# Patient Record
Sex: FEMALE | Race: WHITE | NOT HISPANIC OR LATINO | Employment: OTHER | URBAN - METROPOLITAN AREA
[De-identification: names, ages, dates, MRNs, and addresses within clinical notes are randomized per-mention and may not be internally consistent; named-entity substitution may affect disease eponyms.]

---

## 2017-01-21 ENCOUNTER — HOSPITAL ENCOUNTER (EMERGENCY)
Facility: MEDICAL CENTER | Age: 67
End: 2017-01-21
Attending: EMERGENCY MEDICINE
Payer: COMMERCIAL

## 2017-01-21 VITALS
DIASTOLIC BLOOD PRESSURE: 71 MMHG | RESPIRATION RATE: 16 BRPM | BODY MASS INDEX: 43.67 KG/M2 | HEIGHT: 65 IN | HEART RATE: 79 BPM | OXYGEN SATURATION: 93 % | WEIGHT: 262.13 LBS | TEMPERATURE: 97.1 F | SYSTOLIC BLOOD PRESSURE: 129 MMHG

## 2017-01-21 DIAGNOSIS — R10.10 PAIN OF UPPER ABDOMEN: ICD-10-CM

## 2017-01-21 DIAGNOSIS — R19.7 VOMITING AND DIARRHEA: ICD-10-CM

## 2017-01-21 DIAGNOSIS — R11.10 VOMITING AND DIARRHEA: ICD-10-CM

## 2017-01-21 DIAGNOSIS — E87.6 HYPOKALEMIA: ICD-10-CM

## 2017-01-21 LAB
ALBUMIN SERPL BCP-MCNC: 3.6 G/DL (ref 3.2–4.9)
ALBUMIN/GLOB SERPL: 1.1 G/DL
ALP SERPL-CCNC: 85 U/L (ref 30–99)
ALT SERPL-CCNC: 47 U/L (ref 2–50)
ANION GAP SERPL CALC-SCNC: 11 MMOL/L (ref 0–11.9)
AST SERPL-CCNC: 40 U/L (ref 12–45)
BASOPHILS # BLD AUTO: 0 % (ref 0–1.8)
BASOPHILS # BLD: 0 K/UL (ref 0–0.12)
BILIRUB SERPL-MCNC: 0.8 MG/DL (ref 0.1–1.5)
BUN SERPL-MCNC: 10 MG/DL (ref 8–22)
CALCIUM SERPL-MCNC: 8.5 MG/DL (ref 8.4–10.2)
CHLORIDE SERPL-SCNC: 89 MMOL/L (ref 96–112)
CO2 SERPL-SCNC: 29 MMOL/L (ref 20–33)
CREAT SERPL-MCNC: 0.81 MG/DL (ref 0.5–1.4)
EOSINOPHIL # BLD AUTO: 0.34 K/UL (ref 0–0.51)
EOSINOPHIL NFR BLD: 4 % (ref 0–6.9)
ERYTHROCYTE [DISTWIDTH] IN BLOOD BY AUTOMATED COUNT: 35.8 FL (ref 35.9–50)
GFR SERPL CREATININE-BSD FRML MDRD: >60 ML/MIN/1.73 M 2
GLOBULIN SER CALC-MCNC: 3.2 G/DL (ref 1.9–3.5)
GLUCOSE SERPL-MCNC: 111 MG/DL (ref 65–99)
HCT VFR BLD AUTO: 39.1 % (ref 37–47)
HGB BLD-MCNC: 14.1 G/DL (ref 12–16)
LYMPHOCYTES # BLD AUTO: 1.51 K/UL (ref 1–4.8)
LYMPHOCYTES NFR BLD: 18 % (ref 22–41)
MANUAL DIFF BLD: NORMAL
MCH RBC QN AUTO: 29.4 PG (ref 27–33)
MCHC RBC AUTO-ENTMCNC: 36.1 G/DL (ref 33.6–35)
MCV RBC AUTO: 81.5 FL (ref 81.4–97.8)
MONOCYTES # BLD AUTO: 0.34 K/UL (ref 0–0.85)
MONOCYTES NFR BLD AUTO: 4 % (ref 0–13.4)
NEUTROPHILS # BLD AUTO: 6.22 K/UL (ref 2–7.15)
NEUTROPHILS NFR BLD: 71 % (ref 44–72)
NEUTS BAND NFR BLD MANUAL: 3 % (ref 0–10)
NRBC # BLD AUTO: 0 K/UL
NRBC BLD AUTO-RTO: 0 /100 WBC
PLATELET # BLD AUTO: 271 K/UL (ref 164–446)
PMV BLD AUTO: 9.1 FL (ref 9–12.9)
POTASSIUM SERPL-SCNC: 2.6 MMOL/L (ref 3.6–5.5)
PROT SERPL-MCNC: 6.8 G/DL (ref 6–8.2)
RBC # BLD AUTO: 4.8 M/UL (ref 4.2–5.4)
SODIUM SERPL-SCNC: 129 MMOL/L (ref 135–145)
WBC # BLD AUTO: 8.4 K/UL (ref 4.8–10.8)

## 2017-01-21 PROCEDURE — 85007 BL SMEAR W/DIFF WBC COUNT: CPT

## 2017-01-21 PROCEDURE — 99285 EMERGENCY DEPT VISIT HI MDM: CPT

## 2017-01-21 PROCEDURE — 85027 COMPLETE CBC AUTOMATED: CPT

## 2017-01-21 PROCEDURE — 96365 THER/PROPH/DIAG IV INF INIT: CPT

## 2017-01-21 PROCEDURE — 700111 HCHG RX REV CODE 636 W/ 250 OVERRIDE (IP): Performed by: EMERGENCY MEDICINE

## 2017-01-21 PROCEDURE — 700111 HCHG RX REV CODE 636 W/ 250 OVERRIDE (IP): Performed by: FAMILY MEDICINE

## 2017-01-21 PROCEDURE — 36415 COLL VENOUS BLD VENIPUNCTURE: CPT

## 2017-01-21 PROCEDURE — 700102 HCHG RX REV CODE 250 W/ 637 OVERRIDE(OP): Performed by: EMERGENCY MEDICINE

## 2017-01-21 PROCEDURE — A9270 NON-COVERED ITEM OR SERVICE: HCPCS | Performed by: EMERGENCY MEDICINE

## 2017-01-21 PROCEDURE — 80053 COMPREHEN METABOLIC PANEL: CPT

## 2017-01-21 RX ORDER — POTASSIUM CHLORIDE 750 MG/1
40 TABLET, FILM COATED, EXTENDED RELEASE ORAL ONCE
Status: COMPLETED | OUTPATIENT
Start: 2017-01-21 | End: 2017-01-21

## 2017-01-21 RX ORDER — POTASSIUM CHLORIDE 7.45 MG/ML
10 INJECTION INTRAVENOUS
Status: DISCONTINUED | OUTPATIENT
Start: 2017-01-21 | End: 2017-01-21 | Stop reason: HOSPADM

## 2017-01-21 RX ORDER — POTASSIUM CHLORIDE 750 MG/1
10 TABLET, FILM COATED, EXTENDED RELEASE ORAL 2 TIMES DAILY
Qty: 20 TAB | Refills: 3 | Status: SHIPPED | OUTPATIENT
Start: 2017-01-21 | End: 2018-09-08

## 2017-01-21 RX ORDER — IBUPROFEN 200 MG
400 TABLET ORAL EVERY 6 HOURS PRN
Status: ON HOLD | COMMUNITY
End: 2021-05-18

## 2017-01-21 RX ORDER — DEXTROMETHORPHAN HBR. AND GUAIFENESIN 10; 100 MG/5ML; MG/5ML
10 SOLUTION ORAL EVERY 4 HOURS PRN
Status: SHIPPED | COMMUNITY
End: 2018-09-08

## 2017-01-21 RX ORDER — SODIUM CHLORIDE 9 MG/ML
1000 INJECTION, SOLUTION INTRAVENOUS ONCE
Status: COMPLETED | OUTPATIENT
Start: 2017-01-21 | End: 2017-01-21

## 2017-01-21 RX ORDER — AZITHROMYCIN 250 MG/1
250-500 TABLET, FILM COATED ORAL DAILY
Status: SHIPPED | COMMUNITY
Start: 2017-01-16 | End: 2018-09-08

## 2017-01-21 RX ADMIN — POTASSIUM CHLORIDE 40 MEQ: 750 TABLET, FILM COATED, EXTENDED RELEASE ORAL at 17:48

## 2017-01-21 RX ADMIN — SODIUM CHLORIDE 1000 ML: 9 INJECTION, SOLUTION INTRAVENOUS at 17:48

## 2017-01-21 RX ADMIN — POTASSIUM CHLORIDE 10 MEQ: 7.46 INJECTION, SOLUTION INTRAVENOUS at 17:47

## 2017-01-21 ASSESSMENT — PAIN SCALES - GENERAL
PAINLEVEL_OUTOF10: 0
PAINLEVEL_OUTOF10: ASSUMED PAIN PRESENT

## 2017-01-21 NOTE — ED AVS SNAPSHOT
After Visit Summary                                                                                                                Guerda Lunsford   MRN: [id number]    Department:  [facility], Emergency Dept   Date of Visit:  1/21/2017            [facility], Emergency Dept    [address]    [place] [address]    Phone:  [phone]      You were seen by     Wing Zapata M.D.      Your Diagnosis Was     Pain of upper abdomen     R10.10       These are the medications you received during your hospitalization from 01/21/2017 1315 to 01/21/2017 1856     Date/Time Order Dose Route Action    01/21/2017 1747 potassium chloride in water (KCL) ivpb 10 mEq 10 mEq Intravenous New Bag    01/21/2017 1748 potassium chloride ER (KLOR-CON) tablet 40 mEq 40 mEq Oral Given    01/21/2017 1748 NS (BOLUS) infusion 1,000 mL 1,000 mL Intravenous Given      Follow-up Information     1. Follow up with Hermelinda Mccabe D.O.. Schedule an appointment as soon as possible for a visit in 1 week.    Specialty:  Family Medicine    Why:  As needed    Contact information    [address]  [address]  [place] [id number] [phone]        Medication Information     Review all of your home medications and newly ordered medications with your primary doctor and/or pharmacist as soon as possible. Follow medication instructions as directed by your doctor and/or pharmacist.     Please keep your complete medication list with you and share with your physician. Update the information when medications are discontinued, doses are changed, or new medications (including over-the-counter products) are added; and carry medication information at all times in the event of emergency situations.               Medication List      START taking these medications        Instructions    potassium chloride ER 10 MEQ tablet   Commonly known as:  KLOR-CON    Take 1 Tab by mouth 2 times a day.   Dose:  10 mEq         ASK your doctor about these medications        Instructions    albuterol 108 (90 BASE) MCG/ACT Aers inhalation aerosol    Inhale 2 Puffs by mouth every 6 hours as needed for Shortness of Breath.   Dose:  2 Puff       azithromycin 250 MG Tabs   Commonly known as:  ZITHROMAX    Take 250-500 mg by mouth every day. 5 day course, started 01/16. Patient discontinued after third dose.   Dose:  250-500 mg       docusate sodium 50 MG Caps   Commonly known as:  COLACE    Take 50 mg by mouth 1 time daily as needed for Constipation.   Dose:  50 mg       guaifenesin DM sugar free  MG/5ML Liqd soln   Commonly known as:  DIABETIC TUSSIN DM    Take 10 mL by mouth every four hours as needed for Cough.   Dose:  10 mL       hydrochlorothiazide 25 MG Tabs   Commonly known as:  HYDRODIURIL    Take 25 mg by mouth every day.   Dose:  25 mg       ibuprofen 200 MG Tabs   Commonly known as:  MOTRIN    Take 400 mg by mouth every 6 hours as needed for Mild Pain.   Dose:  400 mg       ipratropium-albuterol 0.5-2.5 (3) MG/3ML nebulizer solution   Commonly known as:  DUONEB    3 mL by Nebulization route 4 times a day.   Dose:  3 mL       sertraline 100 MG Tabs   Commonly known as:  ZOLOFT    Take 150 mg by mouth every day.   Dose:  150 mg               Procedures and tests performed during your visit     CBC WITH DIFFERENTIAL    COMP METABOLIC PANEL    DIFFERENTIAL MANUAL    ESTIMATED GFR        Discharge Instructions         Abdominal Pain, Extended Version   Belly Pain, Stomach Pain    Take a clear fluid diet 12 hours and use Tylenol for p  Return  for worsening pain (especially in the lower right abdomen), pain that is worse with movement, uncontrolled vomiting, new fever, bleeding or ill appearance. Take potassium as prescribed.        Your exam may not have shown the exact reason you have abdominal pain.  Since there are many different causes of abdominal pain, another checkup and more tests may be needed. One of the many possible  causes of abdominal pain in any person who has not had their appendix removed is Acute Appendicitis.  Appendicitis is often a very difficult to diagnosis. Normal blood tests, urine tests, and even ultrasound and CT can not ensure there is not an early appendicitis. Sometimes only the changes which occur over time will allow appendicitis and other causes of abdominal pain to be determined.  Because of this, it is important you follow all of the instructions below.                                                                                                Home care instructions  Ø Rest.  Ø Do not eat solid food until your pain is gone.  Ø While You Have Pain:  Stay on a clear liquid diet.  A clear liquid is one you can see through (water, weak tea, broth or bouillon, ginger ale, Jell-o, Clint-Aid, Gatorade, apple juice, popsicles or ice chips).   Ø When Your Pain is Gone:  Start a light diet (dry toast, crackers, applesauce, white rice, bananas, broth or bouillon) and increase the diet slowly, as long as it does not bother you.  No dairy products (including cheese and eggs) and no spicy, fatty, fried or high fiber foods.  Ø No alcohol, caffeine or cigarettes.  Ø Take your regular medicines unless the caregiver told you not to.  Ø Take any prescribed medicine as directed.  Ø Do not take medicine containing aspirin, ibuprofen (Advil® / Motrin® ), naprosyn/naproxen (Aleve®) or ketoprofen (Orudis® ) unless told to by your own caregiver.    seek immediate medical attention if :  Ø Your pain is not gone in 8-12 hours.  Ø Your pain becomes worse, changes location or feels different.  Ø You have a fever or shaking chills.  Ø You keep throwing up or cannot drink liquids.   Ø You see blood when you throw up or see blood in your bowel movements.    Ø Your bowel movements become dark or black.  Ø You move your bowels frequently.  Ø Your bowel movements stop (become blocked) or you cannot pass gas.  Ø You have bloody, frequent  or painful urination (“passing water”).  Ø Your skin or the whites of your eyes look yellow.  Ø Your stomach becomes bloated or bigger.  Ø You notice bleeding or discharge from your vagina.  Ø You have dizziness or fainting.  Ø You have chest or back pain.   Ø Anything else worries you.  Ø You become short of breath.    If you have questions or concerns, please call your caregiver.     Adapted from ©2001  [employer] Aftercare Instruction Sheets  Last reviewed July 12, 2005, [employer], creator of [employer] Patient Information System.    ExitCare® Patient Information ©2007 [employer].              Patient Information     Patient Information    Following emergency treatment: all patient requiring follow-up care must return either to a private physician or a clinic if your condition worsens before you are able to obtain further medical attention, please return to the emergency room.     Billing Information    At [facility], we work to make the billing process streamlined for our patients.  Our Representatives are here to answer any questions you may have regarding your hospital bill.  If you have insurance coverage and have supplied your insurance information to us, we will submit a claim to your insurer on your behalf.  Should you have any questions regarding your bill, we can be reached online or by phone as follows:  Online: You are able pay your bills online or live chat with our representatives about any billing questions you may have. We are here to help Monday - Friday from 8:00am to 7:30pm and 9:00am - 12:00pm on Saturdays.  Please visit [email or web address]  for more information.   Phone:  [phone] or [phone]    Please note that your emergency physician, surgeon, pathologist, radiologist, anesthesiologist, and other specialists are not employed by [facility] and will therefore bill separately for their services.  Please  contact them directly for any questions concerning their bills at the numbers below:     Emergency Physician Services:  [phone]  [facility]:  [phone]  [facility]:  [phone]  [facility]:  [phone]    1. Your final bill may vary from the amount quoted upon discharge if all procedures are not complete at that time, or if your doctor has additional procedures of which we are not aware. You will receive an additional bill if you return to the Emergency Department at [facility] for suture removal regardless of the facility of which the sutures were placed.     2. Please arrange for settlement of this account at the emergency registration.    3. All self-pay accounts are due in full at the time of treatment.  If you are unable to meet this obligation then payment is expected within 4-5 days.     4. If you have had radiology studies (CT, X-ray, Ultrasound, MRI), you have received a preliminary result during your emergency department visit. Please contact the radiology department [phone] to receive a copy of your final result. Please discuss the Final result with your primary physician or with the follow up physician provided.     Crisis Hotline:  [place] Crisis Hotline:  9-404-BONLBQM or 1-648.584.4931  [place] Crisis Hotline:    1-191.907.7744 or 816-046-6906         ED Discharge Follow Up Questions    1. In order to provide you with very good care, we would like to follow up with a phone call in the next few days.  May we have your permission to contact you?     YES /  NO    2. What is the best phone number to call you? (       )_____-__________    3. What is the best time to call you?      Morning  /  Afternoon  /  Evening                   Patient Signature:  ____________________________________________________________    Date:  ____________________________________________________________

## 2017-01-21 NOTE — ED AVS SNAPSHOT
[employer] Access Code: [email or web address]  Expires: 2/20/2017  6:56 PM    [employer]  A secure, online tool to manage your health information     [employer]’s [employer]® is a secure, online tool that connects you to your personalized health information from the privacy of your home -- day or night - making it very easy for you to manage your healthcare. Once the activation process is completed, you can even access your medical information using the [employer] jeramie, which is available for free in the Apple Jeramie store or Google Play store.     [employer] provides the following levels of access (as shown below):   My Chart Features   [facility] Primary Care Doctor [facility]  Specialists [facility]  Urgent  Care Non-[facility]  Primary Care  Doctor   Email your healthcare team securely and privately 24/7 X X X X   Manage appointments: schedule your next appointment; view details of past/upcoming appointments X      Request prescription refills. X      View recent personal medical records, including lab and immunizations X X X X   View health record, including health history, allergies, medications X X X X   Read reports about your outpatient visits, procedures, consult and ER notes X X X X   See your discharge summary, which is a recap of your hospital and/or ER visit that includes your diagnosis, lab results, and care plan. X X       How to register for [employer]:  1. Go to  [email or web address].  2. Click on the Sign Up Now box, which takes you to the New Member Sign Up page. You will need to provide the following information:  a. Enter your [employer] Access Code exactly as it appears at the top of this page. (You will not need to use this code after you’ve completed the sign-up process. If you do not sign up before the expiration date, you must request a new code.)   b. Enter your date of birth.   c. Enter your home email address.   d. Click Submit, and follow the next screen’s instructions.  3. Create a [employer] ID. This will be your [employer]  login ID and cannot be changed, so think of one that is secure and easy to remember.  4. Create a [employer] password. You can change your password at any time.  5. Enter your Password Reset Question and Answer. This can be used at a later time if you forget your password.   6. Enter your e-mail address. This allows you to receive e-mail notifications when new information is available in [employer].  7. Click Sign Up. You can now view your health information.    For assistance activating your [employer] account, call [phone]

## 2017-01-21 NOTE — ED PROVIDER NOTES
"CHIEF COMPLAINT  Chief Complaint   Patient presents with   • Abdominal Pain   • Nausea/Vomiting/Diarrhea       HPI (1,4)  Guerda Lunsford is a 66 y.o. female who presents with complaint of abdominal pain. N/V, and diarrhea after starting azithromycin 5 days ago for suspected bronchiolitis diagnosed at [facility] Urgent care. Patient reports symptoms started  With N/V initially on Monday night that progressed to 10-episodes non-bloody diarrhea and subjective fever/chills,and upper abdominal 3/10 dull nonradiating pain with borborgamy the following day. Patient stopped taking the azithromycin on Wednesday with improved symptoms. Patient restarted the azithromycin  on Thursday with return of diarrhea and subjective fever/chills. Patient reports last dose of Azithromycin on Thursday with no further diarrhea or N/V, however patient reports subjective fever described like \"nuclear medicine through my vein\" throughout her body and  Dull mild upper abdominal pain this morning that resolved prior to arrving to the ED.      REVIEW OF SYSTEMS(2/10)  Pertinent positives include: currently asymptmatic  Pertinent negatives include: denies headache, dizziness, N/V, diarreha, f/c, abdominal pain, hematochezia, melena, dysuria, polyuria, increased urinary urgency.   All other systems are negative.     PAST MEDICAL HISTORY(PFS1,2)  Past Medical History   Diagnosis Date   • Cholesterol serum elevated      diet controlled   • Hypertension      non-medicated   • Arthritis    • Allergy    • Anemia 1997     Bleeding hemorrhoids   • Hemorrhoids 1997     had surgery   • Anxiety    • Heart murmur      Comes and goes   • Headache(784.0)      Occasionally, can become severe if she does not take Advil.   • Depression      anxiety   • Infectious disease      uti   • Urinary tract infection, site not specified    • Pneumonia    • Indigestion    • Bronchitis    • Diverticulitis    hospitalized for diverticulitis for 4 days in March 2016    FAMILY " "HISTORY  Family History   Problem Relation Age of Onset   • Diabetes Mother    • Heart Disease Mother 60     MI then CABG   • Heart Disease Father      CHF   • Alcohol/Drug Maternal Aunt    • Heart Disease Maternal Grandmother 65     MI,  with it   • Alcohol/Drug Maternal Grandmother    • Alcohol/Drug Maternal Grandfather    • Alcohol/Drug Paternal Grandmother        SOCIAL HISTORY  Social History   Substance Use Topics   • Smoking status: Never Smoker    • Smokeless tobacco: Never Used   • Alcohol Use: No     History   Drug Use No   denies illicit drug use    SURGICAL HISTORY  Past Surgical History   Procedure Laterality Date   • Laminotomy       2 Lumbar Discs   • Breast biopsy     • Other abdominal surgery       appy   • Appendectomy     • Gyn surgery       hysterectomy - partial   • Abdominal hysterectomy total       Cervical precancer and bleeding.  No oophorectomy.   • Other orthopedic surgery       lami   • Other       hemorrhoidectomy       CURRENT MEDICATIONS  Home Medications     Reviewed by Yasmeen Shaffer (Pharmacy Tech) on 17 at 1514  Med List Status: Complete    Medication Last Dose Status    albuterol (VENTOLIN OR PROVENTIL) 108 (90 BASE) MCG/ACT AERS 2017 Active    azithromycin (ZITHROMAX) 250 MG Tab discontinued Active    docusate sodium (COLACE) 50 MG Cap > 3 days Active    guaifenesin DM sugar free (DIABETIC TUSSIN DM)  MG/5ML Liquid soln > 5 days Active    hydrochlorothiazide (HYDRODIURIL) 25 MG Tab 2017 Active    ibuprofen (MOTRIN) 200 MG Tab > 3 days Active    ipratropium-albuterol (DUONEB) 0.5-2.5 (3) MG/3ML nebulizer solution 2017 Active    sertraline (ZOLOFT) 100 MG TABS 2017 Active                ALLERGIES  None.    PHYSICAL EXAM (2,8)  VITAL SIGNS: /71 mmHg  Pulse 79  Temp(Src) 36.2 °C (97.1 °F)  Resp 16  Ht 1.651 m (5' 5\")  Wt 118.9 kg (262 lb 2 oz)  BMI 43.62 kg/m2  SpO2 92% Reviewed and  borderline elevated blood " pressure  Constitutional: pleasant, Well developed, Well nourished, obese  HENT: Normocephalic, atraumatic  Eyes: PERRLA, conjunctiva pink, no scleral icterus.    Cardiovascular: RRR, no murmurs or gallops appreciated  Respiratory: CTAB, no WOB, good-air flow throughout.  Gastrointestinal: soft, NTTP, no guarding or rebound to deep palpation, no CVA tenderness.  Skin: No erythema, no rash.    Genitourinary:  No suprapubic tenderness    Neurologic: Alert & oriented x 3, cranial nerves 2-12 intact by passive exam.  No focal deficit noted.  Psychiatric: Affect normal, Judgment normal, Mood normal.   DIFFERENTIAL DIAGNOSIS:  drug reaction, c. Difficile infection, gastritis, diverticulitis (unlikley given no fever)    LABORATORY: Reviewed as below.  Results for orders placed or performed during the hospital encounter of 01/21/17   CBC WITH DIFFERENTIAL   Result Value Ref Range    WBC 8.4 4.8 - 10.8 K/uL    RBC 4.80 4.20 - 5.40 M/uL    Hemoglobin 14.1 12.0 - 16.0 g/dL    Hematocrit 39.1 37.0 - 47.0 %    MCV 81.5 81.4 - 97.8 fL    MCH 29.4 27.0 - 33.0 pg    MCHC 36.1 (H) 33.6 - 35.0 g/dL    RDW 35.8 (L) 35.9 - 50.0 fL    Platelet Count 271 164 - 446 K/uL    MPV 9.1 9.0 - 12.9 fL    Nucleated RBC 0.00 /100 WBC    NRBC (Absolute) 0.00 K/uL    Neutrophils-Polys 71.00 44.00 - 72.00 %    Lymphocytes 18.00 (L) 22.00 - 41.00 %    Monocytes 4.00 0.00 - 13.40 %    Eosinophils 4.00 0.00 - 6.90 %    Basophils 0.00 0.00 - 1.80 %    Neutrophils (Absolute) 6.22 2.00 - 7.15 K/uL    Lymphs (Absolute) 1.51 1.00 - 4.80 K/uL    Monos (Absolute) 0.34 0.00 - 0.85 K/uL    Eos (Absolute) 0.34 0.00 - 0.51 K/uL    Baso (Absolute) 0.00 0.00 - 0.12 K/uL   COMP METABOLIC PANEL   Result Value Ref Range    Sodium 129 (L) 135 - 145 mmol/L    Potassium 2.6 (LL) 3.6 - 5.5 mmol/L    Chloride 89 (L) 96 - 112 mmol/L    Co2 29 20 - 33 mmol/L    Anion Gap 11.0 0.0 - 11.9    Glucose 111 (H) 65 - 99 mg/dL    Bun 10 8 - 22 mg/dL    Creatinine 0.81 0.50 - 1.40  mg/dL    Calcium 8.5 8.4 - 10.2 mg/dL    AST(SGOT) 40 12 - 45 U/L    ALT(SGPT) 47 2 - 50 U/L    Alkaline Phosphatase 85 30 - 99 U/L    Total Bilirubin 0.8 0.1 - 1.5 mg/dL    Albumin 3.6 3.2 - 4.9 g/dL    Total Protein 6.8 6.0 - 8.2 g/dL    Globulin 3.2 1.9 - 3.5 g/dL    A-G Ratio 1.1 g/dL       INTERVENTIONS:  Medications   potassium chloride in water (KCL) ivpb 10 mEq (0 mEq Intravenous Stopped 1/21/17 1854)   potassium chloride ER (KLOR-CON) tablet 40 mEq (40 mEq Oral Given 1/21/17 1748)   NS (BOLUS) infusion 1,000 mL (1,000 mL Intravenous Given 1/21/17 1748)         COURSE & MEDICAL DECISION MAKING  Azithromycin allergy  Hypokalemia K+2.6 - repleted IV and PO potassium and gave Il NS bolus fluids    PLAN:  -Follow up with primary care, Dr. Hermelinda Mccabe  -will provide oral potassium for 10 days  -discussed stopping azithromycin use  -provided return to ED precautions    CONDITION: stable    FINAL IMPRESSION  1. Pain of upper abdomen    2. Vomiting and diarrhea    3. Hypokalemia          Electronically signed by: Cody Messer, 1/21/2017 3:37 PM      I independently evaluated the patient and repeated the important components of the history and physical. I discussed the management with the resident. I have reviewed and agree with the pertinent clinical information above including history, exam, study findings, and recommendations.     This patient presents with vomiting diarrhea and abdominal pain this week how her symptoms are pretty much resolved at this time and she is nontender. Patient was primarily worried that she had recurrent diverticulitis but this is unlikely given her exam and laboratory findings as demonstrated above. She likely had a viral syndrome or an adverse effect to azithromycin. C. difficile enteritis is much less likely.    Electronically signed by: Wing Zapata, 1/21/2017 8:59 PM

## 2017-01-21 NOTE — ED AVS SNAPSHOT
1/21/2017          Guerda Lunsford  [address] [place] [address]    Dear Guerda:    [facility] wants to ensure your discharge home is safe and you or your loved ones have had all your questions answered regarding your care after you leave the hospital.    You may receive a telephone call within two days of your discharge.  This call is to make certain you understand your discharge instructions as well as ensure we provided you with the best care possible during your stay with us.     The call will only last approximately 3-5 minutes and will be done by a nurse.    Once again, we want to ensure your discharge home is safe and that you have a clear understanding of any next steps in your care.  If you have any questions or concerns, please do not hesitate to contact us, we are here for you.  Thank you for choosing [facility] for your healthcare needs.    Sincerely,    Reuben Mcclelland    [facility]

## 2017-01-21 NOTE — ED NOTES
"Med rec complete per patient.  Patient started a Zpak on 01/16, took three doses total and discontinued. States she had a reaction to it.  Allergies reviewed with patient and updated per patient request after speaking with [facility].  Patient states she took ciprofloxacin and metronidazole in March 2016 and had a similar reaction to them as she did to azithromycin, unsure if allergic.  Patient states she used to get \"lots\" of sinus infections and was treated with antibiotics for them \"many times\" until her surgery. States she tolerates amoxicillin for sure, but is unsure of other antibiotics that she's taken.  "

## 2017-01-22 NOTE — DISCHARGE INSTRUCTIONS
Abdominal Pain, Extended Version   Belly Pain, Stomach Pain    Take a clear fluid diet 12 hours and use Tylenol for p  Return  for worsening pain (especially in the lower right abdomen), pain that is worse with movement, uncontrolled vomiting, new fever, bleeding or ill appearance. Take potassium as prescribed.        Your exam may not have shown the exact reason you have abdominal pain.  Since there are many different causes of abdominal pain, another checkup and more tests may be needed. One of the many possible causes of abdominal pain in any person who has not had their appendix removed is Acute Appendicitis.  Appendicitis is often a very difficult to diagnosis. Normal blood tests, urine tests, and even ultrasound and CT can not ensure there is not an early appendicitis. Sometimes only the changes which occur over time will allow appendicitis and other causes of abdominal pain to be determined.  Because of this, it is important you follow all of the instructions below.                                                                                                Home care instructions  Ø Rest.  Ø Do not eat solid food until your pain is gone.  Ø While You Have Pain:  Stay on a clear liquid diet.  A clear liquid is one you can see through (water, weak tea, broth or bouillon, ginger ale, Jell-o, Clint-Aid, Gatorade, apple juice, popsicles or ice chips).   Ø When Your Pain is Gone:  Start a light diet (dry toast, crackers, applesauce, white rice, bananas, broth or bouillon) and increase the diet slowly, as long as it does not bother you.  No dairy products (including cheese and eggs) and no spicy, fatty, fried or high fiber foods.  Ø No alcohol, caffeine or cigarettes.  Ø Take your regular medicines unless the caregiver told you not to.  Ø Take any prescribed medicine as directed.  Ø Do not take medicine containing aspirin, ibuprofen (Advil® / Motrin® ), naprosyn/naproxen (Aleve®) or ketoprofen (Orudis® ) unless  told to by your own caregiver.    seek immediate medical attention if :  Ø Your pain is not gone in 8-12 hours.  Ø Your pain becomes worse, changes location or feels different.  Ø You have a fever or shaking chills.  Ø You keep throwing up or cannot drink liquids.   Ø You see blood when you throw up or see blood in your bowel movements.    Ø Your bowel movements become dark or black.  Ø You move your bowels frequently.  Ø Your bowel movements stop (become blocked) or you cannot pass gas.  Ø You have bloody, frequent or painful urination (“passing water”).  Ø Your skin or the whites of your eyes look yellow.  Ø Your stomach becomes bloated or bigger.  Ø You notice bleeding or discharge from your vagina.  Ø You have dizziness or fainting.  Ø You have chest or back pain.   Ø Anything else worries you.  Ø You become short of breath.    If you have questions or concerns, please call your caregiver.     Adapted from ©2001  [employer] Aftercare Instruction Sheets  Last reviewed July 12, 2005, Layout [place]ut, creator of [employer] Patient Information System.    ExitCare® Patient Information ©2007 [employer].

## 2017-01-22 NOTE — ED NOTES
IV placed for intravenous medications. Patient given PO medications. Patient stated  was bringing dinner. RN clarified with ERP this was allowed. Patient educated 2 bags of IV potassium ordered.

## 2017-01-22 NOTE — ED NOTES
Jorge from Lab called with critical result of potassium 2.6 at 1710. Critical lab result read back to Jorge.   Dr. Zapata notified of critical lab result at 1710.  Critical lab result read back by Dr. Zapata.

## 2017-01-22 NOTE — ED NOTES
Patient ok for discharge after 1 bag of IV potassium.   Discharge instructions provided.  Pt verbalized the understanding of discharge instructions to follow up with PCP and to return to ER if condition worsens.  Pt ambulated out of ER without difficulty.   Patient educated on 1 new prescription.

## 2018-09-08 ENCOUNTER — HOSPITAL ENCOUNTER (EMERGENCY)
Facility: MEDICAL CENTER | Age: 68
End: 2018-09-08
Attending: EMERGENCY MEDICINE
Payer: COMMERCIAL

## 2018-09-08 VITALS
HEIGHT: 65 IN | RESPIRATION RATE: 19 BRPM | HEART RATE: 84 BPM | SYSTOLIC BLOOD PRESSURE: 144 MMHG | BODY MASS INDEX: 45.55 KG/M2 | TEMPERATURE: 97.5 F | DIASTOLIC BLOOD PRESSURE: 73 MMHG | WEIGHT: 273.37 LBS | OXYGEN SATURATION: 92 %

## 2018-09-08 DIAGNOSIS — R55 VASOVAGAL EPISODE: ICD-10-CM

## 2018-09-08 LAB
ALBUMIN SERPL BCP-MCNC: 4.3 G/DL (ref 3.2–4.9)
ALBUMIN/GLOB SERPL: 1.3 G/DL
ALP SERPL-CCNC: 79 U/L (ref 30–99)
ALT SERPL-CCNC: 18 U/L (ref 2–50)
ANION GAP SERPL CALC-SCNC: 10 MMOL/L (ref 0–11.9)
AST SERPL-CCNC: 20 U/L (ref 12–45)
BASOPHILS # BLD AUTO: 0.5 % (ref 0–1.8)
BASOPHILS # BLD: 0.05 K/UL (ref 0–0.12)
BILIRUB SERPL-MCNC: 0.4 MG/DL (ref 0.1–1.5)
BNP SERPL-MCNC: 40 PG/ML (ref 0–100)
BUN SERPL-MCNC: 17 MG/DL (ref 8–22)
CALCIUM SERPL-MCNC: 8.5 MG/DL (ref 8.4–10.2)
CHLORIDE SERPL-SCNC: 97 MMOL/L (ref 96–112)
CO2 SERPL-SCNC: 27 MMOL/L (ref 20–33)
CREAT SERPL-MCNC: 0.9 MG/DL (ref 0.5–1.4)
DEPRECATED D DIMER PPP IA-ACNC: 281 NG/ML(D-DU)
EKG IMPRESSION: NORMAL
EOSINOPHIL # BLD AUTO: 0.34 K/UL (ref 0–0.51)
EOSINOPHIL NFR BLD: 3.5 % (ref 0–6.9)
ERYTHROCYTE [DISTWIDTH] IN BLOOD BY AUTOMATED COUNT: 37.6 FL (ref 35.9–50)
GLOBULIN SER CALC-MCNC: 3.2 G/DL (ref 1.9–3.5)
GLUCOSE SERPL-MCNC: 136 MG/DL (ref 65–99)
HCT VFR BLD AUTO: 40.5 % (ref 37–47)
HGB BLD-MCNC: 14 G/DL (ref 12–16)
IMM GRANULOCYTES # BLD AUTO: 0.05 K/UL (ref 0–0.11)
IMM GRANULOCYTES NFR BLD AUTO: 0.5 % (ref 0–0.9)
LYMPHOCYTES # BLD AUTO: 2.34 K/UL (ref 1–4.8)
LYMPHOCYTES NFR BLD: 24.1 % (ref 22–41)
MCH RBC QN AUTO: 29 PG (ref 27–33)
MCHC RBC AUTO-ENTMCNC: 34.6 G/DL (ref 33.6–35)
MCV RBC AUTO: 84 FL (ref 81.4–97.8)
MONOCYTES # BLD AUTO: 0.81 K/UL (ref 0–0.85)
MONOCYTES NFR BLD AUTO: 8.4 % (ref 0–13.4)
NEUTROPHILS # BLD AUTO: 6.11 K/UL (ref 2–7.15)
NEUTROPHILS NFR BLD: 63 % (ref 44–72)
NRBC # BLD AUTO: 0 K/UL
NRBC BLD-RTO: 0 /100 WBC
PLATELET # BLD AUTO: 304 K/UL (ref 164–446)
PMV BLD AUTO: 9.2 FL (ref 9–12.9)
POTASSIUM SERPL-SCNC: 3.2 MMOL/L (ref 3.6–5.5)
PROT SERPL-MCNC: 7.5 G/DL (ref 6–8.2)
RBC # BLD AUTO: 4.82 M/UL (ref 4.2–5.4)
SODIUM SERPL-SCNC: 134 MMOL/L (ref 135–145)
TROPONIN I SERPL-MCNC: <0.02 NG/ML (ref 0–0.04)
WBC # BLD AUTO: 9.7 K/UL (ref 4.8–10.8)

## 2018-09-08 PROCEDURE — 99284 EMERGENCY DEPT VISIT MOD MDM: CPT

## 2018-09-08 PROCEDURE — 80053 COMPREHEN METABOLIC PANEL: CPT

## 2018-09-08 PROCEDURE — 85379 FIBRIN DEGRADATION QUANT: CPT

## 2018-09-08 PROCEDURE — 36415 COLL VENOUS BLD VENIPUNCTURE: CPT

## 2018-09-08 PROCEDURE — 83880 ASSAY OF NATRIURETIC PEPTIDE: CPT

## 2018-09-08 PROCEDURE — 85025 COMPLETE CBC W/AUTO DIFF WBC: CPT

## 2018-09-08 PROCEDURE — 93005 ELECTROCARDIOGRAM TRACING: CPT

## 2018-09-08 PROCEDURE — 84484 ASSAY OF TROPONIN QUANT: CPT

## 2018-09-08 PROCEDURE — 93005 ELECTROCARDIOGRAM TRACING: CPT | Performed by: EMERGENCY MEDICINE

## 2018-09-08 RX ORDER — AZELASTINE HYDROCHLORIDE, FLUTICASONE PROPIONATE 137; 50 UG/1; UG/1
1 SPRAY, METERED NASAL DAILY
Status: SHIPPED | COMMUNITY
End: 2023-04-10

## 2018-09-08 NOTE — ED NOTES
Pt BIB [facility] following a near-syncopal event. Pt states she was shopping at [employer] when she developed sudden onset dizziness, weakness, and feeling flush.

## 2018-09-08 NOTE — ED PROVIDER NOTES
ED Provider Note    CHIEF COMPLAINT  Chief Complaint   Patient presents with   • Dizziness   • Weakness       HPI  Guerda Lunsford is a 67 y.o. female who presents with near syncope.  She was at [employer] shopping near the end of her trip.  She suddenly felt a feeling of dizziness is the best she could explain she felt hot week.  She was helped to the ground.  It lasted a few minutes she has never had it before she is brought in for evaluation.  She did not have any pain no back pain neck pain headache or chest pain no abdominal pain no shortness of breath no vomiting.  She is felt okay prior to this.  She has no visual changes of diplopia or blurred vision.  No hearing loss no tinnitus no recent upper respiratory symptoms no leg pain or swelling no dysuria or diarrhea.  All other systems are negative    REVIEW OF SYSTEMS  See HPI for further details    PAST MEDICAL HISTORY  Past Medical History:   Diagnosis Date   • Allergy    • Anemia     Bleeding hemorrhoids   • Anxiety    • Arthritis    • Bronchitis    • Cholesterol serum elevated     diet controlled   • Depression     anxiety   • Diverticulitis    • Headache(784.0)     Occasionally, can become severe if she does not take Advil.   • Heart murmur     Comes and goes   • Hemorrhoids     had surgery   • Hypertension     non-medicated   • Indigestion    • Infectious disease     uti   • Pneumonia    • Urinary tract infection, site not specified        FAMILY HISTORY  Family History   Problem Relation Age of Onset   • Diabetes Mother    • Heart Disease Mother 60        MI then CABG   • Heart Disease Father         CHF   • Alcohol/Drug Maternal Aunt    • Heart Disease Maternal Grandmother 65        MI,  with it   • Alcohol/Drug Maternal Grandmother    • Alcohol/Drug Maternal Grandfather    • Alcohol/Drug Paternal Grandmother        SOCIAL HISTORY  Social History     Social History   • Marital status:      Spouse name: N/A   • Number of children: N/A   •  "Years of education: N/A     Social History Main Topics   • Smoking status: Never Smoker   • Smokeless tobacco: Never Used   • Alcohol use No   • Drug use: No   • Sexual activity: Not Currently     Partners: Male     Other Topics Concern   • Not on file     Social History Narrative   • No narrative on file       SURGICAL HISTORY  Past Surgical History:   Procedure Laterality Date   • BREAST BIOPSY  2009   • LAMINOTOMY  1982    2 Lumbar Discs   • ABDOMINAL HYSTERECTOMY TOTAL  1982    Cervical precancer and bleeding.  No oophorectomy.   • APPENDECTOMY     • GYN SURGERY      hysterectomy - partial   • OTHER      hemorrhoidectomy   • OTHER ABDOMINAL SURGERY      appy   • OTHER ORTHOPEDIC SURGERY      lami       CURRENT MEDICATIONS  Home Medications    **Home medications have not yet been reviewed for this encounter**         ALLERGIES  Allergies   Allergen Reactions   • Azithromycin Unspecified     Pt states she feels a burning and hot sensation \"I can feel it in my veins, all the way through my body down to the bottom of my feet.\"   • Cymbalta [Duloxetine Hcl]      Make blood pressure go up   • Demerol Rash     Rash at injection site, N/V.       PHYSICAL EXAM  VITAL SIGNS: /73   Pulse 82   Temp 36.4 °C (97.5 °F)   Resp 18   Ht 1.651 m (5' 5\")   Wt 124 kg (273 lb 5.9 oz)   SpO2 92%   BMI 45.49 kg/m²     Constitutional: Patient is alert and oriented x3 in mild distress   HENT: Moist mucous membranes  Eyes:   No conjunctivitis or icterus  Neck: Trachea is midline no palpable thyroid  Lymphatic: Negative anterior cervical lymphadenopathy  Cardiovascular: Normal heart rate    Thorax & Lungs: Clear to auscultation  Back: No CVA tenderness  Abdomen: Obese nontender palpation bowel sounds present  Neurologic: Normal motor sensation cranial nerves are intact  Extremities: Trace symmetric pretibial edema  Psychiatric: Affect normal, Judgment normal, Mood normal.     EKG: Normal sinus rhythm 78 with a normal corrected " QT interval normal QRS leftward axis T-wave inversions V1 through V4 unchanged from prior EKG    Results for orders placed or performed during the hospital encounter of 09/08/18   CBC WITH DIFFERENTIAL   Result Value Ref Range    WBC 9.7 4.8 - 10.8 K/uL    RBC 4.82 4.20 - 5.40 M/uL    Hemoglobin 14.0 12.0 - 16.0 g/dL    Hematocrit 40.5 37.0 - 47.0 %    MCV 84.0 81.4 - 97.8 fL    MCH 29.0 27.0 - 33.0 pg    MCHC 34.6 33.6 - 35.0 g/dL    RDW 37.6 35.9 - 50.0 fL    Platelet Count 304 164 - 446 K/uL    MPV 9.2 9.0 - 12.9 fL    Neutrophils-Polys 63.00 44.00 - 72.00 %    Lymphocytes 24.10 22.00 - 41.00 %    Monocytes 8.40 0.00 - 13.40 %    Eosinophils 3.50 0.00 - 6.90 %    Basophils 0.50 0.00 - 1.80 %    Immature Granulocytes 0.50 0.00 - 0.90 %    Nucleated RBC 0.00 /100 WBC    Neutrophils (Absolute) 6.11 2.00 - 7.15 K/uL    Lymphs (Absolute) 2.34 1.00 - 4.80 K/uL    Monos (Absolute) 0.81 0.00 - 0.85 K/uL    Eos (Absolute) 0.34 0.00 - 0.51 K/uL    Baso (Absolute) 0.05 0.00 - 0.12 K/uL    Immature Granulocytes (abs) 0.05 0.00 - 0.11 K/uL    NRBC (Absolute) 0.00 K/uL   COMP METABOLIC PANEL   Result Value Ref Range    Sodium 134 (L) 135 - 145 mmol/L    Potassium 3.2 (L) 3.6 - 5.5 mmol/L    Chloride 97 96 - 112 mmol/L    Co2 27 20 - 33 mmol/L    Anion Gap 10.0 0.0 - 11.9    Glucose 136 (H) 65 - 99 mg/dL    Bun 17 8 - 22 mg/dL    Creatinine 0.90 0.50 - 1.40 mg/dL    Calcium 8.5 8.4 - 10.2 mg/dL    AST(SGOT) 20 12 - 45 U/L    ALT(SGPT) 18 2 - 50 U/L    Alkaline Phosphatase 79 30 - 99 U/L    Total Bilirubin 0.4 0.1 - 1.5 mg/dL    Albumin 4.3 3.2 - 4.9 g/dL    Total Protein 7.5 6.0 - 8.2 g/dL    Globulin 3.2 1.9 - 3.5 g/dL    A-G Ratio 1.3 g/dL   TROPONIN   Result Value Ref Range    Troponin I <0.02 0.00 - 0.04 ng/mL   BTYPE NATRIURETIC PEPTIDE   Result Value Ref Range    B Natriuretic Peptide 40 0 - 100 pg/mL   D-DIMER   Result Value Ref Range    D-Dimer Screen 281 (H) <250 ng/mL(D-DU)   ESTIMATED GFR   Result Value Ref Range     GFR If African American >60 >60 mL/min/1.73 m 2    GFR If Non African American >60 >60 mL/min/1.73 m 2   EKG (ER)   Result Value Ref Range    Report       [facility] Emergency Dept.    Test Date:  2018  Pt Name:    KARYNA BARBER             Department: [facility]  MRN:        [id number]                      Room:       [place] [address]  Gender:     Female                       Technician: HRS  :        [id number]                   Requested By:ER TRIAGE PROTOCOL  Order #:    [id number]                    Reading MD:    Measurements  Intervals                                Axis  Rate:       78                           P:          25  TX:         181                          QRS:        -42  QRSD:       107                          T:          37  QT:         418  QTc:        477    Interpretive Statements  Sinus rhythm  Atrial premature complex  Left axis deviation  Low voltage, precordial leads  RSR' in V1 or V2, right VCD or RVH  Compared to ECG 2016 13:10:02  Atrial premature complex(es) now present          COURSE & MEDICAL DECISION MAKING  Pertinent Labs & Imaging studies reviewed. (See chart for details)  Patient's EKG is unchanged her lab work is unremarkable.  She had low normal oxygen intermittently.  She is low risk for PE so d-dimer was obtained for rule out.  It is slightly elevated have ever adjusting for age she does not need further evaluation for the d-dimer.    Patient's been ambulated at 3 PM feels fine.  I do not think she needs further evaluation.  I am going to discharge her with near-syncope information precautions and follow-up with her physician    FINAL IMPRESSION  1.   1. Vasovagal episode        2.   3.         Electronically signed by: Aaron Garcia, 2018 2:05 PM

## 2018-09-08 NOTE — ED NOTES
Pt ambulated around ED with no assistance. Pt denies SOB, difficulty breathing, lightheadedness, or dizziness. Pt tolerated ambulation well. ERP notified.

## 2018-09-08 NOTE — ED NOTES
Med Rec Updated and Complete per Pt at bedside with permission to do so in front of visitor/family  Allergies Reviewed and Updated  No PO ABX last 30 days    Pt states she is not taking any Potassium Supplements at this time

## 2020-02-19 ENCOUNTER — HOSPITAL ENCOUNTER (OUTPATIENT)
Dept: LAB | Facility: MEDICAL CENTER | Age: 70
End: 2020-02-19
Attending: FAMILY MEDICINE
Payer: COMMERCIAL

## 2020-02-19 LAB
ALBUMIN SERPL BCP-MCNC: 4.3 G/DL (ref 3.2–4.9)
ALBUMIN/GLOB SERPL: 1.7 G/DL
ALP SERPL-CCNC: 86 U/L (ref 30–99)
ALT SERPL-CCNC: 15 U/L (ref 2–50)
ANION GAP SERPL CALC-SCNC: 10 MMOL/L (ref 0–11.9)
AST SERPL-CCNC: 15 U/L (ref 12–45)
BILIRUB SERPL-MCNC: 0.6 MG/DL (ref 0.1–1.5)
BUN SERPL-MCNC: 23 MG/DL (ref 8–22)
CALCIUM SERPL-MCNC: 9.2 MG/DL (ref 8.5–10.5)
CHLORIDE SERPL-SCNC: 99 MMOL/L (ref 96–112)
CHOLEST SERPL-MCNC: 194 MG/DL (ref 100–199)
CO2 SERPL-SCNC: 30 MMOL/L (ref 20–33)
CREAT SERPL-MCNC: 0.99 MG/DL (ref 0.5–1.4)
FASTING STATUS PATIENT QL REPORTED: NORMAL
GLOBULIN SER CALC-MCNC: 2.6 G/DL (ref 1.9–3.5)
GLUCOSE SERPL-MCNC: 110 MG/DL (ref 65–99)
HDLC SERPL-MCNC: 41 MG/DL
LDLC SERPL CALC-MCNC: 121 MG/DL
POTASSIUM SERPL-SCNC: 3.7 MMOL/L (ref 3.6–5.5)
PROT SERPL-MCNC: 6.9 G/DL (ref 6–8.2)
SODIUM SERPL-SCNC: 139 MMOL/L (ref 135–145)
TRIGL SERPL-MCNC: 159 MG/DL (ref 0–149)

## 2020-02-19 PROCEDURE — 80053 COMPREHEN METABOLIC PANEL: CPT

## 2020-02-19 PROCEDURE — 80061 LIPID PANEL: CPT

## 2020-02-19 PROCEDURE — 83036 HEMOGLOBIN GLYCOSYLATED A1C: CPT | Mod: GA

## 2020-02-19 PROCEDURE — 84439 ASSAY OF FREE THYROXINE: CPT

## 2020-02-19 PROCEDURE — 84443 ASSAY THYROID STIM HORMONE: CPT

## 2020-02-19 PROCEDURE — 36415 COLL VENOUS BLD VENIPUNCTURE: CPT | Mod: GA

## 2020-02-20 LAB
EST. AVERAGE GLUCOSE BLD GHB EST-MCNC: 146 MG/DL
HBA1C MFR BLD: 6.7 % (ref 0–5.6)
T4 FREE SERPL-MCNC: 0.94 NG/DL (ref 0.53–1.43)
TSH SERPL DL<=0.005 MIU/L-ACNC: 1.61 UIU/ML (ref 0.38–5.33)

## 2020-09-02 ENCOUNTER — HOSPITAL ENCOUNTER (OUTPATIENT)
Dept: LAB | Facility: MEDICAL CENTER | Age: 70
End: 2020-09-02
Attending: FAMILY MEDICINE
Payer: MEDICARE

## 2020-09-02 PROCEDURE — 82570 ASSAY OF URINE CREATININE: CPT

## 2020-09-02 PROCEDURE — 82306 VITAMIN D 25 HYDROXY: CPT

## 2020-09-02 PROCEDURE — 82043 UR ALBUMIN QUANTITATIVE: CPT

## 2020-09-02 PROCEDURE — 36415 COLL VENOUS BLD VENIPUNCTURE: CPT

## 2020-09-02 PROCEDURE — 80061 LIPID PANEL: CPT

## 2020-09-02 PROCEDURE — 83036 HEMOGLOBIN GLYCOSYLATED A1C: CPT

## 2020-09-02 PROCEDURE — 80053 COMPREHEN METABOLIC PANEL: CPT

## 2020-09-03 LAB
25(OH)D3 SERPL-MCNC: 16 NG/ML (ref 30–100)
ALBUMIN SERPL BCP-MCNC: 4.2 G/DL (ref 3.2–4.9)
ALBUMIN/GLOB SERPL: 1.5 G/DL
ALP SERPL-CCNC: 115 U/L (ref 30–99)
ALT SERPL-CCNC: 14 U/L (ref 2–50)
ANION GAP SERPL CALC-SCNC: 14 MMOL/L (ref 7–16)
AST SERPL-CCNC: 11 U/L (ref 12–45)
BILIRUB SERPL-MCNC: 0.4 MG/DL (ref 0.1–1.5)
BUN SERPL-MCNC: 25 MG/DL (ref 8–22)
CALCIUM SERPL-MCNC: 9 MG/DL (ref 8.5–10.5)
CHLORIDE SERPL-SCNC: 98 MMOL/L (ref 96–112)
CHOLEST SERPL-MCNC: 128 MG/DL (ref 100–199)
CO2 SERPL-SCNC: 28 MMOL/L (ref 20–33)
CREAT SERPL-MCNC: 0.86 MG/DL (ref 0.5–1.4)
CREAT UR-MCNC: 136.55 MG/DL
EST. AVERAGE GLUCOSE BLD GHB EST-MCNC: 128 MG/DL
FASTING STATUS PATIENT QL REPORTED: NORMAL
GLOBULIN SER CALC-MCNC: 2.8 G/DL (ref 1.9–3.5)
GLUCOSE SERPL-MCNC: 116 MG/DL (ref 65–99)
HBA1C MFR BLD: 6.1 % (ref 0–5.6)
HDLC SERPL-MCNC: 40 MG/DL
LDLC SERPL CALC-MCNC: 63 MG/DL
MICROALBUMIN UR-MCNC: <1.2 MG/DL
MICROALBUMIN/CREAT UR: NORMAL MG/G (ref 0–30)
POTASSIUM SERPL-SCNC: 3.4 MMOL/L (ref 3.6–5.5)
PROT SERPL-MCNC: 7 G/DL (ref 6–8.2)
SODIUM SERPL-SCNC: 140 MMOL/L (ref 135–145)
TRIGL SERPL-MCNC: 127 MG/DL (ref 0–149)

## 2021-01-15 DIAGNOSIS — Z23 NEED FOR VACCINATION: ICD-10-CM

## 2021-04-07 ENCOUNTER — HOSPITAL ENCOUNTER (OUTPATIENT)
Dept: LAB | Facility: MEDICAL CENTER | Age: 71
End: 2021-04-07
Attending: PHYSICIAN ASSISTANT
Payer: MEDICARE

## 2021-04-07 LAB
ALBUMIN SERPL BCP-MCNC: 4.2 G/DL (ref 3.2–4.9)
ALBUMIN/GLOB SERPL: 1.4 G/DL
ALP SERPL-CCNC: 129 U/L (ref 30–99)
ALT SERPL-CCNC: 16 U/L (ref 2–50)
ANION GAP SERPL CALC-SCNC: 7 MMOL/L (ref 7–16)
AST SERPL-CCNC: 15 U/L (ref 12–45)
BASOPHILS # BLD AUTO: 0.5 % (ref 0–1.8)
BASOPHILS # BLD: 0.06 K/UL (ref 0–0.12)
BILIRUB SERPL-MCNC: 0.3 MG/DL (ref 0.1–1.5)
BUN SERPL-MCNC: 20 MG/DL (ref 8–22)
CALCIUM SERPL-MCNC: 9.4 MG/DL (ref 8.5–10.5)
CHLORIDE SERPL-SCNC: 97 MMOL/L (ref 96–112)
CHOLEST SERPL-MCNC: 132 MG/DL (ref 100–199)
CO2 SERPL-SCNC: 32 MMOL/L (ref 20–33)
CREAT SERPL-MCNC: 0.96 MG/DL (ref 0.5–1.4)
EOSINOPHIL # BLD AUTO: 0.44 K/UL (ref 0–0.51)
EOSINOPHIL NFR BLD: 3.8 % (ref 0–6.9)
ERYTHROCYTE [DISTWIDTH] IN BLOOD BY AUTOMATED COUNT: 40.5 FL (ref 35.9–50)
EST. AVERAGE GLUCOSE BLD GHB EST-MCNC: 143 MG/DL
GLOBULIN SER CALC-MCNC: 3 G/DL (ref 1.9–3.5)
GLUCOSE SERPL-MCNC: 117 MG/DL (ref 65–99)
HBA1C MFR BLD: 6.6 % (ref 4–5.6)
HCT VFR BLD AUTO: 42.4 % (ref 37–47)
HDLC SERPL-MCNC: 38 MG/DL
HGB BLD-MCNC: 14.1 G/DL (ref 12–16)
IMM GRANULOCYTES # BLD AUTO: 0.08 K/UL (ref 0–0.11)
IMM GRANULOCYTES NFR BLD AUTO: 0.7 % (ref 0–0.9)
LDLC SERPL CALC-MCNC: 60 MG/DL
LYMPHOCYTES # BLD AUTO: 1.92 K/UL (ref 1–4.8)
LYMPHOCYTES NFR BLD: 16.5 % (ref 22–41)
MCH RBC QN AUTO: 28.8 PG (ref 27–33)
MCHC RBC AUTO-ENTMCNC: 33.3 G/DL (ref 33.6–35)
MCV RBC AUTO: 86.7 FL (ref 81.4–97.8)
MONOCYTES # BLD AUTO: 0.73 K/UL (ref 0–0.85)
MONOCYTES NFR BLD AUTO: 6.3 % (ref 0–13.4)
NEUTROPHILS # BLD AUTO: 8.41 K/UL (ref 2–7.15)
NEUTROPHILS NFR BLD: 72.2 % (ref 44–72)
NRBC # BLD AUTO: 0 K/UL
NRBC BLD-RTO: 0 /100 WBC
PLATELET # BLD AUTO: 318 K/UL (ref 164–446)
PMV BLD AUTO: 9.5 FL (ref 9–12.9)
POTASSIUM SERPL-SCNC: 3.9 MMOL/L (ref 3.6–5.5)
PROT SERPL-MCNC: 7.2 G/DL (ref 6–8.2)
RBC # BLD AUTO: 4.89 M/UL (ref 4.2–5.4)
SODIUM SERPL-SCNC: 136 MMOL/L (ref 135–145)
TRIGL SERPL-MCNC: 169 MG/DL (ref 0–149)
WBC # BLD AUTO: 11.6 K/UL (ref 4.8–10.8)

## 2021-04-07 PROCEDURE — 36415 COLL VENOUS BLD VENIPUNCTURE: CPT | Mod: GA

## 2021-04-07 PROCEDURE — 80053 COMPREHEN METABOLIC PANEL: CPT

## 2021-04-07 PROCEDURE — 82306 VITAMIN D 25 HYDROXY: CPT | Mod: GA

## 2021-04-07 PROCEDURE — 83036 HEMOGLOBIN GLYCOSYLATED A1C: CPT | Mod: GA

## 2021-04-07 PROCEDURE — 85025 COMPLETE CBC W/AUTO DIFF WBC: CPT

## 2021-04-07 PROCEDURE — 80061 LIPID PANEL: CPT

## 2021-04-09 LAB — 25(OH)D3 SERPL-MCNC: 33 NG/ML (ref 30–80)

## 2021-05-13 ENCOUNTER — PRE-ADMISSION TESTING (OUTPATIENT)
Dept: ADMISSIONS | Facility: MEDICAL CENTER | Age: 71
End: 2021-05-13
Attending: ORTHOPAEDIC SURGERY
Payer: MEDICARE

## 2021-05-13 DIAGNOSIS — Z01.812 PRE-OPERATIVE LABORATORY EXAMINATION: ICD-10-CM

## 2021-05-13 DIAGNOSIS — Z01.810 PRE-OPERATIVE CARDIOVASCULAR EXAMINATION: ICD-10-CM

## 2021-05-13 LAB
ANION GAP SERPL CALC-SCNC: 15 MMOL/L (ref 7–16)
APTT PPP: 34.4 SEC (ref 24.7–36)
BUN SERPL-MCNC: 26 MG/DL (ref 8–22)
CALCIUM SERPL-MCNC: 9.3 MG/DL (ref 8.4–10.2)
CHLORIDE SERPL-SCNC: 96 MMOL/L (ref 96–112)
CO2 SERPL-SCNC: 29 MMOL/L (ref 20–33)
CREAT SERPL-MCNC: 1.01 MG/DL (ref 0.5–1.4)
EKG IMPRESSION: NORMAL
ERYTHROCYTE [DISTWIDTH] IN BLOOD BY AUTOMATED COUNT: 39.6 FL (ref 35.9–50)
GLUCOSE SERPL-MCNC: 104 MG/DL (ref 65–99)
HCT VFR BLD AUTO: 42.6 % (ref 37–47)
HGB BLD-MCNC: 14.4 G/DL (ref 12–16)
INR PPP: 1.13 (ref 0.87–1.13)
MCH RBC QN AUTO: 29.1 PG (ref 27–33)
MCHC RBC AUTO-ENTMCNC: 33.8 G/DL (ref 33.6–35)
MCV RBC AUTO: 86.2 FL (ref 81.4–97.8)
PLATELET # BLD AUTO: 302 K/UL (ref 164–446)
PMV BLD AUTO: 9.2 FL (ref 9–12.9)
POTASSIUM SERPL-SCNC: 3.4 MMOL/L (ref 3.6–5.5)
PROTHROMBIN TIME: 14.2 SEC (ref 12–14.6)
RBC # BLD AUTO: 4.94 M/UL (ref 4.2–5.4)
SCCMEC + MECA PNL NOSE NAA+PROBE: NEGATIVE
SCCMEC + MECA PNL NOSE NAA+PROBE: NEGATIVE
SODIUM SERPL-SCNC: 140 MMOL/L (ref 135–145)
WBC # BLD AUTO: 10.7 K/UL (ref 4.8–10.8)

## 2021-05-13 PROCEDURE — 93010 ELECTROCARDIOGRAM REPORT: CPT | Performed by: INTERNAL MEDICINE

## 2021-05-13 PROCEDURE — 85610 PROTHROMBIN TIME: CPT

## 2021-05-13 PROCEDURE — 85027 COMPLETE CBC AUTOMATED: CPT

## 2021-05-13 PROCEDURE — 80048 BASIC METABOLIC PNL TOTAL CA: CPT

## 2021-05-13 PROCEDURE — 87640 STAPH A DNA AMP PROBE: CPT | Mod: XU

## 2021-05-13 PROCEDURE — 87641 MR-STAPH DNA AMP PROBE: CPT

## 2021-05-13 PROCEDURE — 85730 THROMBOPLASTIN TIME PARTIAL: CPT

## 2021-05-13 PROCEDURE — 36415 COLL VENOUS BLD VENIPUNCTURE: CPT

## 2021-05-13 PROCEDURE — 93005 ELECTROCARDIOGRAM TRACING: CPT

## 2021-05-13 RX ORDER — ATORVASTATIN CALCIUM 20 MG/1
20 TABLET, FILM COATED ORAL EVERY EVENING
COMMUNITY
Start: 2021-03-01

## 2021-05-13 RX ORDER — METOPROLOL SUCCINATE 25 MG/1
25 TABLET, EXTENDED RELEASE ORAL EVERY EVENING
COMMUNITY

## 2021-05-13 ASSESSMENT — FIBROSIS 4 INDEX: FIB4 SCORE: 0.83

## 2021-05-13 NOTE — DISCHARGE PLANNING
DISCHARGE PLANNING NOTE - TOTAL JOINT    Procedure: Procedure(s):  ARTHROPLASTY, KNEE, TOTAL  Procedure Date: 5/18/2021  Insurance: Payor: MEDICARE / Plan: MEDICARE PART A & B / Product Type: *No Product type* /    Equipment currently available at home?  front-wheel walker and ice.  Steps into the home? 0  Steps within the home? 0  Toilet height? Standard  Type of shower? walk-in shower  Who will be with you during your recovery? Spouse.  Is Outpatient Physical Therapy set up after surgery? Yes  Did you take the Total Joint Class and where? Yes received NAON book today.  Planning same day discharge?unsure.    This writer met with pt during her preadmission appointment. Pt presents with a cane. Pt states her spouse is getting a shower chair and a toilet seat riser. Pt states she has all other needed equipment.Home safety checklist reviewed and copy given to pt. Pt educated to dc criteria. All questions answered and verbalizes understanding of all instructions. No dc needs identified at this time. Anticipate dc to home without barriers.

## 2021-05-13 NOTE — OR NURSING
"Preadmit appointment: \" Preparing for your Procedure information\" sheet given to patient with verbal and written instructions. Patient instructed to continue prescribed medications through the day before surgery, instructed to take the following medications the day of surgery per anesthesia protocol: AZELASTINE-FLUTICASONE. Pt states she take her meds at night; instructed to bring in med sheet with dates and times meds were taken.           Verbal and written instructions on self isolation prior to surgery and covid symptoms to watch for given to patient, pt advised to notify MD if any symptoms develop      Covid test 05/15/21    Pt states she has \"My Chart\"; informed of pre admit video and encouraged to watch.    Pt states she had echo and stress test and cariac clearance with Dr. Alyson New and these were FAX'd to Dr. Amado; requested these to be FAX fo [phone] from Dr. Amado.  "

## 2021-05-14 NOTE — OR NURSING
Hi again. This patient needs to be referred to the health improvement program for review of attempted weight loss and other measures as she meets the Red flag criteria for primary total Joint replacement. This needs to be done prior to having her be scheduled for this elective procedure. Thank you.    Also, I would appreciate if you can include the anesthesia summary as I do not have access to epic.  Joi Javier M.D.  [facility]      On May 13, 2021, at 18:23, Shu Alejandre [email or web address] wrote:  ?   Fredy Brito Guerda Beal MRN: [id number], BMI is 47.87 with cardiomeglia. She has seen Dr. Alyson Peace had an echo and stress test and got cardiac clearance that was sent to Dr. Amado. I have Requested Dr. Amado to FAX us these. Guerda is also on metformin and we did get a Hem A1C today as well as CBC, BMP,PT, PTT, and an ECG. When we receive the echo and stress test results and the cardiac clearance would you like me to FAX them to you?     Shu Alejandre RN  Pre-Admit

## 2021-05-14 NOTE — OR NURSING
Hi again. This patient needs to be referred to the health improvement program for review of attempted weight loss and other measures as she meets the Red flag criteria for primary total Joint replacement. This needs to be done prior to having her be scheduled for this elective procedure. Thank you.    Also, I would appreciate if you can include the anesthesia summary as I do not have access to epic.  Joi Javier M.D.  [facility]      On May 13, 2021, at 18:23, Shu Alejandre [email or web address] wrote:  ?   Fredy Brito Guerda Beal MRN: [id number], BMI is 47.87 with cardiomeglia. She has seen Dr. Alyson Peace had an echo and stress test and got cardiac clearance that was sent to Dr. Amado. I have Requested Dr. Amado to FAX us these. Guerda is also on metformin and we did get a Hem A1C today as well as CBC, BMP,PT, PTT, and an ECG. When we receive the echo and stress test results and the cardiac clearance would you like me to FAX them to you?     Shu Alejandre RN  Pre-Admit

## 2021-05-15 ENCOUNTER — PRE-ADMISSION TESTING (OUTPATIENT)
Dept: ADMISSIONS | Facility: MEDICAL CENTER | Age: 71
End: 2021-05-15
Attending: ORTHOPAEDIC SURGERY
Payer: MEDICARE

## 2021-05-15 DIAGNOSIS — Z01.812 PRE-OPERATIVE LABORATORY EXAMINATION: ICD-10-CM

## 2021-05-15 LAB — COVID ORDER STATUS COVID19: NORMAL

## 2021-05-15 PROCEDURE — C9803 HOPD COVID-19 SPEC COLLECT: HCPCS

## 2021-05-15 PROCEDURE — U0005 INFEC AGEN DETEC AMPLI PROBE: HCPCS

## 2021-05-15 PROCEDURE — U0003 INFECTIOUS AGENT DETECTION BY NUCLEIC ACID (DNA OR RNA); SEVERE ACUTE RESPIRATORY SYNDROME CORONAVIRUS 2 (SARS-COV-2) (CORONAVIRUS DISEASE [COVID-19]), AMPLIFIED PROBE TECHNIQUE, MAKING USE OF HIGH THROUGHPUT TECHNOLOGIES AS DESCRIBED BY CMS-2020-01-R: HCPCS

## 2021-05-16 LAB
SARS-COV-2 RNA RESP QL NAA+PROBE: NOTDETECTED
SPECIMEN SOURCE: NORMAL

## 2021-05-18 ENCOUNTER — HOSPITAL ENCOUNTER (OUTPATIENT)
Facility: MEDICAL CENTER | Age: 71
End: 2021-05-18
Attending: ORTHOPAEDIC SURGERY | Admitting: ORTHOPAEDIC SURGERY
Payer: MEDICARE

## 2021-05-18 ENCOUNTER — ANESTHESIA (OUTPATIENT)
Dept: SURGERY | Facility: MEDICAL CENTER | Age: 71
End: 2021-05-18
Payer: MEDICARE

## 2021-05-18 ENCOUNTER — APPOINTMENT (OUTPATIENT)
Dept: RADIOLOGY | Facility: MEDICAL CENTER | Age: 71
End: 2021-05-18
Attending: ORTHOPAEDIC SURGERY
Payer: MEDICARE

## 2021-05-18 ENCOUNTER — ANESTHESIA EVENT (OUTPATIENT)
Dept: SURGERY | Facility: MEDICAL CENTER | Age: 71
End: 2021-05-18
Payer: MEDICARE

## 2021-05-18 VITALS
HEIGHT: 64 IN | SYSTOLIC BLOOD PRESSURE: 113 MMHG | TEMPERATURE: 97.4 F | HEART RATE: 69 BPM | RESPIRATION RATE: 17 BRPM | DIASTOLIC BLOOD PRESSURE: 73 MMHG | BODY MASS INDEX: 47.72 KG/M2 | WEIGHT: 279.54 LBS | OXYGEN SATURATION: 98 %

## 2021-05-18 DIAGNOSIS — R65.10 SIRS (SYSTEMIC INFLAMMATORY RESPONSE SYNDROME) (HCC): ICD-10-CM

## 2021-05-18 DIAGNOSIS — G89.18 POSTOPERATIVE PAIN: ICD-10-CM

## 2021-05-18 DIAGNOSIS — M17.12 PRIMARY OSTEOARTHRITIS OF LEFT KNEE: ICD-10-CM

## 2021-05-18 PROCEDURE — 64447 NJX AA&/STRD FEMORAL NRV IMG: CPT | Performed by: ORTHOPAEDIC SURGERY

## 2021-05-18 PROCEDURE — 160029 HCHG SURGERY MINUTES - 1ST 30 MINS LEVEL 4: Performed by: ORTHOPAEDIC SURGERY

## 2021-05-18 PROCEDURE — 700105 HCHG RX REV CODE 258: Performed by: ORTHOPAEDIC SURGERY

## 2021-05-18 PROCEDURE — 97165 OT EVAL LOW COMPLEX 30 MIN: CPT

## 2021-05-18 PROCEDURE — 502579 HCHG PACK, TOTAL KNEE: Performed by: ORTHOPAEDIC SURGERY

## 2021-05-18 PROCEDURE — 700111 HCHG RX REV CODE 636 W/ 250 OVERRIDE (IP): Performed by: ORTHOPAEDIC SURGERY

## 2021-05-18 PROCEDURE — 700102 HCHG RX REV CODE 250 W/ 637 OVERRIDE(OP): Performed by: ORTHOPAEDIC SURGERY

## 2021-05-18 PROCEDURE — A9270 NON-COVERED ITEM OR SERVICE: HCPCS | Performed by: ORTHOPAEDIC SURGERY

## 2021-05-18 PROCEDURE — 700101 HCHG RX REV CODE 250: Performed by: ORTHOPAEDIC SURGERY

## 2021-05-18 PROCEDURE — 160048 HCHG OR STATISTICAL LEVEL 1-5: Performed by: ORTHOPAEDIC SURGERY

## 2021-05-18 PROCEDURE — 700111 HCHG RX REV CODE 636 W/ 250 OVERRIDE (IP): Performed by: ANESTHESIOLOGY

## 2021-05-18 PROCEDURE — 700105 HCHG RX REV CODE 258: Performed by: STUDENT IN AN ORGANIZED HEALTH CARE EDUCATION/TRAINING PROGRAM

## 2021-05-18 PROCEDURE — 160041 HCHG SURGERY MINUTES - EA ADDL 1 MIN LEVEL 4: Performed by: ORTHOPAEDIC SURGERY

## 2021-05-18 PROCEDURE — 97161 PT EVAL LOW COMPLEX 20 MIN: CPT

## 2021-05-18 PROCEDURE — 160009 HCHG ANES TIME/MIN: Performed by: ORTHOPAEDIC SURGERY

## 2021-05-18 PROCEDURE — 73560 X-RAY EXAM OF KNEE 1 OR 2: CPT | Mod: LT

## 2021-05-18 PROCEDURE — G0378 HOSPITAL OBSERVATION PER HR: HCPCS

## 2021-05-18 PROCEDURE — 160035 HCHG PACU - 1ST 60 MINS PHASE I: Performed by: ORTHOPAEDIC SURGERY

## 2021-05-18 PROCEDURE — 160002 HCHG RECOVERY MINUTES (STAT): Performed by: ORTHOPAEDIC SURGERY

## 2021-05-18 PROCEDURE — 502000 HCHG MISC OR IMPLANTS RC 0278: Performed by: ORTHOPAEDIC SURGERY

## 2021-05-18 PROCEDURE — 700111 HCHG RX REV CODE 636 W/ 250 OVERRIDE (IP)

## 2021-05-18 PROCEDURE — C1776 JOINT DEVICE (IMPLANTABLE): HCPCS | Performed by: ORTHOPAEDIC SURGERY

## 2021-05-18 PROCEDURE — 96365 THER/PROPH/DIAG IV INF INIT: CPT | Mod: XU

## 2021-05-18 PROCEDURE — 700101 HCHG RX REV CODE 250: Performed by: STUDENT IN AN ORGANIZED HEALTH CARE EDUCATION/TRAINING PROGRAM

## 2021-05-18 PROCEDURE — 700101 HCHG RX REV CODE 250: Performed by: ANESTHESIOLOGY

## 2021-05-18 PROCEDURE — 94669 MECHANICAL CHEST WALL OSCILL: CPT

## 2021-05-18 DEVICE — IMPLANTABLE DEVICE: Type: IMPLANTABLE DEVICE | Site: KNEE | Status: FUNCTIONAL

## 2021-05-18 RX ORDER — SODIUM CHLORIDE, SODIUM LACTATE, POTASSIUM CHLORIDE, CALCIUM CHLORIDE 600; 310; 30; 20 MG/100ML; MG/100ML; MG/100ML; MG/100ML
INJECTION, SOLUTION INTRAVENOUS CONTINUOUS
Status: ACTIVE | OUTPATIENT
Start: 2021-05-18 | End: 2021-05-18

## 2021-05-18 RX ORDER — ONDANSETRON 2 MG/ML
4 INJECTION INTRAMUSCULAR; INTRAVENOUS EVERY 4 HOURS PRN
Status: DISCONTINUED | OUTPATIENT
Start: 2021-05-18 | End: 2021-05-18 | Stop reason: HOSPADM

## 2021-05-18 RX ORDER — DIPHENHYDRAMINE HYDROCHLORIDE 50 MG/ML
12.5 INJECTION INTRAMUSCULAR; INTRAVENOUS
Status: DISCONTINUED | OUTPATIENT
Start: 2021-05-18 | End: 2021-05-18 | Stop reason: HOSPADM

## 2021-05-18 RX ORDER — MELOXICAM 7.5 MG/1
7.5 TABLET ORAL DAILY
Qty: 30 TABLET | Refills: 1 | Status: SHIPPED | OUTPATIENT
Start: 2021-05-18 | End: 2021-07-17

## 2021-05-18 RX ORDER — KETOROLAC TROMETHAMINE 30 MG/ML
INJECTION, SOLUTION INTRAMUSCULAR; INTRAVENOUS
Status: DISCONTINUED | OUTPATIENT
Start: 2021-05-18 | End: 2021-05-18 | Stop reason: HOSPADM

## 2021-05-18 RX ORDER — DEXMEDETOMIDINE HYDROCHLORIDE 100 UG/ML
INJECTION, SOLUTION INTRAVENOUS PRN
Status: DISCONTINUED | OUTPATIENT
Start: 2021-05-18 | End: 2021-05-18 | Stop reason: SURG

## 2021-05-18 RX ORDER — AMOXICILLIN 250 MG
1 CAPSULE ORAL NIGHTLY
Status: DISCONTINUED | OUTPATIENT
Start: 2021-05-18 | End: 2021-05-18 | Stop reason: HOSPADM

## 2021-05-18 RX ORDER — EPINEPHRINE 1 MG/ML(1)
AMPUL (ML) INJECTION PRN
Status: DISCONTINUED | OUTPATIENT
Start: 2021-05-18 | End: 2021-05-18 | Stop reason: SURG

## 2021-05-18 RX ORDER — GLYCOPYRROLATE 0.2 MG/ML
INJECTION INTRAMUSCULAR; INTRAVENOUS PRN
Status: DISCONTINUED | OUTPATIENT
Start: 2021-05-18 | End: 2021-05-18 | Stop reason: SURG

## 2021-05-18 RX ORDER — ONDANSETRON 2 MG/ML
4 INJECTION INTRAMUSCULAR; INTRAVENOUS
Status: DISCONTINUED | OUTPATIENT
Start: 2021-05-18 | End: 2021-05-18 | Stop reason: HOSPADM

## 2021-05-18 RX ORDER — LIDOCAINE HYDROCHLORIDE 20 MG/ML
INJECTION, SOLUTION EPIDURAL; INFILTRATION; INTRACAUDAL; PERINEURAL PRN
Status: DISCONTINUED | OUTPATIENT
Start: 2021-05-18 | End: 2021-05-18 | Stop reason: SURG

## 2021-05-18 RX ORDER — OXYCODONE HYDROCHLORIDE AND ACETAMINOPHEN 5; 325 MG/1; MG/1
2 TABLET ORAL
Status: DISCONTINUED | OUTPATIENT
Start: 2021-05-18 | End: 2021-05-18 | Stop reason: HOSPADM

## 2021-05-18 RX ORDER — ASPIRIN 81 MG/1
81 TABLET, CHEWABLE ORAL 2 TIMES DAILY
Qty: 56 TABLET | Refills: 0 | Status: SHIPPED | OUTPATIENT
Start: 2021-05-18 | End: 2021-06-15

## 2021-05-18 RX ORDER — CEFAZOLIN SODIUM IN 0.9 % NACL 2 G/100 ML
2 PLASTIC BAG, INJECTION (ML) INTRAVENOUS ONCE
Status: COMPLETED | OUTPATIENT
Start: 2021-05-18 | End: 2021-05-18

## 2021-05-18 RX ORDER — LIDOCAINE HYDROCHLORIDE 40 MG/ML
SOLUTION TOPICAL PRN
Status: DISCONTINUED | OUTPATIENT
Start: 2021-05-18 | End: 2021-05-18 | Stop reason: SURG

## 2021-05-18 RX ORDER — DOCUSATE SODIUM 100 MG/1
100 CAPSULE, LIQUID FILLED ORAL 2 TIMES DAILY
Qty: 60 CAPSULE | Refills: 0 | Status: SHIPPED | OUTPATIENT
Start: 2021-05-18 | End: 2021-06-17

## 2021-05-18 RX ORDER — VANCOMYCIN HYDROCHLORIDE 1 G/20ML
INJECTION, POWDER, LYOPHILIZED, FOR SOLUTION INTRAVENOUS
Status: COMPLETED | OUTPATIENT
Start: 2021-05-18 | End: 2021-05-18

## 2021-05-18 RX ORDER — ACETAMINOPHEN 500 MG
1000 TABLET ORAL EVERY 6 HOURS
Status: DISCONTINUED | OUTPATIENT
Start: 2021-05-18 | End: 2021-05-18 | Stop reason: HOSPADM

## 2021-05-18 RX ORDER — POLYETHYLENE GLYCOL 3350 17 G/17G
1 POWDER, FOR SOLUTION ORAL 2 TIMES DAILY PRN
Status: DISCONTINUED | OUTPATIENT
Start: 2021-05-18 | End: 2021-05-18 | Stop reason: HOSPADM

## 2021-05-18 RX ORDER — BUPIVACAINE HYDROCHLORIDE 5 MG/ML
INJECTION, SOLUTION EPIDURAL; INTRACAUDAL PRN
Status: DISCONTINUED | OUTPATIENT
Start: 2021-05-18 | End: 2021-05-18

## 2021-05-18 RX ORDER — ONDANSETRON 2 MG/ML
INJECTION INTRAMUSCULAR; INTRAVENOUS PRN
Status: DISCONTINUED | OUTPATIENT
Start: 2021-05-18 | End: 2021-05-18 | Stop reason: SURG

## 2021-05-18 RX ORDER — HYDROMORPHONE HYDROCHLORIDE 1 MG/ML
0.1 INJECTION, SOLUTION INTRAMUSCULAR; INTRAVENOUS; SUBCUTANEOUS
Status: DISCONTINUED | OUTPATIENT
Start: 2021-05-18 | End: 2021-05-18 | Stop reason: HOSPADM

## 2021-05-18 RX ORDER — EPINEPHRINE 1 MG/ML(1)
AMPUL (ML) INJECTION
Status: DISCONTINUED | OUTPATIENT
Start: 2021-05-18 | End: 2021-05-18 | Stop reason: HOSPADM

## 2021-05-18 RX ORDER — CEFAZOLIN SODIUM 1 G/3ML
INJECTION, POWDER, FOR SOLUTION INTRAMUSCULAR; INTRAVENOUS PRN
Status: DISCONTINUED | OUTPATIENT
Start: 2021-05-18 | End: 2021-05-18 | Stop reason: SURG

## 2021-05-18 RX ORDER — SODIUM CHLORIDE 9 MG/ML
INJECTION, SOLUTION INTRAMUSCULAR; INTRAVENOUS; SUBCUTANEOUS
Status: DISCONTINUED | OUTPATIENT
Start: 2021-05-18 | End: 2021-05-18 | Stop reason: HOSPADM

## 2021-05-18 RX ORDER — SCOLOPAMINE TRANSDERMAL SYSTEM 1 MG/1
1 PATCH, EXTENDED RELEASE TRANSDERMAL
Status: DISCONTINUED | OUTPATIENT
Start: 2021-05-18 | End: 2021-05-18 | Stop reason: HOSPADM

## 2021-05-18 RX ORDER — ROCURONIUM BROMIDE 10 MG/ML
INJECTION, SOLUTION INTRAVENOUS PRN
Status: DISCONTINUED | OUTPATIENT
Start: 2021-05-18 | End: 2021-05-18 | Stop reason: SURG

## 2021-05-18 RX ORDER — BUPIVACAINE HYDROCHLORIDE 5 MG/ML
INJECTION, SOLUTION EPIDURAL; INTRACAUDAL PRN
Status: DISCONTINUED | OUTPATIENT
Start: 2021-05-18 | End: 2021-05-18 | Stop reason: SURG

## 2021-05-18 RX ORDER — HYDROMORPHONE HYDROCHLORIDE 1 MG/ML
0.2 INJECTION, SOLUTION INTRAMUSCULAR; INTRAVENOUS; SUBCUTANEOUS
Status: DISCONTINUED | OUTPATIENT
Start: 2021-05-18 | End: 2021-05-18 | Stop reason: HOSPADM

## 2021-05-18 RX ORDER — CELECOXIB 200 MG/1
200 CAPSULE ORAL 2 TIMES DAILY PRN
Status: DISCONTINUED | OUTPATIENT
Start: 2021-05-23 | End: 2021-05-18 | Stop reason: HOSPADM

## 2021-05-18 RX ORDER — OXYCODONE HYDROCHLORIDE AND ACETAMINOPHEN 5; 325 MG/1; MG/1
1 TABLET ORAL
Status: DISCONTINUED | OUTPATIENT
Start: 2021-05-18 | End: 2021-05-18 | Stop reason: HOSPADM

## 2021-05-18 RX ORDER — OXYCODONE HYDROCHLORIDE 5 MG/1
5 TABLET ORAL
Status: DISCONTINUED | OUTPATIENT
Start: 2021-05-18 | End: 2021-05-18 | Stop reason: HOSPADM

## 2021-05-18 RX ORDER — METOPROLOL SUCCINATE 25 MG/1
25 TABLET, EXTENDED RELEASE ORAL DAILY
Status: DISCONTINUED | OUTPATIENT
Start: 2021-05-18 | End: 2021-05-18 | Stop reason: HOSPADM

## 2021-05-18 RX ORDER — HYDROMORPHONE HYDROCHLORIDE 1 MG/ML
0.5 INJECTION, SOLUTION INTRAMUSCULAR; INTRAVENOUS; SUBCUTANEOUS
Status: DISCONTINUED | OUTPATIENT
Start: 2021-05-18 | End: 2021-05-18 | Stop reason: HOSPADM

## 2021-05-18 RX ORDER — TRAMADOL HYDROCHLORIDE 50 MG/1
50 TABLET ORAL EVERY 4 HOURS PRN
Status: DISCONTINUED | OUTPATIENT
Start: 2021-05-18 | End: 2021-05-18 | Stop reason: HOSPADM

## 2021-05-18 RX ORDER — BISACODYL 10 MG
10 SUPPOSITORY, RECTAL RECTAL
Status: DISCONTINUED | OUTPATIENT
Start: 2021-05-18 | End: 2021-05-18 | Stop reason: HOSPADM

## 2021-05-18 RX ORDER — TRANEXAMIC ACID 100 MG/ML
INJECTION, SOLUTION INTRAVENOUS PRN
Status: DISCONTINUED | OUTPATIENT
Start: 2021-05-18 | End: 2021-05-18 | Stop reason: SURG

## 2021-05-18 RX ORDER — DEXAMETHASONE SODIUM PHOSPHATE 4 MG/ML
4 INJECTION, SOLUTION INTRA-ARTICULAR; INTRALESIONAL; INTRAMUSCULAR; INTRAVENOUS; SOFT TISSUE
Status: DISCONTINUED | OUTPATIENT
Start: 2021-05-18 | End: 2021-05-18 | Stop reason: HOSPADM

## 2021-05-18 RX ORDER — OXYCODONE HYDROCHLORIDE 5 MG/1
5-10 TABLET ORAL EVERY 4 HOURS PRN
Qty: 42 TABLET | Refills: 0 | Status: SHIPPED | OUTPATIENT
Start: 2021-05-18 | End: 2021-05-25

## 2021-05-18 RX ORDER — DIPHENHYDRAMINE HYDROCHLORIDE 50 MG/ML
25 INJECTION INTRAMUSCULAR; INTRAVENOUS EVERY 6 HOURS PRN
Status: DISCONTINUED | OUTPATIENT
Start: 2021-05-18 | End: 2021-05-18 | Stop reason: HOSPADM

## 2021-05-18 RX ORDER — HYDROMORPHONE HYDROCHLORIDE 2 MG/ML
INJECTION, SOLUTION INTRAMUSCULAR; INTRAVENOUS; SUBCUTANEOUS PRN
Status: DISCONTINUED | OUTPATIENT
Start: 2021-05-18 | End: 2021-05-18 | Stop reason: SURG

## 2021-05-18 RX ORDER — ACETAMINOPHEN 500 MG
1000 TABLET ORAL 3 TIMES DAILY
Qty: 180 TABLET | Refills: 0 | Status: SHIPPED | OUTPATIENT
Start: 2021-05-18 | End: 2021-06-17

## 2021-05-18 RX ORDER — TRAMADOL HYDROCHLORIDE 50 MG/1
50-100 TABLET ORAL EVERY 4 HOURS PRN
Qty: 80 TABLET | Refills: 0 | Status: SHIPPED | OUTPATIENT
Start: 2021-05-18 | End: 2021-05-28

## 2021-05-18 RX ORDER — ROPIVACAINE HYDROCHLORIDE 5 MG/ML
INJECTION, SOLUTION EPIDURAL; INFILTRATION; PERINEURAL
Status: DISCONTINUED | OUTPATIENT
Start: 2021-05-18 | End: 2021-05-18 | Stop reason: HOSPADM

## 2021-05-18 RX ORDER — ENEMA 19; 7 G/133ML; G/133ML
1 ENEMA RECTAL
Status: DISCONTINUED | OUTPATIENT
Start: 2021-05-18 | End: 2021-05-18 | Stop reason: HOSPADM

## 2021-05-18 RX ORDER — HALOPERIDOL 5 MG/ML
1 INJECTION INTRAMUSCULAR EVERY 6 HOURS PRN
Status: DISCONTINUED | OUTPATIENT
Start: 2021-05-18 | End: 2021-05-18 | Stop reason: HOSPADM

## 2021-05-18 RX ORDER — HYDROMORPHONE HYDROCHLORIDE 1 MG/ML
0.4 INJECTION, SOLUTION INTRAMUSCULAR; INTRAVENOUS; SUBCUTANEOUS
Status: DISCONTINUED | OUTPATIENT
Start: 2021-05-18 | End: 2021-05-18 | Stop reason: HOSPADM

## 2021-05-18 RX ORDER — DOCUSATE SODIUM 100 MG/1
100 CAPSULE, LIQUID FILLED ORAL 2 TIMES DAILY
Status: DISCONTINUED | OUTPATIENT
Start: 2021-05-18 | End: 2021-05-18 | Stop reason: HOSPADM

## 2021-05-18 RX ORDER — CELECOXIB 200 MG/1
200 CAPSULE ORAL 2 TIMES DAILY
Status: DISCONTINUED | OUTPATIENT
Start: 2021-05-18 | End: 2021-05-18 | Stop reason: HOSPADM

## 2021-05-18 RX ORDER — ACETAMINOPHEN 500 MG
1000 TABLET ORAL EVERY 6 HOURS PRN
Status: DISCONTINUED | OUTPATIENT
Start: 2021-05-23 | End: 2021-05-18 | Stop reason: HOSPADM

## 2021-05-18 RX ORDER — HALOPERIDOL 5 MG/ML
1 INJECTION INTRAMUSCULAR
Status: DISCONTINUED | OUTPATIENT
Start: 2021-05-18 | End: 2021-05-18 | Stop reason: HOSPADM

## 2021-05-18 RX ORDER — OXYCODONE HYDROCHLORIDE 10 MG/1
10 TABLET ORAL
Status: DISCONTINUED | OUTPATIENT
Start: 2021-05-18 | End: 2021-05-18 | Stop reason: HOSPADM

## 2021-05-18 RX ORDER — AMOXICILLIN 250 MG
1 CAPSULE ORAL
Status: DISCONTINUED | OUTPATIENT
Start: 2021-05-18 | End: 2021-05-18 | Stop reason: HOSPADM

## 2021-05-18 RX ADMIN — ONDANSETRON 4 MG: 2 INJECTION INTRAMUSCULAR; INTRAVENOUS at 09:00

## 2021-05-18 RX ADMIN — TRANEXAMIC ACID 1000 MG: 100 INJECTION, SOLUTION INTRAVENOUS at 08:51

## 2021-05-18 RX ADMIN — FENTANYL CITRATE 100 MCG: 50 INJECTION, SOLUTION INTRAMUSCULAR; INTRAVENOUS at 08:03

## 2021-05-18 RX ADMIN — DOCUSATE SODIUM 100 MG: 100 CAPSULE, LIQUID FILLED ORAL at 11:05

## 2021-05-18 RX ADMIN — EPHEDRINE SULFATE 10 MG: 50 INJECTION INTRAMUSCULAR; INTRAVENOUS; SUBCUTANEOUS at 08:28

## 2021-05-18 RX ADMIN — POVIDONE IODINE 15 ML: 100 SOLUTION TOPICAL at 06:47

## 2021-05-18 RX ADMIN — LIDOCAINE HYDROCHLORIDE 4 ML: 40 SOLUTION TOPICAL at 08:06

## 2021-05-18 RX ADMIN — HYDROMORPHONE HYDROCHLORIDE 1 MG: 2 INJECTION, SOLUTION INTRAMUSCULAR; INTRAVENOUS; SUBCUTANEOUS at 08:35

## 2021-05-18 RX ADMIN — CEFAZOLIN 2 G: 1 INJECTION, POWDER, FOR SOLUTION INTRAVENOUS at 08:05

## 2021-05-18 RX ADMIN — CELECOXIB 200 MG: 200 CAPSULE ORAL at 11:06

## 2021-05-18 RX ADMIN — SUGAMMADEX 200 MG: 100 INJECTION, SOLUTION INTRAVENOUS at 09:03

## 2021-05-18 RX ADMIN — LIDOCAINE HYDROCHLORIDE 80 MG: 20 INJECTION, SOLUTION EPIDURAL; INFILTRATION; INTRACAUDAL; PERINEURAL at 08:04

## 2021-05-18 RX ADMIN — SODIUM CHLORIDE, POTASSIUM CHLORIDE, SODIUM LACTATE AND CALCIUM CHLORIDE: 600; 310; 30; 20 INJECTION, SOLUTION INTRAVENOUS at 06:42

## 2021-05-18 RX ADMIN — SODIUM CHLORIDE, POTASSIUM CHLORIDE, SODIUM LACTATE AND CALCIUM CHLORIDE: 600; 310; 30; 20 INJECTION, SOLUTION INTRAVENOUS at 11:03

## 2021-05-18 RX ADMIN — BUPIVACAINE HYDROCHLORIDE 20 ML: 5 INJECTION, SOLUTION EPIDURAL; INTRACAUDAL; PERINEURAL at 08:13

## 2021-05-18 RX ADMIN — TRANEXAMIC ACID 1000 MG: 100 INJECTION, SOLUTION INTRAVENOUS at 10:05

## 2021-05-18 RX ADMIN — PROPOFOL 150 MG: 10 INJECTION, EMULSION INTRAVENOUS at 08:04

## 2021-05-18 RX ADMIN — ROCURONIUM BROMIDE 50 MG: 10 INJECTION, SOLUTION INTRAVENOUS at 08:04

## 2021-05-18 RX ADMIN — SODIUM CHLORIDE, POTASSIUM CHLORIDE, SODIUM LACTATE AND CALCIUM CHLORIDE: 600; 310; 30; 20 INJECTION, SOLUTION INTRAVENOUS at 09:08

## 2021-05-18 RX ADMIN — ACETAMINOPHEN 1000 MG: 500 TABLET ORAL at 11:05

## 2021-05-18 RX ADMIN — CEFAZOLIN 2 G: 1 INJECTION, POWDER, FOR SOLUTION INTRAVENOUS at 11:03

## 2021-05-18 RX ADMIN — GLYCOPYRROLATE 0.2 MG: 0.2 INJECTION INTRAMUSCULAR; INTRAVENOUS at 08:28

## 2021-05-18 RX ADMIN — EPINEPHRINE 100 MCG: 1 INJECTION, SOLUTION, CONCENTRATE INTRAVENOUS at 08:13

## 2021-05-18 RX ADMIN — DEXMEDETOMIDINE 40 MCG: 200 INJECTION, SOLUTION INTRAVENOUS at 08:13

## 2021-05-18 ASSESSMENT — COGNITIVE AND FUNCTIONAL STATUS - GENERAL
CLIMB 3 TO 5 STEPS WITH RAILING: A LITTLE
TOILETING: A LITTLE
DRESSING REGULAR LOWER BODY CLOTHING: A LOT
SUGGESTED CMS G CODE MODIFIER DAILY ACTIVITY: CJ
WALKING IN HOSPITAL ROOM: A LITTLE
MOBILITY SCORE: 22
HELP NEEDED FOR BATHING: A LITTLE
DAILY ACTIVITIY SCORE: 20
SUGGESTED CMS G CODE MODIFIER MOBILITY: CJ

## 2021-05-18 ASSESSMENT — LIFESTYLE VARIABLES
HOW MANY TIMES IN THE PAST YEAR HAVE YOU HAD 5 OR MORE DRINKS IN A DAY: 0
AVERAGE NUMBER OF DAYS PER WEEK YOU HAVE A DRINK CONTAINING ALCOHOL: 0
ALCOHOL_USE: NO
EVER FELT BAD OR GUILTY ABOUT YOUR DRINKING: NO
TOTAL SCORE: 0
HAVE PEOPLE ANNOYED YOU BY CRITICIZING YOUR DRINKING: NO
CONSUMPTION TOTAL: NEGATIVE
EVER HAD A DRINK FIRST THING IN THE MORNING TO STEADY YOUR NERVES TO GET RID OF A HANGOVER: NO
TOTAL SCORE: 0
HAVE YOU EVER FELT YOU SHOULD CUT DOWN ON YOUR DRINKING: NO
ON A TYPICAL DAY WHEN YOU DRINK ALCOHOL HOW MANY DRINKS DO YOU HAVE: 0
TOTAL SCORE: 0

## 2021-05-18 ASSESSMENT — PAIN SCALES - GENERAL: PAIN_LEVEL: 4

## 2021-05-18 ASSESSMENT — FIBROSIS 4 INDEX: FIB4 SCORE: 0.87

## 2021-05-18 ASSESSMENT — GAIT ASSESSMENTS
GAIT LEVEL OF ASSIST: SUPERVISED
ASSISTIVE DEVICE: FRONT WHEEL WALKER
DEVIATION: ANTALGIC;STEP TO
DISTANCE (FEET): 150

## 2021-05-18 ASSESSMENT — PATIENT HEALTH QUESTIONNAIRE - PHQ9
1. LITTLE INTEREST OR PLEASURE IN DOING THINGS: NOT AT ALL
SUM OF ALL RESPONSES TO PHQ9 QUESTIONS 1 AND 2: 0
2. FEELING DOWN, DEPRESSED, IRRITABLE, OR HOPELESS: NOT AT ALL

## 2021-05-18 ASSESSMENT — ACTIVITIES OF DAILY LIVING (ADL): TOILETING: INDEPENDENT

## 2021-05-18 ASSESSMENT — PAIN DESCRIPTION - PAIN TYPE: TYPE: SURGICAL PAIN

## 2021-05-18 NOTE — THERAPY
Physical Therapy   Initial Evaluation     Patient Name: Guerda Lunsford  Age:  70 y.o., Sex:  female  Medical Record #: [id number]  Today's Date: 5/18/2021     Precautions: (P) No Weight Bearing Restrictions Left Lower Extremity    Assessment  Patient is 70 y.o. female who was seen s/p L TKA. Pt was agreeable to therapy evaluation and was able to demonstrate safe functional upright mobility with FWW use. Pt educated on supine therapeutic exercises, elevation, icing, and positioning. Pt was primarily limited by pain and poor confidence with upright mobility. Pt was able to improve in mobility with continued ambulation and demonstrated with improved confidence as session continued. Pt was able to go up/down 2 steps with railing use w/spv and able to demo good mechanics. Pt is in no acute skilled PT needs at this time, anticipate pt to d/c home once medically clear with recs for OP therapy services.     Plan    Recommend Physical Therapy for Evaluation only     DC Equipment Recommendations: (P) Front-Wheel Walker  Discharge Recommendations: (P) Recommend outpatient physical therapy services to address higher level deficits    Objective       05/18/21 1616   Initial Contact Note    Initial Contact Note Order Received and Verified, Evaluation Only - Patient Does Not Require Further Acute Physical Therapy at this Time.  However, May Benefit from Post Acute Therapy for Higher Level Functional Deficits.   Precautions   Precautions No Weight Bearing Restrictions Left Lower Extremity   Pain 0 - 10 Group   Location Knee   Location Orientation Left   Description Aching   Therapist Pain Assessment During Activity;Prior to Activity;Post Activity;Nurse Notified;3   Prior Living Situation   Prior Services None   Housing / Facility 1 Story House   Steps Into Home 2   Steps In Home 0   Equipment Owned Front-Wheel Walker;4-Wheel Walker;Raised Toilet Seat With Arms   Lives with - Patient's Self Care Capacity Spouse   Comments pt  reports spouse will be able to assist upon d/c to home    Prior Level of Functional Mobility   Bed Mobility Independent   Transfer Status Independent   Ambulation Independent   Distance Ambulation (Feet)   (community )   Assistive Devices Used None   Stairs Independent   History of Falls   History of Falls No   Cognition    Cognition / Consciousness WDL   Level of Consciousness Alert   Comments pleasant/cooperative   Passive ROM Lower Body   Passive ROM Lower Body X   Comments limited due to pain; able to demo functional PROM for L LE 0 to 90 deg    Active ROM Lower Body    Active ROM Lower Body  X   Comments same as above   Strength Lower Body   Lower Body Strength  X   Comments limited due to pain; able to demo functional strength for BLE    Sensation Lower Body   Lower Extremity Sensation   WDL   Lower Body Muscle Tone   Lower Body Muscle Tone  WDL   Strength Upper Body   Upper Body Strength  WDL   Upper Body Muscle Tone   Upper Body Muscle Tone  WDL   Neurological Concerns   Neurological Concerns No   Coordination Lower Body    Coordination Lower Body  WDL   Balance Assessment   Sitting Balance (Static) Good   Sitting Balance (Dynamic) Fair +   Standing Balance (Static) Fair +   Standing Balance (Dynamic) Fair +   Weight Shift Sitting Good   Weight Shift Standing Fair   Comments no jose LOB noted with FWW use; heavy reliance on FWW due to anterior lean; cues to stand upright    Gait Analysis   Gait Level Of Assist Supervised   Assistive Device Front Wheel Walker   Distance (Feet) 150   # of Times Distance was Traveled 1   Deviation Antalgic;Step To   # of Stairs Climbed 2   Level of Assist with Stairs Supervised   Weight Bearing Status none noted   Comments cues and demo to go up/down stairs safely with correct mechanics; pt provided with cues and demo to demonstrate appropriate heel to toe mechanics and knee flexion of L LE during ambulation    Bed Mobility    Supine to Sit Supervised   Sit to Supine  Supervised   Scooting Supervised   Rolling Supervised   Comments HOB elevated and rails up    Functional Mobility   Sit to Stand Supervised   Bed, Chair, Wheelchair Transfer Supervised   Transfer Method Stand Step   Mobility EOB, sit<>stand, ambulation, stairs, back to bed    Comments cues for handplacement    How much difficulty does the patient currently have...   Turning over in bed (including adjusting bedclothes, sheets and blankets)? 4   Sitting down on and standing up from a chair with arms (e.g., wheelchair, bedside commode, etc.) 4   Moving from lying on back to sitting on the side of the bed? 4   How much help from another person does the patient currently need...   Moving to and from a bed to a chair (including a wheelchair)? 4   Need to walk in a hospital room? 3   Climbing 3-5 steps with a railing? 3   6 clicks Mobility Score 22   Activity Tolerance   Sitting in Chair 5 mins    Sitting Edge of Bed 5 mins   Standing 10 mins    Comments no adverse events noted    Edema / Skin Assessment   Edema / Skin  Not Assessed   Education Group   Education Provided Role of Physical Therapist   Role of Physical Therapist Patient Response Patient;Acceptance;Explanation;Demonstration;Verbal Demonstration;Action Demonstration   Anticipated Discharge Equipment and Recommendations   DC Equipment Recommendations Front-Wheel Walker   Discharge Recommendations Recommend outpatient physical therapy services to address higher level deficits   Interdisciplinary Plan of Care Collaboration   IDT Collaboration with  Nursing;Family / Caregiver   Patient Position at End of Therapy Seated;Edge of Bed;Call Light within Reach;Tray Table within Reach;Phone within Reach;Family / Friend in Room   Collaboration Comments aware of visit and recs    Session Information   Date / Session Number  5/18 eval only

## 2021-05-18 NOTE — ANESTHESIA POSTPROCEDURE EVALUATION
Patient: Guerda Lunsford    Procedure Summary     Date: 05/18/21 Room / Location:  OR 03 / SURGERY [facility]    Anesthesia Start: 0800 Anesthesia Stop: 0923    Procedure: ARTHROPLASTY, KNEE, TOTAL. (Left Knee) Diagnosis: (PRIMARY LOCALIZED OSTEOARTHRITIS, PAIN LEFT KNEE)    Surgeons: Sonido Amado M.D. Responsible Provider: Dio Gonzalez M.D.    Anesthesia Type: general, peripheral nerve block ASA Status: 3          Final Anesthesia Type: general, peripheral nerve block  Last vitals  BP   Blood Pressure : 123/60    Temp   36.9 °C (98.4 °F)    Pulse   98   Resp   16    SpO2   92 %      Anesthesia Post Evaluation    Patient location during evaluation: PACU  Patient participation: complete - patient participated  Level of consciousness: awake and alert  Pain score: 4    Airway patency: patent  Anesthetic complications: no  Cardiovascular status: hemodynamically stable  Respiratory status: acceptable  Hydration status: euvolemic    PONV: none          No complications documented.     Nurse Pain Score: 0 (NPRS)

## 2021-05-18 NOTE — ANESTHESIA PROCEDURE NOTES
Airway    Date/Time: 5/18/2021 8:06 AM  Performed by: Dio Gonzalez M.D.  Authorized by: Dio Gonzalez M.D.     Location:  OR  Urgency:  Elective  Difficult Airway: No    Indications for Airway Management:  Anesthesia      Spontaneous Ventilation: absent    Sedation Level:  Deep  Preoxygenated: Yes    Patient Position:  Sniffing  Mask Difficulty Assessment:  2 - vent by mask + OA or adjuvant +/- NMBA  Final Airway Type:  Endotracheal airway  Final Endotracheal Airway:  ETT  Cuffed: Yes    Technique Used for Successful ETT Placement:  Direct laryngoscopy  Devices/Methods Used in Placement:  Intubating stylet and anterior pressure/BURP    Insertion Site:  Oral  Blade Type:  Jose  Laryngoscope Blade/Videolaryngoscope Blade Size:  3  ETT Size (mm):  7.0  Measured from:  Teeth  ETT to Teeth (cm):  22  Placement Verified by: auscultation and capnometry    Cormack-Lehane Classification:  Grade IIb - view of arytenoids or posterior of glottis only  Number of Attempts at Approach:  1   By JUS Bergeron

## 2021-05-18 NOTE — OR NURSING
0607 Procedure, patient allergies and NPO status verified. Home med rec completed and belongings secured. Patient verbalizes understanding of pain scale, expected course of stay and plan of care. IV access established. SCD placed on RIGHT calve. Triple aim completed by Birgit RUIZ.    0706 Pt ready for the OR.     0747 Pre-admit called regarding the referral to health improvement program regarding high BMI 47.96. Pre-admit stated referral made, but appointment not completed. Anesthesia notified.    0750 Anesthesia stated patient ok to proceed with surgery.

## 2021-05-18 NOTE — DISCHARGE PLANNING
Anticipated Discharge Disposition:   Home when medically cleared     Action:   Chart review complete.     This patient was admitted for a left TKA. Per case management note from 5/13, this patient has a FWW and ice at home. No anticipated equipment needs.     PT/OT pending. RN CM will continue to follow and assist as needed.     Barriers to Discharge:   PT/OT   Medical Clearance    Plan:   Hospital care management will continue to assist with discharge planning needs.

## 2021-05-18 NOTE — DIETARY
NUTRITION SERVICES: BMI - Pt with BMI >40 (=Body mass index is 47.96 kg/m².), Class III obesity. Weight loss counseling not appropriate in acute care setting. RECOMMEND - If appropriate at DC please refer to outpatient nutrition services for weight management.

## 2021-05-18 NOTE — OR NURSING
0919: Patient arrived from OR via Bed.  Left knee dresggin CDI, pedal pulse +2.  Ice pack placed. Knee gatch of bed locked straight. STOP BANG per protocol.     Sedation/Resp Status: Oral airway in place. Sleeping, respirations spontaneous and non-labored.    HR 91 SR; VSS on 6L 02 via mask.    0921: Spontaneous eye opening, eye opening to verbal, oral airway removed. 02 turned up to 10L via mask per ERAS.     0930: Continues stable, no changes.     0941: Paged Dr Amado for admit orders and 2nd TXA    0945: Continues stable, no changes.    1000: Continues stable, no changes. Tolerating sips of clear liquids when awake.     1001: Xray at bedside.     1015: Continues stable, no changes. Meets criteria to transfer to floor.     1035: Transport arrived to take patient to floor room.

## 2021-05-18 NOTE — THERAPY
Occupational Therapy   Initial Evaluation     Patient Name: Guerda Lunsford  Age:  70 y.o., Sex:  female  Medical Record #: [id number]  Today's Date: 5/18/2021     Precautions  Precautions: No Weight Bearing Restrictions Left Lower Extremity    Assessment  Patient is 70 y.o. female s/p L TKA. A&Ox4. Performs ADL transfers with Will, FWW. Encouragement needed for standing on L knee. Toileting with Sup. LB dressing with Will/ModA; states spouse helps at times with socks/shoes. Reviewed home safety during ADL's. Spouse present. D/C ready from OT.       Plan  Recommend Occupational Therapy for Evaluation only for the following treatments:  .  DC Equipment Recommendations: (P) Tub / Shower Seat - spouse to obtain  Discharge Recommendations: (P) Anticipate that the patient will have no further occupational therapy needs after discharge from the hospital      05/18/21 1605   Prior Living Situation   Prior Services Intermittent Physical Support for ADL Per Family   Housing / Facility 1 Story House   Steps Into Home 2   Steps In Home 0   Bathroom Set up Walk In Shower;Bathtub / Shower Combination;Grab Bars   Equipment Owned Front-Wheel Walker;4-Wheel Walker;Grab Bar(s) In Tub / Shower;Raised Toilet Seat Without Arms   Lives with - Patient's Self Care Capacity Spouse   Comments Used SPC prior. States standing tolerance was limited.    Prior Level of ADL Function   Self Feeding Independent   Grooming / Hygiene Independent   Bathing Independent   Dressing Requires Assist   Toileting Independent   Prior Level of IADL Function   Medication Management Independent   Laundry Independent   Kitchen Mobility Independent   Finances Unable To Determine At This Time   Home Management Requires Assist   Shopping Requires Assist   Prior Level Of Mobility Independent With Device in Home   Driving / Transportation Unable To Determine At This Time   Occupation (Pre-Hospital Vocational) Retired Due To Age   Leisure Interests Crafts   Balance  Assessment   Sitting Balance (Static) Good   Sitting Balance (Dynamic) Fair +   Standing Balance (Static) Fair   Standing Balance (Dynamic) Fair   Weight Shift Sitting Good   Weight Shift Standing Fair   Comments fww   ADL Assessment   Eating Independent   Grooming Independent;Seated   Upper Body Dressing Independent   Lower Body Dressing Moderate Assist   Toileting Supervision   Functional Mobility   Sit to Stand Minimal Assist   Bed, Chair, Wheelchair Transfer Minimal Assist   Toilet Transfers Minimal Assist   Transfer Method Stand Step

## 2021-05-18 NOTE — ANESTHESIA PROCEDURE NOTES
Peripheral Block    Date/Time: 5/18/2021 8:13 AM  Performed by: Dio Gonzalez M.D.  Authorized by: Dio Gonzalez M.D.     Patient Location:  OR  Start Time:  5/18/2021 8:13 AM  End Time:  5/18/2021 8:16 AM  Reason for Block: at surgeon's request and post-op pain management ONLY    patient identified, IV checked, site marked, risks and benefits discussed, surgical consent, monitors and equipment checked, pre-op evaluation and timeout performed    Patient Position:  Supine  Prep: ChloraPrep    Monitoring:  Heart rate, continuous pulse ox and cardiac monitor  Block Region:  Lower Extremity  Lower Extremity - Block Type:  Selective FEMORAL nerve block at the Adductor Canal    Laterality:  Left  Procedures: ultrasound guided  Image captured, interpreted and electronically stored.  Strength:  1 %  Dose:  3 ml  Block Type:  Single-shot  Needle Length:  100mm  Needle Gauge:  21 G  Needle Localization:  Ultrasound guidance  Injection Assessment:  Negative aspiration for heme, no paresthesia on injection, incremental injection and local visualized surrounding nerve on ultrasound  Evidence of intravascular injection: No     US Guided Selective Femoral Nerve Block at Adductor Canal:   US probe placed at mid-thigh level on externally rotated leg and femur identified.  Probe directed medially until Sartorius Muscle (SM), Femoral Artery (FA) and Saphenous Nerve (SN) identified in Adductor Canal (AC).  Needle inserted anterolateral to probe in an in plane approach into a subsartorial perivascular perineural position.  After negative aspiration LA injected with ease and visualized spreading within the AC.

## 2021-05-18 NOTE — ANESTHESIA TIME REPORT
Anesthesia Start and Stop Event Times     Date Time Event    5/18/2021 0800 Anesthesia Start     0923 Anesthesia Stop        Responsible Staff  05/18/21    Name Role Begin End    Dio Gonzalez M.D. Anesth 0800 0923        Preop Diagnosis (Free Text):  Pre-op Diagnosis     PRIMARY LOCALIZED OSTEOARTHRITIS, PAIN LEFT KNEE        Preop Diagnosis (Codes):    Post op Diagnosis  Osteoarthritis      Premium Reason  Non-Premium    Comments:

## 2021-05-18 NOTE — ANESTHESIA PREPROCEDURE EVALUATION
Relevant Problems   PULMONARY   (positive) Shortness of breath      NEURO   (positive) Headache      CARDIAC   (positive) Heart murmur   (positive) Hemorrhoids       Physical Exam    Airway   Mallampati: II  TM distance: >3 FB  Neck ROM: full       Cardiovascular - normal exam  Rhythm: regular  Rate: normal  (-) murmur     Dental - normal exam           Pulmonary - normal exam  Breath sounds clear to auscultation     Abdominal   (+) obese     Neurological - normal exam                 Anesthesia Plan    ASA 3   ASA physical status 3 criteria: morbid obesity - BMI greater than or equal to 40    Plan - general and peripheral nerve block     Peripheral nerve block will be post-op pain control  Airway plan will be ETT          Induction: intravenous    Postoperative Plan: Postoperative administration of opioids is intended.    Pertinent diagnostic labs and testing reviewed    Informed Consent:    Anesthetic plan and risks discussed with patient.    Use of blood products discussed with: patient whom consented to blood products.

## 2021-05-18 NOTE — PROGRESS NOTES
Received report from Sandra CUETO at 1019. Pt to floor at 1036. Pt sleeping but awakes easily to verbal stimuli. Pt is AOx4. No signs of distress. Pt denies any nausea. Pt denies any numbness or tingling. Pt reports pain 1/10, okay with scheduled pain medication. Pt is able to planter flex and dorsi flex ble, + pulses. Dressing to l knee is cdi. Polar ice in place to l knee. Fall precautions in place. Bed in locked and lowest position. Call light within reach.

## 2021-05-18 NOTE — OP REPORT
Preop Diagnosis: Advanced left knee arthritis  Postop Diagnosis:  Same  Procedure: Left total knee arthroplasty  Surgeon: Dr. Sonido Amado MD  Assistant: Narciso Walls PA-C  Anesthesia: Dr. Gonzalez. General + Adductor canal block  Estimated Blood Loss: 50cc  Drains: None  Complications: None    Implants: [place] [employer] CR Cementless, size 4 CR femur, size 5 tibia, with a 9 mm dished insert and 35 oval patella.    Indications: Guerda Lunsford is a 70 y.o. female who has had severe progressive knee pain that failed conservative management.  There were severe degenerative changes on radiographs.  We discussed the options and she was ultimately indicated for knee replacement.  We discussed the risk of bleeding, transfusion, pain, neurovascular injury, stiffness, fracture, infection, wound complication, loosening of the parts over time, blood clots, and medical complication.  No guarantees were made and she elected to proceed.    Pertinent Findings and Releases: There were severe degenerative changes.  At the end of the case, the knee easily came to full extension and flexed up to calf/thigh impingement with the arthrotomy closed.  The patella tracked centrally.  Her bone quality was very good, so we used a cementless design, which has shown extremely good results in heavier patients.    Informed consent and site marking were confirmed in preop.  An adductor canal block had been performed by the anesthesiologist, and then she was brought back to the OR where anesthesia was performed. Supine positioning was perfmored with all bony prominences well padded with a padded tourniquet on the thigh.  The extremity was prepped and draped in the usual sterile fashion. I performed a pre-procedure timeout to confirm we had the correct patient, side, site, procedure, and presence of necessary personal and equipment.  I confirmed that Ancef and tranexamic acid were given.  The tourniquet was then inflated.    A midline  incision was made medial to the tibial tubercle centered over patella taken down to the deep capsule of the knee. A medial parapatellar arthrotomy was performed.  The fat pad was released. The patella was subluxated and the knee was flexed. The remnants of the anterior horn of the medial and lateral meniscus were removed as well as the ACL from the intercondylar notch. All distal protruding osteophytes were removed. I then drilled and accessed the intramedullary canal of the femur, lavaged it and placed the intramedullary cutting guide. The distal femur was cut in 5 degrees of valgus. I then sized the femur and based rotation off of bony landmarks.  All distal femoral cuts were made.  The tibia was then subluxated forward and cut perpendicular to its long axis.  A spacer block was placed in the knee extension to confirm I had resected enough bone and then sequential releases were performed until there was a rectangular gap.  Collaterals were intact and overall limb alignment was checked and appropriate.  The knee was then tensed at 90 degrees and the meniscal remnants and posterior osteophytes were removed.  The posterior capsule was injected with a local anesthetic cocktail.  The tibia was then subluxed forward, sized, and punched in the appropriate amount of external rotation and mechanical alignment was verified using a drop eddie. Trials were placed, the knee was reduced with a trial insert, it was taken through a range of motion, and found to be stable in the varus/valgus plane 0-30 degrees of flexion and the AP plane at 90 degrees of flexion. Attention was then turned to the patella. A symmetric resection was performed to recreate native patella height and appropriately sized. All extraneous osteophyte and synovium were removed.  With a trial in place, the patella tracked centrally using the no thumbs technique. All trial components were removed. The real components were impacted and the trial liner was place  and found to be appropriate.  The tourniquet was let down and hemostasis ensured. The real insert was placed. Stability and patellar tracking were excellent. The knee was then copiously irrigated. One gram of Vancomycin was left in the wound.  The arthrotomy was closed with number 2 running barbed suture, and the wound was closed in layers.  An occlusive silver dressing was applied and the patient was turned over to anesthesia in stable condition without any apparent intraoperative complications.     Plan:  WBAT, PT/OT evaluation, Aspirin 81mg BID for 4 weeks for DVT prophylaxis, Leave dressing in place until followup.

## 2021-05-19 NOTE — CARE PLAN
The patient is Stable - Low risk of patient condition declining or worsening    Shift Goals  Clinical Goals: ambulate 50 ft.    Progress made toward(s) clinical / shift goals:  pt ambulated 50 ft during shift. And prior to discharge    Patient is not progressing towards the following goals:

## 2021-05-19 NOTE — DISCHARGE SUMMARY
Patient was admitted for a total knee arthroplasty.  There were no complications during the surgery. Did well post-operatively.               There are no active hospital problems to display for this patient.      Uneventful hospital course.     Medication List      START taking these medications      Instructions   acetaminophen 500 MG Tabs  Commonly known as: TYLENOL   Take 2 Tablets by mouth 3 times a day for 30 days.  Dose: 1,000 mg     aspirin 81 MG Chew chewable tablet  Commonly known as: ASA   Chew 1 tablet 2 times a day for 28 days.  Dose: 81 mg     docusate sodium 100 MG Caps  Commonly known as: COLACE   Take 1 capsule by mouth 2 times a day for 30 days.  Dose: 100 mg     meloxicam 7.5 MG Tabs  Commonly known as: MOBIC   Take 1 tablet by mouth every day for 60 days.  Dose: 7.5 mg     oxyCODONE immediate-release 5 MG Tabs  Commonly known as: ROXICODONE   Take 1-2 Tablets by mouth every four hours as needed for up to 7 days.  Dose: 5-10 mg     traMADol 50 MG Tabs  Commonly known as: ULTRAM   Take 1-2 Tablets by mouth every four hours as needed for up to 10 days.  Dose:  mg        CONTINUE taking these medications      Instructions   atorvastatin 20 MG Tabs  Commonly known as: LIPITOR   Take  by mouth. Take 1 tablet by mouth every night at bedtime     Azelastine-Fluticasone 137-50 MCG/ACT Susp   Spray 1 Spray in nose every day. 1 spray each nostril  Dose: 1 Spray     CYANOCOBALAMIN PO   Take 1 Dose by mouth every day.  Dose: 1 Dose     hydroCHLOROthiazide 25 MG Tabs  Commonly known as: HYDRODIURIL   Take 25 mg by mouth every day.  Dose: 25 mg     metoprolol SR 25 MG Tb24  Commonly known as: TOPROL XL   Take 25 mg by mouth every day. Takes 1/2 tab daily  Dose: 25 mg     Pepto-Bismol 262 MG Tabs  Generic drug: Bismuth Subsalicylate   Take 1 Tab by mouth Once.  Dose: 1 tablet     sertraline 100 MG Tabs  Commonly known as: Zoloft   Take 150 mg by mouth every day. Pt takes a 100mg tablet and a 50mg  tablet  Dose: 150 mg        STOP taking these medications    ibuprofen 200 MG Tabs  Commonly known as: MOTRIN              Patient will be discharged home and follow up with Dr. Amado in 2 weeks, for which the patient already has scheduled. [facility] office phone number is [phone].  Patient to begin Physical Therapy as currently scheduled.  Encourage ROM of the knee.     Instructions:  -Leave the bandage in place until your followup appointment in 2 weeks.  -Call if there is drainage beneath the bandage  -Use ice and elevation frequently to help with pain and swelling control  -Put as much weight as comfortable on the operative leg.  Use a walker to assist with balance.  -Take your blood clot prevention medication for 4 weeks after surgery.

## 2021-05-19 NOTE — PROGRESS NOTES
Discharge paperwork reviewed with patient and spouse at bedside. Copy given. Questions encouraged and answered. IV removed.  Patient escorted to ED entrance via wheelchair. Patient discharged to home.

## 2021-05-19 NOTE — PROGRESS NOTES
2 RN skin assessment completed. Skin is not wdl. Sx inciscion to left knee. Dressing to l knee is cdi. All other skin wdl.   Pt has pillows to support positioning.

## 2021-05-19 NOTE — DISCHARGE INSTRUCTIONS
Patient will be discharged home and follow up with Dr. Amado in 2 weeks, for which the patient already has scheduled. [facility] office phone number is [phone].  Patient to begin Physical Therapy as currently scheduled.  Encourage ROM of the knee.      Instructions:  -Leave the bandage in place until your followup appointment in 2 weeks.  -Call if there is drainage beneath the bandage  -Use ice and elevation frequently to help with pain and swelling control  -Put as much weight as comfortable on the operative leg.  Use a walker to assist with balance.  -Take your blood clot prevention medication for 4 weeks after surgery.    Discharge Instructions    Discharged to home by car with relative. Discharged via wheelchair, hospital escort: Yes.  Special equipment needed: Not Applicable    Be sure to schedule a follow-up appointment with your primary care doctor or any specialists as instructed.     Discharge Plan:        I understand that a diet low in cholesterol, fat, and sodium is recommended for good health. Unless I have been given specific instructions below for another diet, I accept this instruction as my diet prescription.   Other diet: Regular    Special Instructions: Discharge instructions for the Orthopedic Patient    Follow up with Primary Care Physician within 2 weeks of discharge to home, regarding:  Review of medications and diagnostic testing.  Surveillance for medical complications.  Workup and treatment of osteoporosis, if appropriate.     -Is this a Hip/Knee/Shoulder Joint Replacement patient? Yes   TOTAL KNEE REPLACEMENT, AFTER-CARE GUIDELINES     These instructions provide you with information on caring for yourself and your knee after surgery. Your health care provider may also give you instructions that are more specific. Your treatment was planned and performed according to current medical practices but problems sometimes occur. Call your health care provider if you have any problems or  questions.     WHAT TO EXPECT AFTER THE PROCEDURE   After your procedure, your knee will typically be stiff, sore, and bruised. This will improve over time.     Pain   · Follow your home pain management plan as discussed with your nurse and as directed by your provider.   · It is important to follow any scheduled pain medications for maximal pain relief.   · If prescribed opioid medication, the goal is to use opioids only as needed and to wean off prescription pain medicine as soon as possible.   · Ice can be used for pain control.   · Put ice in a plastic bag.   · Place a towel between your skin and the bag.   · Leave the ice on for 20 minutes, 2-3 times a day at a minimum.   · Most patients are off the pain pills by 3 weeks. If your pain continues to be severe, follow up with your provider.     Infection   Knee joint infections occur in fewer than 2% of patients. The most common causes of infection following total knee replacement surgery are from bacteria that enter the bloodstream during dental procedures, urinary tract infections, or skin infections. These bacteria can lodge around your knee replacement and cause an infection.   · Keep the incision as clean and dry as possible.   · Always wash your hands before touching your incision.   · Avoid dental care for 3 months after surgery. Your provider may recommend taking a dose of antibiotics an hour prior to any dental procedure. After 2 years, most providers recommend antibiotics only before an extensive procedure. Ask your provider what they recommend.   · Signs and symptoms of infection include low-grade fever, redness, pain, swelling and drainage from your incision. Notify your provider IMMEDIATELY if you develop ANY of these symptoms.     Post op Disturbances   · Bowel Habits - Constipation is extremely common and caused by a combination of anesthesia, lack of mobility, dehydration and pain medicine. Use stool softeners or laxatives if necessary. It is  important not to ignore this problem as bowel obstructions can be a serious complication after joint replacement surgery.   · Mood/Energy Level - Many patients experience a lack of energy and endurance for up to 2-3 months after surgery. Some people feel down and can even become depressed. This is likely due to postoperative anemia, change in activity level, lack of sleep, pain medicine and just the emotional reaction to the surgery itself that is a big disruption in a person’s life. This usually passes. If symptoms persist, follow up with your primary care provider.  · Returning to Work - Your provider will give you specific instructions based on your profession. Generally, if you work a sedentary job requiring little standing or walking, most patients may return within 2-6 weeks. Manual labor jobs involving walking, lifting and standing may take 3-4 months. Your provider’s office can provide a release to part-time or light duty work early on in your recovery and progress you to full duty as able.   · Driving - You can begin driving once cleared by your provider, provided you are no longer taking narcotic pain medication or any other medications that impair driving. Discuss the length of time expected with your provider as returning to driving depends on things such as your vehicle, which knee was replaced (right or left), and knee motion, strength and reflexes returning appropriately.   · Avoiding falls - A fall during the first few weeks after surgery can damage your new knee and may result in a need for further surgery.  throw rugs and tack down loose carpeting. Be aware of floor hazards such as pets, small objects or uneven surfaces. Notify your provider of any falls.   · Airport Metal Detectors - The sensitivity of metal detectors varies and it is likely that your prosthesis will cause an alarm. Inform the  of your artificial joint.     Diet   · Resume your normal diet as tolerated.    · It is important to achieve a healthy nutritional status by eating a well-balanced diet on a regular basis.   · Your provider may recommend that you take iron and vitamin supplements.   · Continue to drink plenty of fluids.     Shower/Bathing   · You may shower as soon as you get home from the hospital unless otherwise instructed.   · Keep your incision out of water to prevent infection. To keep the incision dry when showering, cover it with a plastic bag or plastic wrap. If your bandage is waterproof, this may not be necessary. o Pat incision dry if it gets wet. Do not rub. Notify your provider.   · Do not submerge in a bath until cleared by your provider. Your staples must be out and the incision completely healed.     Dressing Change: Only change your dressing if directed by your provider.   · Wash hands.   · Open all dressing change materials.   · Remove old dressing and discard.   · Inspect incision for signs of irritation or infection including redness, increase in clear drainage, yellow/green drainage, odor and surrounding skin hot to touch. Notify your provider if present.   ·  the new dressing by one corner and lay over the incision. Be careful not to touch the inside of the dressing that will lay over the incision.   · Secure in place as instructed. Swelling/Bruising   · Swelling is normal after knee replacement and can involve the thigh, knee, calf and foot.   · Swelling can last from 3-6 months.   · To reduce swelling, elevate your leg higher than your heart while reclining. The first week you are home you should elevate your leg an equal amount of time as you are active.   · The swelling is usually worse after you go home since you are upright for longer periods of time.   · Bruising often does not appear until after you arrive home and can be quite dramatic- appearing purple, black, or green. Bruising is typically not concerning and will subside without any treatment.     Blood Clot  Prevention   Your treatment plan includes multiple preventative measures to decrease the risk of blood clots in the legs (DVTs) and the less common, but serious, clots that travel to the lungs (pulmonary emboli). Most patients are at standard risk for them, but people who are at higher risk include those who have had previous clots, a family history of clotting, smoking, diabetes, obesity, advanced age, use estrogen and/or live a sedentary lifestyle.     · Signs of blood clots in legs include - Swelling in thigh, calf or ankle that does not go down with elevation. Pain, heat and tenderness in calf, back of calf or groin area. NOTE: blood clots can occur in either leg.   · Signs of blood clots in lungs include - Sudden increased shortness of breath, sudden onset of chest pain, and localized chest pain with coughing.   · If you experience any of the above symptoms, notify your provider and seek medical attention immediately.   · You received anticoagulant therapy (blood thinners) in the hospital. Continue the prescribed blood-thinning medication at home, as directed by your provider.   · Your risk for developing a clot continues for up to 2-3 months after surgery. Avoid prolonged sitting and dehydration (long air trips and car trips). If you do take a trip during this time, please get up, move around every 1-1.5 hours, and discuss all travel plans with your provider.     Activity   Once home, stay active. The key is not to overdo it. While you can expect some good days and some bad days, you should notice a gradual improvement and a gradual increase in your endurance over the next 6 to 12 months. Exercise is a critical component of recovery, particularly during the first few weeks after surgery.     · Normal activities of daily living - Expect to resume most within 3 to 6 weeks following surgery. Some pain with activity and at night is common for several weeks after surgery. Walk as much as you like once your doctor  gives permission to proceed, but remember that walking is no substitute for the exercises your doctor and physical therapist prescribe. Use a walker, crutches or cane to assist with walking until you can walk smoothly (minimal or no limp) without assistance.   · Physical Therapy Exercises - Follow your home exercise program as instructed by your physical therapist during your hospital stay. Call and set up outpatient physical therapy appointments per your provider’s recommendations. Physical therapy after the hospital stay focuses on increasing your range of motion, strengthening your muscles and improving your gait/walking pattern. Contact your provider for the referral to outpatient physical therapy if you have not yet received this. -   · Riding a stationary bicycle can help maintain muscle tone and keep your knee flexible. Begin stationary bicycling as directed by your physical therapist or provider.   · Sexual Activity - Your provider can tell you when it safe to resume sexual activity.   · Sleeping Positions - You can safely sleep on your back, on either side, or on your stomach.   · Other Activities - Lower impact activities are preferred. Consult your provider if you have specific questions.     When to Call the Doctor   Call the provider if you experience:   · Fever over 100.5° F   · Increased pain, drainage, redness, odor or heat around the incision area   · Shaking chills   · Increased knee pain with activity and rest   · Increased pain in calf, tenderness or redness above or below the knee   · Increased swelling of calf, ankle, foot   · Sudden increased shortness of breath, sudden onset of chest pain, localized chest pain with coughing   · Incision opening   Or, if there are any questions or concerns about medications or care.     Infection statistic resource:   https://[email or web address].[email or web address]-joint-[email or web address]-epidemiology-microbiology-clinical-manifestations-and-diagnosis     -Is this  patient being discharged with medication to prevent blood clots?  Yes, Aspirin Aspirin, ASA oral tablets  What is this medicine?  ASPIRIN (AS pir in) is a pain reliever. It is used to treat mild pain and fever. This medicine is also used as directed by a doctor to prevent and to treat heart attacks, to prevent strokes and blood clots, and to treat arthritis or inflammation.  This medicine may be used for other purposes; ask your health care provider or pharmacist if you have questions.  COMMON BRAND NAME(S): Aspir-Low, Aspir-Martina, Aspirtab, Pranav Advanced Aspirin, Pranav Aspirin, Pranav Aspirin Extra Strength, Pranav Aspirin Plus, Pranav Extra Strength, Pranav Extra Strength Plus, Pranav Genuine Aspirin, Pranav Womens Aspirin, Bufferin, Bufferin Extra Strength, Bufferin Low Dose  What should I tell my health care provider before I take this medicine?  They need to know if you have any of these conditions:  · anemia  · asthma  · bleeding problems  · child with chickenpox, the flu, or other viral infection  · diabetes  · gout  · if you frequently drink alcohol containing drinks  · kidney disease  · liver disease  · low level of vitamin K  · lupus  · smoke tobacco  · stomach ulcers or other problems  · an unusual or allergic reaction to aspirin, tartrazine dye, other medicines, dyes, or preservatives  · pregnant or trying to get pregnant  · breast-feeding  How should I use this medicine?  Take this medicine by mouth with a glass of water. Follow the directions on the package or prescription label. You can take this medicine with or without food. If it upsets your stomach, take it with food. Do not take your medicine more often than directed.  Talk to your pediatrician regarding the use of this medicine in children. While this drug may be prescribed for children as young as 12 years of age for selected conditions, precautions do apply. Children and teenagers should not use this medicine to treat chicken pox or flu symptoms  unless directed by a doctor.  Patients over 65 years old may have a stronger reaction and need a smaller dose.  Overdosage: If you think you have taken too much of this medicine contact a poison control center or emergency room at once.  NOTE: This medicine is only for you. Do not share this medicine with others.  What if I miss a dose?  If you are taking this medicine on a regular schedule and miss a dose, take it as soon as you can. If it is almost time for your next dose, take only that dose. Do not take double or extra doses.  What may interact with this medicine?  Do not take this medicine with any of the following medications:  · cidofovir  · ketorolac  · probenecid  This medicine may also interact with the following medications:  · alcohol  · alendronate  · bismuth subsalicylate  · flavocoxid  · herbal supplements like feverfew, garlic, michael, ginkgo biloba, horse chestnut  · medicines for diabetes or glaucoma like acetazolamide, methazolamide  · medicines for gout  · medicines that treat or prevent blood clots like enoxaparin, heparin, ticlopidine, warfarin  · other aspirin and aspirin-like medicines  · NSAIDs, medicines for pain and inflammation, like ibuprofen or naproxen  · pemetrexed  · sulfinpyrazone  · varicella live vaccine  This list may not describe all possible interactions. Give your health care provider a list of all the medicines, herbs, non-prescription drugs, or dietary supplements you use. Also tell them if you smoke, drink alcohol, or use illegal drugs. Some items may interact with your medicine.  What should I watch for while using this medicine?  If you are treating yourself for pain, tell your doctor or health care professional if the pain lasts more than 10 days, if it gets worse, or if there is a new or different kind of pain. Tell your doctor if you see redness or swelling. Also, check with your doctor if you have a fever that lasts for more than 3 days. Only take this medicine to  prevent heart attacks or blood clotting if prescribed by your doctor or health care professional.  Do not take aspirin or aspirin-like medicines with this medicine. Too much aspirin can be dangerous. Always read the labels carefully.  This medicine can irritate your stomach or cause bleeding problems. Do not smoke cigarettes or drink alcohol while taking this medicine. Do not lie down for 30 minutes after taking this medicine to prevent irritation to your throat.  If you are scheduled for any medical or dental procedure, tell your healthcare provider that you are taking this medicine. You may need to stop taking this medicine before the procedure.  This medicine may be used to treat migraines. If you take migraine medicines for 10 or more days a month, your migraines may get worse. Keep a diary of headache days and medicine use. Contact your healthcare professional if your migraine attacks occur more frequently.  What side effects may I notice from receiving this medicine?  Side effects that you should report to your doctor or health care professional as soon as possible:  · allergic reactions like skin rash, itching or hives, swelling of the face, lips, or tongue  · breathing problems  · changes in hearing, ringing in the ears  · confusion  · general ill feeling or flu-like symptoms  · pain on swallowing  · redness, blistering, peeling or loosening of the skin, including inside the mouth or nose  · signs and symptoms of bleeding such as bloody or black, tarry stools; red or dark-brown urine; spitting up blood or brown material that looks like coffee grounds; red spots on the skin; unusual bruising or bleeding from the eye, gums, or nose  · trouble passing urine or change in the amount of urine  · unusually weak or tired  · yellowing of the eyes or skin  Side effects that usually do not require medical attention (report to your doctor or health care professional if they continue or are bothersome):  · diarrhea or  constipation  · headache  · nausea, vomiting  · stomach gas, heartburn  This list may not describe all possible side effects. Call your doctor for medical advice about side effects. You may report side effects to FDA at 5-531-JWF-7031.  Where should I keep my medicine?  Keep out of the reach of children.  Store at room temperature between 15 and 30 degrees C (59 and 86 degrees F). Protect from heat and moisture. Do not use this medicine if it has a strong vinegar smell. Throw away any unused medicine after the expiration date.  NOTE: This sheet is a summary. It may not cover all possible information. If you have questions about this medicine, talk to your doctor, pharmacist, or health care provider.  © 2020 [employer]/Gold Standard (2018-01-30 10:42:13)      · Is patient discharged on Warfarin / Coumadin?   No     Depression / Suicide Risk    As you are discharged from this [facility] facility, it is important to learn how to keep safe from harming yourself.    Recognize the warning signs:  · Abrupt changes in personality, positive or negative- including increase in energy   · Giving away possessions  · Change in eating patterns- significant weight changes-  positive or negative  · Change in sleeping patterns- unable to sleep or sleeping all the time   · Unwillingness or inability to communicate  · Depression  · Unusual sadness, discouragement and loneliness  · Talk of wanting to die  · Neglect of personal appearance   · Rebelliousness- reckless behavior  · Withdrawal from people/activities they love  · Confusion- inability to concentrate     If you or a loved one observes any of these behaviors or has concerns about self-harm, here's what you can do:  · Talk about it- your feelings and reasons for harming yourself  · Remove any means that you might use to hurt yourself (examples: pills, rope, extension cords, firearm)  · Get professional help from the community (Mental Health, Substance Abuse, psychological  counseling)  · Do not be alone:Call your Safe Contact- someone whom you trust who will be there for you.  · Call your local CRISIS HOTLINE 541-7180 or 406-919-7093  · Call your local Children's Mobile Crisis Response Team [place] (011) 157-4257 or www.[employer].com  · Call the toll free National Suicide Prevention Hotlines   · National Suicide Prevention Lifeline 907-386-IGIB (9972)  · National Hope Line Network 800-SUICIDE (397-2322)

## 2021-09-10 PROBLEM — M17.11 OSTEOARTHRITIS OF RIGHT KNEE: Status: ACTIVE | Noted: 2021-09-10

## 2021-11-09 ENCOUNTER — PRE-ADMISSION TESTING (OUTPATIENT)
Dept: ADMISSIONS | Facility: MEDICAL CENTER | Age: 71
End: 2021-11-09
Attending: ORTHOPAEDIC SURGERY
Payer: MEDICARE

## 2021-11-09 DIAGNOSIS — Z01.818 PREOPERATIVE EVALUATION TO RULE OUT SURGICAL CONTRAINDICATION: ICD-10-CM

## 2021-11-09 DIAGNOSIS — M17.11 PRIMARY OSTEOARTHRITIS OF RIGHT KNEE: ICD-10-CM

## 2021-11-09 DIAGNOSIS — Z01.810 PRE-OPERATIVE CARDIOVASCULAR EXAMINATION: ICD-10-CM

## 2021-11-09 DIAGNOSIS — Z01.812 PRE-OPERATIVE LABORATORY EXAMINATION: ICD-10-CM

## 2021-11-09 LAB
ANION GAP SERPL CALC-SCNC: 14 MMOL/L (ref 7–16)
APTT PPP: 37.3 SEC (ref 24.7–36)
BASOPHILS # BLD AUTO: 0.6 % (ref 0–1.8)
BASOPHILS # BLD: 0.07 K/UL (ref 0–0.12)
BUN SERPL-MCNC: 21 MG/DL (ref 8–22)
CALCIUM SERPL-MCNC: 9.1 MG/DL (ref 8.4–10.2)
CHLORIDE SERPL-SCNC: 99 MMOL/L (ref 96–112)
CO2 SERPL-SCNC: 28 MMOL/L (ref 20–33)
CREAT SERPL-MCNC: 1.14 MG/DL (ref 0.5–1.4)
EKG IMPRESSION: NORMAL
EOSINOPHIL # BLD AUTO: 0.38 K/UL (ref 0–0.51)
EOSINOPHIL NFR BLD: 3.2 % (ref 0–6.9)
ERYTHROCYTE [DISTWIDTH] IN BLOOD BY AUTOMATED COUNT: 40.5 FL (ref 35.9–50)
GLUCOSE SERPL-MCNC: 95 MG/DL (ref 65–99)
HCT VFR BLD AUTO: 42.3 % (ref 37–47)
HGB BLD-MCNC: 14.1 G/DL (ref 12–16)
IMM GRANULOCYTES # BLD AUTO: 0.06 K/UL (ref 0–0.11)
IMM GRANULOCYTES NFR BLD AUTO: 0.5 % (ref 0–0.9)
INR PPP: 1.16 (ref 0.87–1.13)
LYMPHOCYTES # BLD AUTO: 2.67 K/UL (ref 1–4.8)
LYMPHOCYTES NFR BLD: 22.3 % (ref 22–41)
MCH RBC QN AUTO: 28.9 PG (ref 27–33)
MCHC RBC AUTO-ENTMCNC: 33.3 G/DL (ref 33.6–35)
MCV RBC AUTO: 86.7 FL (ref 81.4–97.8)
MONOCYTES # BLD AUTO: 0.91 K/UL (ref 0–0.85)
MONOCYTES NFR BLD AUTO: 7.6 % (ref 0–13.4)
NEUTROPHILS # BLD AUTO: 7.88 K/UL (ref 2–7.15)
NEUTROPHILS NFR BLD: 65.8 % (ref 44–72)
NRBC # BLD AUTO: 0 K/UL
NRBC BLD-RTO: 0 /100 WBC
PLATELET # BLD AUTO: 329 K/UL (ref 164–446)
PMV BLD AUTO: 9.1 FL (ref 9–12.9)
POTASSIUM SERPL-SCNC: 3.7 MMOL/L (ref 3.6–5.5)
PROTHROMBIN TIME: 13.9 SEC (ref 12–14.6)
RBC # BLD AUTO: 4.88 M/UL (ref 4.2–5.4)
SCCMEC + MECA PNL NOSE NAA+PROBE: NEGATIVE
SCCMEC + MECA PNL NOSE NAA+PROBE: NEGATIVE
SODIUM SERPL-SCNC: 141 MMOL/L (ref 135–145)
WBC # BLD AUTO: 12 K/UL (ref 4.8–10.8)

## 2021-11-09 PROCEDURE — 93010 ELECTROCARDIOGRAM REPORT: CPT | Performed by: INTERNAL MEDICINE

## 2021-11-09 PROCEDURE — U0003 INFECTIOUS AGENT DETECTION BY NUCLEIC ACID (DNA OR RNA); SEVERE ACUTE RESPIRATORY SYNDROME CORONAVIRUS 2 (SARS-COV-2) (CORONAVIRUS DISEASE [COVID-19]), AMPLIFIED PROBE TECHNIQUE, MAKING USE OF HIGH THROUGHPUT TECHNOLOGIES AS DESCRIBED BY CMS-2020-01-R: HCPCS

## 2021-11-09 PROCEDURE — 93005 ELECTROCARDIOGRAM TRACING: CPT

## 2021-11-09 PROCEDURE — 85610 PROTHROMBIN TIME: CPT

## 2021-11-09 PROCEDURE — 85730 THROMBOPLASTIN TIME PARTIAL: CPT

## 2021-11-09 PROCEDURE — 85025 COMPLETE CBC W/AUTO DIFF WBC: CPT

## 2021-11-09 PROCEDURE — 87640 STAPH A DNA AMP PROBE: CPT | Mod: XU

## 2021-11-09 PROCEDURE — U0005 INFEC AGEN DETEC AMPLI PROBE: HCPCS

## 2021-11-09 PROCEDURE — 87641 MR-STAPH DNA AMP PROBE: CPT

## 2021-11-09 PROCEDURE — 80048 BASIC METABOLIC PNL TOTAL CA: CPT

## 2021-11-09 PROCEDURE — 36415 COLL VENOUS BLD VENIPUNCTURE: CPT

## 2021-11-09 RX ORDER — CEFAZOLIN SODIUM IN 0.9 % NACL 2 G/100 ML
2 PLASTIC BAG, INJECTION (ML) INTRAVENOUS ONCE
Status: CANCELLED | OUTPATIENT
Start: 2021-11-09 | End: 2021-11-09

## 2021-11-09 RX ORDER — SERTRALINE HYDROCHLORIDE 100 MG/1
100 TABLET, FILM COATED ORAL DAILY
COMMUNITY
Start: 2021-10-09 | End: 2021-11-09

## 2021-11-09 ASSESSMENT — FIBROSIS 4 INDEX: FIB4 SCORE: 0.87

## 2021-11-09 NOTE — OR NURSING
Good afternoon Dr. Concepcion Croft’s BMI of 42.76 is the main reason for drawing your attention to her. She does have HTN and elevated cholesterol which she is medicated for. We got a CBC, BMP, ECG PT, PTT today. She had a TKA 05/21 w/Dr. Amado. Is there anything you would like done for Guerda?      Anesthesia Summary Report           Patient Name: Guerda Lunsford MRN: [id number] Admission Date: Patient not admitted

## 2021-11-09 NOTE — OR NURSING
"Preadmit appointment: \" Preparing for your Procedure information\" sheet given to patient with verbal and written instructions. Patient instructed to continue prescribed medications through the day before surgery, instructed to take the following medications the day of surgery per anesthesia protocol: AZELASTINE-FLUTICASONE           Verbal and written, and pre-admit video website instructions provided on covid symptoms to watch for given to patient, pt advised to notify MD if any symptoms develop. Verbal instructions given to follow the [place] mask mandate and encouraged to avoid crowds. Also verbally instructed to wear a mask when with person known not to have had the Covid vaccine.    METs 3-5    FALL RISK ORDERED AND PROTOCOL INITIATED    STOP/BANG PROTOCOL INITIATED  "

## 2021-11-09 NOTE — DISCHARGE PLANNING
DISCHARGE PLANNING NOTE - TOTAL JOINT    Procedure: Procedure(s):  ARTHROPLASTY, KNEE, TOTAL [place] cementless  Procedure Date: 11/16/2021  Insurance: Payor: MEDICARE / Plan: MEDICARE PART A & B / Product Type: *No Product type* /    Equipment currently available at home?  cane, front-wheel walker and shower chair  Steps into the home? 2  Steps within the home? 0  Toilet height? Standard  Type of shower? walk-in shower  Who will be with you during your recovery? Spouse  Is Outpatient Physical Therapy set up after surgery? Yes  Did you take the Total Joint Class and where? Yes received [facility] [employer] book this past May.  Planning same day discharge?Yes     This writer met with pt during her preadmission appointment. Pt states she has all needed equipment.  Home safety checklist reviewed and copy given to pt. Pt educated to dc criteria. All questions answered and verbalizes understanding of all instructions. No dc needs identified at this time. Anticipate dc to home without barriers.

## 2021-11-10 LAB
SARS-COV-2 RNA RESP QL NAA+PROBE: NOTDETECTED
SPECIMEN SOURCE: NORMAL

## 2021-11-10 NOTE — OR NURSING
11/10 Left message with Landon regarding Dr. Javier's request for medical clearance, addressing pt's BMI greater than 40, which meets red flag criteria for total joint.  Medical clearance to address wt loss strategies, and if Dr. Amado' would mention in his H&P weight loss strategies.

## 2021-11-10 NOTE — OR NURSING
We will need a primary care medical clearance particularly addressing the BMI of greater than 40 which meets red flag criteria for primary total joint replacement and attempts made add weight loss strategies. If Dr. Amado’ H&P also make mention of these, then it would be appropriate to proceed forward. Thank you.  Joi Javier M.D.  [facility]      On Nov 9, 2021, at 15:09, SMPreadmit [email or web address] wrote:  ?   Good afternoon Dr. Concepcion Croft’s BMI of 42.76 is the main reason for drawing your attention to her. She does have HTN and elevated cholesterol which she is medicated for. We got a CBC, BMP, ECG PT, PTT today. She had a TKA 05/21 w/Dr. Amado. Is there anything you would like done for Guerda?        Anesthesia Summary Report           Patient Name: Guerda Lunsford MRN: [id number] Admission Date: Patient not admitted   Allergies: Azithromycin, Cymbalta [Duloxetine Hcl], Montelukast, Demerol

## 2021-11-15 NOTE — OR NURSING
LM with FINN Porter for Dr. Aguilar's office regarding Dr. Javier's request for medical clearance addressing BMI, requested a return call of status (surgery tomorrow). Also re-faxed paperwork to Dr. Aguilar's office.      Phone call to surgery scheduler, spoke with Helen, she states she will be looking into case.

## 2021-11-16 ENCOUNTER — ANESTHESIA EVENT (OUTPATIENT)
Dept: SURGERY | Facility: MEDICAL CENTER | Age: 71
End: 2021-11-16
Payer: MEDICARE

## 2021-11-16 ENCOUNTER — ANESTHESIA (OUTPATIENT)
Dept: SURGERY | Facility: MEDICAL CENTER | Age: 71
End: 2021-11-16
Payer: MEDICARE

## 2021-11-16 ENCOUNTER — HOSPITAL ENCOUNTER (OUTPATIENT)
Facility: MEDICAL CENTER | Age: 71
End: 2021-11-16
Attending: ORTHOPAEDIC SURGERY | Admitting: ORTHOPAEDIC SURGERY
Payer: MEDICARE

## 2021-11-16 VITALS
HEART RATE: 82 BPM | WEIGHT: 256.95 LBS | OXYGEN SATURATION: 94 % | TEMPERATURE: 97.3 F | SYSTOLIC BLOOD PRESSURE: 120 MMHG | DIASTOLIC BLOOD PRESSURE: 66 MMHG | BODY MASS INDEX: 42.81 KG/M2 | HEIGHT: 65 IN | RESPIRATION RATE: 18 BRPM

## 2021-11-16 DIAGNOSIS — M17.11 PRIMARY OSTEOARTHRITIS OF RIGHT KNEE: ICD-10-CM

## 2021-11-16 DIAGNOSIS — G89.18 POSTOPERATIVE PAIN: ICD-10-CM

## 2021-11-16 PROCEDURE — 90662 IIV NO PRSV INCREASED AG IM: CPT

## 2021-11-16 PROCEDURE — 160035 HCHG PACU - 1ST 60 MINS PHASE I: Performed by: ORTHOPAEDIC SURGERY

## 2021-11-16 PROCEDURE — 700111 HCHG RX REV CODE 636 W/ 250 OVERRIDE (IP): Performed by: ORTHOPAEDIC SURGERY

## 2021-11-16 PROCEDURE — G0378 HOSPITAL OBSERVATION PER HR: HCPCS

## 2021-11-16 PROCEDURE — 97165 OT EVAL LOW COMPLEX 30 MIN: CPT

## 2021-11-16 PROCEDURE — 500002 HCHG ADHESIVE, DERMABOND: Performed by: ORTHOPAEDIC SURGERY

## 2021-11-16 PROCEDURE — 96365 THER/PROPH/DIAG IV INF INIT: CPT | Mod: XU

## 2021-11-16 PROCEDURE — A9270 NON-COVERED ITEM OR SERVICE: HCPCS | Performed by: INTERNAL MEDICINE

## 2021-11-16 PROCEDURE — 700101 HCHG RX REV CODE 250: Performed by: INTERNAL MEDICINE

## 2021-11-16 PROCEDURE — 700111 HCHG RX REV CODE 636 W/ 250 OVERRIDE (IP)

## 2021-11-16 PROCEDURE — 160036 HCHG PACU - EA ADDL 30 MINS PHASE I: Performed by: ORTHOPAEDIC SURGERY

## 2021-11-16 PROCEDURE — 96375 TX/PRO/DX INJ NEW DRUG ADDON: CPT | Mod: XU

## 2021-11-16 PROCEDURE — 700111 HCHG RX REV CODE 636 W/ 250 OVERRIDE (IP): Performed by: INTERNAL MEDICINE

## 2021-11-16 PROCEDURE — 160048 HCHG OR STATISTICAL LEVEL 1-5: Performed by: ORTHOPAEDIC SURGERY

## 2021-11-16 PROCEDURE — 97162 PT EVAL MOD COMPLEX 30 MIN: CPT

## 2021-11-16 PROCEDURE — A9270 NON-COVERED ITEM OR SERVICE: HCPCS | Performed by: ORTHOPAEDIC SURGERY

## 2021-11-16 PROCEDURE — 94669 MECHANICAL CHEST WALL OSCILL: CPT

## 2021-11-16 PROCEDURE — C1776 JOINT DEVICE (IMPLANTABLE): HCPCS | Performed by: ORTHOPAEDIC SURGERY

## 2021-11-16 PROCEDURE — 64447 NJX AA&/STRD FEMORAL NRV IMG: CPT | Performed by: ORTHOPAEDIC SURGERY

## 2021-11-16 PROCEDURE — 160002 HCHG RECOVERY MINUTES (STAT): Performed by: ORTHOPAEDIC SURGERY

## 2021-11-16 PROCEDURE — 700105 HCHG RX REV CODE 258: Performed by: ORTHOPAEDIC SURGERY

## 2021-11-16 PROCEDURE — 700102 HCHG RX REV CODE 250 W/ 637 OVERRIDE(OP): Performed by: INTERNAL MEDICINE

## 2021-11-16 PROCEDURE — 160041 HCHG SURGERY MINUTES - EA ADDL 1 MIN LEVEL 4: Performed by: ORTHOPAEDIC SURGERY

## 2021-11-16 PROCEDURE — 27447 TOTAL KNEE ARTHROPLASTY: CPT | Mod: RT | Performed by: ORTHOPAEDIC SURGERY

## 2021-11-16 PROCEDURE — C1713 ANCHOR/SCREW BN/BN,TIS/BN: HCPCS | Performed by: ORTHOPAEDIC SURGERY

## 2021-11-16 PROCEDURE — 90471 IMMUNIZATION ADMIN: CPT

## 2021-11-16 PROCEDURE — 27447 TOTAL KNEE ARTHROPLASTY: CPT | Mod: ASROC,RT | Performed by: STUDENT IN AN ORGANIZED HEALTH CARE EDUCATION/TRAINING PROGRAM

## 2021-11-16 PROCEDURE — 502579 HCHG PACK, TOTAL KNEE: Performed by: ORTHOPAEDIC SURGERY

## 2021-11-16 PROCEDURE — 700101 HCHG RX REV CODE 250: Performed by: ORTHOPAEDIC SURGERY

## 2021-11-16 PROCEDURE — 97116 GAIT TRAINING THERAPY: CPT

## 2021-11-16 PROCEDURE — 502000 HCHG MISC OR IMPLANTS RC 0278: Performed by: ORTHOPAEDIC SURGERY

## 2021-11-16 PROCEDURE — 700102 HCHG RX REV CODE 250 W/ 637 OVERRIDE(OP): Performed by: ORTHOPAEDIC SURGERY

## 2021-11-16 PROCEDURE — 94760 N-INVAS EAR/PLS OXIMETRY 1: CPT

## 2021-11-16 PROCEDURE — 160009 HCHG ANES TIME/MIN: Performed by: ORTHOPAEDIC SURGERY

## 2021-11-16 PROCEDURE — 160029 HCHG SURGERY MINUTES - 1ST 30 MINS LEVEL 4: Performed by: ORTHOPAEDIC SURGERY

## 2021-11-16 DEVICE — IMPLANTABLE DEVICE: Type: IMPLANTABLE DEVICE | Status: FUNCTIONAL

## 2021-11-16 RX ORDER — ONDANSETRON 2 MG/ML
INJECTION INTRAMUSCULAR; INTRAVENOUS PRN
Status: DISCONTINUED | OUTPATIENT
Start: 2021-11-16 | End: 2021-11-16 | Stop reason: SURG

## 2021-11-16 RX ORDER — HYDROMORPHONE HYDROCHLORIDE 2 MG/ML
INJECTION, SOLUTION INTRAMUSCULAR; INTRAVENOUS; SUBCUTANEOUS PRN
Status: DISCONTINUED | OUTPATIENT
Start: 2021-11-16 | End: 2021-11-16 | Stop reason: SURG

## 2021-11-16 RX ORDER — EPINEPHRINE 1 MG/ML(1)
AMPUL (ML) INJECTION
Status: DISCONTINUED | OUTPATIENT
Start: 2021-11-16 | End: 2021-11-16 | Stop reason: HOSPADM

## 2021-11-16 RX ORDER — OXYCODONE HYDROCHLORIDE 5 MG/1
5-10 TABLET ORAL EVERY 4 HOURS PRN
Qty: 42 TABLET | Refills: 0 | Status: SHIPPED | OUTPATIENT
Start: 2021-11-16 | End: 2021-11-23

## 2021-11-16 RX ORDER — SODIUM CHLORIDE 9 MG/ML
INJECTION, SOLUTION INTRAMUSCULAR; INTRAVENOUS; SUBCUTANEOUS
Status: DISCONTINUED | OUTPATIENT
Start: 2021-11-16 | End: 2021-11-16 | Stop reason: HOSPADM

## 2021-11-16 RX ORDER — DIPHENHYDRAMINE HYDROCHLORIDE 50 MG/ML
25 INJECTION INTRAMUSCULAR; INTRAVENOUS EVERY 6 HOURS PRN
Status: DISCONTINUED | OUTPATIENT
Start: 2021-11-16 | End: 2021-11-16 | Stop reason: HOSPADM

## 2021-11-16 RX ORDER — DEXAMETHASONE SODIUM PHOSPHATE 4 MG/ML
4 INJECTION, SOLUTION INTRA-ARTICULAR; INTRALESIONAL; INTRAMUSCULAR; INTRAVENOUS; SOFT TISSUE
Status: DISCONTINUED | OUTPATIENT
Start: 2021-11-16 | End: 2021-11-16 | Stop reason: HOSPADM

## 2021-11-16 RX ORDER — ONDANSETRON 2 MG/ML
4 INJECTION INTRAMUSCULAR; INTRAVENOUS EVERY 4 HOURS PRN
Status: DISCONTINUED | OUTPATIENT
Start: 2021-11-16 | End: 2021-11-16 | Stop reason: HOSPADM

## 2021-11-16 RX ORDER — ATORVASTATIN CALCIUM 20 MG/1
20 TABLET, FILM COATED ORAL EVERY EVENING
Status: DISCONTINUED | OUTPATIENT
Start: 2021-11-16 | End: 2021-11-16 | Stop reason: HOSPADM

## 2021-11-16 RX ORDER — AMOXICILLIN 250 MG
1 CAPSULE ORAL
Status: DISCONTINUED | OUTPATIENT
Start: 2021-11-16 | End: 2021-11-16 | Stop reason: HOSPADM

## 2021-11-16 RX ORDER — LIDOCAINE HYDROCHLORIDE 20 MG/ML
INJECTION, SOLUTION EPIDURAL; INFILTRATION; INTRACAUDAL; PERINEURAL PRN
Status: DISCONTINUED | OUTPATIENT
Start: 2021-11-16 | End: 2021-11-16 | Stop reason: SURG

## 2021-11-16 RX ORDER — KETOROLAC TROMETHAMINE 30 MG/ML
INJECTION, SOLUTION INTRAMUSCULAR; INTRAVENOUS
Status: DISCONTINUED | OUTPATIENT
Start: 2021-11-16 | End: 2021-11-16 | Stop reason: HOSPADM

## 2021-11-16 RX ORDER — HYDROMORPHONE HYDROCHLORIDE 1 MG/ML
0.1 INJECTION, SOLUTION INTRAMUSCULAR; INTRAVENOUS; SUBCUTANEOUS
Status: DISCONTINUED | OUTPATIENT
Start: 2021-11-16 | End: 2021-11-16 | Stop reason: HOSPADM

## 2021-11-16 RX ORDER — CHLORPROMAZINE HYDROCHLORIDE 25 MG/ML
25 INJECTION INTRAMUSCULAR EVERY 6 HOURS PRN
Status: DISCONTINUED | OUTPATIENT
Start: 2021-11-16 | End: 2021-11-16 | Stop reason: HOSPADM

## 2021-11-16 RX ORDER — DOCUSATE SODIUM 100 MG/1
100 CAPSULE, LIQUID FILLED ORAL 2 TIMES DAILY
Status: DISCONTINUED | OUTPATIENT
Start: 2021-11-16 | End: 2021-11-16 | Stop reason: HOSPADM

## 2021-11-16 RX ORDER — SODIUM CHLORIDE, SODIUM LACTATE, POTASSIUM CHLORIDE, CALCIUM CHLORIDE 600; 310; 30; 20 MG/100ML; MG/100ML; MG/100ML; MG/100ML
INJECTION, SOLUTION INTRAVENOUS CONTINUOUS
Status: ACTIVE | OUTPATIENT
Start: 2021-11-16 | End: 2021-11-16

## 2021-11-16 RX ORDER — HALOPERIDOL 5 MG/ML
1 INJECTION INTRAMUSCULAR
Status: DISCONTINUED | OUTPATIENT
Start: 2021-11-16 | End: 2021-11-16 | Stop reason: HOSPADM

## 2021-11-16 RX ORDER — AZELASTINE HYDROCHLORIDE, FLUTICASONE PROPIONATE 137; 50 UG/1; UG/1
1 SPRAY, METERED NASAL DAILY
Status: DISCONTINUED | OUTPATIENT
Start: 2021-11-16 | End: 2021-11-16

## 2021-11-16 RX ORDER — HALOPERIDOL 5 MG/ML
1 INJECTION INTRAMUSCULAR EVERY 6 HOURS PRN
Status: DISCONTINUED | OUTPATIENT
Start: 2021-11-16 | End: 2021-11-16 | Stop reason: HOSPADM

## 2021-11-16 RX ORDER — DEXAMETHASONE SODIUM PHOSPHATE 4 MG/ML
6 INJECTION, SOLUTION INTRA-ARTICULAR; INTRALESIONAL; INTRAMUSCULAR; INTRAVENOUS; SOFT TISSUE ONCE
Status: DISCONTINUED | OUTPATIENT
Start: 2021-11-17 | End: 2021-11-16 | Stop reason: HOSPADM

## 2021-11-16 RX ORDER — TRAMADOL HYDROCHLORIDE 50 MG/1
100 TABLET ORAL EVERY 6 HOURS
Status: DISCONTINUED | OUTPATIENT
Start: 2021-11-16 | End: 2021-11-16 | Stop reason: HOSPADM

## 2021-11-16 RX ORDER — METOPROLOL SUCCINATE 25 MG/1
25 TABLET, EXTENDED RELEASE ORAL DAILY
Status: DISCONTINUED | OUTPATIENT
Start: 2021-11-16 | End: 2021-11-16 | Stop reason: HOSPADM

## 2021-11-16 RX ORDER — CEFAZOLIN SODIUM 1 G/3ML
INJECTION, POWDER, FOR SOLUTION INTRAMUSCULAR; INTRAVENOUS PRN
Status: DISCONTINUED | OUTPATIENT
Start: 2021-11-16 | End: 2021-11-16 | Stop reason: SURG

## 2021-11-16 RX ORDER — MEPERIDINE HYDROCHLORIDE 25 MG/ML
12.5 INJECTION INTRAMUSCULAR; INTRAVENOUS; SUBCUTANEOUS
Status: DISCONTINUED | OUTPATIENT
Start: 2021-11-16 | End: 2021-11-16 | Stop reason: HOSPADM

## 2021-11-16 RX ORDER — IBUPROFEN 400 MG/1
800 TABLET ORAL 3 TIMES DAILY PRN
Status: DISCONTINUED | OUTPATIENT
Start: 2021-11-19 | End: 2021-11-16 | Stop reason: HOSPADM

## 2021-11-16 RX ORDER — DEXAMETHASONE SODIUM PHOSPHATE 4 MG/ML
INJECTION, SOLUTION INTRA-ARTICULAR; INTRALESIONAL; INTRAMUSCULAR; INTRAVENOUS; SOFT TISSUE PRN
Status: DISCONTINUED | OUTPATIENT
Start: 2021-11-16 | End: 2021-11-16 | Stop reason: SURG

## 2021-11-16 RX ORDER — ONDANSETRON 2 MG/ML
4 INJECTION INTRAMUSCULAR; INTRAVENOUS
Status: DISCONTINUED | OUTPATIENT
Start: 2021-11-16 | End: 2021-11-16 | Stop reason: HOSPADM

## 2021-11-16 RX ORDER — TRAMADOL HYDROCHLORIDE 50 MG/1
50-100 TABLET ORAL EVERY 6 HOURS PRN
Qty: 80 TABLET | Refills: 0 | Status: SHIPPED | OUTPATIENT
Start: 2021-11-16 | End: 2021-11-30

## 2021-11-16 RX ORDER — ROPIVACAINE HYDROCHLORIDE 5 MG/ML
INJECTION, SOLUTION EPIDURAL; INFILTRATION; PERINEURAL
Status: DISCONTINUED | OUTPATIENT
Start: 2021-11-16 | End: 2021-11-16 | Stop reason: HOSPADM

## 2021-11-16 RX ORDER — BISACODYL 10 MG
10 SUPPOSITORY, RECTAL RECTAL
Status: DISCONTINUED | OUTPATIENT
Start: 2021-11-16 | End: 2021-11-16 | Stop reason: HOSPADM

## 2021-11-16 RX ORDER — POLYETHYLENE GLYCOL 3350 17 G/17G
1 POWDER, FOR SOLUTION ORAL 2 TIMES DAILY PRN
Status: DISCONTINUED | OUTPATIENT
Start: 2021-11-16 | End: 2021-11-16 | Stop reason: HOSPADM

## 2021-11-16 RX ORDER — TRANEXAMIC ACID 100 MG/ML
INJECTION, SOLUTION INTRAVENOUS PRN
Status: DISCONTINUED | OUTPATIENT
Start: 2021-11-16 | End: 2021-11-16 | Stop reason: SURG

## 2021-11-16 RX ORDER — ENEMA 19; 7 G/133ML; G/133ML
1 ENEMA RECTAL
Status: DISCONTINUED | OUTPATIENT
Start: 2021-11-16 | End: 2021-11-16 | Stop reason: HOSPADM

## 2021-11-16 RX ORDER — KETOROLAC TROMETHAMINE 30 MG/ML
15 INJECTION, SOLUTION INTRAMUSCULAR; INTRAVENOUS EVERY 6 HOURS
Status: DISCONTINUED | OUTPATIENT
Start: 2021-11-16 | End: 2021-11-16 | Stop reason: HOSPADM

## 2021-11-16 RX ORDER — AMOXICILLIN 250 MG
1 CAPSULE ORAL NIGHTLY
Status: DISCONTINUED | OUTPATIENT
Start: 2021-11-16 | End: 2021-11-16 | Stop reason: HOSPADM

## 2021-11-16 RX ORDER — OXYCODONE HYDROCHLORIDE 10 MG/1
10 TABLET ORAL
Status: DISCONTINUED | OUTPATIENT
Start: 2021-11-16 | End: 2021-11-16 | Stop reason: HOSPADM

## 2021-11-16 RX ORDER — LABETALOL HYDROCHLORIDE 5 MG/ML
5 INJECTION, SOLUTION INTRAVENOUS
Status: DISCONTINUED | OUTPATIENT
Start: 2021-11-16 | End: 2021-11-16 | Stop reason: HOSPADM

## 2021-11-16 RX ORDER — ACETAMINOPHEN 500 MG
1000 TABLET ORAL EVERY 6 HOURS
Status: DISCONTINUED | OUTPATIENT
Start: 2021-11-16 | End: 2021-11-16 | Stop reason: HOSPADM

## 2021-11-16 RX ORDER — HYDROCHLOROTHIAZIDE 25 MG/1
25 TABLET ORAL DAILY
Status: DISCONTINUED | OUTPATIENT
Start: 2021-11-16 | End: 2021-11-16 | Stop reason: HOSPADM

## 2021-11-16 RX ORDER — CELECOXIB 200 MG/1
200 CAPSULE ORAL ONCE
Status: COMPLETED | OUTPATIENT
Start: 2021-11-16 | End: 2021-11-16

## 2021-11-16 RX ORDER — OXYCODONE HCL 5 MG/5 ML
10 SOLUTION, ORAL ORAL
Status: DISCONTINUED | OUTPATIENT
Start: 2021-11-16 | End: 2021-11-16 | Stop reason: HOSPADM

## 2021-11-16 RX ORDER — SCOLOPAMINE TRANSDERMAL SYSTEM 1 MG/1
1 PATCH, EXTENDED RELEASE TRANSDERMAL
Status: DISCONTINUED | OUTPATIENT
Start: 2021-11-16 | End: 2021-11-16 | Stop reason: HOSPADM

## 2021-11-16 RX ORDER — MELOXICAM 7.5 MG/1
7.5 TABLET ORAL DAILY
Qty: 30 TABLET | Refills: 1 | Status: SHIPPED | OUTPATIENT
Start: 2021-11-16 | End: 2022-01-17

## 2021-11-16 RX ORDER — VANCOMYCIN HYDROCHLORIDE 1 G/20ML
INJECTION, POWDER, LYOPHILIZED, FOR SOLUTION INTRAVENOUS
Status: COMPLETED | OUTPATIENT
Start: 2021-11-16 | End: 2021-11-16

## 2021-11-16 RX ORDER — DIPHENHYDRAMINE HCL 25 MG
25 TABLET ORAL EVERY 6 HOURS PRN
Status: DISCONTINUED | OUTPATIENT
Start: 2021-11-16 | End: 2021-11-16 | Stop reason: HOSPADM

## 2021-11-16 RX ORDER — ACETAMINOPHEN 500 MG
1000 TABLET ORAL ONCE
Status: COMPLETED | OUTPATIENT
Start: 2021-11-16 | End: 2021-11-16

## 2021-11-16 RX ORDER — HYDROMORPHONE HYDROCHLORIDE 1 MG/ML
0.5 INJECTION, SOLUTION INTRAMUSCULAR; INTRAVENOUS; SUBCUTANEOUS
Status: DISCONTINUED | OUTPATIENT
Start: 2021-11-16 | End: 2021-11-16 | Stop reason: HOSPADM

## 2021-11-16 RX ORDER — KETAMINE HYDROCHLORIDE 50 MG/ML
INJECTION, SOLUTION INTRAMUSCULAR; INTRAVENOUS PRN
Status: DISCONTINUED | OUTPATIENT
Start: 2021-11-16 | End: 2021-11-16 | Stop reason: SURG

## 2021-11-16 RX ORDER — HYDROMORPHONE HYDROCHLORIDE 1 MG/ML
0.2 INJECTION, SOLUTION INTRAMUSCULAR; INTRAVENOUS; SUBCUTANEOUS
Status: DISCONTINUED | OUTPATIENT
Start: 2021-11-16 | End: 2021-11-16 | Stop reason: HOSPADM

## 2021-11-16 RX ORDER — HYDROMORPHONE HYDROCHLORIDE 1 MG/ML
0.4 INJECTION, SOLUTION INTRAMUSCULAR; INTRAVENOUS; SUBCUTANEOUS
Status: DISCONTINUED | OUTPATIENT
Start: 2021-11-16 | End: 2021-11-16 | Stop reason: HOSPADM

## 2021-11-16 RX ORDER — HYDRALAZINE HYDROCHLORIDE 20 MG/ML
5 INJECTION INTRAMUSCULAR; INTRAVENOUS
Status: DISCONTINUED | OUTPATIENT
Start: 2021-11-16 | End: 2021-11-16 | Stop reason: HOSPADM

## 2021-11-16 RX ORDER — ACETAMINOPHEN 500 MG
1000 TABLET ORAL EVERY 6 HOURS PRN
Status: DISCONTINUED | OUTPATIENT
Start: 2021-11-21 | End: 2021-11-16 | Stop reason: HOSPADM

## 2021-11-16 RX ORDER — OXYCODONE HCL 10 MG/1
10 TABLET, FILM COATED, EXTENDED RELEASE ORAL ONCE
Status: COMPLETED | OUTPATIENT
Start: 2021-11-16 | End: 2021-11-16

## 2021-11-16 RX ORDER — CHLORPROMAZINE HYDROCHLORIDE 25 MG/1
25 TABLET, FILM COATED ORAL EVERY 6 HOURS PRN
Status: DISCONTINUED | OUTPATIENT
Start: 2021-11-16 | End: 2021-11-16 | Stop reason: HOSPADM

## 2021-11-16 RX ORDER — ROPIVACAINE HYDROCHLORIDE 5 MG/ML
INJECTION, SOLUTION EPIDURAL; INFILTRATION; PERINEURAL
Status: COMPLETED | OUTPATIENT
Start: 2021-11-16 | End: 2021-11-16

## 2021-11-16 RX ORDER — OXYCODONE HCL 5 MG/5 ML
5 SOLUTION, ORAL ORAL
Status: DISCONTINUED | OUTPATIENT
Start: 2021-11-16 | End: 2021-11-16 | Stop reason: HOSPADM

## 2021-11-16 RX ORDER — DIPHENHYDRAMINE HYDROCHLORIDE 50 MG/ML
12.5 INJECTION INTRAMUSCULAR; INTRAVENOUS
Status: DISCONTINUED | OUTPATIENT
Start: 2021-11-16 | End: 2021-11-16 | Stop reason: HOSPADM

## 2021-11-16 RX ORDER — KETOROLAC TROMETHAMINE 30 MG/ML
INJECTION, SOLUTION INTRAMUSCULAR; INTRAVENOUS PRN
Status: DISCONTINUED | OUTPATIENT
Start: 2021-11-16 | End: 2021-11-16 | Stop reason: SURG

## 2021-11-16 RX ORDER — CEFAZOLIN SODIUM IN 0.9 % NACL 2 G/100 ML
2 PLASTIC BAG, INJECTION (ML) INTRAVENOUS ONCE
Status: COMPLETED | OUTPATIENT
Start: 2021-11-16 | End: 2021-11-16

## 2021-11-16 RX ORDER — OXYCODONE HYDROCHLORIDE 5 MG/1
5 TABLET ORAL
Status: DISCONTINUED | OUTPATIENT
Start: 2021-11-16 | End: 2021-11-16 | Stop reason: HOSPADM

## 2021-11-16 RX ORDER — CEFAZOLIN SODIUM 1 G/3ML
3 INJECTION, POWDER, FOR SOLUTION INTRAMUSCULAR; INTRAVENOUS ONCE
Status: DISCONTINUED | OUTPATIENT
Start: 2021-11-16 | End: 2021-11-16 | Stop reason: HOSPADM

## 2021-11-16 RX ORDER — LIDOCAINE HYDROCHLORIDE 40 MG/ML
SOLUTION TOPICAL PRN
Status: DISCONTINUED | OUTPATIENT
Start: 2021-11-16 | End: 2021-11-16 | Stop reason: SURG

## 2021-11-16 RX ADMIN — CEFAZOLIN 2 G: 330 INJECTION, POWDER, FOR SOLUTION INTRAMUSCULAR; INTRAVENOUS at 07:50

## 2021-11-16 RX ADMIN — TRANEXAMIC ACID 1000 MG: 100 INJECTION, SOLUTION INTRAVENOUS at 08:17

## 2021-11-16 RX ADMIN — FENTANYL CITRATE 50 MCG: 50 INJECTION, SOLUTION INTRAMUSCULAR; INTRAVENOUS at 07:44

## 2021-11-16 RX ADMIN — CELECOXIB 200 MG: 200 CAPSULE ORAL at 06:54

## 2021-11-16 RX ADMIN — TRAMADOL HYDROCHLORIDE 100 MG: 50 TABLET ORAL at 11:25

## 2021-11-16 RX ADMIN — PROPOFOL 150 MG: 10 INJECTION, EMULSION INTRAVENOUS at 07:44

## 2021-11-16 RX ADMIN — KETOROLAC TROMETHAMINE 15 MG: 30 INJECTION, SOLUTION INTRAMUSCULAR at 08:29

## 2021-11-16 RX ADMIN — DOCUSATE SODIUM 100 MG: 100 CAPSULE ORAL at 11:25

## 2021-11-16 RX ADMIN — KETAMINE HYDROCHLORIDE 30 MG: 50 INJECTION INTRAMUSCULAR; INTRAVENOUS at 07:55

## 2021-11-16 RX ADMIN — ROPIVACAINE HYDROCHLORIDE 15 ML: 5 INJECTION, SOLUTION EPIDURAL; INFILTRATION; PERINEURAL at 07:31

## 2021-11-16 RX ADMIN — FENTANYL CITRATE 50 MCG: 50 INJECTION, SOLUTION INTRAMUSCULAR; INTRAVENOUS at 07:31

## 2021-11-16 RX ADMIN — DEXAMETHASONE SODIUM PHOSPHATE 8 MG: 4 INJECTION, SOLUTION INTRAMUSCULAR; INTRAVENOUS at 07:44

## 2021-11-16 RX ADMIN — KETOROLAC TROMETHAMINE 15 MG: 30 INJECTION, SOLUTION INTRAMUSCULAR at 11:26

## 2021-11-16 RX ADMIN — HYDROMORPHONE HYDROCHLORIDE 0.5 MG: 2 INJECTION, SOLUTION INTRAMUSCULAR; INTRAVENOUS; SUBCUTANEOUS at 07:55

## 2021-11-16 RX ADMIN — ROCURONIUM BROMIDE 50 MG: 10 INJECTION INTRAVENOUS at 07:44

## 2021-11-16 RX ADMIN — ACETAMINOPHEN 1000 MG: 500 TABLET, FILM COATED ORAL at 11:25

## 2021-11-16 RX ADMIN — EPHEDRINE SULFATE 5 MG: 50 INJECTION INTRAMUSCULAR; INTRAVENOUS; SUBCUTANEOUS at 08:23

## 2021-11-16 RX ADMIN — LIDOCAINE HYDROCHLORIDE 3 ML: 40 SOLUTION TOPICAL at 07:46

## 2021-11-16 RX ADMIN — EPHEDRINE SULFATE 5 MG: 50 INJECTION INTRAMUSCULAR; INTRAVENOUS; SUBCUTANEOUS at 08:17

## 2021-11-16 RX ADMIN — CEFAZOLIN 2 G: 1 INJECTION, POWDER, FOR SOLUTION INTRAVENOUS at 11:26

## 2021-11-16 RX ADMIN — LIDOCAINE HYDROCHLORIDE 80 MG: 20 INJECTION, SOLUTION EPIDURAL; INFILTRATION; INTRACAUDAL; PERINEURAL at 07:44

## 2021-11-16 RX ADMIN — INFLUENZA A VIRUS A/VICTORIA/2570/2019 IVR-215 (H1N1) ANTIGEN (FORMALDEHYDE INACTIVATED), INFLUENZA A VIRUS A/TASMANIA/503/2020 IVR-221 (H3N2) ANTIGEN (FORMALDEHYDE INACTIVATED), INFLUENZA B VIRUS B/PHUKET/3073/2013 ANTIGEN (FORMALDEHYDE INACTIVATED), AND INFLUENZA B VIRUS B/WASHINGTON/02/2019 ANTIGEN (FORMALDEHYDE INACTIVATED) 0.7 ML: 60; 60; 60; 60 INJECTION, SUSPENSION INTRAMUSCULAR at 12:02

## 2021-11-16 RX ADMIN — OXYCODONE HYDROCHLORIDE 10 MG: 10 TABLET, FILM COATED, EXTENDED RELEASE ORAL at 06:54

## 2021-11-16 RX ADMIN — ONDANSETRON 4 MG: 2 INJECTION INTRAMUSCULAR; INTRAVENOUS at 13:32

## 2021-11-16 RX ADMIN — TRANEXAMIC ACID 1000 MG: 100 INJECTION, SOLUTION INTRAVENOUS at 07:48

## 2021-11-16 RX ADMIN — SUGAMMADEX 400 MG: 100 INJECTION, SOLUTION INTRAVENOUS at 08:27

## 2021-11-16 RX ADMIN — ONDANSETRON 4 MG: 2 INJECTION INTRAMUSCULAR; INTRAVENOUS at 08:24

## 2021-11-16 RX ADMIN — ACETAMINOPHEN 1000 MG: 500 TABLET, FILM COATED ORAL at 06:54

## 2021-11-16 RX ADMIN — SODIUM CHLORIDE, POTASSIUM CHLORIDE, SODIUM LACTATE AND CALCIUM CHLORIDE: 600; 310; 30; 20 INJECTION, SOLUTION INTRAVENOUS at 06:55

## 2021-11-16 ASSESSMENT — COGNITIVE AND FUNCTIONAL STATUS - GENERAL
SUGGESTED CMS G CODE MODIFIER MOBILITY: CI
MOBILITY SCORE: 22
SUGGESTED CMS G CODE MODIFIER DAILY ACTIVITY: CK
SUGGESTED CMS G CODE MODIFIER MOBILITY: CJ
TOILETING: A LITTLE
DRESSING REGULAR UPPER BODY CLOTHING: A LITTLE
SUGGESTED CMS G CODE MODIFIER DAILY ACTIVITY: CJ
TOILETING: A LITTLE
MOBILITY SCORE: 23
DRESSING REGULAR LOWER BODY CLOTHING: A LITTLE
DAILY ACTIVITIY SCORE: 22
HELP NEEDED FOR BATHING: A LITTLE
DAILY ACTIVITIY SCORE: 18
PERSONAL GROOMING: A LITTLE
DRESSING REGULAR LOWER BODY CLOTHING: A LITTLE
EATING MEALS: A LITTLE
CLIMB 3 TO 5 STEPS WITH RAILING: A LITTLE
CLIMB 3 TO 5 STEPS WITH RAILING: A LOT

## 2021-11-16 ASSESSMENT — LIFESTYLE VARIABLES
HAVE YOU EVER FELT YOU SHOULD CUT DOWN ON YOUR DRINKING: NO
HAVE PEOPLE ANNOYED YOU BY CRITICIZING YOUR DRINKING: NO
TOTAL SCORE: 0
HOW MANY TIMES IN THE PAST YEAR HAVE YOU HAD 5 OR MORE DRINKS IN A DAY: 0
TOTAL SCORE: 0
CONSUMPTION TOTAL: NEGATIVE
EVER HAD A DRINK FIRST THING IN THE MORNING TO STEADY YOUR NERVES TO GET RID OF A HANGOVER: NO
AVERAGE NUMBER OF DAYS PER WEEK YOU HAVE A DRINK CONTAINING ALCOHOL: 0
ON A TYPICAL DAY WHEN YOU DRINK ALCOHOL HOW MANY DRINKS DO YOU HAVE: 0
ALCOHOL_USE: NO
TOTAL SCORE: 0
EVER FELT BAD OR GUILTY ABOUT YOUR DRINKING: NO

## 2021-11-16 ASSESSMENT — PAIN DESCRIPTION - PAIN TYPE
TYPE: ACUTE PAIN;SURGICAL PAIN
TYPE: SURGICAL PAIN
TYPE: CHRONIC PAIN

## 2021-11-16 ASSESSMENT — GAIT ASSESSMENTS
DISTANCE (FEET): 2
GAIT LEVEL OF ASSIST: SUPERVISED
ASSISTIVE DEVICE: FRONT WHEEL WALKER
DISTANCE (FEET): 80
DEVIATION: INCREASED BASE OF SUPPORT;ANTALGIC

## 2021-11-16 ASSESSMENT — ACTIVITIES OF DAILY LIVING (ADL): TOILETING: INDEPENDENT

## 2021-11-16 NOTE — ANESTHESIA PREPROCEDURE EVALUATION
Case: [id number] Anesthesia Start Date/Time: 11/16/21 0739    Procedure: ARTHROPLASTY, KNEE, TOTAL [place] cementless (Right Knee)    Anesthesia type: General    Diagnosis: Osteoarthritis of right knee [M17.11]    Pre-op diagnosis: Osteoarthritis of right knee [M17.11]    Location: [place] [address] / SURGERY [facility]    Surgeons: Sonido Amado M.D.        Brief Past Medical History    Anesthesia: No Problems with Anesthesia  Cards: No History of CHF, CAD, or Stents. > 4 METS. Negative stress test in the past. Has improved METS since last total joint in May and tolerated that procedure well losing 30+ pounds since her last procedure. Ernandez risk calculator states 1.3% chance of MI or cardiac event post-op and is low risk.   EKG: Stable NSR  ECHO: No recent on file.   Resp: No COPD, Hx of mild intermittent asthma, no hospitalizations. (patient advised for sleep study given stop bang score)   GI: No Daily Symptomatic GERD. NPO per guidelines.  Neuro: No History of CVA , TIA, or Seizures.   Endo: No History of Diabetes  Renal: GFR<60 noted, likely some underlying CKD.   MSK: No active problems    Relevant Problems   PULMONARY   (positive) Shortness of breath      NEURO   (positive) Headache      CARDIAC   (positive) Heart murmur   (positive) Hemorrhoids       Physical Exam    Airway   Mallampati: III  TM distance: <3 FB  Neck ROM: full       Cardiovascular - normal exam  Rhythm: regular  Rate: normal  (-) murmur     Dental - normal exam           Pulmonary - normal exam  Breath sounds clear to auscultation     Abdominal    Neurological - normal exam                 Anesthesia Plan    ASA 3   ASA physical status 3 criteria: morbid obesity - BMI greater than or equal to 40    Plan - general and peripheral nerve block     Peripheral nerve block will be post-op pain control  Airway plan will be ETT    (ACB for post-op pain)      Induction: intravenous    Postoperative Plan: Postoperative administration of opioids is  intended.    Pertinent diagnostic labs and testing reviewed    Informed Consent:    Anesthetic plan and risks discussed with patient.    Use of blood products discussed with: patient whom consented to blood products.

## 2021-11-16 NOTE — DISCHARGE PLANNING
Anticipated Discharge Disposition: Home     Action: Pt on surgery list for today. Pt not on [facility] at this time.   Note in Epic from Shahnaz DOUGLASS stating that during pt's preadmission appointment it was identified that pt has all of the required equipment available upon d/c. Pt stated to have a cane, front-wheel walker and shower chair.    Anticipate for pt to be evaluated by the therapy team.     Barriers to Discharge: None     Plan: Hospital Care Management to continue to follow for d/c needs

## 2021-11-16 NOTE — THERAPY
"Physical Therapy   Initial Evaluation     Patient Name: Guerda Lunsford  Age:  70 y.o., Sex:  female  Medical Record #: [id number]  Today's Date: 11/16/2021     Precautions  Precautions: Fall Risk;Weight Bearing As Tolerated Right Lower Extremity  Comments: s/p R TKA     Assessment  Patient is 70 y.o. female s/p R TKA with Dr. Amado.  She presents with pain, impaired balance and gait and session was limited by nausea/emesis. She was able to demo bed mobility and 80' gaitx2 w/FWW and demos safe use of AD and unable to practice stairs 2/2 emesis. However, we reviewed safe sequencing with stairs and patient verbalizes understanding and sufficient strength to navigate stairs if she decides to DC home today. If she stays we will see her tomorrow for further PT.      Recommend Physical Therapy daily until therapy goals are met for the following treatments:  Bed Mobility, Gait Training, Neuro Re-Education / Balance, Stair Training, Therapeutic Activities and Therapeutic Exercises    DC Equipment Recommendations: None  Discharge Recommendations: Recommend outpatient physical therapy services to address higher level deficits       Subjective    \"I'm just tired\"      Objective       11/16/21 1300   Precautions   Precautions Fall Risk;Weight Bearing As Tolerated Right Lower Extremity   Comments s/p R TKA    Pain 0 - 10 Group   Therapist Pain Assessment 0;Post Activity Pain Same as Prior to Activity   Prior Living Situation   Prior Services Home-Independent   Housing / Facility 1 Story House   Steps Into Home 2   Steps In Home 0   Rail Both Rail (Steps into Home)   Bathroom Set up Walk In Shower;Shower Chair   Equipment Owned Front-Wheel Walker;4-Wheel Walker;Tub / Shower Seat;Raised Toilet Seat With Arms;Single Point Cane   Lives with - Patient's Self Care Capacity Spouse   Comments spouse can assist as needed    Prior Level of Functional Mobility   Bed Mobility Independent   Transfer Status Independent   Ambulation " Independent   Distance Ambulation (Feet)   (community distances )   Assistive Devices Used Single Point Cane   Stairs Independent   Comments patient had her L knee replaced last year and reports progressing to mabulating w/SPC    History of Falls   History of Falls No   Cognition    Cognition / Consciousness WDL   Level of Consciousness Alert   Comments pleasant and cooperative, somewhat lethargic    Passive ROM Lower Body   Passive ROM Lower Body X   Comments R knee flexion limited by pain    Active ROM Lower Body    Active ROM Lower Body  X   Comments see above   Strength Lower Body   Lower Body Strength  WDL   Sensation Lower Body   Lower Extremity Sensation   WDL   Strength Upper Body   Upper Body Strength  WDL   Balance Assessment   Sitting Balance (Static) Good   Sitting Balance (Dynamic) Good   Standing Balance (Static) Fair +   Standing Balance (Dynamic) Fair +   Weight Shift Sitting Good   Weight Shift Standing Fair   Gait Analysis   Gait Level Of Assist Supervised   Assistive Device Front Wheel Walker   Distance (Feet) 80   # of Times Distance was Traveled 2   Deviation Increased Base Of Support;Antalgic   # of Stairs Climbed 0   Comments stairs deferred 2/2 emesis    Bed Mobility    Supine to Sit Supervised   Scooting Supervised   Rolling Supervised   Functional Mobility   Sit to Stand Supervised   Bed, Chair, Wheelchair Transfer Supervised   Toilet Transfers Supervised   How much difficulty does the patient currently have...   Turning over in bed (including adjusting bedclothes, sheets and blankets)? 4   Sitting down on and standing up from a chair with arms (e.g., wheelchair, bedside commode, etc.) 4   Moving from lying on back to sitting on the side of the bed? 4   How much help from another person does the patient currently need...   Moving to and from a bed to a chair (including a wheelchair)? 4   Need to walk in a hospital room? 4   Climbing 3-5 steps with a railing? 3   6 clicks Mobility Score 23    Activity Tolerance   Sitting in Chair post session    Sitting Edge of Bed 5 min    Standing 5 min    Comments limtied by nausea    Edema / Skin Assessment   Comments R knee in ACE wrap    Short Term Goals    Short Term Goal # 1 in 6 visits patient will demo all functional transfers Neal for safe DC   Short Term Goal # 2 in 6 visits patient will ambulate 200' Neal for safe DC w/FWW   Short Term Goal # 3 in 6 visits patient will navigate 2 stairs w/B rails and Sup for safe DC    Education Group   Education Provided Role of Physical Therapist;Exercises - Supine;Exercises - Seated;Gait Training   Role of Physical Therapist Patient Response Patient;Acceptance;Explanation;Demonstration;Verbal Demonstration   Gait Training Patient Response Patient;Acceptance;Explanation;Demonstration;Verbal Demonstration;Action Demonstration   Exercises - Supine Patient Response Patient;Acceptance;Explanation;Demonstration;Handout;Verbal Demonstration;Action Demonstration   Exercises - Seated Patient Response Patient;Eager;Explanation;Demonstration;Handout;Verbal Demonstration;Action Demonstration   Problem List    Problems Pain;Impaired Ambulation;Impaired Transfers;Impaired Balance;Decreased Activity Tolerance   Anticipated Discharge Equipment and Recommendations   DC Equipment Recommendations None   Discharge Recommendations Recommend outpatient physical therapy services to address higher level deficits       Kellie Brice, PT, DPT, GCS

## 2021-11-16 NOTE — PROGRESS NOTES
COVID-19 surge in effect  Received report from Maddy CUETO at 0950. Pt brought to floor via bed at 1002. Pt is awake and alert. No signs of distress. Pt denies any nausea at this time. Pt denies any numbness or tingling. Pt denies any pain at this time. Pt is able to planter flex and dorsi flex ble, + pulses. Dressing to r knee is cdi. Polar ice in place to r knee. fall precautions in place. Bed in lowest and locked position. Call light within reach.

## 2021-11-16 NOTE — OR NURSING
0843 PT RECEIVED IN PACU, REPORT RECEIVED.  VSS, RESP SPONT, EVEN, NON LABORED.      0901 VSS. PT COUGHING, ABLE TO CLEAR AIRWAY, TOLERATING PO FLUIDS.     0916 VSS.     0931 VSS. PT CHANGED TO GOWN, UP TO FOWLERS. PT REMAINS DROWSY.      0946 VSS. PT MEETS CRITERIA FOR TRANSFER TO ROOM CALL TO [facility] FOR REPORT.

## 2021-11-16 NOTE — ANESTHESIA TIME REPORT
Anesthesia Start and Stop Event Times     Date Time Event    11/16/2021 0732 Ready for Procedure     0739 Anesthesia Start     0848 Anesthesia Stop        Responsible Staff  11/16/21    Name Role Begin End    Peter Santamaria M.D. Anesth 0739 0848        Preop Diagnosis (Free Text):  Pre-op Diagnosis     Osteoarthritis of right knee [M17.11]        Preop Diagnosis (Codes):  Diagnosis Information     Diagnosis Code(s): Osteoarthritis of right knee [M17.11]        Premium Reason  Non-Premium    Comments: Block

## 2021-11-16 NOTE — ANESTHESIA PROCEDURE NOTES
Peripheral Block    Date/Time: 11/16/2021 7:31 AM  Performed by: Peter Santamaria M.D.  Authorized by: Peter Santamaria M.D.     Patient Location:  Pre-op  Start Time:  11/16/2021 7:31 AM  End Time:  11/16/2021 7:33 AM  Reason for Block: at surgeon's request and post-op pain management ONLY    patient identified, IV checked, site marked, risks and benefits discussed, surgical consent, monitors and equipment checked, pre-op evaluation and timeout performed    Patient Position:  Supine  Prep: ChloraPrep    Monitoring:  Heart rate, continuous pulse ox and cardiac monitor  Block Region:  Lower Extremity  Lower Extremity - Block Type:  Selective FEMORAL nerve block at the Adductor Canal    Laterality:  Right  Procedures: ultrasound guided  Image captured, interpreted and electronically stored.  Local Infiltration:  Lidocaine  Strength:  1 %  Dose:  3 ml  Block Type:  Single-shot  Needle Length:  100mm  Needle Gauge:  21 G  Needle Localization:  Ultrasound guidance  Injection Assessment:  Negative aspiration for heme, no paresthesia on injection, incremental injection and local visualized surrounding nerve on ultrasound  Evidence of intravascular injection: No     US Guided Selective Femoral Nerve Block at Adductor Canal:   US probe placed at mid-thigh level on externally rotated leg and femur identified.  Probe directed medially until Sartorius Muscle (SM), Femoral Artery (FA) and Saphenous Nerve (SN) identified in Adductor Canal (AC).  Needle inserted anterolateral to probe in an in plane approach into a subsartorial perivascular perineural position.  After negative aspiration LA injected with ease and visualized spreading within the AC.

## 2021-11-16 NOTE — LETTER
October 1, 2021    [facility]  [address] [place] [address]  [phone]    Patient Name: Guerda Lunsford  Surgeon Name: Surgeon(s):  Sonido Masters M.D.  Narciso Walls P.A.-C.  Surgery Facility: [facility] ([address] [facility])  Surgery Date: 11/16/2021    The time of your surgery is not final and may change up to and until the day of your surgery. You will be contacted 24-48 hours prior to your surgery date with your check-in and surgery time.    If you will not be at one of the below numbers please call/text the surgery scheduler at [facility] ([phone])  Cell Phone: [phone]    BEFORE YOUR SURGERY  Pre Registration and/or Lab Work must be done within and no earlier than 28 days prior to your surgery date. Please call [phone] for an appointment as soon as possible.    Please refrain from smoking any substance after midnight prior to surgery. Smoking may interfere with the anesthetic and frequently produces nausea during the recovery period.    Continue taking all lifesaving medications. Including the morning of your surgery with small sip of water.    Please read the MEDICATION INSTRUCTIONS below completely.    DAY OF YOUR SURGERY  Nothing to eat or drink after midnight     Please arrive at the hospital/surgery center at the check-in time provided.     An adult will need to bring you and take you home after your surgery.     AFTER YOUR SURGERY  Post op Appointment:   Date: 11/29/2021   Time: 09:30AM   With: SONIDO MASTERS MD   Location: [address] [place] [phone]    - Therapy- Your first appointment should be 1  day(s) after your surgery. For your convenience we have 4 Physical Therapy locations: [facility], [place], and [facility]. Call our office ASAP to schedule an appointment at [phone] or take the enclosed Therapy Prescription to a facility of your choice.  - No dental work for 3-6 months after your  surgery.    TIME OFF WORK  FMLA or Disability forms can be faxed directly to: [phone] or you may drop them off at [address] [place] [phone]. Our office charges a $35.00 fee per form. Forms will be completed within 10 business days of drop off and payment received. For the status of your forms you may contact our disability office directly [phone].    MEDICATION INSTRUCTIONS  The following medications should be stopped a minimum of 10 days prior to surgery:  All over the counter, Supplements & Herbal medications    Anorectics: Phentermine (Adipex-P, Lomaira and Suprenza), Phentermine-topiramate (Qsymia), Bupropion-naltrexone (Contrave)    Opiod Partial Agonists/Opioid Antagonists: Buprenorphine (Subocone, Belbuca, Butrans, Probuphine Implant, Sublocade), Naltrexone (ReVia, Vivitrol), Naloxone    Amphetamines: Dextroamphetamine/Amphetamine (Adderall, Mydayis), Methylphenidate Hydrochloride (Concerta, Metadate, Methylin, Ritalin)    The following medications should be stopped 5 days prior to surgery:  Blood Thinners: Any Aspirin, Aspirin products, anti-inflammatories such as ibuprofen and any blood thinners such as Coumadin and Plavix. Please consult your prescribing physician if you are on life saving blood thinners, in regards to when to stop medications prior to surgery.     The following medications should be stopped a minimum of 3 days prior to surgery:  PDE-5 inhibitors: Sildenafil (Viagra), Tadalafil (Cialis), Vardenafil (Levitra), Avanafil (Stendra)    MAO Inhibitors: Rasagiline (Azilect), Selegiline (Eldepryl, Emsam, Selapar), Isocarboxazid (Marplan), Phenelzine (Nardil)

## 2021-11-16 NOTE — PROGRESS NOTES
Discharge paperwork reviewed with patient and relative at bedside. Copy given. Questions encouraged and answered. IV removed. biointellisence education provided. Patient escorted to ED entrance via wheelchair. Patient discharged to home.

## 2021-11-16 NOTE — ANESTHESIA POSTPROCEDURE EVALUATION
Patient: Guerda Lunsford    Procedure Summary     Date: 11/16/21 Room / Location:  OR 06 / SURGERY [facility]    Anesthesia Start: 0739 Anesthesia Stop: 0848    Procedure: ARTHROPLASTY, KNEE, TOTAL [place] cementless (Right Knee) Diagnosis:       Osteoarthritis of right knee      (Osteoarthritis of right knee [M17.11])    Surgeons: Sonido Amado M.D. Responsible Provider: Peter Santamaria M.D.    Anesthesia Type: general, peripheral nerve block ASA Status: 3          Final Anesthesia Type: general, peripheral nerve block  Last vitals  BP   Blood Pressure : 135/66, NIBP: 139/90    Temp   36.4 °C (97.5 °F)    Pulse   81   Resp   14    SpO2   97 %      Anesthesia Post Evaluation    Patient location during evaluation: PACU  Patient participation: complete - patient participated  Level of consciousness: awake and alert    Airway patency: patent  Anesthetic complications: no  Cardiovascular status: hemodynamically stable  Respiratory status: acceptable  Hydration status: euvolemic    PONV: none          No complications documented.     Nurse Pain Score: 0 (NPRS)

## 2021-11-16 NOTE — DISCHARGE INSTRUCTIONS
Aspirin 81mg BID for 4 weeks for DVT prophylaxis, Leave dressing in place until followup.    Discharge Instructions    Discharged to home by car with relative. Discharged via wheelchair, hospital escort: Yes.  Special equipment needed: Not Applicable    Be sure to schedule a follow-up appointment with your primary care doctor or any specialists as instructed.     Discharge Plan:   Diet Plan: Discussed  Activity Level: Discussed  Confirmed Follow up Appointment: Appointment Scheduled  Confirmed Symptoms Management: Discussed  Influenza Vaccine Indication: Indicated: 65 years and older  Influenza Vaccine Given - only chart on this line when given: Influenza Vaccine Given (See MAR)    I understand that a diet low in cholesterol, fat, and sodium is recommended for good health. Unless I have been given specific instructions below for another diet, I accept this instruction as my diet prescription.   Other diet: resume home diet    Special Instructions: Discharge instructions for the Orthopedic Patient    Follow up with Primary Care Physician within 2 weeks of discharge to home, regarding:  Review of medications and diagnostic testing.  Surveillance for medical complications.  Workup and treatment of osteoporosis, if appropriate.     -Is this a Hip/Knee/Shoulder Joint Replacement patient? Yes   TOTAL KNEE REPLACEMENT, AFTER-CARE GUIDELINES     These instructions provide you with information on caring for yourself and your knee after surgery. Your health care provider may also give you instructions that are more specific. Your treatment was planned and performed according to current medical practices but problems sometimes occur. Call your health care provider if you have any problems or questions.     WHAT TO EXPECT AFTER THE PROCEDURE   After your procedure, your knee will typically be stiff, sore, and bruised. This will improve over time.     Pain   · Follow your home pain management plan as discussed with your nurse  and as directed by your provider.   · It is important to follow any scheduled pain medications for maximal pain relief.   · If prescribed opioid medication, the goal is to use opioids only as needed and to wean off prescription pain medicine as soon as possible.   · Ice can be used for pain control.   · Put ice in a plastic bag.   · Place a towel between your skin and the bag.   · Leave the ice on for 20 minutes, 2-3 times a day at a minimum.   · Most patients are off the pain pills by 3 weeks. If your pain continues to be severe, follow up with your provider.     Infection   Knee joint infections occur in fewer than 2% of patients. The most common causes of infection following total knee replacement surgery are from bacteria that enter the bloodstream during dental procedures, urinary tract infections, or skin infections. These bacteria can lodge around your knee replacement and cause an infection.   · Keep the incision as clean and dry as possible.   · Always wash your hands before touching your incision.   · Avoid dental care for 3 months after surgery. Your provider may recommend taking a dose of antibiotics an hour prior to any dental procedure. After 2 years, most providers recommend antibiotics only before an extensive procedure. Ask your provider what they recommend.   · Signs and symptoms of infection include low-grade fever, redness, pain, swelling and drainage from your incision. Notify your provider IMMEDIATELY if you develop ANY of these symptoms.     Post op Disturbances   · Bowel Habits - Constipation is extremely common and caused by a combination of anesthesia, lack of mobility, dehydration and pain medicine. Use stool softeners or laxatives if necessary. It is important not to ignore this problem as bowel obstructions can be a serious complication after joint replacement surgery.   · Mood/Energy Level - Many patients experience a lack of energy and endurance for up to 2-3 months after surgery. Some  people feel down and can even become depressed. This is likely due to postoperative anemia, change in activity level, lack of sleep, pain medicine and just the emotional reaction to the surgery itself that is a big disruption in a person’s life. This usually passes. If symptoms persist, follow up with your primary care provider.  · Returning to Work - Your provider will give you specific instructions based on your profession. Generally, if you work a sedentary job requiring little standing or walking, most patients may return within 2-6 weeks. Manual labor jobs involving walking, lifting and standing may take 3-4 months. Your provider’s office can provide a release to part-time or light duty work early on in your recovery and progress you to full duty as able.   · Driving - You can begin driving once cleared by your provider, provided you are no longer taking narcotic pain medication or any other medications that impair driving. Discuss the length of time expected with your provider as returning to driving depends on things such as your vehicle, which knee was replaced (right or left), and knee motion, strength and reflexes returning appropriately.   · Avoiding falls - A fall during the first few weeks after surgery can damage your new knee and may result in a need for further surgery.  throw rugs and tack down loose carpeting. Be aware of floor hazards such as pets, small objects or uneven surfaces. Notify your provider of any falls.   · [employer] Metal Detectors - The sensitivity of metal detectors varies and it is likely that your prosthesis will cause an alarm. Inform the  of your artificial joint.     Diet   · Resume your normal diet as tolerated.   · It is important to achieve a healthy nutritional status by eating a well-balanced diet on a regular basis.   · Your provider may recommend that you take iron and vitamin supplements.   · Continue to drink plenty of fluids.     Shower/Bathing    · You may shower as soon as you get home from the hospital unless otherwise instructed.   · Keep your incision out of water to prevent infection. To keep the incision dry when showering, cover it with a plastic bag or plastic wrap. If your bandage is waterproof, this may not be necessary. o Pat incision dry if it gets wet. Do not rub. Notify your provider.   · Do not submerge in a bath until cleared by your provider. Your staples must be out and the incision completely healed.     Dressing Change: Only change your dressing if directed by your provider.   · Wash hands.   · Open all dressing change materials.   · Remove old dressing and discard.   · Inspect incision for signs of irritation or infection including redness, increase in clear drainage, yellow/green drainage, odor and surrounding skin hot to touch. Notify your provider if present.   ·  the new dressing by one corner and lay over the incision. Be careful not to touch the inside of the dressing that will lay over the incision.   · Secure in place as instructed. Swelling/Bruising   · Swelling is normal after knee replacement and can involve the thigh, knee, calf and foot.   · Swelling can last from 3-6 months.   · To reduce swelling, elevate your leg higher than your heart while reclining. The first week you are home you should elevate your leg an equal amount of time as you are active.   · The swelling is usually worse after you go home since you are upright for longer periods of time.   · Bruising often does not appear until after you arrive home and can be quite dramatic- appearing purple, black, or green. Bruising is typically not concerning and will subside without any treatment.     Blood Clot Prevention   Your treatment plan includes multiple preventative measures to decrease the risk of blood clots in the legs (DVTs) and the less common, but serious, clots that travel to the lungs (pulmonary emboli). Most patients are at standard risk for  them, but people who are at higher risk include those who have had previous clots, a family history of clotting, smoking, diabetes, obesity, advanced age, use estrogen and/or live a sedentary lifestyle.     · Signs of blood clots in legs include - Swelling in thigh, calf or ankle that does not go down with elevation. Pain, heat and tenderness in calf, back of calf or groin area. NOTE: blood clots can occur in either leg.   · Signs of blood clots in lungs include - Sudden increased shortness of breath, sudden onset of chest pain, and localized chest pain with coughing.   · If you experience any of the above symptoms, notify your provider and seek medical attention immediately.   · You received anticoagulant therapy (blood thinners) in the hospital. Continue the prescribed blood-thinning medication at home, as directed by your provider.   · Your risk for developing a clot continues for up to 2-3 months after surgery. Avoid prolonged sitting and dehydration (long air trips and car trips). If you do take a trip during this time, please get up, move around every 1-1.5 hours, and discuss all travel plans with your provider.     Activity   Once home, stay active. The key is not to overdo it. While you can expect some good days and some bad days, you should notice a gradual improvement and a gradual increase in your endurance over the next 6 to 12 months. Exercise is a critical component of recovery, particularly during the first few weeks after surgery.     · Normal activities of daily living - Expect to resume most within 3 to 6 weeks following surgery. Some pain with activity and at night is common for several weeks after surgery. Walk as much as you like once your doctor gives permission to proceed, but remember that walking is no substitute for the exercises your doctor and physical therapist prescribe. Use a walker, crutches or cane to assist with walking until you can walk smoothly (minimal or no limp) without  assistance.   · Physical Therapy Exercises - Follow your home exercise program as instructed by your physical therapist during your hospital stay. Call and set up outpatient physical therapy appointments per your provider’s recommendations. Physical therapy after the hospital stay focuses on increasing your range of motion, strengthening your muscles and improving your gait/walking pattern. Contact your provider for the referral to outpatient physical therapy if you have not yet received this. -   · Riding a stationary bicycle can help maintain muscle tone and keep your knee flexible. Begin stationary bicycling as directed by your physical therapist or provider.   · Sexual Activity - Your provider can tell you when it safe to resume sexual activity.   · Sleeping Positions - You can safely sleep on your back, on either side, or on your stomach.   · Other Activities - Lower impact activities are preferred. Consult your provider if you have specific questions.     When to Call the Doctor   Call the provider if you experience:   · Fever over 100.5° F   · Increased pain, drainage, redness, odor or heat around the incision area   · Shaking chills   · Increased knee pain with activity and rest   · Increased pain in calf, tenderness or redness above or below the knee   · Increased swelling of calf, ankle, foot   · Sudden increased shortness of breath, sudden onset of chest pain, localized chest pain with coughing   · Incision opening   Or, if there are any questions or concerns about medications or care.     Infection statistic resource:   https://[email or web address]/prosthetic-joint-infection-epidemiology-microbiology-clinical-manifestations-and-diagnosis     -Is this patient being discharged with medication to prevent blood clots?  Yes, Aspirin Aspirin, ASA oral tablets  What is this medicine?  ASPIRIN (AS pir in) is a pain reliever. It is used to treat mild pain and fever. This medicine is also used as directed by  a doctor to prevent and to treat heart attacks, to prevent strokes and blood clots, and to treat arthritis or inflammation.  This medicine may be used for other purposes; ask your health care provider or pharmacist if you have questions.  COMMON BRAND NAME(S): Aspir-Low, Aspir-Martina, Aspirtab, Pranav Advanced Aspirin, Pranav Aspirin, Pranav Aspirin Extra Strength, Pranav Aspirin Plus, Pranav Extra Strength, Pranav Extra Strength Plus, Pranav Genuine Aspirin, Pranav Womens Aspirin, Bufferin, Bufferin Extra Strength, Bufferin Low Dose  What should I tell my health care provider before I take this medicine?  They need to know if you have any of these conditions:  · anemia  · asthma  · bleeding problems  · child with chickenpox, the flu, or other viral infection  · diabetes  · gout  · if you frequently drink alcohol containing drinks  · kidney disease  · liver disease  · low level of vitamin K  · lupus  · smoke tobacco  · stomach ulcers or other problems  · an unusual or allergic reaction to aspirin, tartrazine dye, other medicines, dyes, or preservatives  · pregnant or trying to get pregnant  · breast-feeding  How should I use this medicine?  Take this medicine by mouth with a glass of water. Follow the directions on the package or prescription label. You can take this medicine with or without food. If it upsets your stomach, take it with food. Do not take your medicine more often than directed.  Talk to your pediatrician regarding the use of this medicine in children. While this drug may be prescribed for children as young as 12 years of age for selected conditions, precautions do apply. Children and teenagers should not use this medicine to treat chicken pox or flu symptoms unless directed by a doctor.  Patients over 65 years old may have a stronger reaction and need a smaller dose.  Overdosage: If you think you have taken too much of this medicine contact a poison control center or emergency room at once.  NOTE: This  medicine is only for you. Do not share this medicine with others.  What if I miss a dose?  If you are taking this medicine on a regular schedule and miss a dose, take it as soon as you can. If it is almost time for your next dose, take only that dose. Do not take double or extra doses.  What may interact with this medicine?  Do not take this medicine with any of the following medications:  · cidofovir  · ketorolac  · probenecid  This medicine may also interact with the following medications:  · alcohol  · alendronate  · bismuth subsalicylate  · flavocoxid  · herbal supplements like feverfew, garlic, michael, ginkgo biloba, horse chestnut  · medicines for diabetes or glaucoma like acetazolamide, methazolamide  · medicines for gout  · medicines that treat or prevent blood clots like enoxaparin, heparin, ticlopidine, warfarin  · other aspirin and aspirin-like medicines  · NSAIDs, medicines for pain and inflammation, like ibuprofen or naproxen  · pemetrexed  · sulfinpyrazone  · varicella live vaccine  This list may not describe all possible interactions. Give your health care provider a list of all the medicines, herbs, non-prescription drugs, or dietary supplements you use. Also tell them if you smoke, drink alcohol, or use illegal drugs. Some items may interact with your medicine.  What should I watch for while using this medicine?  If you are treating yourself for pain, tell your doctor or health care professional if the pain lasts more than 10 days, if it gets worse, or if there is a new or different kind of pain. Tell your doctor if you see redness or swelling. Also, check with your doctor if you have a fever that lasts for more than 3 days. Only take this medicine to prevent heart attacks or blood clotting if prescribed by your doctor or health care professional.  Do not take aspirin or aspirin-like medicines with this medicine. Too much aspirin can be dangerous. Always read the labels carefully.  This medicine can  irritate your stomach or cause bleeding problems. Do not smoke cigarettes or drink alcohol while taking this medicine. Do not lie down for 30 minutes after taking this medicine to prevent irritation to your throat.  If you are scheduled for any medical or dental procedure, tell your healthcare provider that you are taking this medicine. You may need to stop taking this medicine before the procedure.  This medicine may be used to treat migraines. If you take migraine medicines for 10 or more days a month, your migraines may get worse. Keep a diary of headache days and medicine use. Contact your healthcare professional if your migraine attacks occur more frequently.  What side effects may I notice from receiving this medicine?  Side effects that you should report to your doctor or health care professional as soon as possible:  · allergic reactions like skin rash, itching or hives, swelling of the face, lips, or tongue  · breathing problems  · changes in hearing, ringing in the ears  · confusion  · general ill feeling or flu-like symptoms  · pain on swallowing  · redness, blistering, peeling or loosening of the skin, including inside the mouth or nose  · signs and symptoms of bleeding such as bloody or black, tarry stools; red or dark-brown urine; spitting up blood or brown material that looks like coffee grounds; red spots on the skin; unusual bruising or bleeding from the eye, gums, or nose  · trouble passing urine or change in the amount of urine  · unusually weak or tired  · yellowing of the eyes or skin  Side effects that usually do not require medical attention (report to your doctor or health care professional if they continue or are bothersome):  · diarrhea or constipation  · headache  · nausea, vomiting  · stomach gas, heartburn  This list may not describe all possible side effects. Call your doctor for medical advice about side effects. You may report side effects to FDA at 3-908-FDA-3542.  Where should I  keep my medicine?  Keep out of the reach of children.  Store at room temperature between 15 and 30 degrees C (59 and 86 degrees F). Protect from heat and moisture. Do not use this medicine if it has a strong vinegar smell. Throw away any unused medicine after the expiration date.  NOTE: This sheet is a summary. It may not cover all possible information. If you have questions about this medicine, talk to your doctor, pharmacist, or health care provider.  © 2020 [employer] (2018-01-30 10:42:13)      · Is patient discharged on Warfarin / Coumadin?   No     Depression / Suicide Risk    As you are discharged from this [facility] facility, it is important to learn how to keep safe from harming yourself.    Recognize the warning signs:  · Abrupt changes in personality, positive or negative- including increase in energy   · Giving away possessions  · Change in eating patterns- significant weight changes-  positive or negative  · Change in sleeping patterns- unable to sleep or sleeping all the time   · Unwillingness or inability to communicate  · Depression  · Unusual sadness, discouragement and loneliness  · Talk of wanting to die  · Neglect of personal appearance   · Rebelliousness- reckless behavior  · Withdrawal from people/activities they love  · Confusion- inability to concentrate     If you or a loved one observes any of these behaviors or has concerns about self-harm, here's what you can do:  · Talk about it- your feelings and reasons for harming yourself  · Remove any means that you might use to hurt yourself (examples: pills, rope, extension cords, firearm)  · Get professional help from the community (Mental Health, Substance Abuse, psychological counseling)  · Do not be alone:Call your Safe Contact- someone whom you trust who will be there for you.  · Call your local CRISIS HOTLINE 929-2096 or 944-264-7855  · Call your local Children's Mobile Crisis Response Team [place] (698) 882-4502 or  www.[facility]  · Call the toll free National Suicide Prevention Hotlines   · National Suicide Prevention Lifeline 458-539-PJVC (6333)  · National Hope Line Network 800-SUICIDE (872-1484)

## 2021-11-16 NOTE — H&P
Surgery Orthopedic History & Physical Note    Date  11/16/2021    Primary Care Physician  Veronica Danielson M.D.    CC  Pre-Op Diagnosis Codes:     * Osteoarthritis of right knee [M17.11]    HPI  This is a 70 y.o. female who presents for a TKA.  No new concerns.    Past Medical History:   Diagnosis Date   • Allergy    • Anemia 1997    Bleeding hemorrhoids   • Anxiety    • Arthritis    • Bronchitis    • Cataract     bilateral   • Cholesterol serum elevated     diet controlled   • Dental disorder 10/2020    Filled chipped front upper tooth   • Depression     anxiety   • Detached retina, left    • Diverticulitis    • Gynecological disorder 1982    bleeding, cervical pecancer   • Headache(784.0)     Occasionally, can become severe if she does not take Advil.   • Heart murmur     Comes and goes   • Hemorrhoids 1997    had surgery   • High cholesterol    • Hypertension     non-medicated   • Indigestion    • Infectious disease     uti   • Macular hole of right eye    • Pain    • Pneumonia    • Urinary tract infection, site not specified        Past Surgical History:   Procedure Laterality Date   • PB TOTAL KNEE ARTHROPLASTY Left 5/18/2021    Procedure: ARTHROPLASTY, KNEE, TOTAL.;  Surgeon: Sonido Amado M.D.;  Location: SURGERY [facility];  Service: Orthopedics   • BREAST BIOPSY  2009   • LAMINOTOMY  1982    2 Lumbar Discs   • ABDOMINAL HYSTERECTOMY TOTAL  1982    Cervical precancer and bleeding.  No oophorectomy.   • APPENDECTOMY     • GYN SURGERY      hysterectomy - partial   • OTHER      hemorrhoidectomy   • OTHER Right     cataract extraction   • OTHER Right     Macular Hole Surgery   • OTHER ABDOMINAL SURGERY      appy   • OTHER ORTHOPEDIC SURGERY      lami       Current Facility-Administered Medications   Medication Dose Route Frequency Provider Last Rate Last Admin   • acetaminophen (TYLENOL) tablet 1,000 mg  1,000 mg Oral Once Peter Santamaria M.D.       • celecoxib (CELEBREX) capsule 200 mg  200 mg Oral Once  Peter Santamaria M.D.       • oxyCODONE CR (OXYCONTIN) tablet 10 mg  10 mg Oral Once Peter Santamaria M.D.       • ceFAZolin (ANCEF) injection 3 g  3 g Intravenous Once Sonido Amado M.D.       • lidocaine (XYLOCAINE) 1 % injection 0.5 mL  0.5 mL Intradermal Once PRN Sonido Amado M.D.       • lactated ringers infusion   Intravenous Continuous Sonido Amado M.D.           Social History     Socioeconomic History   • Marital status:      Spouse name: Not on file   • Number of children: Not on file   • Years of education: Not on file   • Highest education level: Not on file   Occupational History   • Not on file   Tobacco Use   • Smoking status: Never Smoker   • Smokeless tobacco: Never Used   Vaping Use   • Vaping Use: Never used   Substance and Sexual Activity   • Alcohol use: No     Alcohol/week: 0.0 oz   • Drug use: No   • Sexual activity: Not Currently     Partners: Male   Other Topics Concern   • Not on file   Social History Narrative   • Not on file     Social Determinants of Health     Financial Resource Strain:    • Difficulty of Paying Living Expenses: Not on file   Food Insecurity:    • Worried About Running Out of Food in the Last Year: Not on file   • Ran Out of Food in the Last Year: Not on file   Transportation Needs:    • Lack of Transportation (Medical): Not on file   • Lack of Transportation (Non-Medical): Not on file   Physical Activity:    • Days of Exercise per Week: Not on file   • Minutes of Exercise per Session: Not on file   Stress:    • Feeling of Stress : Not on file   Social Connections:    • Frequency of Communication with Friends and Family: Not on file   • Frequency of Social Gatherings with Friends and Family: Not on file   • Attends Yazidism Services: Not on file   • Active Member of Clubs or Organizations: Not on file   • Attends Club or Organization Meetings: Not on file   • Marital Status: Not on file   Intimate Partner Violence:    • Fear of Current or Ex-Partner: Not on  file   • Emotionally Abused: Not on file   • Physically Abused: Not on file   • Sexually Abused: Not on file   Housing Stability:    • Unable to Pay for Housing in the Last Year: Not on file   • Number of Places Lived in the Last Year: Not on file   • Unstable Housing in the Last Year: Not on file       Family History   Problem Relation Age of Onset   • Diabetes Mother    • Heart Disease Mother 60        MI then CABG   • Heart Disease Father         CHF   • Alcohol/Drug Maternal Aunt    • Heart Disease Maternal Grandmother 65        MI,  with it   • Alcohol/Drug Maternal Grandmother    • Alcohol/Drug Maternal Grandfather    • Alcohol/Drug Paternal Grandmother        Allergies  Azithromycin, Cymbalta [duloxetine hcl], Montelukast, and Demerol    Review of Systems  Negative    Physical Exam  Regular rate and rhythm  Nonlabored breathing  Abdomen soft and nontender   Neurovascular intact distally  Skin is in good condition    Vital Signs      Temperature: 36.7 °C (98.1 °F)   Pulse: (!) 102   Respiration: 16           Labs:                    Radiology:  No orders to display         Assessment/Plan:  Pre-Op Diagnosis Codes:     * Osteoarthritis of right knee [M17.11]  Procedure(s):  ARTHROPLASTY, KNEE, TOTAL Marvin cementless

## 2021-11-16 NOTE — PROGRESS NOTES
Guerda Lunsford has been enrolled in the [facility] remote patient monitoring proof of concept program in which biometric data will be collected concurrently during the hospital stay and upon discharge and transfer for approximately 30 days.  Consent has been obtained for this project.  The monitoring device must be REMOVED for MRIs, but may be worn during all other times of patient care.  If there are questions regarding this program please contact the [facility] [place] (RTOC) leadership who will be happy to answer your questions.  Information about the program has been provided to the patient and is available from the [facility] nursing coordinators.  During the initial phase of this  project, we will be determining how to best use the data to optimize each patient's care and we will NOT be monitoring the data in real-time, so there will not be notifications of abnormalities, and this will not replace or change your standard care for the patient.   A referral for this monitoring program has been placed under the supervision of Dr. Alec Arreola, Medical Director of the [facility] [place] to enable to collection of data in the patient's electronic medical record.

## 2021-11-16 NOTE — THERAPY
"Occupational Therapy   Initial Evaluation     Patient Name: Guerda Lunsford  Age:  70 y.o., Sex:  female  Medical Record #: [id number]  Today's Date: 11/16/2021     Precautions  Precautions: No Weight Bearing Restrictions Right Lower Extremity  Comments: s/p R TKA    Assessment  Patient is 70 y.o. female with a diagnosis of advanced R knee OA, now POD #0 R TKA. Additional factors influencing patient status / progress: previous L TKA in May 2021. Pt lives with supportive spouse, Keo, and was I with all ADLs/IADLs, ambulates with use of SPC for both inside and outside the home, is retired, but still drives at [employer]. Pt and spouse -- who was present during entire OT eval -- were provided detailed education re: post op care, dressing techniques, shower strategies with mgt of Aquacel dressing, use of appropriate DMEs/AD, and home safety. Pt presents today with post op pain, though was able to tolerate most ADLs and functional txfs with FWW use at spv level. Pt's home is adequately equipped with DMEs to aid her during recovery, and spouse reports he is able to provide good support upon dc home. No acute OT needs at this time.    Plan    Recommend Occupational Therapy for Evaluation only     DC Equipment Recommendations: None  Discharge Recommendations: Anticipate that the patient will have no further occupational therapy needs after discharge from the hospital     Subjective    \"I wanna take a nap.\"     Objective       11/16/21 1446   Prior Living Situation   Prior Services None   Housing / Facility 1 Story House   Steps Into Home 2   Steps In Home 0   Rail Both Rail (Steps into Home)   Bathroom Set up Walk In Shower;Shower Chair;Grab Bars   Equipment Owned Front-Wheel Walker;Single Point Cane;Tub / Shower Seat;Grab Bar(s) In Tub / Shower;Raised Toilet Seat Without Arms   Lives with - Patient's Self Care Capacity Spouse   Comments Pt lives with supportive spouse, Keo   Prior Level of ADL Function   Self Feeding " Independent   Grooming / Hygiene Independent   Bathing Independent   Dressing Independent   Toileting Independent   Prior Level of IADL Function   Medication Management Independent   Laundry Independent   Kitchen Mobility Independent   Finances Independent   Home Management Independent   Shopping Independent   Prior Level Of Mobility Independent With Device in Home;Independent With Device in Community   Driving / Transportation Driving Independent   Occupation (Pre-Hospital Vocational) Retired Due To Age   Comments pt was using SPC at baseline   History of Falls   History of Falls No   Precautions   Precautions No Weight Bearing Restrictions Right Lower Extremity   Comments s/p R TKA   Pain   Pain Scales 0 to 10 Scale    Intervention Declines;Cold Pack   Pain 0 - 10 Group   Pain Rating Scale (NPRS) 0   Therapist Pain Assessment Post Activity Pain Same as Prior to Activity   Non Verbal Descriptors   Non Verbal Scale  Calm;Unlabored Breathing   Cognition    Cognition / Consciousness WDL   Level of Consciousness Alert   Comments pleasant and cooperative, still somewhat lethargic   Active ROM Upper Body   Active ROM Upper Body  WDL   Dominant Hand Right   Strength Upper Body   Upper Body Strength  WDL   Balance Assessment   Sitting Balance (Static) Good   Sitting Balance (Dynamic) Good   Standing Balance (Static) Fair +   Standing Balance (Dynamic) Fair +   Weight Shift Sitting Good   Weight Shift Standing Fair   Comments with FWW   Bed Mobility    Comments up in chair pre and post OT   ADL Assessment   Eating Supervision   Grooming Seated;Supervision   Upper Body Dressing Supervision   Lower Body Dressing Supervision   How much help from another person does the patient currently need...   Putting on and taking off regular lower body clothing? 3   Bathing (including washing, rinsing, and drying)? 3   Toileting, which includes using a toilet, bedpan, or urinal? 3   Putting on and taking off regular upper body clothing? 3    Taking care of personal grooming such as brushing teeth? 3   Eating meals? 3   6 Clicks Daily Activity Score 18   Functional Mobility   Sit to Stand Supervised   Bed, Chair, Wheelchair Transfer Supervised   Transfer Method Stand Step   Mobility in room with FWW   Distance (Feet) 2   # of Times Distance was Traveled 1   Activity Tolerance   Sitting in Chair 1+ hour   Sitting Edge of Bed NT   Standing 5 mins   Education Group   Education Provided Role of Occupational Therapist;Home Safety;Activities of Daily Living   Role of Occupational Therapist Patient Response Patient;Significant Other;Acceptance;Explanation   Home Safety Patient Response Patient;Significant Other;Acceptance;Explanation;Verbal Demonstration   ADL Patient Response Patient;Significant Other;Acceptance;Explanation;Verbal Demonstration;Action Demonstration   Anticipated Discharge Equipment and Recommendations   DC Equipment Recommendations None   Discharge Recommendations Anticipate that the patient will have no further occupational therapy needs after discharge from the hospital   Interdisciplinary Plan of Care Collaboration   IDT Collaboration with  Nursing;Physical Therapist;Family / Caregiver   Patient Position at End of Therapy Seated;Call Light within Reach;Tray Table within Reach;Phone within Reach;Family / Friend in Room   Collaboration Comments RN aware of OT session   Session Information   Date / Session Number  11/16 #1 (one time only)   Priority 0

## 2021-11-16 NOTE — ANESTHESIA PROCEDURE NOTES
Airway    Date/Time: 11/16/2021 7:46 AM  Performed by: Peter Santamaria M.D.  Authorized by: Peter Santamaria M.D.     Location:  OR  Urgency:  Elective  Indications for Airway Management:  Anesthesia      Spontaneous Ventilation: absent    Sedation Level:  Deep  Preoxygenated: Yes    Patient Position:  Sniffing  Mask Difficulty Assessment:  2 - vent by mask + OA or adjuvant +/- NMBA  Final Airway Type:  Endotracheal airway  Final Endotracheal Airway:  ETT  Cuffed: Yes    Technique Used for Successful ETT Placement:  Direct laryngoscopy  Devices/Methods Used in Placement:  Intubating stylet    Insertion Site:  Oral  Blade Type:  Jose  Laryngoscope Blade/Videolaryngoscope Blade Size:  3  ETT Size (mm):  7.0  Measured from:  Teeth  ETT to Teeth (cm):  22  Placement Verified by: auscultation and capnometry    Cormack-Lehane Classification:  Grade IIb - view of arytenoids or posterior of glottis only  Number of Attempts at Approach:  1

## 2021-11-16 NOTE — CARE PLAN
The patient is Stable - Low risk of patient condition declining or worsening    Shift Goals  Clinical Goals: Pain control, ambulation  Patient Goals: Comfort, Rest    Progress made toward(s) clinical / shift goals: Patient educated on Pain rating scale. Patient educated on safety precautions. Patient worked with Physical Therapy.    Patient is not progressing towards the following goals:N/A      Problem: Fall Risk  Goal: Patient will remain free from falls  Outcome: Progressing     Problem: Knowledge Deficit - Standard  Goal: Patient and family/care givers will demonstrate understanding of plan of care, disease process/condition, diagnostic tests and medications  Outcome: Progressing

## 2021-11-16 NOTE — OP REPORT
Preop Diagnosis: Advanced right knee arthritis  Postop Diagnosis:  Same  Procedure: Right total knee arthroplasty  Surgeon: Dr. Sonido Amado MD  Assistant: Narciso Walls PA-C  Anesthesia: General + Adductor canal block  Estimated Blood Loss: 50cc  Drains: None  Complications: None    Implants: [place] Triathlon CR Cementless, size 5 femur, size 5 tibia, with a 9 mm CS insert and 35 oval patella.    Indications: Guerda Lunsford is a 70 y.o. female who has had severe progressive knee pain that failed conservative management.  There were severe degenerative changes on radiographs.  We discussed the options and she was ultimately indicated for knee replacement.  We discussed the risk of bleeding, transfusion, pain, neurovascular injury, stiffness, fracture, infection, wound complication, loosening of the parts over time, blood clots, and medical complication.  No guarantees were made and she elected to proceed.    Pertinent Findings and Releases: There were severe degenerative changes.  Her valgus and flexion contracture corrected without the need for any significant soft tissue releases.  At the end of the case, the knee easily came to full extension and flexed up to calf/thigh impingement with the arthrotomy closed.  The patella tracked centrally.    Informed consent and site marking were confirmed in preop.  An adductor canal block had been performed by the anesthesiologist, and then she was brought back to the OR where anesthesia was performed. Supine positioning was perfmored with all bony prominences well padded with a padded tourniquet on the thigh.  The extremity was prepped and draped in the usual sterile fashion. I performed a pre-procedure timeout to confirm we had the correct patient, side, site, procedure, and presence of necessary personal and equipment.  I confirmed that Ancef and tranexamic acid were given.  The tourniquet was then inflated.    A midline incision was made medial to the tibial  tubercle centered over patella taken down to the deep capsule of the knee. A medial parapatellar arthrotomy was performed.  The fat pad was released. The patella was subluxated and the knee was flexed. The remnants of the anterior horn of the medial and lateral meniscus were removed as well as the ACL from the intercondylar notch. All distal protruding osteophytes were removed. I then drilled and accessed the intramedullary canal of the femur, lavaged it and placed the intramedullary cutting guide. The distal femur was cut in 6 degrees of valgus. I then sized the femur and based rotation off of bony landmarks.  All distal femoral cuts were made.  The tibia was then subluxated forward and cut perpendicular to its long axis.  A spacer block was placed in the knee extension to confirm I had resected enough bone and then sequential releases were performed until there was a rectangular gap.  Collaterals were intact and overall limb alignment was checked and appropriate.  The knee was then tensed at 90 degrees and the meniscal remnants and posterior osteophytes were removed.  The posterior capsule was injected with a local anesthetic cocktail.  The tibia was then subluxed forward, sized, and punched in the appropriate amount of external rotation and mechanical alignment was verified using a drop eddie. Trials were placed, the knee was reduced with a trial insert, it was taken through a range of motion, and found to be stable in the varus/valgus plane 0-30 degrees of flexion and the AP plane at 90 degrees of flexion. Attention was then turned to the patella. A symmetric resection was performed to recreate native patella height and appropriately sized. All extraneous osteophyte and synovium were removed.  With a trial in place, the patella tracked centrally using the no thumbs technique. All trial components were removed. The real components were impacted with a great press-fit.  The tourniquet was let down and hemostasis  ensured. The real insert was placed. Stability and patellar tracking were excellent. The knee was then copiously irrigated. One gram of Vancomycin was left in the wound.  The arthrotomy was closed with number 2 running barbed suture, and the wound was closed in layers. An occlusive silver dressing was applied and the patient was turned over to anesthesia in stable condition without any apparent intraoperative complications.    Plan:  WBAT, PT/OT evaluation, Aspirin 81mg BID for 4 weeks for DVT prophylaxis, Leave dressing in place until followup.

## 2023-02-14 ENCOUNTER — OFFICE VISIT (OUTPATIENT)
Dept: URGENT CARE | Facility: CLINIC | Age: 73
End: 2023-02-14
Payer: MEDICARE

## 2023-02-14 VITALS
WEIGHT: 270 LBS | HEART RATE: 72 BPM | DIASTOLIC BLOOD PRESSURE: 72 MMHG | TEMPERATURE: 98.5 F | HEIGHT: 65 IN | OXYGEN SATURATION: 95 % | BODY MASS INDEX: 44.98 KG/M2 | SYSTOLIC BLOOD PRESSURE: 116 MMHG | RESPIRATION RATE: 16 BRPM

## 2023-02-14 DIAGNOSIS — J01.40 ACUTE NON-RECURRENT PANSINUSITIS: ICD-10-CM

## 2023-02-14 DIAGNOSIS — J45.21 MILD INTERMITTENT ASTHMA WITH ACUTE EXACERBATION: ICD-10-CM

## 2023-02-14 PROCEDURE — 99204 OFFICE O/P NEW MOD 45 MIN: CPT | Performed by: NURSE PRACTITIONER

## 2023-02-14 RX ORDER — PREDNISONE 20 MG/1
40 TABLET ORAL DAILY
Qty: 10 TABLET | Refills: 0 | Status: SHIPPED | OUTPATIENT
Start: 2023-02-14 | End: 2023-02-19

## 2023-02-14 RX ORDER — ALBUTEROL SULFATE 90 UG/1
2 AEROSOL, METERED RESPIRATORY (INHALATION) EVERY 6 HOURS PRN
Qty: 8.5 G | Refills: 0 | Status: SHIPPED | OUTPATIENT
Start: 2023-02-14

## 2023-02-14 RX ORDER — AMOXICILLIN AND CLAVULANATE POTASSIUM 875; 125 MG/1; MG/1
1 TABLET, FILM COATED ORAL 2 TIMES DAILY
Qty: 20 TABLET | Refills: 0 | Status: SHIPPED | OUTPATIENT
Start: 2023-02-14 | End: 2023-02-24

## 2023-02-14 RX ORDER — BUPROPION HYDROCHLORIDE 150 MG/1
TABLET ORAL
COMMUNITY
Start: 2023-01-17 | End: 2023-03-08

## 2023-02-14 ASSESSMENT — FIBROSIS 4 INDEX: FIB4 SCORE: 0.82

## 2023-02-14 NOTE — PROGRESS NOTES
"Patient has consented to treatment and for use of patient information for treatment and billing purposes.    Date: 02/14/23     Arrival Mode: Private Vehicle / Ambulatory    Chief Complaint:    Chief Complaint   Patient presents with    Nasal Congestion     X1 week, \" Chest congestion, cough. I have had to use my inhaler more frequently this week.\"          History of Present Illness: 72 y.o. female  presents to clinic on day 8 of sinus congestion pain pressure with postnasal drip and a cough that is causing wheezing.  Patient has been using her albuterol inhaler which she states at this point she feels it is not helping.  The cough is awakening her at night.  She denies any severe shortness of breath chest pain or leg swelling.  No nausea vomiting diarrhea.  No recent fever and or body aches.      ROS:  As stated in HPI     Pertinent Medical History:    Past Medical History:   Diagnosis Date    Allergy     Anemia 1997    Bleeding hemorrhoids    Anxiety     Arthritis     Bronchitis     Cataract     bilateral    Chest pain 2/1/2016    Cholesterol serum elevated     diet controlled    Dental disorder 10/2020    Filled chipped front upper tooth    Depression     anxiety    Detached retina, left     Diverticulitis     Diverticulitis 3/28/2016    Gynecological disorder 1982    bleeding, cervical pecancer    Headache     Occasionally, can become severe if she does not take Advil. ICD-10 transition    Headache(784.0)     Occasionally, can become severe if she does not take Advil.    Heart murmur     Comes and goes    Hemorrhoids 1997    had surgery    High cholesterol     Hypertension     non-medicated    Hyponatremia 3/29/2016    Indigestion     Infectious disease     uti    Macular hole of right eye     Pain     Pneumonia     Shortness of breath 2/28/2014    SIRS (systemic inflammatory response syndrome) (HCC) 3/29/2016    Urinary tract infection, site not specified         Pertinent Surgical History:    Past Surgical " History:   Procedure Laterality Date    PB TOTAL KNEE ARTHROPLASTY Right 11/16/2021    Procedure: ARTHROPLASTY, KNEE, TOTAL Lupillo cementless;  Surgeon: Sonido Amado M.D.;  Location: SURGERY [facility];  Service: Orthopedics    PB TOTAL KNEE ARTHROPLASTY Left 5/18/2021    Procedure: ARTHROPLASTY, KNEE, TOTAL.;  Surgeon: Sonido Amado M.D.;  Location: SURGERY [facility];  Service: Orthopedics    BREAST BIOPSY  2009    LAMINOTOMY  1982    2 Lumbar Discs    ABDOMINAL HYSTERECTOMY TOTAL  1982    Cervical precancer and bleeding.  No oophorectomy.    APPENDECTOMY      GYN SURGERY      hysterectomy - partial    OTHER      hemorrhoidectomy    OTHER Right     cataract extraction    OTHER Right     Macular Hole Surgery    OTHER ABDOMINAL SURGERY      appy    OTHER ORTHOPEDIC SURGERY      lami        Current  Medications:  Current Outpatient Medications on File Prior to Visit   Medication Sig Dispense Refill    buPROPion (WELLBUTRIN XL) 150 MG XL tablet       atorvastatin (LIPITOR) 20 MG Tab Take  by mouth. Take 1 tablet by mouth every night at bedtime      metoprolol SR (TOPROL XL) 25 MG TABLET SR 24 HR Take 25 mg by mouth every day. Takes 1/2 tab daily      Bismuth Subsalicylate 262 MG Tab Take 1 Tab by mouth Once.      Azelastine-Fluticasone 137-50 MCG/ACT Suspension Spray 1 Spray in nose every day. 1 spray each nostril      hydrochlorothiazide (HYDRODIURIL) 25 MG Tab Take 25 mg by mouth every day.      sertraline (ZOLOFT) 100 MG TABS Take 150 mg by mouth every day. Pt takes a 100mg tablet and a 50mg tablet       No current facility-administered medications on file prior to visit.        Allergies:     Azithromycin, Cymbalta [duloxetine hcl], Montelukast, and Demerol     Social History:   Social History     Socioeconomic History    Marital status:      Spouse name: Not on file    Number of children: Not on file    Years of education: Not on file    Highest education level: Not on file   Occupational History     Not on file   Tobacco Use    Smoking status: Never    Smokeless tobacco: Never   Vaping Use    Vaping Use: Never used   Substance and Sexual Activity    Alcohol use: No     Alcohol/week: 0.0 oz    Drug use: No    Sexual activity: Not Currently     Partners: Male   Other Topics Concern    Not on file   Social History Narrative    Not on file     Social Determinants of Health     Financial Resource Strain: Not on file   Food Insecurity: Not on file   Transportation Needs: Not on file   Physical Activity: Not on file   Stress: Not on file   Social Connections: Not on file   Intimate Partner Violence: Not on file   Housing Stability: Not on file        No LMP recorded. Patient has had a hysterectomy.       Physical Exam:  Vitals:    02/14/23 1222   BP: 116/72   Pulse: 72   Resp: 16   Temp: 36.9 °C (98.5 °F)   SpO2: 95%        Physical Exam  Vitals reviewed.   Constitutional:       General: She is not in acute distress.     Appearance: Normal appearance. She is well-developed. She is not toxic-appearing.   HENT:      Head: Normocephalic and atraumatic.      Right Ear: Tympanic membrane, ear canal and external ear normal.      Left Ear: Tympanic membrane, ear canal and external ear normal.      Nose: Congestion and rhinorrhea present.      Right Sinus: Maxillary sinus tenderness and frontal sinus tenderness present.      Left Sinus: Maxillary sinus tenderness and frontal sinus tenderness present.      Mouth/Throat:      Lips: Pink.      Mouth: Mucous membranes are moist.      Pharynx: Posterior oropharyngeal erythema present.   Eyes:      General: Lids are normal. Gaze aligned appropriately. No allergic shiner or scleral icterus.     Extraocular Movements: Extraocular movements intact.      Conjunctiva/sclera: Conjunctivae normal.      Pupils: Pupils are equal, round, and reactive to light.   Cardiovascular:      Rate and Rhythm: Normal rate and regular rhythm.      Pulses:           Radial pulses are 2+ on the right  side and 2+ on the left side.      Heart sounds: Normal heart sounds.   Pulmonary:      Effort: Pulmonary effort is normal.      Breath sounds: Normal breath sounds. No wheezing.   Musculoskeletal:      Right lower leg: No edema.      Left lower leg: No edema.   Lymphadenopathy:      Cervical: No cervical adenopathy.   Skin:     General: Skin is warm.      Capillary Refill: Capillary refill takes less than 2 seconds.      Coloration: Skin is not cyanotic or pale.      Findings: No rash.   Neurological:      Mental Status: She is alert.      Gait: Gait is intact.   Psychiatric:         Behavior: Behavior normal. Behavior is cooperative.        Diagnostics:    None     Medical Decision Making:  I personally reviewed prior external notes and test results pertinent to today's visit.   Shared decision-making was utilized with patient did develop treatment plan and clinic course. Pt is clinically stable at today's acute urgent care visit.  No acute distress noted. Appropriate for outpatient care at this time. Luke, 72 y.o. female presenting clinic on day 8 of viral URI-like symptoms.  Although due to the longevity of symptoms and failed outpatient conservative measures for sinus congestion pain and pressure we will treat for acute bacterial sinusitis at this time.  We send for Augmentin.  We will also treat for acute on chronic asthma exacerbation with 5-day burst of prednisone, will also refill patient's albuterol inhaler    Did advise patient on conservative measures for management of symptoms.  Patient is agreeable to pursue adequate rest, adequate hydration, saltwater gargle and Neti pot for any symptoms of upper respiratory congestion.  Over-the-counter analgesia and antipyretics on a p.r.n. basis as needed for pain and fever.  Did discuss over-the-counter cough medications.      Patient will monitor symptoms closely for worsening and is advised to seek further evaluation the emergency room if alarm signs or  symptoms arise.  Patient states understanding and verbalizes agreement with this plan of care.    Plan:    1. Acute non-recurrent pansinusitis    - amoxicillin-clavulanate (AUGMENTIN) 875-125 MG Tab; Take 1 Tablet by mouth 2 times a day for 10 days.  Dispense: 20 Tablet; Refill: 0    2. Mild intermittent asthma with acute exacerbation    - albuterol 108 (90 Base) MCG/ACT Aero Soln inhalation aerosol; Inhale 2 Puffs every 6 hours as needed for Shortness of Breath.  Dispense: 8.5 g; Refill: 0  - predniSONE (DELTASONE) 20 MG Tab; Take 2 Tablets by mouth every day for 5 days.  Dispense: 10 Tablet; Refill: 0         Disposition:  Patient was discharged in stable condition.    Voice Recognition Disclaimer: Portions of this document were created using voice recognition software. The software does have a chance of producing errors of grammar and possibly content. I have made every reasonable attempt to correct obvious errors, but there may be errors of grammar and possibly content that I did not discover before finalizing the documentation.    TRAVON [email or web address].

## 2023-03-08 ENCOUNTER — OFFICE VISIT (OUTPATIENT)
Dept: URGENT CARE | Facility: CLINIC | Age: 73
End: 2023-03-08
Payer: MEDICARE

## 2023-03-08 VITALS
TEMPERATURE: 98 F | HEART RATE: 68 BPM | HEIGHT: 65 IN | OXYGEN SATURATION: 98 % | RESPIRATION RATE: 18 BRPM | SYSTOLIC BLOOD PRESSURE: 108 MMHG | BODY MASS INDEX: 42.65 KG/M2 | DIASTOLIC BLOOD PRESSURE: 60 MMHG | WEIGHT: 256 LBS

## 2023-03-08 DIAGNOSIS — J45.21 MILD INTERMITTENT ASTHMA WITH EXACERBATION: ICD-10-CM

## 2023-03-08 PROCEDURE — 99213 OFFICE O/P EST LOW 20 MIN: CPT

## 2023-03-08 RX ORDER — ATORVASTATIN CALCIUM 20 MG/1
20 TABLET, FILM COATED ORAL DAILY
COMMUNITY
Start: 2022-11-04 | End: 2023-03-08

## 2023-03-08 RX ORDER — LISINOPRIL 5 MG/1
TABLET ORAL
COMMUNITY
Start: 2023-02-28 | End: 2023-03-30

## 2023-03-08 RX ORDER — METFORMIN HYDROCHLORIDE 500 MG/1
500 TABLET, EXTENDED RELEASE ORAL EVERY EVENING
COMMUNITY
Start: 2023-02-28 | End: 2023-06-29

## 2023-03-08 RX ORDER — BUPROPION HYDROCHLORIDE 300 MG/1
300 TABLET ORAL EVERY MORNING
COMMUNITY
Start: 2023-02-21

## 2023-03-08 RX ORDER — METHYLPREDNISOLONE 4 MG/1
TABLET ORAL
Qty: 21 TABLET | Refills: 0 | Status: SHIPPED | OUTPATIENT
Start: 2023-03-08 | End: 2023-03-30

## 2023-03-08 ASSESSMENT — FIBROSIS 4 INDEX: FIB4 SCORE: 0.82

## 2023-03-08 NOTE — PROGRESS NOTES
Subjective:   Guerda Lunsford is a 72 y.o. female who presents for Cough (Last visit; 2/14/23, deep cough, had sinus drainage and wheezing, was feeling better, states that after taking Lisinopril given to her by PCP: stated that she developed a dry cough /itchy in throat x 3/1/23, chest irritation, stopped taking Lisinopril, coughing fit, worsens as she goes to bed )      HPI:    Patient presents to urgent care with concern of dry cough.  Reports new use of Lisinopril on 03/01/23. Cough started shortly after medication was started.   Reports typically well controlled mild asthma. She states she has not had an exacerbation in over two years.   Albuterol prn, no controller medications.   Pmh of chronic PND, allergies  D/C lisinopril 2 days ago.   Continues to have wheezing, chest tightness  No fever, chills, SOB  No rash    ROS As above in HPI    Medications:    Current Outpatient Medications on File Prior to Visit   Medication Sig Dispense Refill    metFORMIN ER (GLUCOPHAGE XR) 500 MG TABLET SR 24 HR       buPROPion (WELLBUTRIN XL) 300 MG XL tablet       albuterol 108 (90 Base) MCG/ACT Aero Soln inhalation aerosol Inhale 2 Puffs every 6 hours as needed for Shortness of Breath. 8.5 g 0    atorvastatin (LIPITOR) 20 MG Tab Take  by mouth. Take 1 tablet by mouth every night at bedtime      metoprolol SR (TOPROL XL) 25 MG TABLET SR 24 HR Take 25 mg by mouth every day. Takes 1/2 tab daily      Azelastine-Fluticasone 137-50 MCG/ACT Suspension Spray 1 Spray in nose every day. 1 spray each nostril      hydrochlorothiazide (HYDRODIURIL) 25 MG Tab Take 25 mg by mouth every day.      sertraline (ZOLOFT) 100 MG TABS Take 150 mg by mouth every day. Pt takes a 100mg tablet and a 50mg tablet      lisinopril (PRINIVIL) 5 MG Tab       sertraline (ZOLOFT) 50 MG Tab       Bismuth Subsalicylate 262 MG Tab Take 1 Tab by mouth Once.       No current facility-administered medications on file prior to visit.     "    Allergies:   Azithromycin, Cymbalta [duloxetine hcl], Lisinopril, Montelukast, and Demerol    Problem List:   Patient Active Problem List   Diagnosis    Depression    Routine health maintenance    Colon polyp    Menopausal and postmenopausal disorder    Anemia    Hemorrhoids    Heart murmur    Vitamin d deficiency    Hyperglycemia    Abnormal EKG    Hypokalemia    Osteoarthritis of right knee        Surgical History:  Past Surgical History:   Procedure Laterality Date    PB TOTAL KNEE ARTHROPLASTY Right 11/16/2021    Procedure: ARTHROPLASTY, KNEE, TOTAL Lupillo cementless;  Surgeon: Sonido Amado M.D.;  Location: SURGERY [facility];  Service: Orthopedics    PB TOTAL KNEE ARTHROPLASTY Left 5/18/2021    Procedure: ARTHROPLASTY, KNEE, TOTAL.;  Surgeon: Sonido Amado M.D.;  Location: SURGERY [facility];  Service: Orthopedics    BREAST BIOPSY  2009    LAMINOTOMY  1982    2 Lumbar Discs    ABDOMINAL HYSTERECTOMY TOTAL  1982    Cervical precancer and bleeding.  No oophorectomy.    APPENDECTOMY      GYN SURGERY      hysterectomy - partial    OTHER      hemorrhoidectomy    OTHER Right     cataract extraction    OTHER Right     Macular Hole Surgery    OTHER ABDOMINAL SURGERY      appy    OTHER ORTHOPEDIC SURGERY      lami       Past Social Hx:   Social History     Tobacco Use    Smoking status: Never    Smokeless tobacco: Never   Vaping Use    Vaping Use: Never used   Substance Use Topics    Alcohol use: No     Alcohol/week: 0.0 oz    Drug use: No          Problem list, medications, and allergies reviewed by myself today in Epic.     Objective:     /60 (BP Location: Left arm, Patient Position: Sitting, BP Cuff Size: Adult)   Pulse 68   Temp 36.7 °C (98 °F) (Temporal)   Resp 18   Ht 1.651 m (5' 5\")   Wt 116 kg (256 lb)   SpO2 98%   BMI 42.60 kg/m²     Physical Exam  Vitals and nursing note reviewed.   Constitutional:       General: She is not in acute distress.     Appearance: Normal appearance. She is " not ill-appearing or diaphoretic.   HENT:      Head: Normocephalic and atraumatic.      Right Ear: Tympanic membrane and ear canal normal.      Left Ear: Tympanic membrane and ear canal normal.      Nose: Nose normal.      Mouth/Throat:      Mouth: Mucous membranes are moist.      Pharynx: Oropharynx is clear.   Cardiovascular:      Rate and Rhythm: Normal rate and regular rhythm.      Heart sounds: Normal heart sounds. No murmur heard.    No friction rub. No gallop.   Pulmonary:      Effort: Pulmonary effort is normal. No respiratory distress.      Breath sounds: No stridor. Examination of the right-middle field reveals wheezing. Examination of the left-middle field reveals wheezing. Wheezing (Expiratory wheezing) present. No decreased breath sounds, rhonchi or rales.   Chest:      Chest wall: No tenderness.   Abdominal:      General: Bowel sounds are normal.      Palpations: Abdomen is soft.   Skin:     General: Skin is warm and dry.      Capillary Refill: Capillary refill takes less than 2 seconds.      Findings: No rash.   Neurological:      Mental Status: She is alert and oriented to person, place, and time.       Assessment/Plan:       Diagnosis and associated orders:   1. Mild intermittent asthma with exacerbation  - methylPREDNISolone (MEDROL DOSEPAK) 4 MG Tablet Therapy Pack; Follow schedule on package instructions.  Dispense: 21 Tablet; Refill: 0        Comments/MDM:     Mild exacerbation of asthma vs reaction to lisinopril  Continue use of Nasal antihistamine, albuterol prn  Follow up with PCP for substitution of lisinopril         Please note that this dictation was created using voice recognition software. I have made a reasonable attempt to correct obvious errors, but I expect that there are errors of grammar and possibly content that I did not discover before finalizing the note.    This note was electronically signed by Carmelina Craig DNP

## 2023-03-30 ENCOUNTER — APPOINTMENT (OUTPATIENT)
Dept: RADIOLOGY | Facility: IMAGING CENTER | Age: 73
End: 2023-03-30
Attending: FAMILY MEDICINE
Payer: MEDICARE

## 2023-03-30 ENCOUNTER — OFFICE VISIT (OUTPATIENT)
Dept: URGENT CARE | Facility: CLINIC | Age: 73
End: 2023-03-30
Payer: MEDICARE

## 2023-03-30 VITALS
SYSTOLIC BLOOD PRESSURE: 120 MMHG | HEIGHT: 65 IN | RESPIRATION RATE: 16 BRPM | TEMPERATURE: 97.7 F | WEIGHT: 260 LBS | HEART RATE: 70 BPM | BODY MASS INDEX: 43.32 KG/M2 | DIASTOLIC BLOOD PRESSURE: 82 MMHG | OXYGEN SATURATION: 96 %

## 2023-03-30 DIAGNOSIS — J45.901 EXACERBATION OF ASTHMA, UNSPECIFIED ASTHMA SEVERITY, UNSPECIFIED WHETHER PERSISTENT: ICD-10-CM

## 2023-03-30 PROCEDURE — 71046 X-RAY EXAM CHEST 2 VIEWS: CPT | Mod: TC,FY | Performed by: FAMILY MEDICINE

## 2023-03-30 PROCEDURE — 99214 OFFICE O/P EST MOD 30 MIN: CPT | Performed by: FAMILY MEDICINE

## 2023-03-30 RX ORDER — FLUTICASONE PROPIONATE AND SALMETEROL XINAFOATE 45; 21 UG/1; UG/1
2 AEROSOL, METERED RESPIRATORY (INHALATION) 2 TIMES DAILY
Qty: 12 G | Refills: 0 | Status: SHIPPED | OUTPATIENT
Start: 2023-03-30

## 2023-03-30 RX ORDER — BENZONATATE 200 MG/1
200 CAPSULE ORAL 3 TIMES DAILY PRN
Qty: 30 CAPSULE | Refills: 0 | Status: ON HOLD | OUTPATIENT
Start: 2023-03-30 | End: 2023-04-12

## 2023-03-30 RX ORDER — DEXAMETHASONE 6 MG/1
6 TABLET ORAL DAILY
Qty: 3 TABLET | Refills: 0 | Status: ON HOLD | OUTPATIENT
Start: 2023-03-30 | End: 2023-04-12

## 2023-03-30 RX ORDER — MONTELUKAST SODIUM 10 MG/1
10 TABLET ORAL
Qty: 14 TABLET | Refills: 0 | Status: SHIPPED | OUTPATIENT
Start: 2023-03-30 | End: 2023-04-10

## 2023-03-30 ASSESSMENT — ENCOUNTER SYMPTOMS
WHEEZING: 1
COUGH: 1

## 2023-03-30 ASSESSMENT — FIBROSIS 4 INDEX: FIB4 SCORE: 0.82

## 2023-03-30 NOTE — PROGRESS NOTES
Subjective     Guerda Lunsford is a 72 y.o. female who presents with Cough (X 5 days, dry cough, wheezing, SOB, chest congestion.)      - This is a pleasant and nontoxic appearing 72 y.o. female who has come to the walk-in clinic today for:    #1) ongoing cough wheezing x 4 weeks or so. Seen here twice. Steroids Rx helped but in past 5 days cough wheezing returned and having to use albuterol inhaler more. No associated NVFC/CP      ALLERGIES:  Azithromycin, Cymbalta [duloxetine hcl], Lisinopril, Montelukast, and Demerol     PMH:  Past Medical History:   Diagnosis Date    Allergy     Anemia 1997    Bleeding hemorrhoids    Anxiety     Arthritis     Bronchitis     Cataract     bilateral    Chest pain 2/1/2016    Cholesterol serum elevated     diet controlled    Dental disorder 10/2020    Filled chipped front upper tooth    Depression     anxiety    Detached retina, left     Diverticulitis     Diverticulitis 3/28/2016    Gynecological disorder 1982    bleeding, cervical pecancer    Headache     Occasionally, can become severe if she does not take Advil. ICD-10 transition    Headache(784.0)     Occasionally, can become severe if she does not take Advil.    Heart murmur     Comes and goes    Hemorrhoids 1997    had surgery    High cholesterol     Hypertension     non-medicated    Hyponatremia 3/29/2016    Indigestion     Infectious disease     uti    Macular hole of right eye     Pain     Pneumonia     Shortness of breath 2/28/2014    SIRS (systemic inflammatory response syndrome) (HCC) 3/29/2016    Urinary tract infection, site not specified         PSH:  Past Surgical History:   Procedure Laterality Date    PB TOTAL KNEE ARTHROPLASTY Right 11/16/2021    Procedure: ARTHROPLASTY, KNEE, TOTAL Marilla cementless;  Surgeon: Sonido Amado M.D.;  Location: SURGERY [facility];  Service: Orthopedics    PB TOTAL KNEE ARTHROPLASTY Left 5/18/2021    Procedure: ARTHROPLASTY, KNEE, TOTAL.;  Surgeon: Sonido Amado M.D.;   Location: SURGERY [facility];  Service: Orthopedics    BREAST BIOPSY  2009    LAMINOTOMY  1982    2 Lumbar Discs    ABDOMINAL HYSTERECTOMY TOTAL  1982    Cervical precancer and bleeding.  No oophorectomy.    APPENDECTOMY      GYN SURGERY      hysterectomy - partial    OTHER      hemorrhoidectomy    OTHER Right     cataract extraction    OTHER Right     Macular Hole Surgery    OTHER ABDOMINAL SURGERY      appy    OTHER ORTHOPEDIC SURGERY      lami       MEDS:    Current Outpatient Medications:     montelukast (SINGULAIR) 10 MG Tab, Take 1 Tablet by mouth at bedtime., Disp: 14 Tablet, Rfl: 0    fluticasone-salmeterol (ADVAIR HFA) 45-21 MCG/ACT inhaler, Inhale 2 Puffs 2 times a day., Disp: 12 g, Rfl: 0    dexamethasone (DECADRON) 6 MG Tab, Take 1 Tablet by mouth every day., Disp: 3 Tablet, Rfl: 0    benzonatate (TESSALON) 200 MG capsule, Take 1 Capsule by mouth 3 times a day as needed for Cough., Disp: 30 Capsule, Rfl: 0    metFORMIN ER (GLUCOPHAGE XR) 500 MG TABLET SR 24 HR, , Disp: , Rfl:     sertraline (ZOLOFT) 50 MG Tab, , Disp: , Rfl:     buPROPion (WELLBUTRIN XL) 300 MG XL tablet, , Disp: , Rfl:     albuterol 108 (90 Base) MCG/ACT Aero Soln inhalation aerosol, Inhale 2 Puffs every 6 hours as needed for Shortness of Breath., Disp: 8.5 g, Rfl: 0    atorvastatin (LIPITOR) 20 MG Tab, Take  by mouth. Take 1 tablet by mouth every night at bedtime, Disp: , Rfl:     metoprolol SR (TOPROL XL) 25 MG TABLET SR 24 HR, Take 25 mg by mouth every day. Takes 1/2 tab daily, Disp: , Rfl:     Bismuth Subsalicylate 262 MG Tab, Take 1 Tab by mouth Once., Disp: , Rfl:     Azelastine-Fluticasone 137-50 MCG/ACT Suspension, Spray 1 Spray in nose every day. 1 spray each nostril, Disp: , Rfl:     hydrochlorothiazide (HYDRODIURIL) 25 MG Tab, Take 25 mg by mouth every day., Disp: , Rfl:     ** I have documented what I find to be significant in regards to past medical, social, family and surgical history  in my HPI or under PMH/PSH/  "review section, otherwise it is noncontributory **         HPI    Review of Systems   Respiratory:  Positive for cough and wheezing.    All other systems reviewed and are negative.           Objective     /82   Pulse 70   Temp 36.5 °C (97.7 °F) (Temporal)   Resp 16   Ht 1.651 m (5' 5\")   Wt 118 kg (260 lb)   SpO2 96%   BMI 43.27 kg/m²      Physical Exam  Vitals and nursing note reviewed.   Constitutional:       General: She is not in acute distress.     Appearance: Normal appearance. She is well-developed.   HENT:      Head: Normocephalic.      Mouth/Throat:      Mouth: Mucous membranes are moist.      Pharynx: Oropharynx is clear.   Cardiovascular:      Heart sounds: Normal heart sounds. No murmur heard.  Pulmonary:      Effort: Pulmonary effort is normal. No respiratory distress.      Breath sounds: Wheezing (mild exp) present.   Neurological:      Mental Status: She is alert.      Motor: No abnormal muscle tone.   Psychiatric:         Mood and Affect: Mood normal.         Behavior: Behavior normal.                           Assessment & Plan     1. Exacerbation of asthma, unspecified asthma severity, unspecified whether persistent  DX-CHEST-2 VIEWS    montelukast (SINGULAIR) 10 MG Tab    fluticasone-salmeterol (ADVAIR HFA) 45-21 MCG/ACT inhaler    dexamethasone (DECADRON) 6 MG Tab    Referral to Pulmonary and Sleep Medicine    benzonatate (TESSALON) 200 MG capsule          - Dx, plan & d/c instructions discussed   - Rest, stay hydrated  - E.R. precautions discussed     Follow up with your regular primary care providers office next week for a recheck on today's visit. ER if not improving in 2-3 days or if feeling/getting worse.  Any realistic side effects of medications that may have been given today reviewed.     Patient left in stable condition     Today's radiology imaging personally reviewed by me today on day of visit and Radiology readings reviewed and discussed w/ patient today.       Pertinent " prior office visit notes in [facility] have been reviewed by me today on day of this visit.

## 2023-04-05 ENCOUNTER — TELEPHONE (OUTPATIENT)
Dept: HEALTH INFORMATION MANAGEMENT | Facility: OTHER | Age: 73
End: 2023-04-05

## 2023-04-10 ENCOUNTER — HOSPITAL ENCOUNTER (INPATIENT)
Facility: MEDICAL CENTER | Age: 73
LOS: 2 days | DRG: 872 | End: 2023-04-12
Attending: EMERGENCY MEDICINE | Admitting: HOSPITALIST
Payer: MEDICARE

## 2023-04-10 ENCOUNTER — APPOINTMENT (OUTPATIENT)
Dept: RADIOLOGY | Facility: MEDICAL CENTER | Age: 73
DRG: 872 | End: 2023-04-10
Attending: EMERGENCY MEDICINE
Payer: MEDICARE

## 2023-04-10 DIAGNOSIS — K57.92 DIVERTICULITIS: ICD-10-CM

## 2023-04-10 DIAGNOSIS — K52.9 COLITIS: ICD-10-CM

## 2023-04-10 DIAGNOSIS — R10.84 GENERALIZED ABDOMINAL PAIN: ICD-10-CM

## 2023-04-10 DIAGNOSIS — N39.0 ACUTE UTI: ICD-10-CM

## 2023-04-10 PROBLEM — A41.9 SEPSIS (HCC): Status: ACTIVE | Noted: 2023-04-10

## 2023-04-10 PROBLEM — I10 PRIMARY HYPERTENSION: Status: ACTIVE | Noted: 2023-04-10

## 2023-04-10 LAB
ALBUMIN SERPL BCP-MCNC: 4.1 G/DL (ref 3.2–4.9)
ALBUMIN/GLOB SERPL: 1.2 G/DL
ALP SERPL-CCNC: 128 U/L (ref 30–99)
ALT SERPL-CCNC: 13 U/L (ref 2–50)
ANION GAP SERPL CALC-SCNC: 11 MMOL/L (ref 7–16)
APPEARANCE UR: ABNORMAL
AST SERPL-CCNC: 13 U/L (ref 12–45)
BACTERIA #/AREA URNS HPF: ABNORMAL /HPF
BASOPHILS # BLD AUTO: 0.3 % (ref 0–1.8)
BASOPHILS # BLD: 0.05 K/UL (ref 0–0.12)
BILIRUB SERPL-MCNC: 0.6 MG/DL (ref 0.1–1.5)
BILIRUB UR QL STRIP.AUTO: ABNORMAL
BUN SERPL-MCNC: 19 MG/DL (ref 8–22)
CALCIUM ALBUM COR SERPL-MCNC: 9.2 MG/DL (ref 8.5–10.5)
CALCIUM SERPL-MCNC: 9.3 MG/DL (ref 8.4–10.2)
CHLORIDE SERPL-SCNC: 93 MMOL/L (ref 96–112)
CO2 SERPL-SCNC: 29 MMOL/L (ref 20–33)
COLOR UR: YELLOW
CREAT SERPL-MCNC: 1.06 MG/DL (ref 0.5–1.4)
EOSINOPHIL # BLD AUTO: 0.18 K/UL (ref 0–0.51)
EOSINOPHIL NFR BLD: 1.2 % (ref 0–6.9)
EPI CELLS #/AREA URNS HPF: ABNORMAL /HPF
ERYTHROCYTE [DISTWIDTH] IN BLOOD BY AUTOMATED COUNT: 39.2 FL (ref 35.9–50)
GFR SERPLBLD CREATININE-BSD FMLA CKD-EPI: 56 ML/MIN/1.73 M 2
GLOBULIN SER CALC-MCNC: 3.3 G/DL (ref 1.9–3.5)
GLUCOSE SERPL-MCNC: 138 MG/DL (ref 65–99)
GLUCOSE UR STRIP.AUTO-MCNC: NEGATIVE MG/DL
HCT VFR BLD AUTO: 42.3 % (ref 37–47)
HGB BLD-MCNC: 14.9 G/DL (ref 12–16)
IMM GRANULOCYTES # BLD AUTO: 0.09 K/UL (ref 0–0.11)
IMM GRANULOCYTES NFR BLD AUTO: 0.6 % (ref 0–0.9)
KETONES UR STRIP.AUTO-MCNC: ABNORMAL MG/DL
LACTATE SERPL-SCNC: 0.9 MMOL/L (ref 0.5–2)
LACTATE SERPL-SCNC: 0.9 MMOL/L (ref 0.5–2)
LEUKOCYTE ESTERASE UR QL STRIP.AUTO: ABNORMAL
LIPASE SERPL-CCNC: 15 U/L (ref 7–58)
LYMPHOCYTES # BLD AUTO: 1.48 K/UL (ref 1–4.8)
LYMPHOCYTES NFR BLD: 9.8 % (ref 22–41)
MCH RBC QN AUTO: 29 PG (ref 27–33)
MCHC RBC AUTO-ENTMCNC: 35.2 G/DL (ref 33.6–35)
MCV RBC AUTO: 82.5 FL (ref 81.4–97.8)
MICRO URNS: ABNORMAL
MONOCYTES # BLD AUTO: 1.17 K/UL (ref 0–0.85)
MONOCYTES NFR BLD AUTO: 7.8 % (ref 0–13.4)
NEUTROPHILS # BLD AUTO: 12.06 K/UL (ref 2–7.15)
NEUTROPHILS NFR BLD: 80.3 % (ref 44–72)
NITRITE UR QL STRIP.AUTO: POSITIVE
NRBC # BLD AUTO: 0 K/UL
NRBC BLD-RTO: 0 /100 WBC
PH UR STRIP.AUTO: 5.5 [PH] (ref 5–8)
PLATELET # BLD AUTO: 317 K/UL (ref 164–446)
PMV BLD AUTO: 9.2 FL (ref 9–12.9)
POTASSIUM SERPL-SCNC: 3.5 MMOL/L (ref 3.6–5.5)
PROT SERPL-MCNC: 7.4 G/DL (ref 6–8.2)
PROT UR QL STRIP: NEGATIVE MG/DL
RBC # BLD AUTO: 5.13 M/UL (ref 4.2–5.4)
RBC # URNS HPF: ABNORMAL /HPF
RBC UR QL AUTO: ABNORMAL
SODIUM SERPL-SCNC: 133 MMOL/L (ref 135–145)
SP GR UR STRIP.AUTO: >=1.03
WBC # BLD AUTO: 15 K/UL (ref 4.8–10.8)
WBC #/AREA URNS HPF: ABNORMAL /HPF

## 2023-04-10 PROCEDURE — 81001 URINALYSIS AUTO W/SCOPE: CPT

## 2023-04-10 PROCEDURE — 700101 HCHG RX REV CODE 250: Performed by: HOSPITALIST

## 2023-04-10 PROCEDURE — 36415 COLL VENOUS BLD VENIPUNCTURE: CPT

## 2023-04-10 PROCEDURE — 87077 CULTURE AEROBIC IDENTIFY: CPT

## 2023-04-10 PROCEDURE — 74177 CT ABD & PELVIS W/CONTRAST: CPT

## 2023-04-10 PROCEDURE — 87186 SC STD MICRODIL/AGAR DIL: CPT

## 2023-04-10 PROCEDURE — 770001 HCHG ROOM/CARE - MED/SURG/GYN PRIV*

## 2023-04-10 PROCEDURE — 80053 COMPREHEN METABOLIC PANEL: CPT

## 2023-04-10 PROCEDURE — 96374 THER/PROPH/DIAG INJ IV PUSH: CPT

## 2023-04-10 PROCEDURE — 99291 CRITICAL CARE FIRST HOUR: CPT

## 2023-04-10 PROCEDURE — 87086 URINE CULTURE/COLONY COUNT: CPT

## 2023-04-10 PROCEDURE — 700111 HCHG RX REV CODE 636 W/ 250 OVERRIDE (IP): Performed by: HOSPITALIST

## 2023-04-10 PROCEDURE — 700105 HCHG RX REV CODE 258: Performed by: EMERGENCY MEDICINE

## 2023-04-10 PROCEDURE — 87040 BLOOD CULTURE FOR BACTERIA: CPT | Mod: 91

## 2023-04-10 PROCEDURE — 700102 HCHG RX REV CODE 250 W/ 637 OVERRIDE(OP): Performed by: HOSPITALIST

## 2023-04-10 PROCEDURE — 83690 ASSAY OF LIPASE: CPT

## 2023-04-10 PROCEDURE — 700111 HCHG RX REV CODE 636 W/ 250 OVERRIDE (IP): Performed by: EMERGENCY MEDICINE

## 2023-04-10 PROCEDURE — 700117 HCHG RX CONTRAST REV CODE 255: Performed by: EMERGENCY MEDICINE

## 2023-04-10 PROCEDURE — A9270 NON-COVERED ITEM OR SERVICE: HCPCS | Performed by: HOSPITALIST

## 2023-04-10 PROCEDURE — 85025 COMPLETE CBC W/AUTO DIFF WBC: CPT

## 2023-04-10 PROCEDURE — 96375 TX/PRO/DX INJ NEW DRUG ADDON: CPT

## 2023-04-10 PROCEDURE — 99223 1ST HOSP IP/OBS HIGH 75: CPT | Mod: AI | Performed by: HOSPITALIST

## 2023-04-10 PROCEDURE — 94760 N-INVAS EAR/PLS OXIMETRY 1: CPT

## 2023-04-10 PROCEDURE — 83605 ASSAY OF LACTIC ACID: CPT

## 2023-04-10 RX ORDER — SODIUM CHLORIDE AND POTASSIUM CHLORIDE 150; 900 MG/100ML; MG/100ML
INJECTION, SOLUTION INTRAVENOUS CONTINUOUS
Status: DISCONTINUED | OUTPATIENT
Start: 2023-04-10 | End: 2023-04-11

## 2023-04-10 RX ORDER — SODIUM CHLORIDE, SODIUM LACTATE, POTASSIUM CHLORIDE, AND CALCIUM CHLORIDE .6; .31; .03; .02 G/100ML; G/100ML; G/100ML; G/100ML
500 INJECTION, SOLUTION INTRAVENOUS
Status: DISCONTINUED | OUTPATIENT
Start: 2023-04-10 | End: 2023-04-12 | Stop reason: HOSPADM

## 2023-04-10 RX ORDER — OXYCODONE HYDROCHLORIDE 5 MG/1
2.5 TABLET ORAL
Status: DISCONTINUED | OUTPATIENT
Start: 2023-04-10 | End: 2023-04-12 | Stop reason: HOSPADM

## 2023-04-10 RX ORDER — ALBUTEROL SULFATE 90 UG/1
2 AEROSOL, METERED RESPIRATORY (INHALATION) EVERY 6 HOURS PRN
Status: DISCONTINUED | OUTPATIENT
Start: 2023-04-10 | End: 2023-04-12 | Stop reason: HOSPADM

## 2023-04-10 RX ORDER — BUDESONIDE AND FORMOTEROL FUMARATE DIHYDRATE 80; 4.5 UG/1; UG/1
2 AEROSOL RESPIRATORY (INHALATION) 2 TIMES DAILY
Status: DISCONTINUED | OUTPATIENT
Start: 2023-04-10 | End: 2023-04-11

## 2023-04-10 RX ORDER — POLYETHYLENE GLYCOL 3350 17 G/17G
1 POWDER, FOR SOLUTION ORAL
Status: DISCONTINUED | OUTPATIENT
Start: 2023-04-10 | End: 2023-04-12 | Stop reason: HOSPADM

## 2023-04-10 RX ORDER — CEFTRIAXONE 2 G/1
2000 INJECTION, POWDER, FOR SOLUTION INTRAMUSCULAR; INTRAVENOUS ONCE
Status: COMPLETED | OUTPATIENT
Start: 2023-04-10 | End: 2023-04-10

## 2023-04-10 RX ORDER — METRONIDAZOLE 500 MG/100ML
500 INJECTION, SOLUTION INTRAVENOUS EVERY 8 HOURS
Status: DISCONTINUED | OUTPATIENT
Start: 2023-04-10 | End: 2023-04-12 | Stop reason: HOSPADM

## 2023-04-10 RX ORDER — ACETAMINOPHEN 325 MG/1
650 TABLET ORAL EVERY 6 HOURS PRN
Status: DISCONTINUED | OUTPATIENT
Start: 2023-04-10 | End: 2023-04-12 | Stop reason: HOSPADM

## 2023-04-10 RX ORDER — METRONIDAZOLE 500 MG/100ML
500 INJECTION, SOLUTION INTRAVENOUS ONCE
Status: DISCONTINUED | OUTPATIENT
Start: 2023-04-10 | End: 2023-04-10

## 2023-04-10 RX ORDER — ONDANSETRON 4 MG/1
4 TABLET, ORALLY DISINTEGRATING ORAL EVERY 4 HOURS PRN
Status: DISCONTINUED | OUTPATIENT
Start: 2023-04-10 | End: 2023-04-12 | Stop reason: HOSPADM

## 2023-04-10 RX ORDER — BUPROPION HYDROCHLORIDE 150 MG/1
150 TABLET, EXTENDED RELEASE ORAL 2 TIMES DAILY
Status: DISCONTINUED | OUTPATIENT
Start: 2023-04-11 | End: 2023-04-12 | Stop reason: HOSPADM

## 2023-04-10 RX ORDER — HYDROCHLOROTHIAZIDE 25 MG/1
25 TABLET ORAL DAILY
Status: DISCONTINUED | OUTPATIENT
Start: 2023-04-11 | End: 2023-04-12 | Stop reason: HOSPADM

## 2023-04-10 RX ORDER — ONDANSETRON 2 MG/ML
4 INJECTION INTRAMUSCULAR; INTRAVENOUS EVERY 4 HOURS PRN
Status: DISCONTINUED | OUTPATIENT
Start: 2023-04-10 | End: 2023-04-12 | Stop reason: HOSPADM

## 2023-04-10 RX ORDER — OLMESARTAN MEDOXOMIL 20 MG/1
20 TABLET ORAL DAILY
Status: DISCONTINUED | OUTPATIENT
Start: 2023-04-11 | End: 2023-04-12 | Stop reason: HOSPADM

## 2023-04-10 RX ORDER — ONDANSETRON 2 MG/ML
4 INJECTION INTRAMUSCULAR; INTRAVENOUS ONCE
Status: COMPLETED | OUTPATIENT
Start: 2023-04-10 | End: 2023-04-10

## 2023-04-10 RX ORDER — MORPHINE SULFATE 4 MG/ML
2 INJECTION INTRAVENOUS ONCE
Status: COMPLETED | OUTPATIENT
Start: 2023-04-10 | End: 2023-04-10

## 2023-04-10 RX ORDER — ATORVASTATIN CALCIUM 20 MG/1
20 TABLET, FILM COATED ORAL EVERY EVENING
Status: DISCONTINUED | OUTPATIENT
Start: 2023-04-10 | End: 2023-04-12 | Stop reason: HOSPADM

## 2023-04-10 RX ORDER — ENOXAPARIN SODIUM 100 MG/ML
40 INJECTION SUBCUTANEOUS DAILY
Status: DISCONTINUED | OUTPATIENT
Start: 2023-04-10 | End: 2023-04-12 | Stop reason: HOSPADM

## 2023-04-10 RX ORDER — SODIUM CHLORIDE 9 MG/ML
1000 INJECTION, SOLUTION INTRAVENOUS ONCE
Status: COMPLETED | OUTPATIENT
Start: 2023-04-10 | End: 2023-04-10

## 2023-04-10 RX ORDER — OLMESARTAN MEDOXOMIL 20 MG/1
20 TABLET ORAL DAILY
COMMUNITY
Start: 2023-03-09 | End: 2023-04-27

## 2023-04-10 RX ORDER — METOPROLOL SUCCINATE 25 MG/1
25 TABLET, EXTENDED RELEASE ORAL EVERY EVENING
Status: DISCONTINUED | OUTPATIENT
Start: 2023-04-10 | End: 2023-04-12 | Stop reason: HOSPADM

## 2023-04-10 RX ORDER — HYDROMORPHONE HYDROCHLORIDE 1 MG/ML
0.25 INJECTION, SOLUTION INTRAMUSCULAR; INTRAVENOUS; SUBCUTANEOUS
Status: DISCONTINUED | OUTPATIENT
Start: 2023-04-10 | End: 2023-04-12 | Stop reason: HOSPADM

## 2023-04-10 RX ORDER — SODIUM CHLORIDE, SODIUM LACTATE, POTASSIUM CHLORIDE, CALCIUM CHLORIDE 600; 310; 30; 20 MG/100ML; MG/100ML; MG/100ML; MG/100ML
INJECTION, SOLUTION INTRAVENOUS CONTINUOUS
Status: DISCONTINUED | OUTPATIENT
Start: 2023-04-10 | End: 2023-04-10

## 2023-04-10 RX ORDER — OXYCODONE HYDROCHLORIDE 5 MG/1
5 TABLET ORAL
Status: DISCONTINUED | OUTPATIENT
Start: 2023-04-10 | End: 2023-04-12 | Stop reason: HOSPADM

## 2023-04-10 RX ORDER — BISACODYL 10 MG
10 SUPPOSITORY, RECTAL RECTAL
Status: DISCONTINUED | OUTPATIENT
Start: 2023-04-10 | End: 2023-04-12 | Stop reason: HOSPADM

## 2023-04-10 RX ADMIN — ATORVASTATIN CALCIUM 20 MG: 20 TABLET, FILM COATED ORAL at 22:28

## 2023-04-10 RX ADMIN — ONDANSETRON HYDROCHLORIDE 4 MG: 2 INJECTION, SOLUTION INTRAMUSCULAR; INTRAVENOUS at 19:17

## 2023-04-10 RX ADMIN — IOHEXOL 100 ML: 350 INJECTION, SOLUTION INTRAVENOUS at 20:19

## 2023-04-10 RX ADMIN — MORPHINE SULFATE 2 MG: 4 INJECTION INTRAVENOUS at 19:17

## 2023-04-10 RX ADMIN — CEFTRIAXONE SODIUM 2000 MG: 2 INJECTION, POWDER, FOR SOLUTION INTRAMUSCULAR; INTRAVENOUS at 21:07

## 2023-04-10 RX ADMIN — SODIUM CHLORIDE 1000 ML: 9 INJECTION, SOLUTION INTRAVENOUS at 21:08

## 2023-04-10 RX ADMIN — METOPROLOL SUCCINATE 25 MG: 25 TABLET, EXTENDED RELEASE ORAL at 22:28

## 2023-04-10 RX ADMIN — METRONIDAZOLE 500 MG: 500 INJECTION, SOLUTION INTRAVENOUS at 22:25

## 2023-04-10 RX ADMIN — IOHEXOL 20 ML: 240 INJECTION, SOLUTION INTRATHECAL; INTRAVASCULAR; INTRAVENOUS; ORAL at 20:23

## 2023-04-10 RX ADMIN — POTASSIUM CHLORIDE AND SODIUM CHLORIDE: 900; 150 INJECTION, SOLUTION INTRAVENOUS at 22:06

## 2023-04-10 RX ADMIN — ENOXAPARIN SODIUM 40 MG: 40 INJECTION SUBCUTANEOUS at 22:27

## 2023-04-10 ASSESSMENT — PATIENT HEALTH QUESTIONNAIRE - PHQ9
6. FEELING BAD ABOUT YOURSELF - OR THAT YOU ARE A FAILURE OR HAVE LET YOURSELF OR YOUR FAMILY DOWN: NOT AL ALL
SUM OF ALL RESPONSES TO PHQ9 QUESTIONS 1 AND 2: 2
2. FEELING DOWN, DEPRESSED, IRRITABLE, OR HOPELESS: SEVERAL DAYS
8. MOVING OR SPEAKING SO SLOWLY THAT OTHER PEOPLE COULD HAVE NOTICED. OR THE OPPOSITE, BEING SO FIGETY OR RESTLESS THAT YOU HAVE BEEN MOVING AROUND A LOT MORE THAN USUAL: NOT AT ALL
7. TROUBLE CONCENTRATING ON THINGS, SUCH AS READING THE NEWSPAPER OR WATCHING TELEVISION: SEVERAL DAYS
4. FEELING TIRED OR HAVING LITTLE ENERGY: NOT AT ALL
3. TROUBLE FALLING OR STAYING ASLEEP OR SLEEPING TOO MUCH: NOT AT ALL
5. POOR APPETITE OR OVEREATING: NOT AT ALL
1. LITTLE INTEREST OR PLEASURE IN DOING THINGS: SEVERAL DAYS
9. THOUGHTS THAT YOU WOULD BE BETTER OFF DEAD, OR OF HURTING YOURSELF: NOT AT ALL
SUM OF ALL RESPONSES TO PHQ QUESTIONS 1-9: 3

## 2023-04-10 ASSESSMENT — ENCOUNTER SYMPTOMS
SHORTNESS OF BREATH: 0
NERVOUS/ANXIOUS: 0
NAUSEA: 1
ABDOMINAL PAIN: 1
FOCAL WEAKNESS: 0
VOMITING: 0
COUGH: 0
FEVER: 0
CHILLS: 1
FLANK PAIN: 0
STRIDOR: 0
EYE REDNESS: 0
MYALGIAS: 0
EYE DISCHARGE: 0
BRUISES/BLEEDS EASILY: 0

## 2023-04-10 ASSESSMENT — FIBROSIS 4 INDEX
FIB4 SCORE: 0.82
FIB4 SCORE: 0.82

## 2023-04-10 ASSESSMENT — LIFESTYLE VARIABLES
AVERAGE NUMBER OF DAYS PER WEEK YOU HAVE A DRINK CONTAINING ALCOHOL: 0
ALCOHOL_USE: NO
CONSUMPTION TOTAL: NEGATIVE
HAVE YOU EVER FELT YOU SHOULD CUT DOWN ON YOUR DRINKING: NO
ON A TYPICAL DAY WHEN YOU DRINK ALCOHOL HOW MANY DRINKS DO YOU HAVE: 0
HOW MANY TIMES IN THE PAST YEAR HAVE YOU HAD 5 OR MORE DRINKS IN A DAY: 0
TOTAL SCORE: 0
HAVE PEOPLE ANNOYED YOU BY CRITICIZING YOUR DRINKING: NO
TOTAL SCORE: 0
EVER HAD A DRINK FIRST THING IN THE MORNING TO STEADY YOUR NERVES TO GET RID OF A HANGOVER: NO
TOTAL SCORE: 0
EVER FELT BAD OR GUILTY ABOUT YOUR DRINKING: NO

## 2023-04-10 ASSESSMENT — COGNITIVE AND FUNCTIONAL STATUS - GENERAL
SUGGESTED CMS G CODE MODIFIER MOBILITY: CJ
MOBILITY SCORE: 22
CLIMB 3 TO 5 STEPS WITH RAILING: A LITTLE
MOVING FROM LYING ON BACK TO SITTING ON SIDE OF FLAT BED: A LITTLE
DAILY ACTIVITIY SCORE: 24
SUGGESTED CMS G CODE MODIFIER DAILY ACTIVITY: CH

## 2023-04-10 ASSESSMENT — PAIN DESCRIPTION - PAIN TYPE: TYPE: ACUTE PAIN

## 2023-04-10 NOTE — ED NOTES
Med rec updated and complete, per pt   Allergies reviewed, per pt  Per pt reports that she was was on LISINOPRIL 5MG, but stopped taking about a month or longer because she had the worst cough.  Pt has not started taking her DEXAMETHASONE 6MG or her BENZONATATE 200MG.  Pt reports she is not taken MONTELUKAST 10MG in the last 30 days or longer, she also has an allergie to this medication.

## 2023-04-10 NOTE — ED TRIAGE NOTES
Chief Complaint   Patient presents with    Abdominal Pain     X 3 days, states is getting progressively worse, denies N/V, last BM yesterday     /69   Pulse 94   Temp 36.6 °C (97.8 °F) (Temporal)   Resp 15   Wt 122 kg (270 lb)   SpO2 91%   BMI 44.93 kg/m²     Pt to ED w/ visitor vis WC for c/o ongoing abd pain, states at times feels nauseated, no vomiting.

## 2023-04-11 PROBLEM — E66.01 CLASS 3 SEVERE OBESITY DUE TO EXCESS CALORIES WITHOUT SERIOUS COMORBIDITY WITH BODY MASS INDEX (BMI) OF 40.0 TO 44.9 IN ADULT (HCC): Chronic | Status: ACTIVE | Noted: 2023-04-11

## 2023-04-11 PROBLEM — N30.90 CYSTITIS: Status: ACTIVE | Noted: 2023-04-11

## 2023-04-11 PROBLEM — E66.813 CLASS 3 SEVERE OBESITY DUE TO EXCESS CALORIES WITHOUT SERIOUS COMORBIDITY WITH BODY MASS INDEX (BMI) OF 40.0 TO 44.9 IN ADULT (HCC): Chronic | Status: ACTIVE | Noted: 2023-04-11

## 2023-04-11 LAB
ALBUMIN SERPL BCP-MCNC: 3.6 G/DL (ref 3.2–4.9)
ALBUMIN/GLOB SERPL: 1.2 G/DL
ALP SERPL-CCNC: 117 U/L (ref 30–99)
ALT SERPL-CCNC: 11 U/L (ref 2–50)
ANION GAP SERPL CALC-SCNC: 11 MMOL/L (ref 7–16)
AST SERPL-CCNC: 12 U/L (ref 12–45)
BILIRUB SERPL-MCNC: 0.4 MG/DL (ref 0.1–1.5)
BUN SERPL-MCNC: 18 MG/DL (ref 8–22)
CALCIUM ALBUM COR SERPL-MCNC: 8.8 MG/DL (ref 8.5–10.5)
CALCIUM SERPL-MCNC: 8.5 MG/DL (ref 8.4–10.2)
CHLORIDE SERPL-SCNC: 92 MMOL/L (ref 96–112)
CO2 SERPL-SCNC: 29 MMOL/L (ref 20–33)
CREAT SERPL-MCNC: 1.03 MG/DL (ref 0.5–1.4)
ERYTHROCYTE [DISTWIDTH] IN BLOOD BY AUTOMATED COUNT: 39.1 FL (ref 35.9–50)
GFR SERPLBLD CREATININE-BSD FMLA CKD-EPI: 58 ML/MIN/1.73 M 2
GLOBULIN SER CALC-MCNC: 3 G/DL (ref 1.9–3.5)
GLUCOSE SERPL-MCNC: 131 MG/DL (ref 65–99)
HCT VFR BLD AUTO: 39.3 % (ref 37–47)
HGB BLD-MCNC: 13.5 G/DL (ref 12–16)
LACTATE SERPL-SCNC: 1.3 MMOL/L (ref 0.5–2)
MAGNESIUM SERPL-MCNC: 2.1 MG/DL (ref 1.5–2.5)
MCH RBC QN AUTO: 28.5 PG (ref 27–33)
MCHC RBC AUTO-ENTMCNC: 34.4 G/DL (ref 33.6–35)
MCV RBC AUTO: 82.9 FL (ref 81.4–97.8)
PLATELET # BLD AUTO: 308 K/UL (ref 164–446)
PMV BLD AUTO: 9.3 FL (ref 9–12.9)
POTASSIUM SERPL-SCNC: 3.5 MMOL/L (ref 3.6–5.5)
PROT SERPL-MCNC: 6.6 G/DL (ref 6–8.2)
RBC # BLD AUTO: 4.74 M/UL (ref 4.2–5.4)
SODIUM SERPL-SCNC: 132 MMOL/L (ref 135–145)
WBC # BLD AUTO: 13 K/UL (ref 4.8–10.8)

## 2023-04-11 PROCEDURE — 700102 HCHG RX REV CODE 250 W/ 637 OVERRIDE(OP): Performed by: HOSPITALIST

## 2023-04-11 PROCEDURE — 83605 ASSAY OF LACTIC ACID: CPT

## 2023-04-11 PROCEDURE — 83735 ASSAY OF MAGNESIUM: CPT

## 2023-04-11 PROCEDURE — 700101 HCHG RX REV CODE 250: Performed by: HOSPITALIST

## 2023-04-11 PROCEDURE — 94760 N-INVAS EAR/PLS OXIMETRY 1: CPT

## 2023-04-11 PROCEDURE — 99233 SBSQ HOSP IP/OBS HIGH 50: CPT | Performed by: INTERNAL MEDICINE

## 2023-04-11 PROCEDURE — 80053 COMPREHEN METABOLIC PANEL: CPT

## 2023-04-11 PROCEDURE — 700111 HCHG RX REV CODE 636 W/ 250 OVERRIDE (IP): Performed by: HOSPITALIST

## 2023-04-11 PROCEDURE — A9270 NON-COVERED ITEM OR SERVICE: HCPCS | Performed by: HOSPITALIST

## 2023-04-11 PROCEDURE — 94640 AIRWAY INHALATION TREATMENT: CPT

## 2023-04-11 PROCEDURE — 85027 COMPLETE CBC AUTOMATED: CPT

## 2023-04-11 PROCEDURE — 700105 HCHG RX REV CODE 258: Performed by: HOSPITALIST

## 2023-04-11 PROCEDURE — A9270 NON-COVERED ITEM OR SERVICE: HCPCS | Performed by: INTERNAL MEDICINE

## 2023-04-11 PROCEDURE — 770001 HCHG ROOM/CARE - MED/SURG/GYN PRIV*

## 2023-04-11 PROCEDURE — 700102 HCHG RX REV CODE 250 W/ 637 OVERRIDE(OP): Performed by: INTERNAL MEDICINE

## 2023-04-11 RX ORDER — BUDESONIDE AND FORMOTEROL FUMARATE DIHYDRATE 80; 4.5 UG/1; UG/1
2 AEROSOL RESPIRATORY (INHALATION)
Status: DISCONTINUED | OUTPATIENT
Start: 2023-04-11 | End: 2023-04-12 | Stop reason: HOSPADM

## 2023-04-11 RX ORDER — POTASSIUM CHLORIDE 20 MEQ/1
40 TABLET, EXTENDED RELEASE ORAL ONCE
Status: COMPLETED | OUTPATIENT
Start: 2023-04-11 | End: 2023-04-11

## 2023-04-11 RX ORDER — SIMETHICONE 125 MG
125 TABLET,CHEWABLE ORAL 3 TIMES DAILY PRN
Status: DISCONTINUED | OUTPATIENT
Start: 2023-04-11 | End: 2023-04-12 | Stop reason: HOSPADM

## 2023-04-11 RX ADMIN — CEFTRIAXONE SODIUM 1000 MG: 1 INJECTION, POWDER, FOR SOLUTION INTRAMUSCULAR; INTRAVENOUS at 08:41

## 2023-04-11 RX ADMIN — OXYCODONE 2.5 MG: 5 TABLET ORAL at 10:07

## 2023-04-11 RX ADMIN — BUPROPION HYDROCHLORIDE 150 MG: 150 TABLET, EXTENDED RELEASE ORAL at 05:49

## 2023-04-11 RX ADMIN — ENOXAPARIN SODIUM 40 MG: 40 INJECTION SUBCUTANEOUS at 16:38

## 2023-04-11 RX ADMIN — POTASSIUM CHLORIDE 40 MEQ: 1500 TABLET, EXTENDED RELEASE ORAL at 14:40

## 2023-04-11 RX ADMIN — METRONIDAZOLE 500 MG: 500 INJECTION, SOLUTION INTRAVENOUS at 13:16

## 2023-04-11 RX ADMIN — OXYCODONE 5 MG: 5 TABLET ORAL at 21:15

## 2023-04-11 RX ADMIN — OXYCODONE 2.5 MG: 5 TABLET ORAL at 02:54

## 2023-04-11 RX ADMIN — SIMETHICONE 125 MG: 125 TABLET, CHEWABLE ORAL at 10:52

## 2023-04-11 RX ADMIN — METRONIDAZOLE 500 MG: 500 INJECTION, SOLUTION INTRAVENOUS at 05:54

## 2023-04-11 RX ADMIN — BUDESONIDE AND FORMOTEROL FUMARATE DIHYDRATE 2 PUFF: 80; 4.5 AEROSOL RESPIRATORY (INHALATION) at 19:52

## 2023-04-11 RX ADMIN — POTASSIUM CHLORIDE AND SODIUM CHLORIDE: 900; 150 INJECTION, SOLUTION INTRAVENOUS at 07:53

## 2023-04-11 RX ADMIN — ATORVASTATIN CALCIUM 20 MG: 20 TABLET, FILM COATED ORAL at 16:37

## 2023-04-11 RX ADMIN — OLMESARTAN MEDOXOMIL 20 MG: 20 TABLET, FILM COATED ORAL at 05:50

## 2023-04-11 RX ADMIN — METOPROLOL SUCCINATE 25 MG: 25 TABLET, EXTENDED RELEASE ORAL at 16:37

## 2023-04-11 RX ADMIN — BUDESONIDE AND FORMOTEROL FUMARATE DIHYDRATE 2 PUFF: 80; 4.5 AEROSOL RESPIRATORY (INHALATION) at 07:29

## 2023-04-11 RX ADMIN — METRONIDAZOLE 500 MG: 500 INJECTION, SOLUTION INTRAVENOUS at 21:23

## 2023-04-11 RX ADMIN — SERTRALINE HYDROCHLORIDE 25 MG: 50 TABLET ORAL at 05:50

## 2023-04-11 ASSESSMENT — ENCOUNTER SYMPTOMS
DIARRHEA: 0
DIZZINESS: 0
NAUSEA: 0
CONSTIPATION: 0
VOMITING: 0
FEVER: 0
CHILLS: 0
PALPITATIONS: 0
SHORTNESS OF BREATH: 0
BACK PAIN: 0
ABDOMINAL PAIN: 1
COUGH: 0
HEADACHES: 0

## 2023-04-11 ASSESSMENT — PAIN DESCRIPTION - PAIN TYPE
TYPE: ACUTE PAIN

## 2023-04-11 NOTE — PROGRESS NOTES
Hospital Medicine Daily Progress Note    Date of Service  4/11/2023    Chief Complaint  Guerda Lunsford is a 72 y.o. female admitted 4/10/2023 with abdominal pain for 3 days    Hospital Course  No notes on file    Interval Problem Update  Patient stated she has ongoing lower abdominal pain and mid abdominal pain  WBC 13,000, down from 15,000  Sodium 132 low  Potassium 3.5 low.  Replacing via p.o.  Stopping IV fluids.  Patient continued on ceftriaxone and Flagyl for both UTI and colitis/diverticulitis    I have discussed this patient's plan of care and discharge plan at IDT rounds today with Case Management, Nursing, Nursing leadership, and other members of the IDT team.    Consultants/Specialty  none    Code Status  Full Code    Disposition  Patient is not medically cleared for discharge.   Anticipate discharge to to home with close outpatient follow-up.  I have placed the appropriate orders for post-discharge needs.    Review of Systems  Review of Systems   Constitutional:  Negative for chills, fever and malaise/fatigue.   Respiratory:  Negative for cough and shortness of breath.    Cardiovascular:  Negative for chest pain and palpitations.   Gastrointestinal:  Positive for abdominal pain. Negative for constipation, diarrhea, nausea and vomiting.   Musculoskeletal:  Negative for back pain and joint pain.   Neurological:  Negative for dizziness and headaches.   All other systems reviewed and are negative.     Physical Exam  Temp:  [36.3 °C (97.3 °F)-36.8 °C (98.2 °F)] 36.3 °C (97.3 °F)  Pulse:  [] 75  Resp:  [15-20] 18  BP: (101-146)/() 101/51  SpO2:  [90 %-98 %] 90 %    Physical Exam  Vitals and nursing note reviewed.   Constitutional:       General: She is not in acute distress.     Appearance: Normal appearance. She is obese. She is not ill-appearing.   HENT:      Head: Normocephalic and atraumatic.      Mouth/Throat:      Mouth: Mucous membranes are dry.      Pharynx: No oropharyngeal exudate.    Eyes:      General: No scleral icterus.     Extraocular Movements: Extraocular movements intact.   Cardiovascular:      Rate and Rhythm: Normal rate.      Pulses: Normal pulses.      Heart sounds: Normal heart sounds. No murmur heard.     Comments: Difficult to completely auscultate due to body habitus  Pulmonary:      Effort: Pulmonary effort is normal. No respiratory distress.      Breath sounds: Normal breath sounds.      Comments: Difficult to completely auscultate due to body habitus  Abdominal:      General: Bowel sounds are normal. There is no distension.      Tenderness: There is abdominal tenderness.   Musculoskeletal:         General: No swelling or tenderness. Normal range of motion.      Cervical back: Normal range of motion and neck supple.   Skin:     General: Skin is warm.      Capillary Refill: Capillary refill takes less than 2 seconds.      Coloration: Skin is not jaundiced.      Findings: No erythema.   Neurological:      General: No focal deficit present.      Mental Status: She is alert and oriented to person, place, and time. Mental status is at baseline.      Motor: No weakness.   Psychiatric:         Mood and Affect: Mood normal.         Behavior: Behavior normal.         Thought Content: Thought content normal.         Judgment: Judgment normal.       Fluids    Intake/Output Summary (Last 24 hours) at 4/11/2023 1409  Last data filed at 4/11/2023 1311  Gross per 24 hour   Intake 1460.54 ml   Output 225 ml   Net 1235.54 ml       Laboratory  Recent Labs     04/10/23  1855 04/11/23  0113   WBC 15.0* 13.0*   RBC 5.13 4.74   HEMOGLOBIN 14.9 13.5   HEMATOCRIT 42.3 39.3   MCV 82.5 82.9   MCH 29.0 28.5   MCHC 35.2* 34.4   RDW 39.2 39.1   PLATELETCT 317 308   MPV 9.2 9.3     Recent Labs     04/10/23  1855 04/11/23  0113   SODIUM 133* 132*   POTASSIUM 3.5* 3.5*   CHLORIDE 93* 92*   CO2 29 29   GLUCOSE 138* 131*   BUN 19 18   CREATININE 1.06 1.03   CALCIUM 9.3 8.5                    Imaging  CT-ABDOMEN-PELVIS WITH   Final Result         1.  Segmental colonic wall thickening from the mid transverse through the distal sigmoid, appearance compatible with changes of focal segmental colitis. Consider inflammatory or infectious versus ischemic etiology in the appropriate clinical setting.   2.  Distal sigmoid colon area of increased colonic wall thickening with associated colonic diverticula, could represent superimposed segment of diverticulitis.   3.  Small fat-containing umbilical hernia   4.  Hepatomegaly           Assessment/Plan  * Sepsis (HCC)- (present on admission)  Assessment & Plan  This is Sepsis Present on admission  SIRS criteria identified on my evaluation include: Tachycardia, with heart rate greater than 90 BPM and Leukocytosis, with WBC greater than 12,000  Source is colitis  Sepsis protocol initiated  Fluid resuscitation ordered per protocol  Crystalloid Fluid Administration: Fluid resuscitation ordered per standard protocol - 30 mL/kg per current or ideal body weight  IV antibiotics as appropriate for source of sepsis  Reassessment: I have reassessed the patient's hemodynamic status    WBC 13,000, continued on IV ceftriaxone and Flagyl.  Treating UTI, colitis and diverticulitis    Cystitis- (present on admission)  Assessment & Plan  UA positive with patient having suprapubic symptoms  Continue IV ceftriaxone  Pending urine culture    Class 3 severe obesity due to excess calories without serious comorbidity with body mass index (BMI) of 40.0 to 44.9 in adult (HCC)- (present on admission)  Assessment & Plan  Recommending patient to follow up with their PCP on weight management plan  Recommending polysomnography to PCP given elevated BMI  Counseled patient on weight control, diet management, following up with PCP    Primary hypertension- (present on admission)  Assessment & Plan  Resume home metoprolol, olmesartan and hydrochlorothiazide with hold parameters   BP remains  controlled    Hyponatremia- (present on admission)  Assessment & Plan  133 on admission, down to 132  Patient is on hydrochlorothiazide at home, hold  Increasing diet today    Hypokalemia- (present on admission)  Assessment & Plan  3.5  Replacing via p.o.    Diverticulitis- (present on admission)  Assessment & Plan  Continue ceftriaxone and Flagyl  Likely be able to transition to oral Augmentin on discharge    Depression- (present on admission)  Assessment & Plan  Resume home bupropion and sertraline         VTE prophylaxis: enoxaparin ppx    I have performed a physical exam and reviewed and updated ROS and Plan today (4/11/2023). In review of yesterday's note (4/10/2023), there are no changes except as documented above.      Total time spent 51 minutes.  This included my review of patient's history, yesterday's notes, face to face interview, physical examination, lab analysis.  Patient has been seen multiple times today.  I reviewed CT A/P imaging  In addition, I spoke with entire care team on patient's treatment plan and DC planning on IDT rounds.

## 2023-04-11 NOTE — ASSESSMENT & PLAN NOTE
This is Sepsis Present on admission  SIRS criteria identified on my evaluation include: Tachycardia, with heart rate greater than 90 BPM and Leukocytosis, with WBC greater than 12,000  Source is colitis  Sepsis protocol initiated  Fluid resuscitation ordered per protocol  Crystalloid Fluid Administration: Fluid resuscitation ordered per standard protocol - 30 mL/kg per current or ideal body weight  IV antibiotics as appropriate for source of sepsis  Reassessment: I have reassessed the patient's hemodynamic status    WBC 13,000, continued on IV ceftriaxone and Flagyl.  Treating UTI, colitis and diverticulitis

## 2023-04-11 NOTE — ASSESSMENT & PLAN NOTE
UA positive with patient having suprapubic symptoms  Continue IV ceftriaxone  Pending urine culture

## 2023-04-11 NOTE — ASSESSMENT & PLAN NOTE
Resume home metoprolol, olmesartan and hydrochlorothiazide with hold parameters   BP remains controlled

## 2023-04-11 NOTE — DIETARY
NUTRITION SERVICES: BMI - Pt with BMI >40 (=Body mass index is 44.72 kg/m².), Class III obesity. Weight loss counseling not appropriate in acute care setting. RECOMMEND - If appropriate at DC please refer to outpatient nutrition services for weight management.

## 2023-04-11 NOTE — ED PROVIDER NOTES
ER Provider Note    Scribed for Mannie Nelson M.d. by Sherrell Brand. 4/10/2023  6:40 PM    Primary Care Provider: Hermelinda Mccabe D.O.    CHIEF COMPLAINT  Chief Complaint   Patient presents with    Abdominal Pain     X 3 days, states is getting progressively worse, denies N/V, last BM yesterday     EXTERNAL RECORDS REVIEWED  Outpatient Notes patient has a history of asthma.    HPI/ROS    Guerad Lunsford is a 72 y.o. female who presents to the ED complaining of abdominal pain onset 3 days ago The pain has been non radiating. She states that when she sits down to go to the bathroom she will feel nauseas. She has associated symptoms of fevers or vomiting. She denies any pain with urination or burning with urination, diarrhea. Eating food exacerbates her pain. She has only eaten 4 crackers earlier today. She has a history of diverticulitis. She states that her currently pain feels similar. She is allergic to Azithromycin, Cymbalta, Lisinopril, Montelukast, and Demerol. She is not passing gas. She last had a bowel movement one day ago with minimal fluctuance since. She denies history of small bowel obstruction. She is feeling more bloated than normal.     PAST MEDICAL HISTORY  Past Medical History:   Diagnosis Date    Allergy     Anemia 1997    Bleeding hemorrhoids    Anxiety     Arthritis     Bronchitis     Cataract     bilateral    Chest pain 2/1/2016    Cholesterol serum elevated     diet controlled    Dental disorder 10/2020    Filled chipped front upper tooth    Depression     anxiety    Detached retina, left     Diverticulitis     Diverticulitis 3/28/2016    Gynecological disorder 1982    bleeding, cervical pecancer    Headache     Occasionally, can become severe if she does not take Advil. ICD-10 transition    Headache(784.0)     Occasionally, can become severe if she does not take Advil.    Heart murmur     Comes and goes    Hemorrhoids 1997    had surgery    High cholesterol      Hypertension     non-medicated    Hyponatremia 3/29/2016    Indigestion     Infectious disease     uti    Macular hole of right eye     Pain     Pneumonia     Shortness of breath 2014    SIRS (systemic inflammatory response syndrome) (HCC) 3/29/2016    Urinary tract infection, site not specified        SURGICAL HISTORY  Past Surgical History:   Procedure Laterality Date    PB TOTAL KNEE ARTHROPLASTY Right 2021    Procedure: ARTHROPLASTY, KNEE, TOTAL Lupillo cementless;  Surgeon: Sonido Amado M.D.;  Location: SURGERY [facility];  Service: Orthopedics    PB TOTAL KNEE ARTHROPLASTY Left 2021    Procedure: ARTHROPLASTY, KNEE, TOTAL.;  Surgeon: Sonido Amado M.D.;  Location: SURGERY [facility];  Service: Orthopedics    BREAST BIOPSY  2009    LAMINOTOMY      2 Lumbar Discs    ABDOMINAL HYSTERECTOMY TOTAL      Cervical precancer and bleeding.  No oophorectomy.    APPENDECTOMY      GYN SURGERY      hysterectomy - partial    OTHER      hemorrhoidectomy    OTHER Right     cataract extraction    OTHER Right     Macular Hole Surgery    OTHER ABDOMINAL SURGERY      appy    OTHER ORTHOPEDIC SURGERY      lami       FAMILY HISTORY  Family History   Problem Relation Age of Onset    Diabetes Mother     Heart Disease Mother 60        MI then CABG    Heart Disease Father         CHF    Alcohol/Drug Maternal Aunt     Heart Disease Maternal Grandmother 65        MI,  with it    Alcohol/Drug Maternal Grandmother     Alcohol/Drug Maternal Grandfather     Alcohol/Drug Paternal Grandmother        SOCIAL HISTORY   reports that she has never smoked. She has never used smokeless tobacco. She reports that she does not drink alcohol and does not use drugs.    CURRENT MEDICATIONS  Previous Medications    ALBUTEROL 108 (90 BASE) MCG/ACT AERO SOLN INHALATION AEROSOL    Inhale 2 Puffs every 6 hours as needed for Shortness of Breath.    ATORVASTATIN (LIPITOR) 20 MG TAB    Take 20 mg by mouth every evening.     BENZONATATE (TESSALON) 200 MG CAPSULE    Take 1 Capsule by mouth 3 times a day as needed for Cough.    BISMUTH SUBSALICYLATE (PEPTO-BISMOL PO)    Take 5 mL by mouth 2 times a day as needed (For upset stomach).    BUPROPION (WELLBUTRIN XL) 300 MG XL TABLET    Take 300 mg by mouth every morning.    DEXAMETHASONE (DECADRON) 6 MG TAB    Take 1 Tablet by mouth every day.    FLUTICASONE-SALMETEROL (ADVAIR HFA) 45-21 MCG/ACT INHALER    Inhale 2 Puffs 2 times a day.    HYDROCHLOROTHIAZIDE (HYDRODIURIL) 25 MG TAB    Take 25 mg by mouth every day.    METFORMIN ER (GLUCOPHAGE XR) 500 MG TABLET SR 24 HR    Take 500 mg by mouth every evening.    METOPROLOL SR (TOPROL XL) 25 MG TABLET SR 24 HR    Take 25 mg by mouth every evening.    OLMESARTAN (BENICAR) 20 MG TAB    Take 20 mg by mouth every day.    SERTRALINE (ZOLOFT) 50 MG TAB    Take 25 mg by mouth every day.       ALLERGIES  Azithromycin, Cymbalta [duloxetine hcl], Lisinopril, Montelukast, and Demerol    PHYSICAL EXAM  BP (!) 130/101   Pulse (!) 101   Temp 36.8 °C (98.2 °F)   Resp 18   Wt 122 kg (270 lb)   SpO2 93%   BMI 44.93 kg/m²     Constitutional: Well developed, Well nourished, no acute distress.   HENT: Normocephalic, Atraumatic,    Eyes: Conjunctiva normal, No discharge.   Neck: Supple, No stridor  Cardiovascular: Normal heart rate, Normal rhythm, No murmurs, equal pulses.   Pulmonary: Normal breath sounds, No respiratory distress, No wheezing, No rales, No rhonchi.  Chest: No chest wall tenderness or deformity.   Abdomen: Soft, Diffuse tenderness throughout abdomen but worst in the left upper quadrant, diminished bowel sounds. No masses, no rebound, no guarding.   Back: No CVA tenderness.   Musculoskeletal: No major deformities noted, No tenderness.   Skin: Warm, Dry, No erythema, No rash. No edema in her legs   Neurologic: Alert & oriented x 3, Normal motor function,  No focal deficits noted.   Psychiatric: Affect normal, Judgment normal, Mood normal.      DIAGNOSTIC STUDIES    Labs:   Labs Reviewed   CBC WITH DIFFERENTIAL - Abnormal; Notable for the following components:       Result Value    WBC 15.0 (*)     MCHC 35.2 (*)     Neutrophils-Polys 80.30 (*)     Lymphocytes 9.80 (*)     Neutrophils (Absolute) 12.06 (*)     Monos (Absolute) 1.17 (*)     All other components within normal limits   COMP METABOLIC PANEL - Abnormal; Notable for the following components:    Sodium 133 (*)     Potassium 3.5 (*)     Chloride 93 (*)     Glucose 138 (*)     Alkaline Phosphatase 128 (*)     All other components within normal limits   URINALYSIS - Abnormal; Notable for the following components:    Character Turbid (*)     Ketones Trace (*)     Bilirubin Small (*)     Nitrite Positive (*)     Leukocyte Esterase Small (*)     Occult Blood Trace (*)     All other components within normal limits   ESTIMATED GFR - Abnormal; Notable for the following components:    GFR (CKD-EPI) 56 (*)     All other components within normal limits   URINE MICROSCOPIC (W/UA) - Abnormal; Notable for the following components:    -150 (*)     Bacteria Many (*)     All other components within normal limits   LIPASE   CORRECTED CALCIUM   BLOOD CULTURE   BLOOD CULTURE   LACTIC ACID   LACTIC ACID   LACTIC ACID   URINE CULTURE(NEW)     Radiology:     CT-ABDOMEN-PELVIS WITH   Final Result         1.  Segmental colonic wall thickening from the mid transverse through the distal sigmoid, appearance compatible with changes of focal segmental colitis. Consider inflammatory or infectious versus ischemic etiology in the appropriate clinical setting.   2.  Distal sigmoid colon area of increased colonic wall thickening with associated colonic diverticula, could represent superimposed segment of diverticulitis.   3.  Small fat-containing umbilical hernia   4.  Hepatomegaly         COURSE & MEDICAL DECISION MAKING     ED Observation Status? Yes; I am placing the patient in to an observation status due to a  diagnostic uncertainty as well as therapeutic intensity. Patient placed in observation status at 6:50 PM, 4/10/2023.     Observation plan is as follows: Pending CTs and abdomen    Upon Reevaluation, the patient's condition has: not improved; and will be escalated to hospitalization.    Patient discharged from ED Observation status at 8:45 PM (Time) insert (Date).     INITIAL ASSESSMENT, COURSE AND PLAN  Care Narrative:     6:42 PM - Patient seen and examined at bedside. Ordered for CT-abdomen pelvis, CBC w/ diff, CMP, lipase, and UA to evaluate her symptoms.      6:50 PM - Ordered for morphine 4  mg and Zofran 4 mg.    8:40 PM discussed CT and labs with the patient.  She still is having moderate pain given her earlier tachycardic and white blood cell count being 15,000 amount of pain she is having I think she would benefit from hospitalization with IV antibiotics and careful monitoring.  8:45 PM discussed case with Dr. Wren hospitalist he is agreed to consult on hospitalization     PROBLEM LIST  Problem #1 left upper quadrant abdominal pain at this point time this appears to be a combination of colitis and diverticulitis causing the patient's pain as well as a urinary tract infection.  Given the fact the patient was tachycardic and has a white cell count of 15,000 and she does meet SIRS criteria.  I think she would benefit from hospitalization for IV antibiotics and careful monitoring.  Discussed the case discussed with the patient she likely also will need a colonoscopy once this is healed.    DISPOSITION AND DISCUSSIONS  I have discussed management of the patient with the following physicians and LOUIE's: Holger    Discussion of management with other Q or appropriate source(s): Pharmacy discussed appropriate antibiotic treatment we will do Rocephiron Wilsonyl      DISPOSITION:  Patient will be hospitalized by Dr. Wren in third condition.      FINAL DIANGOSIS  1. Colitis    2. Diverticulitis    3. Acute UTI     4. Generalized abdominal pain       ISherrell (Pao), am scribing for, and in the presence of, [place] Chamorro*.    Electronically signed by: Sherrell Brand (Pao), 4/10/2023    IMannie M.* personally performed the services described in this documentation, as scribed by Sherrell Brand in my presence, and it is both accurate and complete.      The note accurately reflects work and decisions made by me.  Mannie Nelson M.D.  4/10/2023  8:49 PM

## 2023-04-11 NOTE — ASSESSMENT & PLAN NOTE
133 on admission, down to 132  Patient is on hydrochlorothiazide at home, hold  Increasing diet today

## 2023-04-11 NOTE — PROGRESS NOTES
Report was received from Patrice CUETO from the ER. Pt was brought up at 9604 and was received via a hospital bed. Pts clothing and purse were brought up with the pt.

## 2023-04-11 NOTE — PROGRESS NOTES
4 Eyes Skin Assessment Completed by Deborah, NORY and NORY Story.    Head WDL  Ears WDL  Nose WDL  Mouth WDL  Neck WDL  Breast/Chest WDL  Shoulder Blades WDL  Spine WDL  (R) Arm/Elbow/Hand WDL  (L) Arm/Elbow/Hand WDL  Abdomen WDL  Groin WDL  Scrotum/Coccyx/Buttocks WDL  (R) Leg Scar  (L) Leg Scar  (R) Heel/Foot/Toe WDL  (L) Heel/Foot/Toe WDL          Devices In Places Pulse Ox and SCD's      Interventions In Place Low Air Loss Mattress    Possible Skin Injury No    Pictures Uploaded Into Epic N/A  Wound Consult Placed N/A  RN Wound Prevention Protocol Ordered No

## 2023-04-11 NOTE — H&P
[facility] Medicine History & Physical Note    Date of Service  4/10/2023    Primary Care Physician  Hermelinda Mccabe D.O.    Consultants  None     Code Status  Full Code    Chief Complaint  Chief Complaint   Patient presents with    Abdominal Pain     X 3 days, states is getting progressively worse, denies N/V, last BM yesterday     History of Presenting Illness  Guerda Lunsford is a 72 y.o. female with a past medical history of hyperlipidemia, primary hypertension and prediabetes who presented 4/10/2023 with abdominal pain and generalized weakness.  Patient reports that she has not been feeling well over the past 3 days.  She has been having progressively worsening abdominal pain.  Pain is worse after eating food.  She denies having vomiting but does have nausea.      I discussed the plan of care with emergency department physician, and the patient.    Review of Systems  Review of Systems   Constitutional:  Positive for chills and malaise/fatigue. Negative for fever.   Eyes:  Negative for discharge and redness.   Respiratory:  Negative for cough, shortness of breath and stridor.    Cardiovascular:  Negative for chest pain and leg swelling.   Gastrointestinal:  Positive for abdominal pain and nausea. Negative for vomiting.   Genitourinary:  Negative for flank pain.   Musculoskeletal:  Negative for myalgias.   Skin: Negative.    Neurological:  Negative for focal weakness.   Endo/Heme/Allergies:  Does not bruise/bleed easily.   Psychiatric/Behavioral:  The patient is not nervous/anxious.      Past Medical History   has a past medical history of Allergy, Anemia (1997), Anxiety, Arthritis, Bronchitis, Cataract, Chest pain (2/1/2016), Cholesterol serum elevated, Dental disorder (10/2020), Depression, Detached retina, left, Diverticulitis, Diverticulitis (3/28/2016), Gynecological disorder (1982), Headache, Headache(784.0), Heart murmur, Hemorrhoids (1997), High cholesterol, Hypertension, Hyponatremia  "(3/29/2016), Indigestion, Infectious disease, Macular hole of right eye, Pain, Pneumonia, Shortness of breath (2/28/2014), SIRS (systemic inflammatory response syndrome) (HCC) (3/29/2016), and Urinary tract infection, site not specified.    Surgical History   has a past surgical history that includes laminotomy (1982); breast biopsy (2009); other abdominal surgery; appendectomy; gyn surgery; abdominal hysterectomy total (1982); other orthopedic surgery; other; other (Right); other (Right); pr total knee arthroplasty (Left, 5/18/2021); and pr total knee arthroplasty (Right, 11/16/2021).     Family History  family history includes Alcohol/Drug in her maternal aunt, maternal grandfather, maternal grandmother, and paternal grandmother; Diabetes in her mother; Heart Disease in her father; Heart Disease (age of onset: 60) in her mother; Heart Disease (age of onset: 65) in her maternal grandmother.      Social History   reports that she has never smoked. She has never used smokeless tobacco. She reports that she does not drink alcohol and does not use drugs.    Allergies  Allergies   Allergen Reactions    Azithromycin Unspecified     Pt states she feels a burning and hot sensation \"I can feel it in my veins, all the way through my body down to the bottom of my feet.\"    Cymbalta [Duloxetine Hcl]      Make blood pressure go up    Lisinopril      Cough      Montelukast Anxiety     Gets nightmares - will never take it again    Demerol Rash     Rash at injection site, N/V.     Medications  Prior to Admission Medications   Prescriptions Last Dose Informant Patient Reported? Taking?   Bismuth Subsalicylate (PEPTO-BISMOL PO) 4/10/2023 at 1200 Patient Yes Yes   Sig: Take 5 mL by mouth 2 times a day as needed (For upset stomach).   albuterol 108 (90 Base) MCG/ACT Aero Soln inhalation aerosol > 5 days at Unknown Patient No No   Sig: Inhale 2 Puffs every 6 hours as needed for Shortness of Breath.   atorvastatin (LIPITOR) 20 MG Tab " 4/9/2023 at 1800 Patient Yes No   Sig: Take 20 mg by mouth every evening.   benzonatate (TESSALON) 200 MG capsule NEW RX Patient No No   Sig: Take 1 Capsule by mouth 3 times a day as needed for Cough.   buPROPion (WELLBUTRIN XL) 300 MG XL tablet 4/10/2023 at 0730 Patient Yes No   Sig: Take 300 mg by mouth every morning.   dexamethasone (DECADRON) 6 MG Tab NEW RX Patient No No   Sig: Take 1 Tablet by mouth every day.   fluticasone-salmeterol (ADVAIR HFA) 45-21 MCG/ACT inhaler > 2 days at Unknown Patient No No   Sig: Inhale 2 Puffs 2 times a day.   hydrochlorothiazide (HYDRODIURIL) 25 MG Tab 4/10/2023 at 0730 Patient Yes No   Sig: Take 25 mg by mouth every day.   metFORMIN ER (GLUCOPHAGE XR) 500 MG TABLET SR 24 HR 4/9/2023 at 1800 Patient Yes No   Sig: Take 500 mg by mouth every evening.   metoprolol SR (TOPROL XL) 25 MG TABLET SR 24 HR 4/9/2023 at 1800 Patient Yes No   Sig: Take 25 mg by mouth every evening.   olmesartan (BENICAR) 20 MG Tab 4/10/2023 at 0730 Patient Yes No   Sig: Take 20 mg by mouth every day.   sertraline (ZOLOFT) 50 MG Tab 4/10/2023 at 0730 Patient Yes No   Sig: Take 25 mg by mouth every day.      Facility-Administered Medications: None     Physical Exam   Temp:  [36.6 °C (97.8 °F)-36.8 °C (98.2 °F)] 36.8 °C (98.2 °F)  Pulse:  [] 101  Resp:  [15-18] 18  BP: (117-130)/() 130/101  SpO2:  [91 %-93 %] 93 %  Blood Pressure : (!) 130/101   Temperature: 36.8 °C (98.2 °F)   Pulse: (!) 101   Respiration: 18   Pulse Oximetry: 93 %     Physical Exam  Constitutional:       General: She is not in acute distress.  HENT:      Head: Normocephalic and atraumatic.      Right Ear: External ear normal.      Left Ear: External ear normal.      Nose: No congestion or rhinorrhea.      Mouth/Throat:      Mouth: Mucous membranes are dry.      Pharynx: No oropharyngeal exudate or posterior oropharyngeal erythema.   Eyes:      General: No scleral icterus.        Right eye: No discharge.         Left eye: No  discharge.      Conjunctiva/sclera: Conjunctivae normal.      Pupils: Pupils are equal, round, and reactive to light.   Cardiovascular:      Rate and Rhythm: Tachycardia present.      Heart sounds:     No friction rub. No gallop.   Pulmonary:      Effort: Pulmonary effort is normal.   Abdominal:      General: Abdomen is flat. There is no distension.      Tenderness: There is abdominal tenderness. There is no guarding.   Musculoskeletal:         General: No swelling.      Cervical back: Neck supple. No rigidity. No muscular tenderness.      Right lower leg: No edema.      Left lower leg: No edema.   Skin:     General: Skin is dry.      Capillary Refill: Capillary refill takes 2 to 3 seconds.      Coloration: Skin is not jaundiced or pale.      Findings: No bruising or erythema.   Neurological:      Mental Status: She is alert and oriented to person, place, and time.   Psychiatric:         Mood and Affect: Mood normal.         Judgment: Judgment normal.     Laboratory:  Recent Labs     04/10/23  1855   WBC 15.0*   RBC 5.13   HEMOGLOBIN 14.9   HEMATOCRIT 42.3   MCV 82.5   MCH 29.0   MCHC 35.2*   RDW 39.2   PLATELETCT 317   MPV 9.2     Recent Labs     04/10/23  1855   SODIUM 133*   POTASSIUM 3.5*   CHLORIDE 93*   CO2 29   GLUCOSE 138*   BUN 19   CREATININE 1.06   CALCIUM 9.3     Recent Labs     04/10/23  1855   ALTSGPT 13   ASTSGOT 13   ALKPHOSPHAT 128*   TBILIRUBIN 0.6   LIPASE 15   GLUCOSE 138*          No results for input(s): NTPROBNP in the last 72 hours.      No results for input(s): TROPONINT in the last 72 hours.    Imaging:  CT-ABDOMEN-PELVIS WITH   Final Result         1.  Segmental colonic wall thickening from the mid transverse through the distal sigmoid, appearance compatible with changes of focal segmental colitis. Consider inflammatory or infectious versus ischemic etiology in the appropriate clinical setting.   2.  Distal sigmoid colon area of increased colonic wall thickening with associated colonic  diverticula, could represent superimposed segment of diverticulitis.   3.  Small fat-containing umbilical hernia   4.  Hepatomegaly        Assessment/Plan:  Justification for Admission Status  I anticipate this patient will require at least two midnights for appropriate medical management, necessitating inpatient admission because the patient has diverticulitis with sepsis will require intravenous antibiotics, and intravenous fluids.    Sepsis (HCC)- (present on admission)  Assessment & Plan  This is Sepsis Present on admission  SIRS criteria identified on my evaluation include: Tachycardia, with heart rate greater than 90 BPM and Leukocytosis, with WBC greater than 12,000  Source is colitis  Sepsis protocol initiated  Fluid resuscitation ordered per protocol  Crystalloid Fluid Administration: Fluid resuscitation ordered per standard protocol - 30 mL/kg per current or ideal body weight  IV antibiotics as appropriate for source of sepsis  Reassessment: I have reassessed the patient's hemodynamic status    Diverticulitis- (present on admission)  Assessment & Plan  We will start ceftriaxone and metronidazole, follow on cultures and sensitivities    Primary hypertension- (present on admission)  Assessment & Plan  Resume home metoprolol, olmesartan and hydrochlorothiazide with hold parameters     Depression- (present on admission)  Assessment & Plan  Resume home bupropion and sertraline    VTE prophylaxis: SCDs/TEDs and enoxaparin ppx

## 2023-04-11 NOTE — CARE PLAN
The patient is Stable - Low risk of patient condition declining or worsening    Shift Goals  Clinical Goals: monitor labs, nausea, pain management  Patient Goals: rest, eat, nausea  Family Goals: GIOVANNI    Progress made toward(s) clinical / shift goals:    Problem: Pain - Standard  Goal: Alleviation of pain or a reduction in pain to the patient’s comfort goal  Outcome: Progressing     Problem: Knowledge Deficit - Standard  Goal: Patient and family/care givers will demonstrate understanding of plan of care, disease process/condition, diagnostic tests and medications  Outcome: Progressing     Problem: Hemodynamics  Goal: Patient's hemodynamics, fluid balance and neurologic status will be stable or improve  Outcome: Progressing     Problem: Fluid Volume  Goal: Fluid volume balance will be maintained  Outcome: Progressing     Problem: Urinary - Renal Perfusion  Goal: Ability to achieve and maintain adequate renal perfusion and functioning will improve  Outcome: Progressing     Problem: Physical Regulation  Goal: Diagnostic test results will improve  Outcome: Progressing  Goal: Signs and symptoms of infection will decrease  Outcome: Progressing       Patient is not progressing towards the following goals:

## 2023-04-11 NOTE — CARE PLAN
The patient is Stable - Low risk of patient condition declining or worsening    Shift Goals  Clinical Goals: Monitor for abnormal labs  Patient Goals: Rest  Family Goals: GIOVANNI    Progress made toward(s) clinical / shift goals:    Problem: Pain - Standard  Goal: Alleviation of pain or a reduction in pain to the patient’s comfort goal  Description: Target End Date:  Prior to discharge or change in level of care    Document on Vitals flowsheet    1.  Document pain using the appropriate pain scale per order or unit policy  2.  Educate and implement non-pharmacologic comfort measures (i.e. relaxation, distraction, massage, cold/heat therapy, etc.)  3.  Pain management medications as ordered  4.  Reassess pain after pain med administration per policy  5.  If opiods administered assess patient's response to pain medication is appropriate per POSS sedation scale  6.  Follow pain management plan developed in collaboration with patient and interdisciplinary team (including palliative care or pain specialists if applicable)  Outcome: Progressing     Problem: Knowledge Deficit - Standard  Goal: Patient and family/care givers will demonstrate understanding of plan of care, disease process/condition, diagnostic tests and medications  Description: Target End Date:  1-3 days or as soon as patient condition allows    Document in Patient Education    1.  Patient and family/caregiver oriented to unit, equipment, visitation policy and means for communicating concern  2.  Complete/review Learning Assessment  3.  Assess knowledge level of disease process/condition, treatment plan, diagnostic tests and medications  4.  Explain disease process/condition, treatment plan, diagnostic tests and medications  Outcome: Progressing     Problem: Hemodynamics  Goal: Patient's hemodynamics, fluid balance and neurologic status will be stable or improve  Description: Target End Date:  Prior to discharge or change in level of care    Document on  Assessment and I/O flowsheet templates    1.  Monitor vital signs, pulse oximetry and cardiac monitor per provider order and/or policy  2.  Maintain blood pressure per provider order  3.  Hemodynamic monitoring per provider order  4.  Manage IV fluids and IV infusions  5.  Monitor intake and output  6.  Daily weights per unit policy or provider order  7.  Assess peripheral pulses and capillary refill  8.  Assess color and body temperature  9.  Position patient for maximum circulation/cardiac output  10. Monitor for signs/symptoms of excessive bleeding  11. Assess mental status, restlessness and changes in level of consciousness  12. Monitor temperature and report fever or hypothermia to provider immediately. Consideration of targeted temperature management.  Outcome: Progressing     Problem: Fluid Volume  Goal: Fluid volume balance will be maintained  Description: Target End Date:  Prior to discharge or change in level of care    Document on I/O flowsheet    1.  Monitor intake and output as ordered  2.  Promote oral intake as appropriate  3.  Report inadequate intake or output to physician  4.  Administer IV therapy as ordered  5.  Weights per provider order  6.  Assess for signs and symptoms of bleeding  7.  Monitor for signs of fluid overload (respiratory changes, edema, weight gain, increased abdominal girth)  8.  Monitor of signs for inadequate fluid volume (poor skin turgor, dry mucous membranes)  9.  Instruct patient on adherence to fluid restrictions  Outcome: Progressing

## 2023-04-12 ENCOUNTER — PHARMACY VISIT (OUTPATIENT)
Dept: PHARMACY | Facility: MEDICAL CENTER | Age: 73
End: 2023-04-12
Payer: COMMERCIAL

## 2023-04-12 VITALS
WEIGHT: 268.74 LBS | SYSTOLIC BLOOD PRESSURE: 105 MMHG | OXYGEN SATURATION: 91 % | HEIGHT: 65 IN | RESPIRATION RATE: 17 BRPM | HEART RATE: 69 BPM | TEMPERATURE: 98.3 F | BODY MASS INDEX: 44.78 KG/M2 | DIASTOLIC BLOOD PRESSURE: 49 MMHG

## 2023-04-12 PROBLEM — A41.9 SEPSIS (HCC): Status: RESOLVED | Noted: 2023-04-10 | Resolved: 2023-04-12

## 2023-04-12 PROBLEM — N30.90 CYSTITIS: Status: RESOLVED | Noted: 2023-04-11 | Resolved: 2023-04-12

## 2023-04-12 LAB
ALBUMIN SERPL BCP-MCNC: 3.1 G/DL (ref 3.2–4.9)
BUN SERPL-MCNC: 16 MG/DL (ref 8–22)
CALCIUM ALBUM COR SERPL-MCNC: 8.7 MG/DL (ref 8.5–10.5)
CALCIUM SERPL-MCNC: 8 MG/DL (ref 8.4–10.2)
CHLORIDE SERPL-SCNC: 92 MMOL/L (ref 96–112)
CO2 SERPL-SCNC: 26 MMOL/L (ref 20–33)
CREAT SERPL-MCNC: 1.01 MG/DL (ref 0.5–1.4)
ERYTHROCYTE [DISTWIDTH] IN BLOOD BY AUTOMATED COUNT: 39.8 FL (ref 35.9–50)
GFR SERPLBLD CREATININE-BSD FMLA CKD-EPI: 59 ML/MIN/1.73 M 2
GLUCOSE SERPL-MCNC: 110 MG/DL (ref 65–99)
HCT VFR BLD AUTO: 35.3 % (ref 37–47)
HGB BLD-MCNC: 12.2 G/DL (ref 12–16)
MCH RBC QN AUTO: 28.6 PG (ref 27–33)
MCHC RBC AUTO-ENTMCNC: 34.6 G/DL (ref 33.6–35)
MCV RBC AUTO: 82.7 FL (ref 81.4–97.8)
PHOSPHATE SERPL-MCNC: 3.4 MG/DL (ref 2.5–4.5)
PLATELET # BLD AUTO: 266 K/UL (ref 164–446)
PMV BLD AUTO: 9.3 FL (ref 9–12.9)
POTASSIUM SERPL-SCNC: 3.4 MMOL/L (ref 3.6–5.5)
RBC # BLD AUTO: 4.27 M/UL (ref 4.2–5.4)
SODIUM SERPL-SCNC: 129 MMOL/L (ref 135–145)
WBC # BLD AUTO: 9.1 K/UL (ref 4.8–10.8)

## 2023-04-12 PROCEDURE — 99239 HOSP IP/OBS DSCHRG MGMT >30: CPT | Performed by: INTERNAL MEDICINE

## 2023-04-12 PROCEDURE — 80069 RENAL FUNCTION PANEL: CPT

## 2023-04-12 PROCEDURE — 94640 AIRWAY INHALATION TREATMENT: CPT

## 2023-04-12 PROCEDURE — 700102 HCHG RX REV CODE 250 W/ 637 OVERRIDE(OP): Performed by: HOSPITALIST

## 2023-04-12 PROCEDURE — 36415 COLL VENOUS BLD VENIPUNCTURE: CPT

## 2023-04-12 PROCEDURE — 700111 HCHG RX REV CODE 636 W/ 250 OVERRIDE (IP): Performed by: HOSPITALIST

## 2023-04-12 PROCEDURE — 94760 N-INVAS EAR/PLS OXIMETRY 1: CPT

## 2023-04-12 PROCEDURE — RXMED WILLOW AMBULATORY MEDICATION CHARGE: Performed by: INTERNAL MEDICINE

## 2023-04-12 PROCEDURE — 85027 COMPLETE CBC AUTOMATED: CPT

## 2023-04-12 PROCEDURE — 700101 HCHG RX REV CODE 250: Performed by: HOSPITALIST

## 2023-04-12 PROCEDURE — A9270 NON-COVERED ITEM OR SERVICE: HCPCS | Performed by: HOSPITALIST

## 2023-04-12 PROCEDURE — 700105 HCHG RX REV CODE 258: Performed by: HOSPITALIST

## 2023-04-12 RX ORDER — METRONIDAZOLE 500 MG/1
500 TABLET ORAL EVERY 8 HOURS
Qty: 15 TABLET | Refills: 0 | Status: SHIPPED | OUTPATIENT
Start: 2023-04-12 | End: 2023-04-17

## 2023-04-12 RX ORDER — CIPROFLOXACIN 500 MG/1
500 TABLET, FILM COATED ORAL 2 TIMES DAILY
Qty: 10 TABLET | Refills: 0 | Status: SHIPPED | OUTPATIENT
Start: 2023-04-12 | End: 2023-04-17

## 2023-04-12 RX ADMIN — BUPROPION HYDROCHLORIDE 150 MG: 150 TABLET, EXTENDED RELEASE ORAL at 05:06

## 2023-04-12 RX ADMIN — ONDANSETRON 4 MG: 2 INJECTION INTRAMUSCULAR; INTRAVENOUS at 04:57

## 2023-04-12 RX ADMIN — SERTRALINE HYDROCHLORIDE 25 MG: 50 TABLET ORAL at 05:06

## 2023-04-12 RX ADMIN — METRONIDAZOLE 500 MG: 500 INJECTION, SOLUTION INTRAVENOUS at 06:15

## 2023-04-12 RX ADMIN — METRONIDAZOLE 500 MG: 500 INJECTION, SOLUTION INTRAVENOUS at 13:24

## 2023-04-12 RX ADMIN — BUDESONIDE AND FORMOTEROL FUMARATE DIHYDRATE 2 PUFF: 80; 4.5 AEROSOL RESPIRATORY (INHALATION) at 08:32

## 2023-04-12 RX ADMIN — CEFTRIAXONE SODIUM 1000 MG: 1 INJECTION, POWDER, FOR SOLUTION INTRAMUSCULAR; INTRAVENOUS at 05:05

## 2023-04-12 RX ADMIN — OLMESARTAN MEDOXOMIL 20 MG: 20 TABLET, FILM COATED ORAL at 05:06

## 2023-04-12 ASSESSMENT — PAIN DESCRIPTION - PAIN TYPE: TYPE: ACUTE PAIN

## 2023-04-12 NOTE — PROGRESS NOTES
Received report from day shift nurse. Assumed pt care at 1915. Pt is A&O x 4, resting comfortably in bed. Pt on room air. No signs of SOB/respiratory distress. Fall precautions in place. Bed at lowest position. Call light and personal belongings within reach.

## 2023-04-12 NOTE — DISCHARGE PLANNING
Case Management Discharge Planning    Admission Date: 4/10/2023  GMLOS: 3.5  ALOS: 2    6-Clicks ADL Score: 24  6-Clicks Mobility Score: 22      Anticipated Discharge Dispo: Discharge Disposition: Discharged to home/self care (01)    DME Needed: No    Action(s) Taken: Updated Provider/Nurse on Discharge Plan    No anticipated DC needs. RN CM will continue to follow.     Escalations Completed: None    Medically Clear: No    Next Steps: Medical clearance    Barriers to Discharge: Medical clearance

## 2023-04-12 NOTE — CARE PLAN
The patient is Stable - Low risk of patient condition declining or worsening    Shift Goals  Clinical Goals: Pain controlled on a scale of 3/10 or lower  Patient Goals: Sleep comfortably tonight  Family Goals: GIOVANNI    Progress made toward(s) clinical / shift goals:  Pain medications given as ordered, hourly rounding done.    Patient is not progressing towards the following goals: n/a

## 2023-04-12 NOTE — PROGRESS NOTES
Received report from Karime CUETO. Assumed pt care at 2267. Pt is A&Ox4, Sleeping comfortably in bed. Pt on r.a. No signs of SOB/respiratory distress. Labs noted, VSS. Fall precautions in place. Bed at lowest position. Call light and personal belongings within reach. Continue to monitor.

## 2023-04-12 NOTE — PROGRESS NOTES
Pt arrived to room. Awake in bed. Plan of care reviewed. Denies pain. Full liquid diet given for dinner meal. Call light with in reach. Education provided to all prior to getting up and out of bed.

## 2023-04-12 NOTE — DISCHARGE SUMMARY
"Discharge Summary    CHIEF COMPLAINT ON ADMISSION  Chief Complaint   Patient presents with    Abdominal Pain     X 3 days, states is getting progressively worse, denies N/V, last BM yesterday       Reason for Admission  Abdominal Pain; Nausea     Admission Date  4/10/2023    CODE STATUS  Full Code    HPI & HOSPITAL COURSE  This is \"Guerda Lunsford is a 72 y.o. female with a past medical history of hyperlipidemia, primary hypertension and prediabetes who presented 4/10/2023 with abdominal pain and generalized weakness.  Patient reports that she has not been feeling well over the past 3 days.  She has been having progressively worsening abdominal pain.  Pain is worse after eating food.  She denies having vomiting but does have nausea. \" (As per admitting physician Dr. Wren).    Patient had presented with acute sepsis (heart rate of 101 and WBC 15) from acute diverticulitis and acute UTI.    Patient stated she has ongoing lower abdominal pain and mid abdominal pain.  Patient continued on ceftriaxone and Flagyl for both UTI and colitis/diverticulitis    Patient has received 3 days of IV ceftriaxone upon discharge.  Her leukocytosis has resolved resolved now 9.1 down from 15,000.  Patient was able to tolerate a full liquid diet, she stated she can continue this and transition at home back to her normal diet.    Upon discharge patient was given a referral to GI for colonoscopy which is recommended at least 4 to 6 weeks after resolution of diverticulitis.  Patient was also given medications via meds to beds with ciprofloxacin and Flagyl.    ADDENDUM:  Ucx returned positive with E. coli and Strep anginosus. Sensitivities pending.    Therefore, she is discharged in good and stable condition to home with close outpatient follow-up.    The patient met 2-midnight criteria for an inpatient stay at the time of discharge.    Discharge Date  04/12/2023    FOLLOW UP ITEMS POST DISCHARGE  With PCP and [facility]    DISCHARGE " DIAGNOSES  Principal Problem:    Sepsis (HCC) POA: Yes  Active Problems:    Depression POA: Yes    Diverticulitis POA: Yes    Hypokalemia POA: Yes    Hyponatremia POA: Yes    Primary hypertension POA: Yes    Class 3 severe obesity due to excess calories without serious comorbidity with body mass index (BMI) of 40.0 to 44.9 in adult (HCC) (Chronic) POA: Yes    Cystitis POA: Yes  Resolved Problems:    * No resolved hospital problems. *      FOLLOW UP  Future Appointments   Date Time Provider Department Center   6/29/2023 12:50 PM Renetta Mckee M.D. [facility] None     Hermelinda Mccabe D.O.  [address]  [address]  [place] [id number] [phone]    Call in 1 week(s)      [facility]  [address]  [place] [address]  [phone]  Call in 1 week(s)  Please call, you will require a colonoscopy 4-6 weeks after diverticulitis has resolved.      MEDICATIONS ON DISCHARGE     Medication List        START taking these medications        Instructions   ciprofloxacin 500 MG Tabs  Commonly known as: CIPRO   Take 1 Tablet by mouth 2 times a day for 5 days.  Dose: 500 mg     metroNIDAZOLE 500 MG Tabs  Commonly known as: FLAGYL   Take 1 Tablet by mouth every 8 hours for 5 days.  Dose: 500 mg            CONTINUE taking these medications        Instructions   Advair HFA 45-21 MCG/ACT inhaler  Generic drug: fluticasone-salmeterol   Inhale 2 Puffs 2 times a day.  Dose: 2 Puff     albuterol 108 (90 Base) MCG/ACT Aers inhalation aerosol   Inhale 2 Puffs every 6 hours as needed for Shortness of Breath.  Dose: 2 Puff     atorvastatin 20 MG Tabs  Commonly known as: LIPITOR   Take 20 mg by mouth every evening.  Dose: 20 mg     buPROPion 300 MG XL tablet  Commonly known as: WELLBUTRIN XL   Take 300 mg by mouth every morning.  Dose: 300 mg     hydroCHLOROthiazide 25 MG Tabs  Commonly known as: HYDRODIURIL   Take 25 mg by mouth every day.  Dose: 25 mg     metFORMIN  MG Tb24  Commonly known as: GLUCOPHAGE XR    "Take 500 mg by mouth every evening.  Dose: 500 mg     metoprolol SR 25 MG Tb24  Commonly known as: TOPROL XL   Take 25 mg by mouth every evening.  Dose: 25 mg     olmesartan 20 MG Tabs  Commonly known as: BENICAR   Take 20 mg by mouth every day.  Dose: 20 mg     PEPTO-BISMOL PO   Take 5 mL by mouth 2 times a day as needed (For upset stomach).  Dose: 5 mL     sertraline 50 MG Tabs  Commonly known as: Zoloft   Take 25 mg by mouth every day.  Dose: 25 mg            STOP taking these medications      benzonatate 200 MG capsule  Commonly known as: TESSALON     dexamethasone 6 MG Tabs  Commonly known as: DECADRON              Allergies  Allergies   Allergen Reactions    Azithromycin Unspecified     Pt states she feels a burning and hot sensation \"I can feel it in my veins, all the way through my body down to the bottom of my feet.\"    Cymbalta [Duloxetine Hcl]      Make blood pressure go up    Lisinopril      Cough      Montelukast Anxiety     Gets nightmares - will never take it again    Demerol Rash     Rash at injection site, N/V.       DIET  Orders Placed This Encounter   Procedures    Diet Order Diet: Full Liquid     Standing Status:   Standing     Number of Occurrences:   1     Order Specific Question:   Diet:     Answer:   Full Liquid [11]       ACTIVITY  As tolerated.  Weight bearing as tolerated    CONSULTATIONS  none    PROCEDURES  none    LABORATORY  Lab Results   Component Value Date    SODIUM 129 (L) 04/12/2023    POTASSIUM 3.4 (L) 04/12/2023    CHLORIDE 92 (L) 04/12/2023    CO2 26 04/12/2023    GLUCOSE 110 (H) 04/12/2023    BUN 16 04/12/2023    CREATININE 1.01 04/12/2023    CREATININE 0.86 07/19/2011        Lab Results   Component Value Date    WBC 9.1 04/12/2023    WBC 6.8 07/19/2011    HEMOGLOBIN 12.2 04/12/2023    HEMATOCRIT 35.3 (L) 04/12/2023    PLATELETCT 266 04/12/2023        Total time of the discharge process exceeds 32 minutes.  More than 50% of time was spent face to face with patient.  This " included but not limited to review of hospital course with patient, treatment goals upon discharge, recommendations to PCP, continued and new medications and their adverse reactions, and nursing instructions for patient.            CT-ABDOMEN-PELVIS WITH   Final Result         1.  Segmental colonic wall thickening from the mid transverse through the distal sigmoid, appearance compatible with changes of focal segmental colitis. Consider inflammatory or infectious versus ischemic etiology in the appropriate clinical setting.   2.  Distal sigmoid colon area of increased colonic wall thickening with associated colonic diverticula, could represent superimposed segment of diverticulitis.   3.  Small fat-containing umbilical hernia   4.  Hepatomegaly

## 2023-04-12 NOTE — PROGRESS NOTES
Discharge instructions, home medication and follow up appointment discussed with patient and understood. Denies any question at this time. All belongings sent with. Discharged alert and oriented x4 in stable condition.

## 2023-04-13 LAB
BACTERIA UR CULT: ABNORMAL
BACTERIA UR CULT: ABNORMAL
SIGNIFICANT IND 70042: ABNORMAL
SITE SITE: ABNORMAL
SOURCE SOURCE: ABNORMAL

## 2023-04-15 LAB
BACTERIA BLD CULT: NORMAL
BACTERIA BLD CULT: NORMAL
SIGNIFICANT IND 70042: NORMAL
SIGNIFICANT IND 70042: NORMAL
SITE SITE: NORMAL
SITE SITE: NORMAL
SOURCE SOURCE: NORMAL
SOURCE SOURCE: NORMAL

## 2023-04-27 ENCOUNTER — APPOINTMENT (OUTPATIENT)
Dept: RADIOLOGY | Facility: MEDICAL CENTER | Age: 73
End: 2023-04-27
Attending: EMERGENCY MEDICINE
Payer: MEDICARE

## 2023-04-27 ENCOUNTER — HOSPITAL ENCOUNTER (EMERGENCY)
Facility: MEDICAL CENTER | Age: 73
End: 2023-04-27
Attending: EMERGENCY MEDICINE
Payer: MEDICARE

## 2023-04-27 VITALS
SYSTOLIC BLOOD PRESSURE: 117 MMHG | RESPIRATION RATE: 18 BRPM | BODY MASS INDEX: 44.3 KG/M2 | HEART RATE: 83 BPM | TEMPERATURE: 97.5 F | DIASTOLIC BLOOD PRESSURE: 59 MMHG | WEIGHT: 265.88 LBS | HEIGHT: 65 IN | OXYGEN SATURATION: 99 %

## 2023-04-27 DIAGNOSIS — K52.9 COLITIS: ICD-10-CM

## 2023-04-27 DIAGNOSIS — E87.6 HYPOKALEMIA: ICD-10-CM

## 2023-04-27 LAB
ALBUMIN SERPL BCP-MCNC: 3.9 G/DL (ref 3.2–4.9)
ALBUMIN/GLOB SERPL: 1.4 G/DL
ALP SERPL-CCNC: 101 U/L (ref 30–99)
ALT SERPL-CCNC: 19 U/L (ref 2–50)
ANION GAP SERPL CALC-SCNC: 15 MMOL/L (ref 7–16)
APPEARANCE UR: CLEAR
AST SERPL-CCNC: 17 U/L (ref 12–45)
BASOPHILS # BLD AUTO: 0.6 % (ref 0–1.8)
BASOPHILS # BLD: 0.06 K/UL (ref 0–0.12)
BILIRUB SERPL-MCNC: 0.6 MG/DL (ref 0.1–1.5)
BILIRUB UR QL STRIP.AUTO: NEGATIVE
BUN SERPL-MCNC: 13 MG/DL (ref 8–22)
CALCIUM ALBUM COR SERPL-MCNC: 8.9 MG/DL (ref 8.5–10.5)
CALCIUM SERPL-MCNC: 8.8 MG/DL (ref 8.4–10.2)
CHLORIDE SERPL-SCNC: 94 MMOL/L (ref 96–112)
CO2 SERPL-SCNC: 24 MMOL/L (ref 20–33)
COLOR UR: YELLOW
CREAT SERPL-MCNC: 0.95 MG/DL (ref 0.5–1.4)
EOSINOPHIL # BLD AUTO: 0.21 K/UL (ref 0–0.51)
EOSINOPHIL NFR BLD: 2.3 % (ref 0–6.9)
ERYTHROCYTE [DISTWIDTH] IN BLOOD BY AUTOMATED COUNT: 39.1 FL (ref 35.9–50)
GFR SERPLBLD CREATININE-BSD FMLA CKD-EPI: 64 ML/MIN/1.73 M 2
GLOBULIN SER CALC-MCNC: 2.8 G/DL (ref 1.9–3.5)
GLUCOSE SERPL-MCNC: 132 MG/DL (ref 65–99)
GLUCOSE UR STRIP.AUTO-MCNC: NEGATIVE MG/DL
HCT VFR BLD AUTO: 39.4 % (ref 37–47)
HGB BLD-MCNC: 13.7 G/DL (ref 12–16)
IMM GRANULOCYTES # BLD AUTO: 0.04 K/UL (ref 0–0.11)
IMM GRANULOCYTES NFR BLD AUTO: 0.4 % (ref 0–0.9)
KETONES UR STRIP.AUTO-MCNC: NEGATIVE MG/DL
LACTATE SERPL-SCNC: 1.2 MMOL/L (ref 0.5–2)
LEUKOCYTE ESTERASE UR QL STRIP.AUTO: NEGATIVE
LIPASE SERPL-CCNC: 11 U/L (ref 7–58)
LYMPHOCYTES # BLD AUTO: 1.44 K/UL (ref 1–4.8)
LYMPHOCYTES NFR BLD: 15.4 % (ref 22–41)
MCH RBC QN AUTO: 28.5 PG (ref 27–33)
MCHC RBC AUTO-ENTMCNC: 34.8 G/DL (ref 33.6–35)
MCV RBC AUTO: 82.1 FL (ref 81.4–97.8)
MICRO URNS: NORMAL
MONOCYTES # BLD AUTO: 0.94 K/UL (ref 0–0.85)
MONOCYTES NFR BLD AUTO: 10.1 % (ref 0–13.4)
NEUTROPHILS # BLD AUTO: 6.64 K/UL (ref 2–7.15)
NEUTROPHILS NFR BLD: 71.2 % (ref 44–72)
NITRITE UR QL STRIP.AUTO: NEGATIVE
NRBC # BLD AUTO: 0 K/UL
NRBC BLD-RTO: 0 /100 WBC
PH UR STRIP.AUTO: 5 [PH] (ref 5–8)
PLATELET # BLD AUTO: 306 K/UL (ref 164–446)
PMV BLD AUTO: 8.7 FL (ref 9–12.9)
POTASSIUM SERPL-SCNC: 3.4 MMOL/L (ref 3.6–5.5)
PROT SERPL-MCNC: 6.7 G/DL (ref 6–8.2)
PROT UR QL STRIP: NEGATIVE MG/DL
RBC # BLD AUTO: 4.8 M/UL (ref 4.2–5.4)
RBC UR QL AUTO: NEGATIVE
SODIUM SERPL-SCNC: 133 MMOL/L (ref 135–145)
SP GR UR STRIP.AUTO: <=1.005
WBC # BLD AUTO: 9.3 K/UL (ref 4.8–10.8)

## 2023-04-27 PROCEDURE — 700117 HCHG RX CONTRAST REV CODE 255: Performed by: EMERGENCY MEDICINE

## 2023-04-27 PROCEDURE — 74177 CT ABD & PELVIS W/CONTRAST: CPT

## 2023-04-27 PROCEDURE — 83605 ASSAY OF LACTIC ACID: CPT

## 2023-04-27 PROCEDURE — 36415 COLL VENOUS BLD VENIPUNCTURE: CPT

## 2023-04-27 PROCEDURE — 96374 THER/PROPH/DIAG INJ IV PUSH: CPT | Mod: XU

## 2023-04-27 PROCEDURE — 94760 N-INVAS EAR/PLS OXIMETRY 1: CPT

## 2023-04-27 PROCEDURE — 99285 EMERGENCY DEPT VISIT HI MDM: CPT

## 2023-04-27 PROCEDURE — 81003 URINALYSIS AUTO W/O SCOPE: CPT

## 2023-04-27 PROCEDURE — A9270 NON-COVERED ITEM OR SERVICE: HCPCS | Performed by: EMERGENCY MEDICINE

## 2023-04-27 PROCEDURE — 83690 ASSAY OF LIPASE: CPT

## 2023-04-27 PROCEDURE — 80053 COMPREHEN METABOLIC PANEL: CPT

## 2023-04-27 PROCEDURE — 85025 COMPLETE CBC W/AUTO DIFF WBC: CPT

## 2023-04-27 PROCEDURE — 700102 HCHG RX REV CODE 250 W/ 637 OVERRIDE(OP): Performed by: EMERGENCY MEDICINE

## 2023-04-27 PROCEDURE — 700105 HCHG RX REV CODE 258: Performed by: EMERGENCY MEDICINE

## 2023-04-27 PROCEDURE — 700111 HCHG RX REV CODE 636 W/ 250 OVERRIDE (IP): Performed by: EMERGENCY MEDICINE

## 2023-04-27 RX ORDER — SODIUM CHLORIDE 9 MG/ML
1000 INJECTION, SOLUTION INTRAVENOUS ONCE
Status: COMPLETED | OUTPATIENT
Start: 2023-04-27 | End: 2023-04-27

## 2023-04-27 RX ORDER — METRONIDAZOLE 500 MG/1
500 TABLET ORAL 3 TIMES DAILY
Status: SHIPPED | COMMUNITY
Start: 2023-04-12 | End: 2023-06-29

## 2023-04-27 RX ORDER — CIPROFLOXACIN 500 MG/1
500 TABLET, FILM COATED ORAL 2 TIMES DAILY
Status: SHIPPED | COMMUNITY
Start: 2023-04-12 | End: 2023-06-29

## 2023-04-27 RX ORDER — ONDANSETRON 4 MG/1
4 TABLET, ORALLY DISINTEGRATING ORAL EVERY 8 HOURS PRN
Qty: 10 TABLET | Refills: 0 | Status: SHIPPED | OUTPATIENT
Start: 2023-04-27 | End: 2023-04-30

## 2023-04-27 RX ORDER — AMOXICILLIN AND CLAVULANATE POTASSIUM 875; 125 MG/1; MG/1
1 TABLET, FILM COATED ORAL 2 TIMES DAILY
Qty: 20 TABLET | Refills: 0 | Status: SHIPPED | OUTPATIENT
Start: 2023-04-27 | End: 2023-05-07

## 2023-04-27 RX ORDER — ONDANSETRON 2 MG/ML
4 INJECTION INTRAMUSCULAR; INTRAVENOUS ONCE
Status: COMPLETED | OUTPATIENT
Start: 2023-04-27 | End: 2023-04-27

## 2023-04-27 RX ORDER — ACETAMINOPHEN 500 MG
500-1000 TABLET ORAL EVERY 6 HOURS PRN
Status: SHIPPED | COMMUNITY
End: 2023-06-29

## 2023-04-27 RX ORDER — AMOXICILLIN AND CLAVULANATE POTASSIUM 875; 125 MG/1; MG/1
1 TABLET, FILM COATED ORAL ONCE
Status: COMPLETED | OUTPATIENT
Start: 2023-04-27 | End: 2023-04-27

## 2023-04-27 RX ADMIN — AMOXICILLIN AND CLAVULANATE POTASSIUM 1 TABLET: 875; 125 TABLET, FILM COATED ORAL at 08:51

## 2023-04-27 RX ADMIN — IOHEXOL 100 ML: 350 INJECTION, SOLUTION INTRAVENOUS at 07:23

## 2023-04-27 RX ADMIN — ONDANSETRON 4 MG: 2 INJECTION INTRAMUSCULAR; INTRAVENOUS at 06:42

## 2023-04-27 RX ADMIN — SODIUM CHLORIDE 1000 ML: 9 INJECTION, SOLUTION INTRAVENOUS at 06:42

## 2023-04-27 ASSESSMENT — FIBROSIS 4 INDEX: FIB4 SCORE: 0.98

## 2023-04-27 NOTE — ED NOTES
Med rec completed per pt   Allergies reviewed    Pt completed a 5 day course of Cipro and Flagyl about 2 week ago

## 2023-04-27 NOTE — ED PROVIDER NOTES
ED Provider Note    CHIEF COMPLAINT  Chief Complaint   Patient presents with    Abdominal Pain     Hx of Diverticulitis, pt was recent admitted for three days for the same thing. Started for the last few days, this morning it is a lot worst        EXTERNAL RECORDS REVIEWED  Inpatient Notes admitted to the hospital April 10 for acute sepsis and diverticulitis and UTI.  Treated with Rocephin and Flagyl for infection.    HPI/ROS  LIMITATION TO HISTORY   Select: : None  OUTSIDE HISTORIAN(S):  Significant other      Guerda Lunsford is a 72 y.o. female who presents with diffuse abdominal pain.  Patient was admitted to the hospital about 17 days ago for the same symptoms.  Finished all of her Cipro and Flagyl as directed.  Denies any dysuria or hematuria.  Pain is a burning pain that is diffuse throughout her abdomen.  History of appendectomy and hysterectomy many years ago.  Nausea but no vomiting.  Slightly increased bowel movements but no diarrhea.  No blood in her stool or vomit.  Chills but no known fevers.  No back pain.  Nothing seems to make her pain better or worse.  Also having some generalized weakness.  Decreased p.o. intake and poor appetite.    PAST MEDICAL HISTORY   has a past medical history of Allergy, Anemia (1997), Anxiety, Arthritis, Bronchitis, Cataract, Chest pain (2/1/2016), Cholesterol serum elevated, Dental disorder (10/2020), Depression, Detached retina, left, Diverticulitis, Diverticulitis (3/28/2016), Gynecological disorder (1982), Headache, Headache(784.0), Heart murmur, Hemorrhoids (1997), High cholesterol, Hypertension, Hyponatremia (3/29/2016), Indigestion, Infectious disease, Macular hole of right eye, Pain, Pneumonia, Shortness of breath (2/28/2014), SIRS (systemic inflammatory response syndrome) (HCC) (3/29/2016), and Urinary tract infection, site not specified.    SURGICAL HISTORY   has a past surgical history that includes laminotomy (1982); breast biopsy (2009); other abdominal  "surgery; appendectomy; gyn surgery; abdominal hysterectomy total (); other orthopedic surgery; other; other (Right); other (Right); total knee arthroplasty (Left, 2021); and total knee arthroplasty (Right, 2021).    FAMILY HISTORY  Family History   Problem Relation Age of Onset    Diabetes Mother     Heart Disease Mother 60        MI then CABG    Heart Disease Father         CHF    Alcohol/Drug Maternal Aunt     Heart Disease Maternal Grandmother 65        MI,  with it    Alcohol/Drug Maternal Grandmother     Alcohol/Drug Maternal Grandfather     Alcohol/Drug Paternal Grandmother        SOCIAL HISTORY  Social History     Tobacco Use    Smoking status: Never    Smokeless tobacco: Never   Vaping Use    Vaping Use: Never used   Substance and Sexual Activity    Alcohol use: No     Alcohol/week: 0.0 oz    Drug use: No    Sexual activity: Not Currently     Partners: Male       CURRENT MEDICATIONS  Home Medications       Reviewed by Bill Hurtado R.N. (Registered Nurse) on 23 at 0601  Med List Status: Partial     Medication Last Dose Status   albuterol 108 (90 Base) MCG/ACT Aero Soln inhalation aerosol  Active   atorvastatin (LIPITOR) 20 MG Tab  Active   Bismuth Subsalicylate (PEPTO-BISMOL PO)  Active   buPROPion (WELLBUTRIN XL) 300 MG XL tablet  Active   fluticasone-salmeterol (ADVAIR HFA) 45-21 MCG/ACT inhaler  Active   hydrochlorothiazide (HYDRODIURIL) 25 MG Tab  Active   metFORMIN ER (GLUCOPHAGE XR) 500 MG TABLET SR 24 HR  Active   metoprolol SR (TOPROL XL) 25 MG TABLET SR 24 HR  Active   olmesartan (BENICAR) 20 MG Tab  Active   sertraline (ZOLOFT) 50 MG Tab  Active                    ALLERGIES  Allergies   Allergen Reactions    Azithromycin Unspecified     Pt states she feels a burning and hot sensation \"I can feel it in my veins, all the way through my body down to the bottom of my feet.\"    Cymbalta [Duloxetine Hcl]      Make blood pressure go up    Lisinopril      Cough      Montelukast " "Anxiety     Gets nightmares - will never take it again    Demerol Rash     Rash at injection site, N/V.       PHYSICAL EXAM  VITAL SIGNS: /78   Pulse 95   Temp 36.7 °C (98.1 °F) (Temporal)   Resp 17   Ht 1.651 m (5' 5\")   Wt 121 kg (265 lb 14 oz)   SpO2 98%   BMI 44.24 kg/m²    Constitutional:  Well developed, No acute distress, Non-toxic appearance.   HENT: Normocephalic, Atraumatic, Bilateral external ears normal, Nose normal.   Eyes: PERRL, EOMI, Conjunctiva normal  Neck: Normal range of motion, No tenderness, Supple  Cardiovascular: Normal heart rate, Normal rhythm   Thorax & Lungs: Normal breath sounds, No respiratory distress  Abdomen: Diffuse abdominal tenderness, no rebound or guarding, no distention, no pulsatile mass  Skin: Warm, Dry, No erythema, No rash.   Back: No tenderness, No CVA tenderness.   Extremities: Intact distal pulses, No edema, No tenderness   Neurologic: Alert & oriented x 3, Normal motor function, Normal sensory function, No focal deficits noted.   Psychiatric: appropriate     DIAGNOSTIC STUDIES / PROCEDURES      LABS  Results for orders placed or performed during the hospital encounter of 04/27/23   CBC with Differential   Result Value Ref Range    WBC 9.3 4.8 - 10.8 K/uL    RBC 4.80 4.20 - 5.40 M/uL    Hemoglobin 13.7 12.0 - 16.0 g/dL    Hematocrit 39.4 37.0 - 47.0 %    MCV 82.1 81.4 - 97.8 fL    MCH 28.5 27.0 - 33.0 pg    MCHC 34.8 33.6 - 35.0 g/dL    RDW 39.1 35.9 - 50.0 fL    Platelet Count 306 164 - 446 K/uL    MPV 8.7 (L) 9.0 - 12.9 fL    Neutrophils-Polys 71.20 44.00 - 72.00 %    Lymphocytes 15.40 (L) 22.00 - 41.00 %    Monocytes 10.10 0.00 - 13.40 %    Eosinophils 2.30 0.00 - 6.90 %    Basophils 0.60 0.00 - 1.80 %    Immature Granulocytes 0.40 0.00 - 0.90 %    Nucleated RBC 0.00 /100 WBC    Neutrophils (Absolute) 6.64 2.00 - 7.15 K/uL    Lymphs (Absolute) 1.44 1.00 - 4.80 K/uL    Monos (Absolute) 0.94 (H) 0.00 - 0.85 K/uL    Eos (Absolute) 0.21 0.00 - 0.51 K/uL    " Baso (Absolute) 0.06 0.00 - 0.12 K/uL    Immature Granulocytes (abs) 0.04 0.00 - 0.11 K/uL    NRBC (Absolute) 0.00 K/uL   Complete Metabolic Panel   Result Value Ref Range    Sodium 133 (L) 135 - 145 mmol/L    Potassium 3.4 (L) 3.6 - 5.5 mmol/L    Chloride 94 (L) 96 - 112 mmol/L    Co2 24 20 - 33 mmol/L    Anion Gap 15.0 7.0 - 16.0    Glucose 132 (H) 65 - 99 mg/dL    Bun 13 8 - 22 mg/dL    Creatinine 0.95 0.50 - 1.40 mg/dL    Calcium 8.8 8.4 - 10.2 mg/dL    AST(SGOT) 17 12 - 45 U/L    ALT(SGPT) 19 2 - 50 U/L    Alkaline Phosphatase 101 (H) 30 - 99 U/L    Total Bilirubin 0.6 0.1 - 1.5 mg/dL    Albumin 3.9 3.2 - 4.9 g/dL    Total Protein 6.7 6.0 - 8.2 g/dL    Globulin 2.8 1.9 - 3.5 g/dL    A-G Ratio 1.4 g/dL   Lipase   Result Value Ref Range    Lipase 11 7 - 58 U/L   Urinalysis    Specimen: Urine   Result Value Ref Range    Color Yellow     Character Clear     Specific Gravity <=1.005 <1.035    Ph 5.0 5.0 - 8.0    Glucose Negative Negative mg/dL    Ketones Negative Negative mg/dL    Protein Negative Negative mg/dL    Bilirubin Negative Negative    Nitrite Negative Negative    Leukocyte Esterase Negative Negative    Occult Blood Negative Negative    Micro Urine Req see below    LACTIC ACID   Result Value Ref Range    Lactic Acid 1.2 0.5 - 2.0 mmol/L   CORRECTED CALCIUM   Result Value Ref Range    Correct Calcium 8.9 8.5 - 10.5 mg/dL   ESTIMATED GFR   Result Value Ref Range    GFR (CKD-EPI) 64 >60 mL/min/1.73 m 2        RADIOLOGY  I have independently interpreted the diagnostic imaging associated with this visit and am waiting the final reading from the radiologist.   My preliminary interpretation is as follows: no free air  Radiologist interpretation:   CT-ABDOMEN-PELVIS WITH   Final Result         1.  Segmental area of colonic wall thickening from the proximal transverse colon through the mid sigmoid colon, appearance compatible with focal segmental colitis. Consider inflammatory versus infectious or ischemic etiology  in the appropriate clinical    setting. Appears stable to slightly increased since prior study.   2.  Diverticulosis   3.  Hepatomegaly and irregular hepatic contour favoring changes of cirrhosis   4.  Small hiatal hernia   5.  Small fat-containing umbilical hernia   6.  Atherosclerosis            COURSE & MEDICAL DECISION MAKING    ED Observation Status? Yes; I am placing the patient in to an observation status due to a diagnostic uncertainty as well as therapeutic intensity. Patient placed in observation status at 6:39 AM, 4/27/2023.     Observation plan is as follows: Serial abdominal exams, CT imaging and labs     Upon Reevaluation, the patient's condition has: Improved; and will be discharged.    Patient discharged from ED Observation status at 8:42 AM  (Time) 4/27/2023  (Date).     INITIAL ASSESSMENT, COURSE AND PLAN  Care Narrative: Patient presents to the emergency department for diffuse abdominal pain and burning.  Recent history of colitis/diverticulitis and UTI.  Symptoms had resolved but returned over the last few days.  Also having some mild nausea.  No peritoneal signs on abdominal exam.  Will evaluate for sepsis, dehydration, and recurrence of colitis/diverticulitis.    Laboratory studies have returned.  Patient has a normal white count.  No anemia.  No bandemia.  Slightly decreased sodium potassium and chloride but fairly near baseline from previous labs.Glucose is 132 without evidence of a gap acidosis.  Normal renal function.  Normal liver function.  Lactic acid was normal.  Urine shows no evidence of infection.  CT imaging has returned and shows persistent colitis.  Patient is feeling better after nausea medication.    I discussed the case with Dr. Jordan who is on-call for GI.  Plan is for patient to go on another course of antibiotics.  We will switch to Augmentin.  We will give a 10-day course.  Ideally she will be scheduled for colonoscopy in the next 4 to 6 weeks.  They would like to avoid  colonoscopy while she is inflamed.  However if this course of antibiotics does not improve her abdominal pain and her colitis she may need more emergent colonoscopy.  I discussed this plan with the patient.  She is tolerating p.o. and comfortable going home with antibiotics.  She understands her return precautions.  Repeat abdominal exam shows some mild tenderness but no peritoneal signs.    HYDRATION: Based on the patient's presentation of Other decreased po intake the patient was given IV fluids. IV Hydration was used because oral hydration was not adequate alone. Upon recheck following hydration, the patient was improved.      ADDITIONAL PROBLEM LIST  Hyperglycemia  Hepatomegaly  DISPOSITION AND DISCUSSIONS    Patient presents to the emergency department with recurrence of her abdominal pain.  CT imaging shows evidence of a persistent colitis that may be slightly worse.  No evidence of acute diverticulitis.  No evidence of sepsis.  Normal lactic acid and white count.  Also had low suspicion for ischemic bowel causing her persistent colitis.  Patient has not had any bloody stools.  No fevers.  Has had decreased p.o. intake but overall is able to tolerate p.o.  No signs of significant dehydration on the labs.  After discussion with GI plan is to place her on a 10-day course of Augmentin.  She will have close follow-up with GI as she will likely need a colonoscopy for definitive diagnosis.    I have discussed management of the patient with the following physicians and LOUIE's:  Dr. Jordan    Escalation of care considered, and ultimately not performed:acute inpatient care management, however at this time, the patient is most appropriate for outpatient management    Decision tools and prescription drugs considered including, but not limited to: Antibiotics for the colitis .    FINAL DIAGNOSIS  1. Colitis    2. Hypokalemia           Electronically signed by: Christine Cordova M.D., 4/27/2023 6:29 AM

## 2023-04-27 NOTE — ED TRIAGE NOTES
Pt ambulated to triage with the following  c/c    Chief Complaint   Patient presents with    Abdominal Pain     Hx of Diverticulitis, pt was recent admitted for three days for the same thing. Started for the last few days, this morning it is a lot worst

## 2023-04-27 NOTE — DISCHARGE INSTRUCTIONS
The CAT scan today shows evidence of a colitis.  The GI doc wants you to take antibiotics for the next 10 days.  If you continue to have pain you must return for reevaluation especially if you develop a fever or have blood in your stools.  You will need a colonoscopy.  Ideally in about 4 to 6 weeks will call today to schedule your appointment.  You may need it sooner if your symptoms do not resolve after another course of antibiotics.  I hope you feel better soon.

## 2023-06-26 ASSESSMENT — ENCOUNTER SYMPTOMS
DYSPNEA AT REST: 0
RESPIRATORY SYMPTOMS COMMENTS: YES
WHEEZING: 1
HEMOPTYSIS: 0
SHORTNESS OF BREATH: 1
CHEST TIGHTNESS: 0

## 2023-06-29 ENCOUNTER — OFFICE VISIT (OUTPATIENT)
Dept: SLEEP MEDICINE | Facility: MEDICAL CENTER | Age: 73
End: 2023-06-29
Attending: FAMILY MEDICINE
Payer: MEDICARE

## 2023-06-29 VITALS
DIASTOLIC BLOOD PRESSURE: 62 MMHG | WEIGHT: 265.7 LBS | HEIGHT: 65 IN | SYSTOLIC BLOOD PRESSURE: 128 MMHG | HEART RATE: 79 BPM | BODY MASS INDEX: 44.27 KG/M2 | OXYGEN SATURATION: 94 %

## 2023-06-29 DIAGNOSIS — T78.40XS ALLERGY, SEQUELA: ICD-10-CM

## 2023-06-29 DIAGNOSIS — J45.20 MILD INTERMITTENT ASTHMA WITHOUT COMPLICATION: ICD-10-CM

## 2023-06-29 PROCEDURE — 99203 OFFICE O/P NEW LOW 30 MIN: CPT | Performed by: INTERNAL MEDICINE

## 2023-06-29 PROCEDURE — 99212 OFFICE O/P EST SF 10 MIN: CPT | Performed by: FAMILY MEDICINE

## 2023-06-29 PROCEDURE — 3074F SYST BP LT 130 MM HG: CPT | Performed by: INTERNAL MEDICINE

## 2023-06-29 PROCEDURE — 3078F DIAST BP <80 MM HG: CPT | Performed by: INTERNAL MEDICINE

## 2023-06-29 ASSESSMENT — ENCOUNTER SYMPTOMS
SPEECH CHANGE: 0
SORE THROAT: 0
WEAKNESS: 0
ABDOMINAL PAIN: 0
SINUS PAIN: 0
TREMORS: 0
SPUTUM PRODUCTION: 0
NAUSEA: 0
BACK PAIN: 0
CLAUDICATION: 0
SHORTNESS OF BREATH: 1
HEADACHES: 0
DIARRHEA: 0
DOUBLE VISION: 0
DIZZINESS: 0
PND: 0
FOCAL WEAKNESS: 0
EYE REDNESS: 0
CHILLS: 0
NECK PAIN: 0
EYE DISCHARGE: 0
WHEEZING: 1
STRIDOR: 0
PHOTOPHOBIA: 0
FALLS: 0
EYE PAIN: 0
HEMOPTYSIS: 0
HEARTBURN: 0
DIAPHORESIS: 0
FEVER: 0
BLURRED VISION: 0
MYALGIAS: 0
COUGH: 0
PALPITATIONS: 0
WEIGHT LOSS: 0
ORTHOPNEA: 0
VOMITING: 0
CONSTIPATION: 0
DEPRESSION: 0

## 2023-06-29 ASSESSMENT — FIBROSIS 4 INDEX: FIB4 SCORE: 0.92

## 2023-06-29 NOTE — PROGRESS NOTES
Chief Complaint   Patient presents with    Follow-Up     REF BY DR. MCALLISTER FOR Exacerbation of asthma       HPI: This patient is a 72 y.o. female presenting for evaluation of asthma.  PMHx is significant for allergies, HTN, dyslipidemia and MDD.  She is a never smoker.  She is retired and has worked as a , .  She does have family members with asthma.  She has 2 dogs but no issues with her animals. She was seen in  at the end of March after an adverse rxn to lisinopril which caused wheezing and SOB. She was tx with advair HFA 45, decadron, albuterol and sxs resolved.  She is no longer using advair and uses albuterol only once per week at most. No chronic cough. No SOB. CXR from 3/30 was clear. She does note that in the past she had been given albuterol for intermittent wheezing when exposed to cigarette smoke or perfumes and has had allergy testing in the past noting some environmental allergies but never required IT. Other than her adverse rxn to lisinopril, she has not been tx for acute respiratory sxs since 2/2014 when she was admitted briefly for bronchitis at [facility].     Past Medical History:   Diagnosis Date    Allergy     Anemia 1997    Bleeding hemorrhoids    Anxiety     Arthritis     Bronchitis     Cataract     bilateral    Chest pain 2/1/2016    Cholesterol serum elevated     diet controlled    Dental disorder 10/2020    Filled chipped front upper tooth    Depression     anxiety    Detached retina, left     Diverticulitis     Diverticulitis 3/28/2016    Gynecological disorder 1982    bleeding, cervical pecancer    Headache     Occasionally, can become severe if she does not take Advil. ICD-10 transition    Headache(784.0)     Occasionally, can become severe if she does not take Advil.    Heart murmur     Comes and goes    Hemorrhoids 1997    had surgery    High cholesterol     Hypertension     non-medicated    Hyponatremia 3/29/2016    Indigestion     Infectious disease     uti     Macular hole of right eye     Pain     Pneumonia     Shortness of breath 2014    SIRS (systemic inflammatory response syndrome) (HCC) 3/29/2016    Urinary tract infection, site not specified        Social History     Socioeconomic History    Marital status:      Spouse name: Not on file    Number of children: Not on file    Years of education: Not on file    Highest education level: Not on file   Occupational History    Not on file   Tobacco Use    Smoking status: Never    Smokeless tobacco: Never   Vaping Use    Vaping Use: Never used   Substance and Sexual Activity    Alcohol use: No     Alcohol/week: 0.0 oz    Drug use: No    Sexual activity: Not Currently     Partners: Male   Other Topics Concern    Not on file   Social History Narrative    Not on file     Social Determinants of Health     Financial Resource Strain: Not on file   Food Insecurity: Not on file   Transportation Needs: Not on file   Physical Activity: Not on file   Stress: Not on file   Social Connections: Not on file   Intimate Partner Violence: Not on file   Housing Stability: Not on file       Family History   Problem Relation Age of Onset    Diabetes Mother     Heart Disease Mother 60        MI then CABG    Heart Disease Father         CHF    Alcohol/Drug Maternal Aunt     Heart Disease Maternal Grandmother 65        MI,  with it    Alcohol/Drug Maternal Grandmother     Alcohol/Drug Maternal Grandfather     Alcohol/Drug Paternal Grandmother        Current Outpatient Medications on File Prior to Visit   Medication Sig Dispense Refill    fluticasone-salmeterol (ADVAIR HFA) 45-21 MCG/ACT inhaler Inhale 2 Puffs 2 times a day. 12 g 0    sertraline (ZOLOFT) 50 MG Tab Take 25 mg by mouth every day.      buPROPion (WELLBUTRIN XL) 300 MG XL tablet Take 300 mg by mouth every morning.      albuterol 108 (90 Base) MCG/ACT Aero Soln inhalation aerosol Inhale 2 Puffs every 6 hours as needed for Shortness of Breath. 8.5 g 0    atorvastatin  "(LIPITOR) 20 MG Tab Take 20 mg by mouth every evening.      metoprolol SR (TOPROL XL) 25 MG TABLET SR 24 HR Take 25 mg by mouth every evening.      hydrochlorothiazide (HYDRODIURIL) 25 MG Tab Take 25 mg by mouth every day.       No current facility-administered medications on file prior to visit.       Allergies: Azithromycin, Cymbalta [duloxetine hcl], Lisinopril, Demerol, and Montelukast    ROS:   Review of Systems   Constitutional:  Negative for chills, diaphoresis, fever, malaise/fatigue and weight loss.   HENT:  Negative for congestion, ear discharge, ear pain, hearing loss, nosebleeds, sinus pain, sore throat and tinnitus.    Eyes:  Negative for blurred vision, double vision, photophobia, pain, discharge and redness.   Respiratory:  Positive for shortness of breath and wheezing. Negative for cough, hemoptysis, sputum production and stridor.    Cardiovascular:  Negative for chest pain, palpitations, orthopnea, claudication, leg swelling and PND.   Gastrointestinal:  Negative for abdominal pain, constipation, diarrhea, heartburn, nausea and vomiting.   Genitourinary:  Negative for dysuria and urgency.   Musculoskeletal:  Negative for back pain, falls, joint pain, myalgias and neck pain.   Skin:  Negative for itching and rash.   Neurological:  Negative for dizziness, tremors, speech change, focal weakness, weakness and headaches.   Endo/Heme/Allergies:  Negative for environmental allergies.   Psychiatric/Behavioral:  Negative for depression.        /62 (BP Location: Right arm, Patient Position: Sitting, BP Cuff Size: Large adult)   Pulse 79   Ht 1.651 m (5' 5\")   Wt 121 kg (265 lb 11.2 oz)   SpO2 94%     Physical Exam:  Physical Exam  Constitutional:       General: She is not in acute distress.     Appearance: Normal appearance. She is well-developed. She is obese.   HENT:      Head: Normocephalic and atraumatic.      Right Ear: External ear normal.      Left Ear: External ear normal.      Nose: Nose " normal. No congestion.      Mouth/Throat:      Mouth: Mucous membranes are moist.      Pharynx: Oropharynx is clear. No oropharyngeal exudate.   Eyes:      General: No scleral icterus.     Extraocular Movements: Extraocular movements intact.      Conjunctiva/sclera: Conjunctivae normal.      Pupils: Pupils are equal, round, and reactive to light.   Neck:      Vascular: No JVD.      Trachea: No tracheal deviation.   Cardiovascular:      Rate and Rhythm: Normal rate and regular rhythm.      Heart sounds: Normal heart sounds. No murmur heard.     No friction rub. No gallop.   Pulmonary:      Effort: Pulmonary effort is normal. No accessory muscle usage or respiratory distress.      Breath sounds: Normal breath sounds. No wheezing or rales.   Abdominal:      General: There is no distension.      Palpations: Abdomen is soft.      Tenderness: There is no abdominal tenderness.   Musculoskeletal:         General: No tenderness or deformity. Normal range of motion.      Cervical back: Normal range of motion and neck supple.      Right lower leg: No edema.      Left lower leg: No edema.   Lymphadenopathy:      Cervical: No cervical adenopathy.   Skin:     General: Skin is warm and dry.      Findings: No rash.      Nails: There is no clubbing.   Neurological:      Mental Status: She is alert and oriented to person, place, and time.      Cranial Nerves: No cranial nerve deficit.      Gait: Gait normal.   Psychiatric:         Behavior: Behavior normal.         PFTs as reviewed by me personally: none    Imaging as reviewed by me personally: as per hPI    Assessment:  1. Mild intermittent asthma without complication  PULMONARY FUNCTION TESTS -Test requested: Complete Pulmonary Function Test      2. Allergy, sequela  PULMONARY FUNCTION TESTS -Test requested: Complete Pulmonary Function Test          Plan:  This is chronic but new to me. Has been well controlled with only prn JONAS. Her  most recent event was medication inducted  therefore she should avoid ACE-I in the future. I offered the pt PFT and f/u in 6-12 mos or f/u PRN and we opted to f/u PRN at this point. Continue PRN albuterol.  Chronic but mild and has not cause recurrent respiratory issues or need for IT at this point.   Return if symptoms worsen or fail to improve.

## 2023-07-10 ENCOUNTER — TELEPHONE (OUTPATIENT)
Dept: HEALTH INFORMATION MANAGEMENT | Facility: OTHER | Age: 73
End: 2023-07-10
Payer: MEDICARE

## 2024-03-10 ENCOUNTER — OFFICE VISIT (OUTPATIENT)
Dept: URGENT CARE | Facility: CLINIC | Age: 74
End: 2024-03-10
Payer: MEDICARE

## 2024-03-10 ENCOUNTER — APPOINTMENT (OUTPATIENT)
Dept: RADIOLOGY | Facility: IMAGING CENTER | Age: 74
End: 2024-03-10
Attending: STUDENT IN AN ORGANIZED HEALTH CARE EDUCATION/TRAINING PROGRAM
Payer: MEDICARE

## 2024-03-10 VITALS
OXYGEN SATURATION: 93 % | DIASTOLIC BLOOD PRESSURE: 60 MMHG | RESPIRATION RATE: 18 BRPM | BODY MASS INDEX: 48.48 KG/M2 | HEIGHT: 65 IN | TEMPERATURE: 97.6 F | HEART RATE: 90 BPM | WEIGHT: 291 LBS | SYSTOLIC BLOOD PRESSURE: 122 MMHG

## 2024-03-10 DIAGNOSIS — J45.21 MILD INTERMITTENT ASTHMA WITH EXACERBATION: ICD-10-CM

## 2024-03-10 DIAGNOSIS — J98.8 RTI (RESPIRATORY TRACT INFECTION): ICD-10-CM

## 2024-03-10 DIAGNOSIS — R05.9 COUGH, UNSPECIFIED TYPE: ICD-10-CM

## 2024-03-10 DIAGNOSIS — R21 RASH AND NONSPECIFIC SKIN ERUPTION: ICD-10-CM

## 2024-03-10 PROCEDURE — 3074F SYST BP LT 130 MM HG: CPT | Performed by: STUDENT IN AN ORGANIZED HEALTH CARE EDUCATION/TRAINING PROGRAM

## 2024-03-10 PROCEDURE — 3078F DIAST BP <80 MM HG: CPT | Performed by: STUDENT IN AN ORGANIZED HEALTH CARE EDUCATION/TRAINING PROGRAM

## 2024-03-10 PROCEDURE — 71046 X-RAY EXAM CHEST 2 VIEWS: CPT | Mod: TC,FY | Performed by: STUDENT IN AN ORGANIZED HEALTH CARE EDUCATION/TRAINING PROGRAM

## 2024-03-10 PROCEDURE — 99214 OFFICE O/P EST MOD 30 MIN: CPT | Performed by: STUDENT IN AN ORGANIZED HEALTH CARE EDUCATION/TRAINING PROGRAM

## 2024-03-10 RX ORDER — BENZONATATE 100 MG/1
100 CAPSULE ORAL 3 TIMES DAILY PRN
Qty: 60 CAPSULE | Refills: 0 | Status: SHIPPED | OUTPATIENT
Start: 2024-03-10

## 2024-03-10 RX ORDER — TRIAMCINOLONE ACETONIDE 1 MG/G
1 OINTMENT TOPICAL 2 TIMES DAILY
Qty: 30 G | Refills: 0 | Status: SHIPPED | OUTPATIENT
Start: 2024-03-10

## 2024-03-10 RX ORDER — DOXYCYCLINE HYCLATE 100 MG
100 TABLET ORAL 2 TIMES DAILY
Qty: 14 TABLET | Refills: 0 | Status: SHIPPED | OUTPATIENT
Start: 2024-03-10 | End: 2024-03-17

## 2024-03-10 RX ORDER — METHYLPREDNISOLONE 4 MG/1
TABLET ORAL
Qty: 21 TABLET | Refills: 0 | Status: SHIPPED | OUTPATIENT
Start: 2024-03-10

## 2024-03-10 ASSESSMENT — FIBROSIS 4 INDEX: FIB4 SCORE: 0.93

## 2024-03-10 ASSESSMENT — ENCOUNTER SYMPTOMS
SORE THROAT: 0
CONSTIPATION: 0
WHEEZING: 1
ABDOMINAL PAIN: 0
VOMITING: 0
PALPITATIONS: 0
FEVER: 0
NAUSEA: 0
COUGH: 1
CHILLS: 0
DIARRHEA: 0

## 2024-03-10 NOTE — PROGRESS NOTES
"Subjective     Guerda Lunsford is a 73 y.o. female who presents with Wheezing (X 1 month), Bump (Lump under Rt arm patient states x 1 week), and Rash (Rash on L side of left hip, x 1 month, itchiness, no pain)            Guerda is a 73 y.o. male who presents to urgent care with multiple concerns.  Patient reports productive cough and symptoms of wheezing for approximately 1 month.  Patient does have a history of asthma and has been using her albuterol and Advair with no relief of symptoms.  No fever/chills.  Patient also reports a bump on her right underarm area.  Dated the other night patient states that she noticed her night gown was wet and the bump spontaneously drained of fluid.  Patient reports no redness and swelling to the area.  Patient states it has not drained since that initial night.  Patient also brings up concerns of rash on her left hip.  Patient states that her left hip has been itchy.  Patient has not noticed any blisters and vesicles.  No pain associated to rash.        Review of Systems   Constitutional:  Negative for chills, fever and malaise/fatigue.   HENT:  Negative for congestion, ear pain and sore throat.    Respiratory:  Positive for cough and wheezing.    Cardiovascular:  Negative for chest pain and palpitations.   Gastrointestinal:  Negative for abdominal pain, constipation, diarrhea, nausea and vomiting.   Skin:  Positive for itching and rash.   All other systems reviewed and are negative.             Objective     /60 (BP Location: Left arm, Patient Position: Sitting, BP Cuff Size: Large adult)   Pulse 90   Temp 36.4 °C (97.6 °F)   Resp 18   Ht 1.651 m (5' 5\")   Wt (!) 132 kg (291 lb)   SpO2 93%   BMI 48.42 kg/m²      Physical Exam  Vitals reviewed.   Constitutional:       General: She is not in acute distress.     Appearance: Normal appearance. She is not toxic-appearing.   HENT:      Head: Normocephalic and atraumatic.      Nose: Nose normal.   Eyes:      " Extraocular Movements: Extraocular movements intact.      Conjunctiva/sclera: Conjunctivae normal.      Pupils: Pupils are equal, round, and reactive to light.   Cardiovascular:      Rate and Rhythm: Normal rate and regular rhythm.   Pulmonary:      Effort: Pulmonary effort is normal. No respiratory distress.      Breath sounds: Normal breath sounds. No stridor. No wheezing, rhonchi or rales.   Skin:     General: Skin is warm and dry.          Neurological:      General: No focal deficit present.      Mental Status: She is alert.                 RADIOLOGY RESULTS   DX-CHEST-2 VIEWS    Result Date: 3/10/2024  3/10/2024 3:40 PM HISTORY/REASON FOR EXAM:  Cough. TECHNIQUE/EXAM DESCRIPTION AND NUMBER OF VIEWS: Two views of the chest. COMPARISON:  Two-view chest 3/30/2023 FINDINGS: The heart is normal in size. No pulmonary infiltrates or consolidations are noted. No pleural effusions are appreciated.     No active disease.                     Assessment & Plan        1. Mild intermittent asthma with exacerbation  - methylPREDNISolone (MEDROL DOSEPAK) 4 MG Tablet Therapy Pack; Follow schedule on package instructions.  Dispense: 21 Tablet; Refill: 0    2. Cough, unspecified type  - DX-CHEST-2 VIEWS; Future  - IMPRESSION: No active disease.  - benzonatate (TESSALON) 100 MG Cap; Take 1 Capsule by mouth 3 times a day as needed for Cough.  Dispense: 60 Capsule; Refill: 0    3. RTI (respiratory tract infection)  - doxycycline (VIBRAMYCIN) 100 MG Tab; Take 1 Tablet by mouth 2 times a day for 7 days.  Dispense: 14 Tablet; Refill: 0    4. Rash and nonspecific skin eruption  - triamcinolone acetonide (KENALOG) 0.1 % Ointment; Apply 1 Application topically 2 times a day.  Dispense: 30 g; Refill: 0       Differential diagnoses, supportive care measures and indications for immediate follow-up discussed with patient. Pathogenesis of diagnosis discussed including typical length and natural progression.  Follow up with PCP and/or  pulm.    Instructed to return to urgent care or nearest emergency department if symptoms fail to improve, for any change in condition, further concerns, or new concerning symptoms.    Patient states understanding and agrees with the plan of care and discharge instructions.

## 2024-06-21 ENCOUNTER — OFFICE VISIT (OUTPATIENT)
Dept: URGENT CARE | Facility: CLINIC | Age: 74
End: 2024-06-21
Payer: MEDICARE

## 2024-06-21 VITALS
RESPIRATION RATE: 18 BRPM | DIASTOLIC BLOOD PRESSURE: 60 MMHG | WEIGHT: 287.4 LBS | BODY MASS INDEX: 47.88 KG/M2 | HEIGHT: 65 IN | SYSTOLIC BLOOD PRESSURE: 124 MMHG | HEART RATE: 68 BPM | TEMPERATURE: 97.1 F | OXYGEN SATURATION: 93 %

## 2024-06-21 DIAGNOSIS — J01.90 ACUTE BACTERIAL SINUSITIS: ICD-10-CM

## 2024-06-21 DIAGNOSIS — B96.89 ACUTE BACTERIAL SINUSITIS: ICD-10-CM

## 2024-06-21 DIAGNOSIS — R05.1 ACUTE COUGH: ICD-10-CM

## 2024-06-21 DIAGNOSIS — J45.21 MILD INTERMITTENT ASTHMA WITH EXACERBATION: ICD-10-CM

## 2024-06-21 PROCEDURE — 3078F DIAST BP <80 MM HG: CPT | Performed by: PHYSICIAN ASSISTANT

## 2024-06-21 PROCEDURE — 3074F SYST BP LT 130 MM HG: CPT | Performed by: PHYSICIAN ASSISTANT

## 2024-06-21 PROCEDURE — 99214 OFFICE O/P EST MOD 30 MIN: CPT | Performed by: PHYSICIAN ASSISTANT

## 2024-06-21 RX ORDER — PANTOPRAZOLE SODIUM 40 MG/1
40 TABLET, DELAYED RELEASE ORAL
COMMUNITY
Start: 2024-05-13

## 2024-06-21 RX ORDER — METHYLPREDNISOLONE 4 MG/1
4 TABLET ORAL DAILY
Qty: 21 TABLET | Refills: 0 | Status: SHIPPED | OUTPATIENT
Start: 2024-06-21

## 2024-06-21 RX ORDER — GLIPIZIDE 5 MG/1
TABLET ORAL
COMMUNITY
Start: 2024-06-11

## 2024-06-21 RX ORDER — DEXTROMETHORPHAN HYDROBROMIDE AND PROMETHAZINE HYDROCHLORIDE 15; 6.25 MG/5ML; MG/5ML
5 SYRUP ORAL EVERY 6 HOURS PRN
Qty: 100 ML | Refills: 0 | Status: SHIPPED | OUTPATIENT
Start: 2024-06-21

## 2024-06-21 RX ORDER — AMOXICILLIN AND CLAVULANATE POTASSIUM 875; 125 MG/1; MG/1
1 TABLET, FILM COATED ORAL 2 TIMES DAILY
Qty: 14 TABLET | Refills: 0 | Status: SHIPPED | OUTPATIENT
Start: 2024-06-21 | End: 2024-06-28

## 2024-06-21 ASSESSMENT — ENCOUNTER SYMPTOMS
ABDOMINAL PAIN: 0
CHILLS: 0
EYE PAIN: 0
SORE THROAT: 0
EYE DISCHARGE: 0
NAUSEA: 0
CONSTIPATION: 0
COUGH: 1
DIAPHORESIS: 0
EYE REDNESS: 0
FEVER: 0
VOMITING: 0
SINUS PAIN: 1
HEADACHES: 0
WHEEZING: 1
DIZZINESS: 0
SHORTNESS OF BREATH: 0
DIARRHEA: 0

## 2024-06-21 ASSESSMENT — FIBROSIS 4 INDEX: FIB4 SCORE: 0.93

## 2024-06-21 NOTE — PROGRESS NOTES
"  Subjective:     Guerda Lunsford  is a 73 y.o. female who presents for Cough (X 2 weeks, cough, wheezing, sinus congestion, history of Asthma.)       She presents today with cough that is been ongoing over the last 2 weeks, has associated episodes of wheezing.  She has a history of asthma and does feel that she is having asthma exacerbation at this time due to prolonged symptoms.  Also notes sinus pain and pressure.  States that her symptoms are present throughout the day but are worsened at night.  Denies fever/chills/sweats, chest pain, shortness of breath, nausea/vomiting, abdominal pain, diarrhea.       Review of Systems   Constitutional:  Negative for chills, diaphoresis, fever and malaise/fatigue.   HENT:  Positive for congestion and sinus pain. Negative for ear discharge and sore throat.    Eyes:  Negative for pain, discharge and redness.   Respiratory:  Positive for cough and wheezing. Negative for shortness of breath.    Cardiovascular:  Negative for chest pain.   Gastrointestinal:  Negative for abdominal pain, constipation, diarrhea, nausea and vomiting.   Neurological:  Negative for dizziness and headaches.      Allergies   Allergen Reactions    Azithromycin Unspecified     Pt states she feels a burning and hot sensation \"I can feel it in my veins, all the way through my body down to the bottom of my feet.\"    Cymbalta [Duloxetine Hcl] Unspecified     Make blood pressure go up    Lisinopril Cough     Cough      Demerol Rash     Rash at injection site, N/V.    Montelukast Anxiety     Gets nightmares - will never take it again     Past Medical History:   Diagnosis Date    Allergy     Anemia 1997    Bleeding hemorrhoids    Anxiety     Arthritis     Bronchitis     Cataract     bilateral    Chest pain 2/1/2016    Cholesterol serum elevated     diet controlled    Dental disorder 10/2020    Filled chipped front upper tooth    Depression     anxiety    Detached retina, left     Diverticulitis     " "Diverticulitis 3/28/2016    Gynecological disorder 1982    bleeding, cervical pecancer    Headache     Occasionally, can become severe if she does not take Advil. ICD-10 transition    Headache(784.0)     Occasionally, can become severe if she does not take Advil.    Heart murmur     Comes and goes    Hemorrhoids 1997    had surgery    High cholesterol     Hypertension     non-medicated    Hyponatremia 3/29/2016    Indigestion     Infectious disease     uti    Macular hole of right eye     Pain     Pneumonia     Shortness of breath 2/28/2014    SIRS (systemic inflammatory response syndrome) (HCC) 3/29/2016    Urinary tract infection, site not specified         Objective:   /60   Pulse 68   Temp 36.2 °C (97.1 °F) (Temporal)   Resp 18   Ht 1.651 m (5' 5\")   Wt (!) 130 kg (287 lb 6.4 oz)   SpO2 93%   BMI 47.83 kg/m²   Physical Exam  Vitals and nursing note reviewed.   Constitutional:       General: She is not in acute distress.     Appearance: Normal appearance. She is not ill-appearing, toxic-appearing or diaphoretic.   HENT:      Head: Normocephalic.      Right Ear: Tympanic membrane, ear canal and external ear normal. There is no impacted cerumen.      Left Ear: Tympanic membrane, ear canal and external ear normal. There is no impacted cerumen.      Nose: Congestion present. No rhinorrhea.      Mouth/Throat:      Mouth: Mucous membranes are moist.      Pharynx: No oropharyngeal exudate or posterior oropharyngeal erythema.   Eyes:      General:         Right eye: No discharge.         Left eye: No discharge.      Conjunctiva/sclera: Conjunctivae normal.   Cardiovascular:      Rate and Rhythm: Normal rate and regular rhythm.   Pulmonary:      Effort: Pulmonary effort is normal. No respiratory distress.      Breath sounds: No stridor. Wheezing (Present overall portions of bilateral lungs) present. No rhonchi.   Musculoskeletal:      Cervical back: Neck supple.   Lymphadenopathy:      Cervical: No cervical " adenopathy.   Neurological:      General: No focal deficit present.      Mental Status: She is alert and oriented to person, place, and time.   Psychiatric:         Mood and Affect: Mood normal.         Behavior: Behavior normal.         Thought Content: Thought content normal.         Judgment: Judgment normal.             Diagnostic testing: None    Assessment/Plan:     Encounter Diagnoses   Name Primary?    Acute bacterial sinusitis     Mild intermittent asthma with exacerbation     Acute cough           Plan for care for today's complaint includes starting the patient on Augmentin for acute bacterial sinusitis.  Start on Medrol Dosepak for acute asthma exacerbation today.  Patient has previously tried Tessalon Perles, without relief and she is requesting cough suppressant medication at this time.  Discussed that the only other prescription available is promethazine which is not recommended in older adults, after discussion of the side effects associated with this medication to include sedation, increased risk of falls and other issues the patient did elect to proceed with this medication.  She did express verbal understanding to the known possible side effects.  Encouraged the patient to use lowest dose necessary and for the shortest duration needed for her cough.  Vital signs were stable during today's office visit, patient was overall well-appearing. Continue to monitor symptoms and return to urgent care or follow-up with primary care provider if symptoms remain ongoing.  Follow-up in the emergency department if symptoms become severe, ER precautions discussed in office today..  Prescription for Augmentin, Medrol Dosepak, promethazine provided.    See AVS Instructions below for written guidance provided to patient on after-visit management and care in addition to our verbal discussion during the visit.    Please note that this dictation was created using voice recognition software. I have attempted to correct  all errors, but there may be sound-alike, spelling, grammar and possibly content errors that I did not discover before finalizing the note.    Encinal Felipe JUDD

## 2024-09-29 ENCOUNTER — HOSPITAL ENCOUNTER (EMERGENCY)
Facility: MEDICAL CENTER | Age: 74
End: 2024-09-29
Attending: EMERGENCY MEDICINE
Payer: MEDICARE

## 2024-09-29 ENCOUNTER — APPOINTMENT (OUTPATIENT)
Dept: RADIOLOGY | Facility: MEDICAL CENTER | Age: 74
End: 2024-09-29
Attending: EMERGENCY MEDICINE
Payer: MEDICARE

## 2024-09-29 VITALS
SYSTOLIC BLOOD PRESSURE: 115 MMHG | HEIGHT: 65 IN | BODY MASS INDEX: 46.98 KG/M2 | OXYGEN SATURATION: 92 % | HEART RATE: 68 BPM | TEMPERATURE: 98.3 F | WEIGHT: 281.97 LBS | DIASTOLIC BLOOD PRESSURE: 54 MMHG | RESPIRATION RATE: 18 BRPM

## 2024-09-29 DIAGNOSIS — K52.9 COLITIS: ICD-10-CM

## 2024-09-29 DIAGNOSIS — K59.00 CONSTIPATION, UNSPECIFIED CONSTIPATION TYPE: ICD-10-CM

## 2024-09-29 LAB
ALBUMIN SERPL BCP-MCNC: 3.8 G/DL (ref 3.2–4.9)
ALBUMIN/GLOB SERPL: 1.2 G/DL
ALP SERPL-CCNC: 145 U/L (ref 30–99)
ALT SERPL-CCNC: 9 U/L (ref 2–50)
AMORPH CRY #/AREA URNS HPF: PRESENT /HPF
ANION GAP SERPL CALC-SCNC: 15 MMOL/L (ref 7–16)
APPEARANCE UR: ABNORMAL
AST SERPL-CCNC: 9 U/L (ref 12–45)
BACTERIA #/AREA URNS HPF: ABNORMAL /HPF
BASOPHILS # BLD AUTO: 0.5 % (ref 0–1.8)
BASOPHILS # BLD: 0.06 K/UL (ref 0–0.12)
BILIRUB SERPL-MCNC: 0.7 MG/DL (ref 0.1–1.5)
BILIRUB UR QL STRIP.AUTO: ABNORMAL
BUN SERPL-MCNC: 18 MG/DL (ref 8–22)
CALCIUM ALBUM COR SERPL-MCNC: 9.2 MG/DL (ref 8.5–10.5)
CALCIUM SERPL-MCNC: 9 MG/DL (ref 8.4–10.2)
CHLORIDE SERPL-SCNC: 94 MMOL/L (ref 96–112)
CO2 SERPL-SCNC: 25 MMOL/L (ref 20–33)
COLOR UR: YELLOW
CREAT SERPL-MCNC: 1.26 MG/DL (ref 0.5–1.4)
EOSINOPHIL # BLD AUTO: 0.37 K/UL (ref 0–0.51)
EOSINOPHIL NFR BLD: 3 % (ref 0–6.9)
EPI CELLS #/AREA URNS HPF: ABNORMAL /HPF
ERYTHROCYTE [DISTWIDTH] IN BLOOD BY AUTOMATED COUNT: 38.2 FL (ref 35.9–50)
GFR SERPLBLD CREATININE-BSD FMLA CKD-EPI: 45 ML/MIN/1.73 M 2
GLOBULIN SER CALC-MCNC: 3.2 G/DL (ref 1.9–3.5)
GLUCOSE SERPL-MCNC: 166 MG/DL (ref 65–99)
GLUCOSE UR STRIP.AUTO-MCNC: NEGATIVE MG/DL
HCT VFR BLD AUTO: 41.2 % (ref 37–47)
HGB BLD-MCNC: 14.4 G/DL (ref 12–16)
IMM GRANULOCYTES # BLD AUTO: 0.07 K/UL (ref 0–0.11)
IMM GRANULOCYTES NFR BLD AUTO: 0.6 % (ref 0–0.9)
KETONES UR STRIP.AUTO-MCNC: NEGATIVE MG/DL
LEUKOCYTE ESTERASE UR QL STRIP.AUTO: ABNORMAL
LIPASE SERPL-CCNC: 15 U/L (ref 11–82)
LYMPHOCYTES # BLD AUTO: 1.14 K/UL (ref 1–4.8)
LYMPHOCYTES NFR BLD: 9.1 % (ref 22–41)
MCH RBC QN AUTO: 28.9 PG (ref 27–33)
MCHC RBC AUTO-ENTMCNC: 35 G/DL (ref 32.2–35.5)
MCV RBC AUTO: 82.7 FL (ref 81.4–97.8)
MICRO URNS: ABNORMAL
MONOCYTES # BLD AUTO: 0.93 K/UL (ref 0–0.85)
MONOCYTES NFR BLD AUTO: 7.4 % (ref 0–13.4)
NEUTROPHILS # BLD AUTO: 9.93 K/UL (ref 1.82–7.42)
NEUTROPHILS NFR BLD: 79.4 % (ref 44–72)
NITRITE UR QL STRIP.AUTO: NEGATIVE
NRBC # BLD AUTO: 0 K/UL
NRBC BLD-RTO: 0 /100 WBC (ref 0–0.2)
PH UR STRIP.AUTO: 6 [PH] (ref 5–8)
PLATELET # BLD AUTO: 338 K/UL (ref 164–446)
PMV BLD AUTO: 9 FL (ref 9–12.9)
POTASSIUM SERPL-SCNC: 3.7 MMOL/L (ref 3.6–5.5)
PROT SERPL-MCNC: 7 G/DL (ref 6–8.2)
PROT UR QL STRIP: NEGATIVE MG/DL
RBC # BLD AUTO: 4.98 M/UL (ref 4.2–5.4)
RBC # URNS HPF: ABNORMAL /HPF
RBC UR QL AUTO: NEGATIVE
SODIUM SERPL-SCNC: 134 MMOL/L (ref 135–145)
SP GR UR STRIP.AUTO: >=1.03
WBC # BLD AUTO: 12.5 K/UL (ref 4.8–10.8)
WBC #/AREA URNS HPF: ABNORMAL /HPF

## 2024-09-29 PROCEDURE — 85025 COMPLETE CBC W/AUTO DIFF WBC: CPT

## 2024-09-29 PROCEDURE — 96375 TX/PRO/DX INJ NEW DRUG ADDON: CPT

## 2024-09-29 PROCEDURE — 96374 THER/PROPH/DIAG INJ IV PUSH: CPT

## 2024-09-29 PROCEDURE — 36415 COLL VENOUS BLD VENIPUNCTURE: CPT

## 2024-09-29 PROCEDURE — 700102 HCHG RX REV CODE 250 W/ 637 OVERRIDE(OP): Performed by: EMERGENCY MEDICINE

## 2024-09-29 PROCEDURE — 99285 EMERGENCY DEPT VISIT HI MDM: CPT

## 2024-09-29 PROCEDURE — A9270 NON-COVERED ITEM OR SERVICE: HCPCS | Performed by: EMERGENCY MEDICINE

## 2024-09-29 PROCEDURE — 74177 CT ABD & PELVIS W/CONTRAST: CPT

## 2024-09-29 PROCEDURE — 83690 ASSAY OF LIPASE: CPT

## 2024-09-29 PROCEDURE — 700111 HCHG RX REV CODE 636 W/ 250 OVERRIDE (IP): Mod: JZ | Performed by: EMERGENCY MEDICINE

## 2024-09-29 PROCEDURE — 80053 COMPREHEN METABOLIC PANEL: CPT

## 2024-09-29 PROCEDURE — 700117 HCHG RX CONTRAST REV CODE 255: Performed by: EMERGENCY MEDICINE

## 2024-09-29 PROCEDURE — 81001 URINALYSIS AUTO W/SCOPE: CPT

## 2024-09-29 PROCEDURE — 700105 HCHG RX REV CODE 258: Performed by: EMERGENCY MEDICINE

## 2024-09-29 RX ORDER — LOSARTAN POTASSIUM 50 MG/1
50 TABLET ORAL DAILY
COMMUNITY

## 2024-09-29 RX ORDER — ONDANSETRON 2 MG/ML
4 INJECTION INTRAMUSCULAR; INTRAVENOUS ONCE
Status: COMPLETED | OUTPATIENT
Start: 2024-09-29 | End: 2024-09-29

## 2024-09-29 RX ORDER — SODIUM CHLORIDE 9 MG/ML
1000 INJECTION, SOLUTION INTRAVENOUS ONCE
Status: COMPLETED | OUTPATIENT
Start: 2024-09-29 | End: 2024-09-29

## 2024-09-29 RX ORDER — MORPHINE SULFATE 4 MG/ML
4 INJECTION INTRAVENOUS ONCE
Status: COMPLETED | OUTPATIENT
Start: 2024-09-29 | End: 2024-09-29

## 2024-09-29 RX ORDER — POLYETHYLENE GLYCOL 3350 17 G/17G
1 POWDER, FOR SOLUTION ORAL ONCE
Status: COMPLETED | OUTPATIENT
Start: 2024-09-29 | End: 2024-09-29

## 2024-09-29 RX ADMIN — MAJOR MAGNESIUM CITRATE ORAL SOLUTION - LEMON 296 ML: 1.75 LIQUID ORAL at 12:00

## 2024-09-29 RX ADMIN — SODIUM CHLORIDE 1000 ML: 9 INJECTION, SOLUTION INTRAVENOUS at 09:03

## 2024-09-29 RX ADMIN — ONDANSETRON 4 MG: 2 INJECTION INTRAMUSCULAR; INTRAVENOUS at 09:04

## 2024-09-29 RX ADMIN — POLYETHYLENE GLYCOL 3350 1 PACKET: 17 POWDER, FOR SOLUTION ORAL at 12:00

## 2024-09-29 RX ADMIN — IOHEXOL 100 ML: 350 INJECTION, SOLUTION INTRAVENOUS at 10:12

## 2024-09-29 RX ADMIN — MORPHINE SULFATE 4 MG: 4 INJECTION, SOLUTION INTRAMUSCULAR; INTRAVENOUS at 09:04

## 2024-09-29 ASSESSMENT — FIBROSIS 4 INDEX: FIB4 SCORE: 0.93

## 2024-09-29 NOTE — ED NOTES
Medicated as ordered.  Pt and visitor updated on POC including pending tests and chart review by ERP.

## 2024-09-29 NOTE — DISCHARGE INSTRUCTIONS
You were seen in the Emergency Department for abdominal pain, constipation.    Labs, CT scan were completed without significant acute abnormalities other than mild colitis which has been present since your last CT scan.    Please use 1,000mg of tylenol every 6 hours as needed for pain.  Continue MiraLAX once to twice daily for ongoing constipation.  You may also attempt magnesium citrate as well.  Please ensure you are drinking fluids.    Please follow up with your primary care physician and gastroenterology.    Return to the Emergency Department with uncontrolled pain, persistent vomiting, fevers, or other concerns.

## 2024-09-29 NOTE — ED NOTES
Medication history reviewed with pt. Med rec is complete.  Allergies reviewed, per pt  Interviewed pt with  at bedside with permission from pt.    Pt reports that she only took 2 days of her LOSARTAN 50MG that was prescribed to her on 9/3/2024.    Patient has not had any outpatient antibiotics in the last 30 days.    Pt is not on any anticoagulants

## 2024-09-29 NOTE — ED PROVIDER NOTES
ED Provider Note    CHIEF COMPLAINT  Chief Complaint   Patient presents with    Abdominal Pain     Generalized abd pain x 5 days  Hx of Diverticulitis    Constipation     X 5 days  Has tried OTC medications w/o relief    Nausea     No vomiting     EXTERNAL RECORDS REVIEWED  Patient was last admitted April 2023 due to sepsis from diverticulitis and urinary tract infection.  Imaging at that time showed evidence of both diverticulitis and colitis.    HPI/ROS  LIMITATION TO HISTORY   Select: : None  OUTSIDE HISTORIAN(S):  None    Guerda Lunsford is a 73 y.o. female who presents to the Emergency Department with abdominal pain and constipation.  Patient states symptoms have been going on for the last 5 days.  She describes generalized abdominal pain possibly more in the left upper quadrant.  She has had constipation without a bowel movement in the last 5 days.  She is tried MiraLAX for the last 3 days as well as stool softeners and Preparation H cream without relief.  She has had some nausea without vomiting.  No known fevers or pain with urination.  Patient has had a history of diverticulitis in the past.  She has had a prior hysterectomy and appendectomy however no other abdominal surgeries.  She does follow with gastroenterology at [facility] regularly and has had a colonoscopy recently without concern.    PAST MEDICAL HISTORY  Past Medical History:   Diagnosis Date    Allergy     Anemia 1997    Bleeding hemorrhoids    Anxiety     Arthritis     Bronchitis     Cataract     bilateral    Chest pain 2/1/2016    Cholesterol serum elevated     diet controlled    Dental disorder 10/2020    Filled chipped front upper tooth    Depression     anxiety    Detached retina, left     Diverticulitis     Diverticulitis 3/28/2016    Gynecological disorder 1982    bleeding, cervical pecancer    Headache     Occasionally, can become severe if she does not take Advil. ICD-10 transition    Headache(784.0)     Occasionally, can  become severe if she does not take Advil.    Heart murmur     Comes and goes    Hemorrhoids     had surgery    High cholesterol     Hypertension     non-medicated    Hyponatremia 3/29/2016    Indigestion     Infectious disease     uti    Macular hole of right eye     Pain     Pneumonia     Shortness of breath 2014    SIRS (systemic inflammatory response syndrome) (HCC) 3/29/2016    Urinary tract infection, site not specified         SURGICAL HISTORY  Past Surgical History:   Procedure Laterality Date    PB TOTAL KNEE ARTHROPLASTY Right 2021    Procedure: ARTHROPLASTY, KNEE, TOTAL [place] cementless;  Surgeon: Sonido Amado M.D.;  Location: SURGERY [facility];  Service: Orthopedics    PB TOTAL KNEE ARTHROPLASTY Left 2021    Procedure: ARTHROPLASTY, KNEE, TOTAL.;  Surgeon: Sonido Amado M.D.;  Location: SURGERY [facility];  Service: Orthopedics    BREAST BIOPSY  2009    LAMINOTOMY      2 Lumbar Discs    ABDOMINAL HYSTERECTOMY TOTAL      Cervical precancer and bleeding.  No oophorectomy.    APPENDECTOMY      GYN SURGERY      hysterectomy - partial    OTHER      hemorrhoidectomy    OTHER Right     cataract extraction    OTHER Right     Macular Hole Surgery    OTHER ABDOMINAL SURGERY      appy    OTHER ORTHOPEDIC SURGERY      lami        FAMILY HISTORY  Family History   Problem Relation Age of Onset    Diabetes Mother     Heart Disease Mother 60        MI then CABG    Heart Disease Father         CHF    Alcohol/Drug Maternal Aunt     Heart Disease Maternal Grandmother 65        MI,  with it    Alcohol/Drug Maternal Grandmother     Alcohol/Drug Maternal Grandfather     Alcohol/Drug Paternal Grandmother        SOCIAL HISTORY   reports that she has never smoked. She has never used smokeless tobacco. She reports that she does not drink alcohol and does not use drugs.    CURRENT MEDICATIONS  Discharge Medication List as of 2024 11:54 AM        CONTINUE these medications which have  "NOT CHANGED    Details   losartan (COZAAR) 50 MG Tab Take 50 mg by mouth every day., Historical Med      Ibuprofen-Acetaminophen (ADVIL DUAL ACTION PO) Take 2 Tablets by mouth 2 times a day as needed (For pain). (OTC), Historical Med      glipiZIDE (GLUCOTROL) 5 MG Tab Take 5 mg by mouth every day. Pt reports that she takes this medication QDAY, not BID, Historical Med      sertraline (ZOLOFT) 50 MG Tab Take 25 mg by mouth every day., Historical Med      buPROPion (WELLBUTRIN XL) 300 MG XL tablet Take 300 mg by mouth every morning., Historical Med      albuterol 108 (90 Base) MCG/ACT Aero Soln inhalation aerosol Inhale 2 Puffs every 6 hours as needed for Shortness of Breath., Disp-8.5 g, R-0, Normal      atorvastatin (LIPITOR) 20 MG Tab Take 20 mg by mouth every evening., Historical Med      metoprolol SR (TOPROL XL) 25 MG TABLET SR 24 HR Take 25 mg by mouth every evening., Historical Med      hydrochlorothiazide (HYDRODIURIL) 25 MG Tab Take 25 mg by mouth every day., Historical Med      pantoprazole (PROTONIX) 40 MG Tablet Delayed Response Take 40 mg by mouth every evening., Historical Med             ALLERGIES  Azithromycin, Cymbalta [duloxetine hcl], Lisinopril, Demerol, and Montelukast    PHYSICAL EXAM  /54   Pulse 68   Temp 36.8 °C (98.3 °F) (Temporal)   Resp 18   Ht 1.651 m (5' 5\")   Wt (!) 128 kg (281 lb 15.5 oz)   SpO2 92%      Constitutional: Nontoxic appearing. Alert in no apparent distress.  HENT: Normocephalic, Atraumatic. Bilateral external ears normal. Nose normal.  Dry mucous membranes.  Oropharynx clear.  Eyes: Pupils are equal and reactive. Conjunctiva normal.   Neck: Supple, full range of motion  Heart: Regular rate and rhythm.  No murmurs.    Lungs: No respiratory distress, normal work of breathing. Lungs clear to auscultation bilaterally.  Abdomen Soft, no distention.  Obese, diffuse mild tenderness to palpation.  No rebound or guarding.  Musculoskeletal: Atraumatic. No obvious " deformities noted.  No lower extremity edema.  Skin: Warm, Dry.  No erythema, No rash.   Neurologic: Alert and oriented x3. Moving all extremities spontaneously without focal deficits.  Psychiatric: Affect normal, Mood normal, Appears appropriate and not intoxicated.      DIAGNOSTIC STUDIES / PROCEDURES    LABS  Labs Reviewed   CBC WITH DIFFERENTIAL - Abnormal; Notable for the following components:       Result Value    WBC 12.5 (*)     Neutrophils-Polys 79.40 (*)     Lymphocytes 9.10 (*)     Neutrophils (Absolute) 9.93 (*)     Monos (Absolute) 0.93 (*)     All other components within normal limits   COMP METABOLIC PANEL - Abnormal; Notable for the following components:    Sodium 134 (*)     Chloride 94 (*)     Glucose 166 (*)     AST(SGOT) 9 (*)     Alkaline Phosphatase 145 (*)     All other components within normal limits   URINALYSIS - Abnormal; Notable for the following components:    Character Cloudy (*)     Bilirubin Small (*)     Leukocyte Esterase Moderate (*)     All other components within normal limits    Narrative:     no extra urine   ESTIMATED GFR - Abnormal; Notable for the following components:    GFR (CKD-EPI) 45 (*)     All other components within normal limits   URINE MICROSCOPIC (W/UA) - Abnormal; Notable for the following components:    WBC 10-20 (*)     Bacteria Rare (*)     Epithelial Cells Many (*)     All other components within normal limits    Narrative:     no extra urine   LIPASE         RADIOLOGY  I have independently interpreted the diagnostic imaging associated with this visit and am waiting the final reading from the radiologist.   My preliminary interpretation is as follows: no bowel obstruction    Radiologist interpretation:  CT-ABDOMEN-PELVIS WITH   Final Result      1.  Induration around left colon is again noted which could be due to colitis.      2.  There is diverticulosis.      3.  Hiatal hernia is again identified.            COURSE & MEDICAL DECISION MAKING      ASSESSMENT,  COURSE AND PLAN  Care Narrative: Patient with history of prior colon resection following diverticulitis who presents with 5-day history of vague abdominal pain and constipation.  She is afebrile with normal vital signs on arrival.  Her abdominal exam is relatively benign without focal tenderness or peritoneal signs.  Labs demonstrate a mild leukocytosis at 12.5.  She has no renal dysfunction or significant electrolyte abnormalities.  Blood sugar is mildly elevated at 166.  No transaminitis or elevated lipase concerning for pancreatitis.  Urinalysis with questionable infection however no symptoms therefore  will not treat.  Her imaging shows will not treat.  Imaging shows a mild area of colitis in the left colon which was present on his CT scan from 2023 as well.  There is no evidence of diverticulitis or infectious process.  No bowel obstruction or perforation.  Will plan to discharge for treatment of constipation.  We did discuss follow-up with gastroenterology due to this chronic appearing colitis however patient states she has had an up-to-date colonoscopy.  Will not treat with antibiotics as it does not seem bacterial in nature.    Upon reassessment, patient is resting comfortably with normal vital signs.  No new complaints at this time.  Discussed results with patient and/or family as well as importance of primary care/gastroenteroloy follow up.  Patient understands plan of care and strict return precautions for new or changing symptoms.             ADDITIONAL PROBLEM LIST  Problem #1:Chronic colitis - discharge with follow up with established GI    Problem #2: Constipation - continue miralax and will discharge with magnesium citrate as well if needed, given diet recommendations      DISPOSITION AND DISCUSSIONS  Escalation of care considered, and ultimately not performed:acute inpatient care management, however at this time, the patient is most appropriate for outpatient management    Decision tools and  prescription drugs considered including, but not limited to: Antibiotics no signs of bacterial infection .      DISPOSITION:  Patient will be discharged home in stable condition.    FOLLOW UP:  Hermelinda Mccabe D.O.  [address] [facility]  [address]  [place] [id number] [phone]    Schedule an appointment as soon as possible for a visit       [facility]  [address] [address]  [phone]  Schedule an appointment as soon as possible for a visit       [facility], Emergency Dept  [address] [facility] [id number] [phone]    If symptoms worsen      OUTPATIENT MEDICATIONS:  Discharge Medication List as of 9/29/2024 11:54 AM            FINAL DIAGNOSIS  1. Colitis    2. Constipation, unspecified constipation type

## 2024-09-29 NOTE — Clinical Note
Pt. Verbalizes understanding of discharge instructions. Ambulates to lobby with steady gait. Pt. Alert/awake in NAD. GCS is 15.

## 2024-09-29 NOTE — ED TRIAGE NOTES
"Chief Complaint   Patient presents with    Abdominal Pain     Generalized abd pain x 5 days  Hx of Diverticulitis    Constipation     X 5 days  Has tried OTC medications w/o relief    Nausea     No vomiting     /73   Pulse 83   Temp 36.8 °C (98.3 °F) (Temporal)   Resp 18   Ht 1.651 m (5' 5\")   Wt (!) 128 kg (281 lb 15.5 oz)   SpO2 90%   BMI 46.92 kg/m²     Pt ambulated to ED w/ visitor for c/o generalized abd pain, constipation and nausea x 5 days.  "

## 2024-11-08 ENCOUNTER — OFFICE VISIT (OUTPATIENT)
Dept: URGENT CARE | Facility: CLINIC | Age: 74
End: 2024-11-08
Payer: MEDICARE

## 2024-11-08 VITALS
WEIGHT: 274 LBS | OXYGEN SATURATION: 93 % | HEIGHT: 65 IN | RESPIRATION RATE: 16 BRPM | SYSTOLIC BLOOD PRESSURE: 128 MMHG | BODY MASS INDEX: 45.65 KG/M2 | TEMPERATURE: 98.1 F | DIASTOLIC BLOOD PRESSURE: 88 MMHG | HEART RATE: 89 BPM

## 2024-11-08 DIAGNOSIS — R06.2 WHEEZING: ICD-10-CM

## 2024-11-08 DIAGNOSIS — R05.1 ACUTE COUGH: ICD-10-CM

## 2024-11-08 DIAGNOSIS — J22 ACUTE RESPIRATORY INFECTION: ICD-10-CM

## 2024-11-08 PROCEDURE — 99214 OFFICE O/P EST MOD 30 MIN: CPT | Performed by: NURSE PRACTITIONER

## 2024-11-08 PROCEDURE — 3074F SYST BP LT 130 MM HG: CPT | Performed by: NURSE PRACTITIONER

## 2024-11-08 PROCEDURE — 3079F DIAST BP 80-89 MM HG: CPT | Performed by: NURSE PRACTITIONER

## 2024-11-08 RX ORDER — METHYLPREDNISOLONE 4 MG/1
TABLET ORAL
Qty: 21 TABLET | Refills: 0 | Status: ON HOLD | OUTPATIENT
Start: 2024-11-08 | End: 2024-11-29

## 2024-11-08 RX ORDER — DOXYCYCLINE HYCLATE 100 MG
100 TABLET ORAL 2 TIMES DAILY
Qty: 14 TABLET | Refills: 0 | Status: SHIPPED | OUTPATIENT
Start: 2024-11-08 | End: 2024-11-15

## 2024-11-08 ASSESSMENT — FIBROSIS 4 INDEX: FIB4 SCORE: 0.65

## 2024-11-08 NOTE — PROGRESS NOTES
"Guerda Lunsford is a 73 y.o. female who presents for Cough (X 3 days) and Congestion (X 3 days)      HPI  This is a new problem. Guerda Lunsford is a 73 y.o. patient who presents to urgent care with c/o: cough and congestion in her chest for 3 days. Trying to cough stuff out. Crackling in her chest at night when she lays down. Feeling fatigued.   Antibiotics and a steroid citlali always help her feel better.   Non smoker  Has used her inhaler. Tried a small amount nyquil last night.     ROS See HPI    Allergies:       Allergies   Allergen Reactions    Azithromycin Unspecified     Pt states she feels a burning and hot sensation \"I can feel it in my veins, all the way through my body down to the bottom of my feet.\"    Cymbalta [Duloxetine Hcl] Unspecified     Make blood pressure go up    Lisinopril Cough     Cough      Demerol Rash     Rash at injection site, N/V.    Montelukast Anxiety     Gets nightmares - will never take it again       PMSFS Hx:  Past Medical History:   Diagnosis Date    Allergy     Anemia 1997    Bleeding hemorrhoids    Anxiety     Arthritis     Bronchitis     Cataract     bilateral    Chest pain 2/1/2016    Cholesterol serum elevated     diet controlled    Dental disorder 10/2020    Filled chipped front upper tooth    Depression     anxiety    Detached retina, left     Diverticulitis     Diverticulitis 3/28/2016    Gynecological disorder 1982    bleeding, cervical pecancer    Headache     Occasionally, can become severe if she does not take Advil. ICD-10 transition    Headache(784.0)     Occasionally, can become severe if she does not take Advil.    Heart murmur     Comes and goes    Hemorrhoids 1997    had surgery    High cholesterol     Hypertension     non-medicated    Hyponatremia 3/29/2016    Indigestion     Infectious disease     uti    Macular hole of right eye     Pain     Pneumonia     Shortness of breath 2/28/2014    SIRS (systemic inflammatory response syndrome) (HCC) " 3/29/2016    Urinary tract infection, site not specified      Past Surgical History:   Procedure Laterality Date    PB TOTAL KNEE ARTHROPLASTY Right 2021    Procedure: ARTHROPLASTY, KNEE, TOTAL Anton cementless;  Surgeon: Sonido Amado M.D.;  Location: SURGERY [facility];  Service: Orthopedics    PB TOTAL KNEE ARTHROPLASTY Left 2021    Procedure: ARTHROPLASTY, KNEE, TOTAL.;  Surgeon: Sonido Amado M.D.;  Location: SURGERY [facility];  Service: Orthopedics    BREAST BIOPSY  2009    LAMINOTOMY      2 Lumbar Discs    ABDOMINAL HYSTERECTOMY TOTAL      Cervical precancer and bleeding.  No oophorectomy.    APPENDECTOMY      GYN SURGERY      hysterectomy - partial    OTHER      hemorrhoidectomy    OTHER Right     cataract extraction    OTHER Right     Macular Hole Surgery    OTHER ABDOMINAL SURGERY      appy    OTHER ORTHOPEDIC SURGERY      lami     Family History   Problem Relation Age of Onset    Diabetes Mother     Heart Disease Mother 60        MI then CABG    Heart Disease Father         CHF    Alcohol/Drug Maternal Aunt     Heart Disease Maternal Grandmother 65        MI,  with it    Alcohol/Drug Maternal Grandmother     Alcohol/Drug Maternal Grandfather     Alcohol/Drug Paternal Grandmother      Social History     Tobacco Use    Smoking status: Never    Smokeless tobacco: Never   Substance Use Topics    Alcohol use: No     Alcohol/week: 0.0 oz         Problems:   Patient Active Problem List   Diagnosis    Depression    Routine health maintenance    Colon polyp    Menopausal and postmenopausal disorder    Anemia    Hemorrhoids    Heart murmur    Vitamin d deficiency    Hyperglycemia    Abnormal EKG    Diverticulitis    Osteoarthritis of right knee    Primary hypertension    Class 3 severe obesity due to excess calories without serious comorbidity with body mass index (BMI) of 40.0 to 44.9 in adult (HCC)       Medications:   Current Outpatient Medications on File Prior to Visit  "  Medication Sig Dispense Refill    losartan (COZAAR) 50 MG Tab Take 50 mg by mouth every day.      Ibuprofen-Acetaminophen (ADVIL DUAL ACTION PO) Take 2 Tablets by mouth 2 times a day as needed (For pain). (OTC)      pantoprazole (PROTONIX) 40 MG Tablet Delayed Response Take 40 mg by mouth every evening.      glipiZIDE (GLUCOTROL) 5 MG Tab Take 5 mg by mouth every day. Pt reports that she takes this medication QDAY, not BID      sertraline (ZOLOFT) 50 MG Tab Take 25 mg by mouth every day.      buPROPion (WELLBUTRIN XL) 300 MG XL tablet Take 300 mg by mouth every morning.      albuterol 108 (90 Base) MCG/ACT Aero Soln inhalation aerosol Inhale 2 Puffs every 6 hours as needed for Shortness of Breath. 8.5 g 0    atorvastatin (LIPITOR) 20 MG Tab Take 20 mg by mouth every evening.      metoprolol SR (TOPROL XL) 25 MG TABLET SR 24 HR Take 25 mg by mouth every evening.      hydrochlorothiazide (HYDRODIURIL) 25 MG Tab Take 25 mg by mouth every day.       No current facility-administered medications on file prior to visit.        Objective:     /88 (BP Location: Left arm, Patient Position: Sitting, BP Cuff Size: Large adult)   Pulse 89   Temp 36.7 °C (98.1 °F)   Resp 16   Ht 1.651 m (5' 5\")   Wt 124 kg (274 lb)   SpO2 93%   BMI 45.60 kg/m²     Physical Exam  Vitals and nursing note reviewed.   Constitutional:       General: She is not in acute distress.     Appearance: Normal appearance. She is well-developed. She is not ill-appearing or toxic-appearing.   HENT:      Head: Normocephalic.      Right Ear: Hearing normal. No middle ear effusion. Tympanic membrane is injected. Tympanic membrane is not erythematous.      Left Ear: Hearing normal.  No middle ear effusion. Tympanic membrane is injected. Tympanic membrane is not erythematous.      Nose: Rhinorrhea present. No mucosal edema or congestion.      Right Sinus: No maxillary sinus tenderness or frontal sinus tenderness.      Left Sinus: No maxillary sinus " tenderness or frontal sinus tenderness.      Mouth/Throat:      Mouth: Mucous membranes are moist.      Pharynx: Uvula midline. No posterior oropharyngeal erythema.      Tonsils: No tonsillar abscesses.   Neck:      Trachea: Trachea normal.   Cardiovascular:      Rate and Rhythm: Normal rate and regular rhythm.      Chest Wall: PMI is not displaced.      Pulses: Normal pulses.      Heart sounds: Normal heart sounds.   Pulmonary:      Effort: Pulmonary effort is normal. No respiratory distress.      Breath sounds: Wheezing and rhonchi present. No decreased breath sounds or rales.   Musculoskeletal:         General: Normal range of motion.      Cervical back: Full passive range of motion without pain, normal range of motion and neck supple.   Lymphadenopathy:      Cervical: No cervical adenopathy.      Upper Body:      Right upper body: No supraclavicular adenopathy.      Left upper body: No supraclavicular adenopathy.   Skin:     General: Skin is warm and dry.      Capillary Refill: Capillary refill takes less than 2 seconds.   Neurological:      Mental Status: She is alert and oriented to person, place, and time.      Gait: Gait normal.   Psychiatric:         Mood and Affect: Mood normal.         Speech: Speech normal.         Behavior: Behavior normal. Behavior is cooperative.         Assessment /Associated Orders:      1. Acute respiratory infection  doxycycline (VIBRAMYCIN) 100 MG Tab    methylPREDNISolone (MEDROL DOSEPAK) 4 MG Tablet Therapy Pack      2. Acute cough        3. Wheezing  methylPREDNISolone (MEDROL DOSEPAK) 4 MG Tablet Therapy Pack            Medical Decision Making:    Guerda Lunsford is a very pleasant 73 y.o. female who is clinically stable at today's acute urgent care visit.    No acute distress is noted.  VSS. Appropriate for outpatient care at this time.   Acute problem today with uncertain prognosis.  Patient with acute lower respiratory infection.  She has wheezing and rhonchi.  Her  oxygen saturation is 93% on room air.  In the past she has responded well to antibiotics and Medrol pack.  She has not had any antibiotics since June and no steroids since April 2023.  She has been using her MDI albuterol inhaler with only mild relief.    Educated in proper administration of  prescription medication(s) ordered today including safety, possible SE, risks, benefits, rationale and alternatives to therapy.   Do not take additional NSAIDS or steroids while taking RX medication. Educated on risk of lower immunity in short term, weight changes, mood changes, increased serum glucose and elevated BP while taking steroids.   Keep well hydrated  Resume all prior  RX medications. Take as prescribed.    Cool mist humidifier at night prn   Through shared decision making a discussion of the Dx and DDx, management options (risks,benefits, and alternatives to planned treatment), natural progression and supportive care.  Expressed understanding and the treatment plan was agreed upon.   Questions were encouraged and answered   Return to urgent care prn if new or worsening sx or if there is no improvement in condition prn.    Educated in Red flags and indications to immediately call 911 or present to the Emergency Department.       Time I spent evaluating Guerda Lunsford in urgent care today was 30  minutes. This time includes preparing for visit, reviewing any pertinent notes or test results, counseling/education, exam, obtaining HPI, interpretation of lab tests, medication management and documentation as indicated above.Time does not include separately billable procedures noted .       Please note that this dictation was created using voice recognition software. I have worked with consultants from the vendor as well as technical experts from [facility] to optimize the interface. I have made every reasonable attempt to correct obvious errors, but I expect that there are errors of grammar and possibly content  that I did not discover before finalizing the note.  This note was electronically signed by provider

## 2024-11-19 ENCOUNTER — HOSPITAL ENCOUNTER (INPATIENT)
Facility: MEDICAL CENTER | Age: 74
LOS: 10 days | End: 2024-11-29
Attending: EMERGENCY MEDICINE | Admitting: HOSPITALIST
Payer: MEDICARE

## 2024-11-19 ENCOUNTER — APPOINTMENT (OUTPATIENT)
Dept: RADIOLOGY | Facility: MEDICAL CENTER | Age: 74
End: 2024-11-19
Attending: EMERGENCY MEDICINE
Payer: MEDICARE

## 2024-11-19 ENCOUNTER — APPOINTMENT (OUTPATIENT)
Dept: RADIOLOGY | Facility: MEDICAL CENTER | Age: 74
End: 2024-11-19
Attending: HOSPITALIST
Payer: MEDICARE

## 2024-11-19 DIAGNOSIS — E87.6 HYPOKALEMIA: ICD-10-CM

## 2024-11-19 DIAGNOSIS — K56.7 ILEUS OF UNSPECIFIED TYPE (HCC): ICD-10-CM

## 2024-11-19 DIAGNOSIS — R01.1 HEART MURMUR: ICD-10-CM

## 2024-11-19 DIAGNOSIS — N95.9 MENOPAUSAL AND POSTMENOPAUSAL DISORDER: ICD-10-CM

## 2024-11-19 DIAGNOSIS — Z71.89 ACP (ADVANCE CARE PLANNING): ICD-10-CM

## 2024-11-19 DIAGNOSIS — K57.92 DIVERTICULITIS: ICD-10-CM

## 2024-11-19 DIAGNOSIS — R73.9 HYPERGLYCEMIA: ICD-10-CM

## 2024-11-19 DIAGNOSIS — Z43.3 COLOSTOMY CARE (HCC): ICD-10-CM

## 2024-11-19 DIAGNOSIS — E86.0 DEHYDRATION: ICD-10-CM

## 2024-11-19 DIAGNOSIS — E66.01 CLASS 3 SEVERE OBESITY DUE TO EXCESS CALORIES WITHOUT SERIOUS COMORBIDITY WITH BODY MASS INDEX (BMI) OF 40.0 TO 44.9 IN ADULT (HCC): Chronic | ICD-10-CM

## 2024-11-19 DIAGNOSIS — A41.9 SEPSIS WITHOUT ACUTE ORGAN DYSFUNCTION, DUE TO UNSPECIFIED ORGANISM (HCC): ICD-10-CM

## 2024-11-19 DIAGNOSIS — I10 PRIMARY HYPERTENSION: ICD-10-CM

## 2024-11-19 DIAGNOSIS — E87.1 HYPONATREMIA: ICD-10-CM

## 2024-11-19 DIAGNOSIS — Z01.818 PREOPERATIVE EXAMINATION: ICD-10-CM

## 2024-11-19 DIAGNOSIS — Z00.00 ROUTINE HEALTH MAINTENANCE: ICD-10-CM

## 2024-11-19 DIAGNOSIS — R94.31 ABNORMAL EKG: ICD-10-CM

## 2024-11-19 DIAGNOSIS — R00.0 SINUS TACHYCARDIA: ICD-10-CM

## 2024-11-19 DIAGNOSIS — E66.813 CLASS 3 SEVERE OBESITY DUE TO EXCESS CALORIES WITHOUT SERIOUS COMORBIDITY WITH BODY MASS INDEX (BMI) OF 40.0 TO 44.9 IN ADULT (HCC): Chronic | ICD-10-CM

## 2024-11-19 DIAGNOSIS — I48.91 ATRIAL FIBRILLATION, UNSPECIFIED TYPE (HCC): ICD-10-CM

## 2024-11-19 DIAGNOSIS — R11.2 NAUSEA AND VOMITING, UNSPECIFIED VOMITING TYPE: ICD-10-CM

## 2024-11-19 DIAGNOSIS — N82.4 COLOVAGINAL FISTULA: ICD-10-CM

## 2024-11-19 DIAGNOSIS — K57.32 SIGMOID DIVERTICULITIS: ICD-10-CM

## 2024-11-19 DIAGNOSIS — N18.31 STAGE 3A CHRONIC KIDNEY DISEASE: ICD-10-CM

## 2024-11-19 DIAGNOSIS — K56.609 INTESTINAL OBSTRUCTION, UNSPECIFIED CAUSE, UNSPECIFIED WHETHER PARTIAL OR COMPLETE (HCC): ICD-10-CM

## 2024-11-19 DIAGNOSIS — F32.A DEPRESSION, UNSPECIFIED DEPRESSION TYPE: ICD-10-CM

## 2024-11-19 PROBLEM — I48.0 PAROXYSMAL ATRIAL FIBRILLATION (HCC): Status: ACTIVE | Noted: 2024-11-19

## 2024-11-19 LAB
ALBUMIN SERPL BCP-MCNC: 3.5 G/DL (ref 3.2–4.9)
ALBUMIN/GLOB SERPL: 1.3 G/DL
ALP SERPL-CCNC: 113 U/L (ref 30–99)
ALT SERPL-CCNC: 9 U/L (ref 2–50)
ANION GAP SERPL CALC-SCNC: 15 MMOL/L (ref 7–16)
ANION GAP SERPL CALC-SCNC: 16 MMOL/L (ref 7–16)
APPEARANCE UR: CLEAR
AST SERPL-CCNC: 16 U/L (ref 12–45)
BACTERIA #/AREA URNS HPF: ABNORMAL /HPF
BASOPHILS # BLD AUTO: 0.2 % (ref 0–1.8)
BASOPHILS # BLD: 0.03 K/UL (ref 0–0.12)
BILIRUB SERPL-MCNC: 0.9 MG/DL (ref 0.1–1.5)
BILIRUB UR QL STRIP.AUTO: ABNORMAL
BUN SERPL-MCNC: 17 MG/DL (ref 8–22)
BUN SERPL-MCNC: 18 MG/DL (ref 8–22)
CALCIUM ALBUM COR SERPL-MCNC: 9 MG/DL (ref 8.5–10.5)
CALCIUM SERPL-MCNC: 8.3 MG/DL (ref 8.4–10.2)
CALCIUM SERPL-MCNC: 8.6 MG/DL (ref 8.4–10.2)
CHLORIDE SERPL-SCNC: 89 MMOL/L (ref 96–112)
CHLORIDE SERPL-SCNC: 92 MMOL/L (ref 96–112)
CO2 SERPL-SCNC: 26 MMOL/L (ref 20–33)
CO2 SERPL-SCNC: 29 MMOL/L (ref 20–33)
COLOR UR: YELLOW
CREAT SERPL-MCNC: 1.06 MG/DL (ref 0.5–1.4)
CREAT SERPL-MCNC: 1.13 MG/DL (ref 0.5–1.4)
EKG IMPRESSION: NORMAL
EOSINOPHIL # BLD AUTO: 0.21 K/UL (ref 0–0.51)
EOSINOPHIL NFR BLD: 1.4 % (ref 0–6.9)
EPI CELLS #/AREA URNS HPF: ABNORMAL /HPF
ERYTHROCYTE [DISTWIDTH] IN BLOOD BY AUTOMATED COUNT: 39.8 FL (ref 35.9–50)
GFR SERPLBLD CREATININE-BSD FMLA CKD-EPI: 51 ML/MIN/1.73 M 2
GFR SERPLBLD CREATININE-BSD FMLA CKD-EPI: 55 ML/MIN/1.73 M 2
GLOBULIN SER CALC-MCNC: 2.7 G/DL (ref 1.9–3.5)
GLUCOSE BLD STRIP.AUTO-MCNC: 118 MG/DL (ref 65–99)
GLUCOSE BLD STRIP.AUTO-MCNC: 137 MG/DL (ref 65–99)
GLUCOSE SERPL-MCNC: 135 MG/DL (ref 65–99)
GLUCOSE SERPL-MCNC: 142 MG/DL (ref 65–99)
GLUCOSE UR STRIP.AUTO-MCNC: NEGATIVE MG/DL
HCT VFR BLD AUTO: 41.5 % (ref 37–47)
HGB BLD-MCNC: 14.7 G/DL (ref 12–16)
IMM GRANULOCYTES # BLD AUTO: 0.08 K/UL (ref 0–0.11)
IMM GRANULOCYTES NFR BLD AUTO: 0.5 % (ref 0–0.9)
KETONES UR STRIP.AUTO-MCNC: ABNORMAL MG/DL
LACTATE SERPL-SCNC: 0.8 MMOL/L (ref 0.5–2)
LACTATE SERPL-SCNC: 0.9 MMOL/L (ref 0.5–2)
LEUKOCYTE ESTERASE UR QL STRIP.AUTO: ABNORMAL
LIPASE SERPL-CCNC: 11 U/L (ref 11–82)
LYMPHOCYTES # BLD AUTO: 1.62 K/UL (ref 1–4.8)
LYMPHOCYTES NFR BLD: 10.7 % (ref 22–41)
MCH RBC QN AUTO: 29.1 PG (ref 27–33)
MCHC RBC AUTO-ENTMCNC: 35.4 G/DL (ref 32.2–35.5)
MCV RBC AUTO: 82 FL (ref 81.4–97.8)
MICRO URNS: ABNORMAL
MONOCYTES # BLD AUTO: 1.09 K/UL (ref 0–0.85)
MONOCYTES NFR BLD AUTO: 7.2 % (ref 0–13.4)
NEUTROPHILS # BLD AUTO: 12.11 K/UL (ref 1.82–7.42)
NEUTROPHILS NFR BLD: 80 % (ref 44–72)
NITRITE UR QL STRIP.AUTO: NEGATIVE
NRBC # BLD AUTO: 0 K/UL
NRBC BLD-RTO: 0 /100 WBC (ref 0–0.2)
PH UR STRIP.AUTO: 5.5 [PH] (ref 5–8)
PLATELET # BLD AUTO: 408 K/UL (ref 164–446)
PMV BLD AUTO: 8.8 FL (ref 9–12.9)
POTASSIUM SERPL-SCNC: 2.9 MMOL/L (ref 3.6–5.5)
POTASSIUM SERPL-SCNC: 3.1 MMOL/L (ref 3.6–5.5)
PROT SERPL-MCNC: 6.2 G/DL (ref 6–8.2)
PROT UR QL STRIP: NEGATIVE MG/DL
RBC # BLD AUTO: 5.06 M/UL (ref 4.2–5.4)
RBC # URNS HPF: ABNORMAL /HPF
RBC UR QL AUTO: ABNORMAL
SODIUM SERPL-SCNC: 133 MMOL/L (ref 135–145)
SODIUM SERPL-SCNC: 134 MMOL/L (ref 135–145)
SP GR UR STRIP.AUTO: 1.01
TROPONIN T SERPL-MCNC: 26 NG/L (ref 6–19)
TROPONIN T SERPL-MCNC: 29 NG/L (ref 6–19)
WBC # BLD AUTO: 15.1 K/UL (ref 4.8–10.8)
WBC #/AREA URNS HPF: ABNORMAL /HPF

## 2024-11-19 PROCEDURE — 36415 COLL VENOUS BLD VENIPUNCTURE: CPT

## 2024-11-19 PROCEDURE — 96375 TX/PRO/DX INJ NEW DRUG ADDON: CPT

## 2024-11-19 PROCEDURE — 85025 COMPLETE CBC W/AUTO DIFF WBC: CPT

## 2024-11-19 PROCEDURE — 71045 X-RAY EXAM CHEST 1 VIEW: CPT

## 2024-11-19 PROCEDURE — 99497 ADVNCD CARE PLAN 30 MIN: CPT | Performed by: HOSPITALIST

## 2024-11-19 PROCEDURE — 87040 BLOOD CULTURE FOR BACTERIA: CPT

## 2024-11-19 PROCEDURE — 770020 HCHG ROOM/CARE - TELE (206)

## 2024-11-19 PROCEDURE — 83605 ASSAY OF LACTIC ACID: CPT | Mod: 91

## 2024-11-19 PROCEDURE — 96365 THER/PROPH/DIAG IV INF INIT: CPT

## 2024-11-19 PROCEDURE — 99223 1ST HOSP IP/OBS HIGH 75: CPT | Mod: 25,AI | Performed by: HOSPITALIST

## 2024-11-19 PROCEDURE — 99285 EMERGENCY DEPT VISIT HI MDM: CPT

## 2024-11-19 PROCEDURE — 700111 HCHG RX REV CODE 636 W/ 250 OVERRIDE (IP): Performed by: HOSPITALIST

## 2024-11-19 PROCEDURE — 81001 URINALYSIS AUTO W/SCOPE: CPT

## 2024-11-19 PROCEDURE — 80048 BASIC METABOLIC PNL TOTAL CA: CPT

## 2024-11-19 PROCEDURE — 700111 HCHG RX REV CODE 636 W/ 250 OVERRIDE (IP): Mod: JZ | Performed by: EMERGENCY MEDICINE

## 2024-11-19 PROCEDURE — 700117 HCHG RX CONTRAST REV CODE 255: Performed by: EMERGENCY MEDICINE

## 2024-11-19 PROCEDURE — 74177 CT ABD & PELVIS W/CONTRAST: CPT

## 2024-11-19 PROCEDURE — 84484 ASSAY OF TROPONIN QUANT: CPT

## 2024-11-19 PROCEDURE — 93005 ELECTROCARDIOGRAM TRACING: CPT

## 2024-11-19 PROCEDURE — 87086 URINE CULTURE/COLONY COUNT: CPT

## 2024-11-19 PROCEDURE — 87077 CULTURE AEROBIC IDENTIFY: CPT

## 2024-11-19 PROCEDURE — 304538 HCHG NG TUBE

## 2024-11-19 PROCEDURE — 83690 ASSAY OF LIPASE: CPT

## 2024-11-19 PROCEDURE — 96367 TX/PROPH/DG ADDL SEQ IV INF: CPT

## 2024-11-19 PROCEDURE — 700105 HCHG RX REV CODE 258: Performed by: EMERGENCY MEDICINE

## 2024-11-19 PROCEDURE — 93005 ELECTROCARDIOGRAM TRACING: CPT | Performed by: EMERGENCY MEDICINE

## 2024-11-19 PROCEDURE — 700101 HCHG RX REV CODE 250: Performed by: HOSPITALIST

## 2024-11-19 PROCEDURE — 80053 COMPREHEN METABOLIC PANEL: CPT

## 2024-11-19 PROCEDURE — 82962 GLUCOSE BLOOD TEST: CPT

## 2024-11-19 RX ORDER — LORAZEPAM 2 MG/ML
0.5 INJECTION INTRAMUSCULAR ONCE
Status: SHIPPED | OUTPATIENT
Start: 2024-11-19 | End: 2024-11-22

## 2024-11-19 RX ORDER — LORAZEPAM 2 MG/ML
0.5 INJECTION INTRAMUSCULAR ONCE
Status: COMPLETED | OUTPATIENT
Start: 2024-11-19 | End: 2024-11-19

## 2024-11-19 RX ORDER — INSULIN LISPRO 100 [IU]/ML
1-6 INJECTION, SOLUTION INTRAVENOUS; SUBCUTANEOUS EVERY 6 HOURS
Status: DISCONTINUED | OUTPATIENT
Start: 2024-11-19 | End: 2024-11-29 | Stop reason: HOSPADM

## 2024-11-19 RX ORDER — LORAZEPAM 2 MG/ML
0.5 INJECTION INTRAMUSCULAR
Status: DISCONTINUED | OUTPATIENT
Start: 2024-11-19 | End: 2024-11-22

## 2024-11-19 RX ORDER — ONDANSETRON 4 MG/1
4 TABLET, ORALLY DISINTEGRATING ORAL EVERY 4 HOURS PRN
Status: DISCONTINUED | OUTPATIENT
Start: 2024-11-19 | End: 2024-11-29 | Stop reason: HOSPADM

## 2024-11-19 RX ORDER — MORPHINE SULFATE 4 MG/ML
4 INJECTION INTRAVENOUS ONCE
Status: COMPLETED | OUTPATIENT
Start: 2024-11-19 | End: 2024-11-19

## 2024-11-19 RX ORDER — BUPROPION HYDROCHLORIDE 150 MG/1
150 TABLET, EXTENDED RELEASE ORAL DAILY
Status: DISCONTINUED | OUTPATIENT
Start: 2024-11-20 | End: 2024-11-22

## 2024-11-19 RX ORDER — METOPROLOL SUCCINATE 25 MG/1
25 TABLET, EXTENDED RELEASE ORAL EVERY EVENING
Status: DISCONTINUED | OUTPATIENT
Start: 2024-11-19 | End: 2024-11-28

## 2024-11-19 RX ORDER — SODIUM CHLORIDE 9 MG/ML
1000 INJECTION, SOLUTION INTRAVENOUS ONCE
Status: COMPLETED | OUTPATIENT
Start: 2024-11-19 | End: 2024-11-19

## 2024-11-19 RX ORDER — POTASSIUM CHLORIDE 7.45 MG/ML
10 INJECTION INTRAVENOUS
Status: DISCONTINUED | OUTPATIENT
Start: 2024-11-19 | End: 2024-11-19

## 2024-11-19 RX ORDER — METOPROLOL TARTRATE 1 MG/ML
5 INJECTION, SOLUTION INTRAVENOUS
Status: DISCONTINUED | OUTPATIENT
Start: 2024-11-19 | End: 2024-11-29 | Stop reason: HOSPADM

## 2024-11-19 RX ORDER — ALBUTEROL SULFATE 90 UG/1
2 INHALANT RESPIRATORY (INHALATION)
Status: DISCONTINUED | OUTPATIENT
Start: 2024-11-19 | End: 2024-11-29 | Stop reason: HOSPADM

## 2024-11-19 RX ORDER — PANTOPRAZOLE SODIUM 40 MG/10ML
40 INJECTION, POWDER, LYOPHILIZED, FOR SOLUTION INTRAVENOUS 2 TIMES DAILY
Status: DISCONTINUED | OUTPATIENT
Start: 2024-11-19 | End: 2024-11-25

## 2024-11-19 RX ORDER — LINACLOTIDE 290 UG/1
290 CAPSULE, GELATIN COATED ORAL
Status: ON HOLD | COMMUNITY
End: 2024-11-29

## 2024-11-19 RX ORDER — ASPIRIN 81 MG/1
81 TABLET ORAL DAILY
Status: DISCONTINUED | OUTPATIENT
Start: 2024-11-20 | End: 2024-11-19

## 2024-11-19 RX ORDER — CEFTRIAXONE 2 G/1
2000 INJECTION, POWDER, FOR SOLUTION INTRAMUSCULAR; INTRAVENOUS ONCE
Status: COMPLETED | OUTPATIENT
Start: 2024-11-19 | End: 2024-11-19

## 2024-11-19 RX ORDER — ONDANSETRON 2 MG/ML
4 INJECTION INTRAMUSCULAR; INTRAVENOUS EVERY 4 HOURS PRN
Status: DISCONTINUED | OUTPATIENT
Start: 2024-11-19 | End: 2024-11-29 | Stop reason: HOSPADM

## 2024-11-19 RX ORDER — METRONIDAZOLE 500 MG/100ML
500 INJECTION, SOLUTION INTRAVENOUS ONCE
Status: COMPLETED | OUTPATIENT
Start: 2024-11-19 | End: 2024-11-19

## 2024-11-19 RX ORDER — DOXYCYCLINE HYCLATE 100 MG
100 TABLET ORAL 2 TIMES DAILY
Status: ON HOLD | COMMUNITY
End: 2024-11-29

## 2024-11-19 RX ORDER — ACETAMINOPHEN 325 MG/1
650 TABLET ORAL EVERY 6 HOURS PRN
Status: DISCONTINUED | OUTPATIENT
Start: 2024-11-19 | End: 2024-11-25

## 2024-11-19 RX ORDER — LABETALOL HYDROCHLORIDE 5 MG/ML
10 INJECTION, SOLUTION INTRAVENOUS EVERY 8 HOURS
Status: DISCONTINUED | OUTPATIENT
Start: 2024-11-19 | End: 2024-11-22

## 2024-11-19 RX ORDER — LABETALOL HYDROCHLORIDE 5 MG/ML
10 INJECTION, SOLUTION INTRAVENOUS EVERY 4 HOURS PRN
Status: DISCONTINUED | OUTPATIENT
Start: 2024-11-19 | End: 2024-11-22

## 2024-11-19 RX ORDER — DEXTROSE MONOHYDRATE 25 G/50ML
25 INJECTION, SOLUTION INTRAVENOUS
Status: DISCONTINUED | OUTPATIENT
Start: 2024-11-19 | End: 2024-11-29 | Stop reason: HOSPADM

## 2024-11-19 RX ORDER — ONDANSETRON 4 MG/1
4 TABLET, ORALLY DISINTEGRATING ORAL EVERY 6 HOURS PRN
COMMUNITY
End: 2024-12-02

## 2024-11-19 RX ORDER — ONDANSETRON 2 MG/ML
4 INJECTION INTRAMUSCULAR; INTRAVENOUS ONCE
Status: COMPLETED | OUTPATIENT
Start: 2024-11-19 | End: 2024-11-19

## 2024-11-19 RX ORDER — HEPARIN SODIUM 5000 [USP'U]/ML
5000 INJECTION, SOLUTION INTRAVENOUS; SUBCUTANEOUS EVERY 8 HOURS
Status: DISCONTINUED | OUTPATIENT
Start: 2024-11-20 | End: 2024-11-20

## 2024-11-19 RX ORDER — POTASSIUM CHLORIDE 7.45 MG/ML
10 INJECTION INTRAVENOUS
Status: COMPLETED | OUTPATIENT
Start: 2024-11-19 | End: 2024-11-19

## 2024-11-19 RX ORDER — ATORVASTATIN CALCIUM 20 MG/1
20 TABLET, FILM COATED ORAL EVERY EVENING
Status: DISCONTINUED | OUTPATIENT
Start: 2024-11-19 | End: 2024-11-29 | Stop reason: HOSPADM

## 2024-11-19 RX ORDER — METRONIDAZOLE 500 MG/100ML
500 INJECTION, SOLUTION INTRAVENOUS EVERY 8 HOURS
Status: DISCONTINUED | OUTPATIENT
Start: 2024-11-20 | End: 2024-11-21

## 2024-11-19 RX ORDER — SODIUM CHLORIDE AND POTASSIUM CHLORIDE 300; 900 MG/100ML; MG/100ML
INJECTION, SOLUTION INTRAVENOUS CONTINUOUS
Status: DISCONTINUED | OUTPATIENT
Start: 2024-11-19 | End: 2024-11-20

## 2024-11-19 RX ADMIN — LORAZEPAM 0.5 MG: 2 INJECTION INTRAMUSCULAR; INTRAVENOUS at 18:55

## 2024-11-19 RX ADMIN — SODIUM CHLORIDE 1000 ML: 9 INJECTION, SOLUTION INTRAVENOUS at 18:29

## 2024-11-19 RX ADMIN — CEFTRIAXONE SODIUM 2000 MG: 2 INJECTION, POWDER, FOR SOLUTION INTRAMUSCULAR; INTRAVENOUS at 18:29

## 2024-11-19 RX ADMIN — METRONIDAZOLE 500 MG: 5 INJECTION, SOLUTION INTRAVENOUS at 18:31

## 2024-11-19 RX ADMIN — MORPHINE SULFATE 4 MG: 4 INJECTION, SOLUTION INTRAMUSCULAR; INTRAVENOUS at 16:03

## 2024-11-19 RX ADMIN — POTASSIUM CHLORIDE 10 MEQ: 7.46 INJECTION, SOLUTION INTRAVENOUS at 19:49

## 2024-11-19 RX ADMIN — ONDANSETRON 4 MG: 2 INJECTION INTRAMUSCULAR; INTRAVENOUS at 16:04

## 2024-11-19 RX ADMIN — PANTOPRAZOLE SODIUM 40 MG: 40 INJECTION, POWDER, FOR SOLUTION INTRAVENOUS at 19:49

## 2024-11-19 RX ADMIN — IOHEXOL 100 ML: 350 INJECTION, SOLUTION INTRAVENOUS at 17:48

## 2024-11-19 RX ADMIN — POTASSIUM CHLORIDE 10 MEQ: 7.46 INJECTION, SOLUTION INTRAVENOUS at 20:49

## 2024-11-19 RX ADMIN — POTASSIUM CHLORIDE AND SODIUM CHLORIDE: 900; 300 INJECTION, SOLUTION INTRAVENOUS at 22:06

## 2024-11-19 SDOH — ECONOMIC STABILITY: TRANSPORTATION INSECURITY
IN THE PAST 12 MONTHS, HAS THE LACK OF TRANSPORTATION KEPT YOU FROM MEDICAL APPOINTMENTS OR FROM GETTING MEDICATIONS?: NO

## 2024-11-19 SDOH — ECONOMIC STABILITY: TRANSPORTATION INSECURITY
IN THE PAST 12 MONTHS, HAS LACK OF RELIABLE TRANSPORTATION KEPT YOU FROM MEDICAL APPOINTMENTS, MEETINGS, WORK OR FROM GETTING THINGS NEEDED FOR DAILY LIVING?: NO

## 2024-11-19 ASSESSMENT — COGNITIVE AND FUNCTIONAL STATUS - GENERAL
TOILETING: A LITTLE
SUGGESTED CMS G CODE MODIFIER MOBILITY: CJ
HELP NEEDED FOR BATHING: A LITTLE
DAILY ACTIVITIY SCORE: 21
MOVING TO AND FROM BED TO CHAIR: A LITTLE
STANDING UP FROM CHAIR USING ARMS: A LITTLE
WALKING IN HOSPITAL ROOM: A LITTLE
SUGGESTED CMS G CODE MODIFIER DAILY ACTIVITY: CJ
MOBILITY SCORE: 20
DRESSING REGULAR UPPER BODY CLOTHING: A LITTLE
CLIMB 3 TO 5 STEPS WITH RAILING: A LITTLE

## 2024-11-19 ASSESSMENT — LIFESTYLE VARIABLES
TOTAL SCORE: 0
AVERAGE NUMBER OF DAYS PER WEEK YOU HAVE A DRINK CONTAINING ALCOHOL: 0
TOTAL SCORE: 0
HAVE YOU EVER FELT YOU SHOULD CUT DOWN ON YOUR DRINKING: NO
EVER FELT BAD OR GUILTY ABOUT YOUR DRINKING: NO
ON A TYPICAL DAY WHEN YOU DRINK ALCOHOL HOW MANY DRINKS DO YOU HAVE: 0
DOES PATIENT WANT TO STOP DRINKING: NO
ALCOHOL_USE: NO
HOW MANY TIMES IN THE PAST YEAR HAVE YOU HAD 5 OR MORE DRINKS IN A DAY: 0
TOTAL SCORE: 0
EVER HAD A DRINK FIRST THING IN THE MORNING TO STEADY YOUR NERVES TO GET RID OF A HANGOVER: NO
HAVE PEOPLE ANNOYED YOU BY CRITICIZING YOUR DRINKING: NO
CONSUMPTION TOTAL: NEGATIVE

## 2024-11-19 ASSESSMENT — ENCOUNTER SYMPTOMS
ABDOMINAL PAIN: 1
SHORTNESS OF BREATH: 0
EYE DISCHARGE: 0
COUGH: 0
NAUSEA: 1
BRUISES/BLEEDS EASILY: 0
FLANK PAIN: 0
CHILLS: 0
STRIDOR: 0
FEVER: 0
FOCAL WEAKNESS: 0
EYE REDNESS: 0
NERVOUS/ANXIOUS: 0
MYALGIAS: 0

## 2024-11-19 ASSESSMENT — SOCIAL DETERMINANTS OF HEALTH (SDOH)
WITHIN THE PAST 12 MONTHS, THE FOOD YOU BOUGHT JUST DIDN'T LAST AND YOU DIDN'T HAVE MONEY TO GET MORE: NEVER TRUE
WITHIN THE LAST YEAR, HAVE YOU BEEN HUMILIATED OR EMOTIONALLY ABUSED IN OTHER WAYS BY YOUR PARTNER OR EX-PARTNER?: NO
WITHIN THE LAST YEAR, HAVE YOU BEEN AFRAID OF YOUR PARTNER OR EX-PARTNER?: NO
WITHIN THE LAST YEAR, HAVE YOU BEEN KICKED, HIT, SLAPPED, OR OTHERWISE PHYSICALLY HURT BY YOUR PARTNER OR EX-PARTNER?: NO
WITHIN THE LAST YEAR, HAVE TO BEEN RAPED OR FORCED TO HAVE ANY KIND OF SEXUAL ACTIVITY BY YOUR PARTNER OR EX-PARTNER?: NO
IN THE PAST 12 MONTHS, HAS THE ELECTRIC, GAS, OIL, OR WATER COMPANY THREATENED TO SHUT OFF SERVICE IN YOUR HOME?: NO
WITHIN THE PAST 12 MONTHS, YOU WORRIED THAT YOUR FOOD WOULD RUN OUT BEFORE YOU GOT THE MONEY TO BUY MORE: NEVER TRUE

## 2024-11-19 ASSESSMENT — PAIN DESCRIPTION - PAIN TYPE
TYPE: ACUTE PAIN
TYPE: ACUTE PAIN

## 2024-11-19 ASSESSMENT — FIBROSIS 4 INDEX
FIB4 SCORE: 0.95
FIB4 SCORE: 0.65

## 2024-11-19 NOTE — ED PROVIDER NOTES
ER Provider Note    Scribed for Humberto Lopez D.O. by Gerald Tyson. 11/19/2024  3:26 PM    Primary Care Provider: Celso Tipton M.D.    CHIEF COMPLAINT  Chief Complaint   Patient presents with    Epigastric Pain     Has been persisting throughout the day paired with SOB.        HPI/ROS    Guerda Lunsford is a 73 y.o. female who presents to the Emergency Department for evaluation of epigastric pain, onset 2 days ago. The patient reports the pain has not radiated since onset. She reports nausea and multiple episodes of vomiting over the last 2 days. She also notes abdominal distention. She reports constipation initially, however she took some Laxatives and has been able to produce a bowel movement. She reports a history of colitis, gastritis, and diverticulitis. She notes a surgical history of appendectomy and hysterectomy.       PAST MEDICAL HISTORY  Past Medical History:   Diagnosis Date    Allergy     Anemia 1997    Bleeding hemorrhoids    Anxiety     Arthritis     Bronchitis     Cataract     bilateral    Chest pain 2/1/2016    Cholesterol serum elevated     diet controlled    Dental disorder 10/2020    Filled chipped front upper tooth    Depression     anxiety    Detached retina, left     Diverticulitis     Diverticulitis 3/28/2016    Gynecological disorder 1982    bleeding, cervical pecancer    Headache     Occasionally, can become severe if she does not take Advil. ICD-10 transition    Headache(784.0)     Occasionally, can become severe if she does not take Advil.    Heart murmur     Comes and goes    Hemorrhoids 1997    had surgery    High cholesterol     Hypertension     non-medicated    Hyponatremia 3/29/2016    Indigestion     Infectious disease     uti    Macular hole of right eye     Pain     Pneumonia     Shortness of breath 2/28/2014    SIRS (systemic inflammatory response syndrome) (HCC) 3/29/2016    Urinary tract infection, site not specified        SURGICAL HISTORY  Past Surgical History:    Procedure Laterality Date    PB TOTAL KNEE ARTHROPLASTY Right 2021    Procedure: ARTHROPLASTY, KNEE, TOTAL [place] cementless;  Surgeon: Sonido Amado M.D.;  Location: SURGERY [facility];  Service: Orthopedics    PB TOTAL KNEE ARTHROPLASTY Left 2021    Procedure: ARTHROPLASTY, KNEE, TOTAL.;  Surgeon: Sonido Amado M.D.;  Location: SURGERY [facility];  Service: Orthopedics    BREAST BIOPSY  2009    LAMINOTOMY      2 Lumbar Discs    ABDOMINAL HYSTERECTOMY TOTAL      Cervical precancer and bleeding.  No oophorectomy.    APPENDECTOMY      GYN SURGERY      hysterectomy - partial    OTHER      hemorrhoidectomy    OTHER Right     cataract extraction    OTHER Right     Macular Hole Surgery    OTHER ABDOMINAL SURGERY      appy    OTHER ORTHOPEDIC SURGERY      lami       FAMILY HISTORY  Family History   Problem Relation Age of Onset    Diabetes Mother     Heart Disease Mother 60        MI then CABG    Heart Disease Father         CHF    Alcohol/Drug Maternal Aunt     Heart Disease Maternal Grandmother 65        MI,  with it    Alcohol/Drug Maternal Grandmother     Alcohol/Drug Maternal Grandfather     Alcohol/Drug Paternal Grandmother        SOCIAL HISTORY   reports that she has never smoked. She has never used smokeless tobacco. She reports that she does not drink alcohol and does not use drugs.    CURRENT MEDICATIONS  Previous Medications    ALBUTEROL 108 (90 BASE) MCG/ACT AERO SOLN INHALATION AEROSOL    Inhale 2 Puffs every 6 hours as needed for Shortness of Breath.    ATORVASTATIN (LIPITOR) 20 MG TAB    Take 20 mg by mouth every evening.    BUPROPION (WELLBUTRIN XL) 300 MG XL TABLET    Take 300 mg by mouth every morning.    DOXYCYCLINE (VIBRAMYCIN) 100 MG TAB    Take 100 mg by mouth 2 times a day. Pt started on 2024 for 7 day course, PRESCRIPTION COURSE WAS COMPLETED    GLIPIZIDE (GLUCOTROL) 5 MG TAB    Take 5 mg by mouth every day. Pt reports that she takes this medication QDAY, not  "BID    HYDROCHLOROTHIAZIDE (HYDRODIURIL) 25 MG TAB    Take 25 mg by mouth every day.    IBUPROFEN-ACETAMINOPHEN (ADVIL DUAL ACTION PO)    Take 2 Tablets by mouth 2 times a day as needed (For pain). (OTC)    LINACLOTIDE (LINZESS) 290 MCG CAP    Take 290 mg by mouth 1 time a day as needed (For constipation).    METHYLPREDNISOLONE (MEDROL DOSEPAK) 4 MG TABLET THERAPY PACK    Follow schedule on package instructions.    METOPROLOL SR (TOPROL XL) 25 MG TABLET SR 24 HR    Take 25 mg by mouth every evening.    ONDANSETRON (ZOFRAN ODT) 4 MG TABLET DISPERSIBLE    Take 4 mg by mouth every 6 hours as needed for Nausea/Vomiting.    PANTOPRAZOLE (PROTONIX) 40 MG TABLET DELAYED RESPONSE    Take 40 mg by mouth every evening.    SERTRALINE (ZOLOFT) 50 MG TAB    Take 50 mg by mouth every day.       ALLERGIES  Azithromycin, Cymbalta [duloxetine hcl], Lisinopril, Demerol, and Montelukast    PHYSICAL EXAM  /74   Pulse (!) 120   Temp 36.4 °C (97.6 °F) (Temporal)   Resp 20   Ht 1.651 m (5' 5\")   Wt 124 kg (274 lb)   SpO2 92%   BMI 45.60 kg/m²     General: Moderate distress due to pain. BMI greater than 30.   HENT: Normocephalic, Mucus membranes are moist.   Chest: Lungs have even and unlabored respirations, Clear to auscultation.   Cardiovascular: Tachycardic, No peripheral cyanosis.  Abdomen: Tender in epigastric. Distended.  Neuro: Awake, Conversive, Able to relay recent events.  Psychiatric: Calm and cooperative.           INITIAL ASSESSMENT  Patient has had vomiting with abdominal distention. She has history of colitis, diverticulitis, and gastritis. Concerns for obstruction, gallbladder disease, and colitis. Will treat for pain and nausea and evaluate with CT scan and lab tests.    ED Observation Status? No; Patient does not meet criteria for ED Observation.     DIAGNOSTIC STUDIES    Labs:   Results for orders placed or performed during the hospital encounter of 11/19/24   EKG    Collection Time: 11/19/24  2:46 PM "   Result Value Ref Range    Report       [facility] Emergency Dept.    Test Date:  2024  Pt Name:    KARYNA BARBER             Department: [facility]  MRN:        [id number]                      Room:  Gender:     Female                       Technician: [id number]  :        [id number]                   Requested By:ER TRIAGE PROTOCOL  Order #:    [id number]                    Reading MD:    Measurements  Intervals                                Axis  Rate:       125                          P:          0  WY:         0                            QRS:        -19  QRSD:       114                          T:          125  QT:         358  QTc:        517    Interpretive Statements  Atrial fibrillation  Incomplete right bundle branch block  Low voltage, precordial leads  Repol abnrm suggests ischemia, diffuse leads  Artifact in lead(s) I,III,aVR,aVL,aVF,V1,V2,V3,V4,V5,V6  Compared to ECG 2021 14:36:15  Incomplete right bundle-branch block now present  Low QRS voltage now present  Early repolarization  now present  Possible ischemia now present  Sinus rhythm no longer present  Right ventricular hypertrophy no longer present     CBC WITH DIFFERENTIAL    Collection Time: 24  4:01 PM   Result Value Ref Range    WBC 15.1 (H) 4.8 - 10.8 K/uL    RBC 5.06 4.20 - 5.40 M/uL    Hemoglobin 14.7 12.0 - 16.0 g/dL    Hematocrit 41.5 37.0 - 47.0 %    MCV 82.0 81.4 - 97.8 fL    MCH 29.1 27.0 - 33.0 pg    MCHC 35.4 32.2 - 35.5 g/dL    RDW 39.8 35.9 - 50.0 fL    Platelet Count 408 164 - 446 K/uL    MPV 8.8 (L) 9.0 - 12.9 fL    Neutrophils-Polys 80.00 (H) 44.00 - 72.00 %    Lymphocytes 10.70 (L) 22.00 - 41.00 %    Monocytes 7.20 0.00 - 13.40 %    Eosinophils 1.40 0.00 - 6.90 %    Basophils 0.20 0.00 - 1.80 %    Immature Granulocytes 0.50 0.00 - 0.90 %    Nucleated RBC 0.00 0.00 - 0.20 /100 WBC    Neutrophils (Absolute) 12.11 (H) 1.82 - 7.42 K/uL    Lymphs (Absolute) 1.62 1.00 - 4.80 K/uL    Monos  (Absolute) 1.09 (H) 0.00 - 0.85 K/uL    Eos (Absolute) 0.21 0.00 - 0.51 K/uL    Baso (Absolute) 0.03 0.00 - 0.12 K/uL    Immature Granulocytes (abs) 0.08 0.00 - 0.11 K/uL    NRBC (Absolute) 0.00 K/uL   COMP METABOLIC PANEL    Collection Time: 11/19/24  4:01 PM   Result Value Ref Range    Sodium 133 (L) 135 - 145 mmol/L    Potassium 2.9 (L) 3.6 - 5.5 mmol/L    Chloride 89 (L) 96 - 112 mmol/L    Co2 29 20 - 33 mmol/L    Anion Gap 15.0 7.0 - 16.0    Glucose 135 (H) 65 - 99 mg/dL    Bun 17 8 - 22 mg/dL    Creatinine 1.13 0.50 - 1.40 mg/dL    Calcium 8.6 8.4 - 10.2 mg/dL    Correct Calcium 9.0 8.5 - 10.5 mg/dL    AST(SGOT) 16 12 - 45 U/L    ALT(SGPT) 9 2 - 50 U/L    Alkaline Phosphatase 113 (H) 30 - 99 U/L    Total Bilirubin 0.9 0.1 - 1.5 mg/dL    Albumin 3.5 3.2 - 4.9 g/dL    Total Protein 6.2 6.0 - 8.2 g/dL    Globulin 2.7 1.9 - 3.5 g/dL    A-G Ratio 1.3 g/dL   LIPASE    Collection Time: 11/19/24  4:01 PM   Result Value Ref Range    Lipase 11 11 - 82 U/L   TROPONIN    Collection Time: 11/19/24  4:01 PM   Result Value Ref Range    Troponin T 29 (H) 6 - 19 ng/L   ESTIMATED GFR    Collection Time: 11/19/24  4:01 PM   Result Value Ref Range    GFR (CKD-EPI) 51 (A) >60 mL/min/1.73 m 2         EKG:   I have independently interpreted the above EKG.    Radiology:   The attending emergency physician has independently interpreted the diagnostic imaging associated with this visit and am waiting the final reading from the radiologist.   Preliminary interpretation is as follows:   Radiologist interpretation:   CT-ABDOMEN-PELVIS WITH    (Results Pending)         COURSE & MEDICAL DECISION MAKING     COURSE AND PLAN  3:26 PM - Patient seen and examined at bedside. Discussed plan of care, including lab work, diagnsotic imaging, and medication for pain. Patient agrees to the plan of care. The patient will be medicated with Zofran 4 mg injection and morphine 4 mg injection. Ordered for EKG, Troponin, Lipase, CMP, CBC w/ diff, and  CT-Abdomen-Pelvis to evaluate her symptoms.     ED Summary: Patient presents with abdominal pain nausea and vomiting.  She does have distention there is concerns for a obstruction.  Lab test shows mild increase in CO2, there is also hypokalemia.    Potassium chloride is being initiated IV, and the patient is still pending CAT scan.  The patient is endorsed to the oncoming physician and will receive follow-up disposition      DISPOSITION AND DISCUSSIONS  I have discussed management of the patient with the following physicians and LOUIE's: Sheeles the oncoming physician      Endorsed to the oncoming physician    FINAL DIAGNOSIS  1. Generalized abdominal pain        Gerald RODRIGUEZ (Pao), am scribing for, and in the presence of, Humberto Lopez D.O..    Electronically signed by: Gerald Tyson (Pao), 11/19/2024    IHumberto D.O. personally performed the services described in this documentation, as scribed by Gerald Tyson in my presence, and it is both accurate and complete.     The note accurately reflects work and decisions made by me.  Humberto Lopez D.O.  11/19/2024  5:47 PM

## 2024-11-19 NOTE — ED TRIAGE NOTES
"Patient presents to the ER with the following complaints:    Chief Complaint   Patient presents with    Epigastric Pain     Has been persisting throughout the day paired with SOB.        /74   Pulse (!) 120   Temp 36.4 °C (97.6 °F) (Temporal)   Resp 20   Ht 1.651 m (5' 5\")   Wt 124 kg (274 lb)   SpO2 92%   BMI 45.60 kg/m²       "

## 2024-11-19 NOTE — ED NOTES
Medication history reviewed with pt. Med rec is complete.  Allergies reviewed, per pt    Pt started DOXYCYCLINE 100MG on 11/8/2024 for 7 day course, per pt did finish course of antibiotic     Pt is not on any anticoagulants

## 2024-11-20 ENCOUNTER — APPOINTMENT (OUTPATIENT)
Dept: CARDIOLOGY | Facility: MEDICAL CENTER | Age: 74
End: 2024-11-20
Attending: HOSPITALIST
Payer: MEDICARE

## 2024-11-20 ENCOUNTER — APPOINTMENT (OUTPATIENT)
Dept: RADIOLOGY | Facility: MEDICAL CENTER | Age: 74
End: 2024-11-20
Attending: HOSPITALIST
Payer: MEDICARE

## 2024-11-20 LAB
ALBUMIN SERPL BCP-MCNC: 3.4 G/DL (ref 3.2–4.9)
ALBUMIN/GLOB SERPL: 1.3 G/DL
ALP SERPL-CCNC: 106 U/L (ref 30–99)
ALT SERPL-CCNC: 11 U/L (ref 2–50)
ANION GAP SERPL CALC-SCNC: 14 MMOL/L (ref 7–16)
AST SERPL-CCNC: 18 U/L (ref 12–45)
BILIRUB SERPL-MCNC: 1.1 MG/DL (ref 0.1–1.5)
BUN SERPL-MCNC: 18 MG/DL (ref 8–22)
CALCIUM ALBUM COR SERPL-MCNC: 8.8 MG/DL (ref 8.5–10.5)
CALCIUM SERPL-MCNC: 8.3 MG/DL (ref 8.4–10.2)
CHLORIDE SERPL-SCNC: 93 MMOL/L (ref 96–112)
CHOLEST SERPL-MCNC: 105 MG/DL (ref 100–199)
CO2 SERPL-SCNC: 27 MMOL/L (ref 20–33)
CREAT SERPL-MCNC: 1.1 MG/DL (ref 0.5–1.4)
ERYTHROCYTE [DISTWIDTH] IN BLOOD BY AUTOMATED COUNT: 41.2 FL (ref 35.9–50)
GFR SERPLBLD CREATININE-BSD FMLA CKD-EPI: 53 ML/MIN/1.73 M 2
GLOBULIN SER CALC-MCNC: 2.7 G/DL (ref 1.9–3.5)
GLUCOSE BLD STRIP.AUTO-MCNC: 106 MG/DL (ref 65–99)
GLUCOSE BLD STRIP.AUTO-MCNC: 81 MG/DL (ref 65–99)
GLUCOSE BLD STRIP.AUTO-MCNC: 91 MG/DL (ref 65–99)
GLUCOSE SERPL-MCNC: 121 MG/DL (ref 65–99)
HCT VFR BLD AUTO: 40.5 % (ref 37–47)
HDLC SERPL-MCNC: 47 MG/DL
HGB BLD-MCNC: 13.8 G/DL (ref 12–16)
LDLC SERPL CALC-MCNC: 41 MG/DL
LV EJECT FRACT  99904: 75
MAGNESIUM SERPL-MCNC: 2.1 MG/DL (ref 1.5–2.5)
MCH RBC QN AUTO: 28.9 PG (ref 27–33)
MCHC RBC AUTO-ENTMCNC: 34.1 G/DL (ref 32.2–35.5)
MCV RBC AUTO: 84.7 FL (ref 81.4–97.8)
NT-PROBNP SERPL IA-MCNC: 1453 PG/ML (ref 0–125)
PLATELET # BLD AUTO: 385 K/UL (ref 164–446)
PMV BLD AUTO: 8.9 FL (ref 9–12.9)
POTASSIUM SERPL-SCNC: 3.2 MMOL/L (ref 3.6–5.5)
PROT SERPL-MCNC: 6.1 G/DL (ref 6–8.2)
RBC # BLD AUTO: 4.78 M/UL (ref 4.2–5.4)
SODIUM SERPL-SCNC: 134 MMOL/L (ref 135–145)
TRIGL SERPL-MCNC: 86 MG/DL (ref 0–149)
TROPONIN T SERPL-MCNC: 28 NG/L (ref 6–19)
WBC # BLD AUTO: 13.6 K/UL (ref 4.8–10.8)

## 2024-11-20 PROCEDURE — 700101 HCHG RX REV CODE 250: Performed by: HOSPITALIST

## 2024-11-20 PROCEDURE — 93306 TTE W/DOPPLER COMPLETE: CPT | Mod: 26 | Performed by: INTERNAL MEDICINE

## 2024-11-20 PROCEDURE — 93306 TTE W/DOPPLER COMPLETE: CPT

## 2024-11-20 PROCEDURE — 82962 GLUCOSE BLOOD TEST: CPT

## 2024-11-20 PROCEDURE — 700117 HCHG RX CONTRAST REV CODE 255: Performed by: HOSPITALIST

## 2024-11-20 PROCEDURE — 700111 HCHG RX REV CODE 636 W/ 250 OVERRIDE (IP): Mod: JZ | Performed by: HOSPITALIST

## 2024-11-20 PROCEDURE — 85027 COMPLETE CBC AUTOMATED: CPT

## 2024-11-20 PROCEDURE — 700105 HCHG RX REV CODE 258: Performed by: HOSPITALIST

## 2024-11-20 PROCEDURE — 83735 ASSAY OF MAGNESIUM: CPT

## 2024-11-20 PROCEDURE — 83880 ASSAY OF NATRIURETIC PEPTIDE: CPT

## 2024-11-20 PROCEDURE — 84484 ASSAY OF TROPONIN QUANT: CPT

## 2024-11-20 PROCEDURE — 700111 HCHG RX REV CODE 636 W/ 250 OVERRIDE (IP): Performed by: HOSPITALIST

## 2024-11-20 PROCEDURE — 36415 COLL VENOUS BLD VENIPUNCTURE: CPT

## 2024-11-20 PROCEDURE — 80061 LIPID PANEL: CPT

## 2024-11-20 PROCEDURE — 80053 COMPREHEN METABOLIC PANEL: CPT

## 2024-11-20 PROCEDURE — 94760 N-INVAS EAR/PLS OXIMETRY 1: CPT

## 2024-11-20 PROCEDURE — 770020 HCHG ROOM/CARE - TELE (206)

## 2024-11-20 PROCEDURE — 99232 SBSQ HOSP IP/OBS MODERATE 35: CPT | Performed by: HOSPITALIST

## 2024-11-20 RX ORDER — ENOXAPARIN SODIUM 100 MG/ML
40 INJECTION SUBCUTANEOUS DAILY
Status: DISCONTINUED | OUTPATIENT
Start: 2024-11-20 | End: 2024-11-27

## 2024-11-20 RX ORDER — SODIUM CHLORIDE AND POTASSIUM CHLORIDE 150; 900 MG/100ML; MG/100ML
INJECTION, SOLUTION INTRAVENOUS CONTINUOUS
Status: ACTIVE | OUTPATIENT
Start: 2024-11-20 | End: 2024-11-20

## 2024-11-20 RX ADMIN — METRONIDAZOLE 500 MG: 5 INJECTION, SOLUTION INTRAVENOUS at 18:12

## 2024-11-20 RX ADMIN — HUMAN ALBUMIN MICROSPHERES AND PERFLUTREN 3 ML: 10; .22 INJECTION, SOLUTION INTRAVENOUS at 16:19

## 2024-11-20 RX ADMIN — METRONIDAZOLE 500 MG: 5 INJECTION, SOLUTION INTRAVENOUS at 02:51

## 2024-11-20 RX ADMIN — PANTOPRAZOLE SODIUM 40 MG: 40 INJECTION, POWDER, FOR SOLUTION INTRAVENOUS at 05:18

## 2024-11-20 RX ADMIN — CEFTRIAXONE SODIUM 2000 MG: 2 INJECTION, POWDER, FOR SOLUTION INTRAMUSCULAR; INTRAVENOUS at 09:40

## 2024-11-20 RX ADMIN — ENOXAPARIN SODIUM 40 MG: 100 INJECTION SUBCUTANEOUS at 17:10

## 2024-11-20 RX ADMIN — POTASSIUM CHLORIDE AND SODIUM CHLORIDE: 900; 150 INJECTION, SOLUTION INTRAVENOUS at 11:15

## 2024-11-20 RX ADMIN — POTASSIUM CHLORIDE AND SODIUM CHLORIDE: 900; 300 INJECTION, SOLUTION INTRAVENOUS at 09:39

## 2024-11-20 RX ADMIN — METRONIDAZOLE 500 MG: 5 INJECTION, SOLUTION INTRAVENOUS at 10:32

## 2024-11-20 RX ADMIN — PANTOPRAZOLE SODIUM 40 MG: 40 INJECTION, POWDER, FOR SOLUTION INTRAVENOUS at 17:10

## 2024-11-20 ASSESSMENT — PAIN DESCRIPTION - PAIN TYPE
TYPE: ACUTE PAIN
TYPE: ACUTE PAIN

## 2024-11-20 ASSESSMENT — ENCOUNTER SYMPTOMS
SPUTUM PRODUCTION: 0
ABDOMINAL PAIN: 1
NAUSEA: 0
HEMOPTYSIS: 0
DIZZINESS: 0
CHILLS: 0
ORTHOPNEA: 0
PALPITATIONS: 0
COUGH: 0
VOMITING: 0

## 2024-11-20 NOTE — CARE PLAN
The patient is Watcher - Medium risk of patient condition declining or worsening    Shift Goals  Clinical Goals: NGT LIS, NPO  Patient Goals: Comfort    Progress made toward(s) clinical / shift goals:    Problem: Pain - Standard  Goal: Alleviation of pain or a reduction in pain to the patient’s comfort goal  Outcome: Progressing     Problem: Knowledge Deficit - Standard  Goal: Patient and family/care givers will demonstrate understanding of plan of care, disease process/condition, diagnostic tests and medications  Outcome: Progressing     Problem: Fall Risk  Goal: Patient will remain free from falls  Outcome: Progressing       Patient is not progressing towards the following goals:

## 2024-11-20 NOTE — ED NOTES
Pt oxygen sat at 97% RA, pt placed 2 L nasal cannula.     NG tube placed, xray at bedside to confirm placement.

## 2024-11-20 NOTE — ED PROVIDER NOTES
ER Provider Note    Scribed for Jerry Frederick D.o. by Monica Do. 11/19/2024  5:48 PM    Primary Care Provider: Celso Tipton M.D.    CHIEF COMPLAINT   Chief Complaint   Patient presents with    Epigastric Pain     Has been persisting throughout the day paired with SOB.      EXTERNAL RECORDS REVIEWED  Outpatient Notes patient has been seen and evaluated at the pulmonary/sleep for chronic medical pulmonary disease.    HPI/ROS  LIMITATION TO HISTORY   Select: : None  OUTSIDE HISTORIAN(S):  None    Guerda Lunsford is a 73 y.o. female who presents to the ED complaining of upper abdominal pain onset prior to arrival. The patient states that she has been nauseous and has been vomiting for the past two days, and notes that her abdomen has been distended. She reports that she had undergone an appendectomy and hysterectomy in the past but denies any other surgeries. The patient denies being on any blood thinners.     PAST MEDICAL HISTORY  Past Medical History:   Diagnosis Date    Allergy     Anemia 1997    Bleeding hemorrhoids    Anxiety     Arthritis     Bronchitis     Cataract     bilateral    Chest pain 2/1/2016    Cholesterol serum elevated     diet controlled    Dental disorder 10/2020    Filled chipped front upper tooth    Depression     anxiety    Detached retina, left     Diverticulitis     Diverticulitis 3/28/2016    Gynecological disorder 1982    bleeding, cervical pecancer    Headache     Occasionally, can become severe if she does not take Advil. ICD-10 transition    Headache(784.0)     Occasionally, can become severe if she does not take Advil.    Heart murmur     Comes and goes    Hemorrhoids 1997    had surgery    High cholesterol     Hypertension     non-medicated    Hyponatremia 3/29/2016    Indigestion     Infectious disease     uti    Macular hole of right eye     Pain     Pneumonia     Shortness of breath 2/28/2014    SIRS (systemic inflammatory response syndrome) (HCC) 3/29/2016     Urinary tract infection, site not specified        SURGICAL HISTORY  Past Surgical History:   Procedure Laterality Date    PB TOTAL KNEE ARTHROPLASTY Right 2021    Procedure: ARTHROPLASTY, KNEE, TOTAL Lup[place] cementless;  Surgeon: Sonido Amado M.D.;  Location: SURGERY [facility];  Service: Orthopedics    PB TOTAL KNEE ARTHROPLASTY Left 2021    Procedure: ARTHROPLASTY, KNEE, TOTAL.;  Surgeon: Sonido Amado M.D.;  Location: SURGERY [facility];  Service: Orthopedics    BREAST BIOPSY  2009    LAMINOTOMY      2 Lumbar Discs    ABDOMINAL HYSTERECTOMY TOTAL      Cervical precancer and bleeding.  No oophorectomy.    APPENDECTOMY      GYN SURGERY      hysterectomy - partial    OTHER      hemorrhoidectomy    OTHER Right     cataract extraction    OTHER Right     Macular Hole Surgery    OTHER ABDOMINAL SURGERY      appy    OTHER ORTHOPEDIC SURGERY      lami       FAMILY HISTORY  Family History   Problem Relation Age of Onset    Diabetes Mother     Heart Disease Mother 60        MI then CABG    Heart Disease Father         CHF    Alcohol/Drug Maternal Aunt     Heart Disease Maternal Grandmother 65        MI,  with it    Alcohol/Drug Maternal Grandmother     Alcohol/Drug Maternal Grandfather     Alcohol/Drug Paternal Grandmother        SOCIAL HISTORY   reports that she has never smoked. She has never used smokeless tobacco. She reports that she does not drink alcohol and does not use drugs.    CURRENT MEDICATIONS  Previous Medications    ALBUTEROL 108 (90 BASE) MCG/ACT AERO SOLN INHALATION AEROSOL    Inhale 2 Puffs every 6 hours as needed for Shortness of Breath.    ATORVASTATIN (LIPITOR) 20 MG TAB    Take 20 mg by mouth every evening.    BUPROPION (WELLBUTRIN XL) 300 MG XL TABLET    Take 300 mg by mouth every morning.    DOXYCYCLINE (VIBRAMYCIN) 100 MG TAB    Take 100 mg by mouth 2 times a day. Pt started on 2024 for 7 day course, PRESCRIPTION COURSE WAS COMPLETED    GLIPIZIDE (GLUCOTROL) 5 MG  "TAB    Take 5 mg by mouth every day. Pt reports that she takes this medication QDAY, not BID    HYDROCHLOROTHIAZIDE (HYDRODIURIL) 25 MG TAB    Take 25 mg by mouth every day.    IBUPROFEN-ACETAMINOPHEN (ADVIL DUAL ACTION PO)    Take 2 Tablets by mouth 2 times a day as needed (For pain). (OTC)    LINACLOTIDE (LINZESS) 290 MCG CAP    Take 290 mg by mouth 1 time a day as needed (For constipation).    METHYLPREDNISOLONE (MEDROL DOSEPAK) 4 MG TABLET THERAPY PACK    Follow schedule on package instructions.    METOPROLOL SR (TOPROL XL) 25 MG TABLET SR 24 HR    Take 25 mg by mouth every evening.    ONDANSETRON (ZOFRAN ODT) 4 MG TABLET DISPERSIBLE    Take 4 mg by mouth every 6 hours as needed for Nausea/Vomiting.    PANTOPRAZOLE (PROTONIX) 40 MG TABLET DELAYED RESPONSE    Take 40 mg by mouth every evening.    SERTRALINE (ZOLOFT) 50 MG TAB    Take 50 mg by mouth every day.       ALLERGIES  Azithromycin, Cymbalta [duloxetine hcl], Lisinopril, Demerol, and Montelukast    PHYSICAL EXAM  /74   Pulse (!) 120   Temp 36.4 °C (97.6 °F) (Temporal)   Resp 20   Ht 1.651 m (5' 5\")   Wt 124 kg (274 lb)   SpO2 92%   BMI 45.60 kg/m²   General: No acute distress, lips appear dry, elderly  Neuro: Awake alert and oriented, muscle strength sensation normal  Neck: Supple  Cardiac: Regular rate and rhythm, occasional PVC/PAC  Pulmonary: Slightly tachypneic, minimal wheeze  Abdomen: Soft, epigastric tenderness, distended abdomen  Back: Nontender  Psych: Normal  Skin: Pink warm dry  Extremities: Full range of motion, muscle strength sensation intact 2+ pulses    DIAGNOSTIC STUDIES    EKG/LABS  Labs Reviewed   CBC WITH DIFFERENTIAL - Abnormal; Notable for the following components:       Result Value    WBC 15.1 (*)     MPV 8.8 (*)     Neutrophils-Polys 80.00 (*)     Lymphocytes 10.70 (*)     Neutrophils (Absolute) 12.11 (*)     Monos (Absolute) 1.09 (*)     All other components within normal limits   COMP METABOLIC PANEL - Abnormal; " Notable for the following components:    Sodium 133 (*)     Potassium 2.9 (*)     Chloride 89 (*)     Glucose 135 (*)     Alkaline Phosphatase 113 (*)     All other components within normal limits   TROPONIN - Abnormal; Notable for the following components:    Troponin T 29 (*)     All other components within normal limits   ESTIMATED GFR - Abnormal; Notable for the following components:    GFR (CKD-EPI) 51 (*)     All other components within normal limits   LIPASE   LACTIC ACID   LACTIC ACID   LACTIC ACID   URINALYSIS   URINE CULTURE(NEW)   BLOOD CULTURE   BLOOD CULTURE   TROPONIN       I have independently interpreted these labs.     RADIOLOGY/PROCEDURES   The attending emergency physician has independently interpreted the diagnostic imaging associated with this visit and am waiting the final reading from the radiologist.   My preliminary interpretation is a follows: Diverticulitis, ileus?    Radiologist interpretation:  DX-CHEST-PORTABLE (1 VIEW)   Final Result      Cardiomegaly.      CT-ABDOMEN-PELVIS WITH   Final Result      1.  Diffusely dilated colon with some bowel wall thickening. This extends to the level of the upper sigmoid colon where there is some focal narrowing. Small intestine is also dilated with air/fluid levels. Differential diagnosis includes ileus as well as    distal colonic obstruction.      2.  Sigmoid diverticulosis. There is some mild stranding attenuation seen in the area of the narrowed sigmoid colon which may represent a component of diverticulitis.      3.  Small hiatal hernia.          COURSE & MEDICAL DECISION MAKING     ASSESSMENT, COURSE AND PLAN    Differential diagnoses include but not limited to: Small bowel obstruction vs gastritis vs pancreatitis vs cholecystitis.     Care Narrative:  The patient is a 73 y.o. female who presents to the ED complaining of upper abdominal pain onset prior to arrival. Ordered labs and radiology to evaluate the patient's condition.       6:07 PM -  Patient's care was transferred from Dr. Lopez. Patient presents to the ED with epigastric pain onset prior to arrival. Discussed the plan of care with the patient including providing the patient with medications as well as ordering labs and imaging to further evaluate the patient's condition. The patient was able to ask questions at this time. Patient will be treated with Zofran 4 mg and Morphine 4 mg. Ordered for DX-chest portable, Lactic acid, UA, Urine culture, Blood culture x 2 to evaluate her symptoms.     6:02 PM - Patient was reevaluated at bedside. Patient's heart rate has improved since arrival but does have sepsis.  Diverticulitis.  Will treat the patient with antibiotics and will admit them to the hospital. Informed the patient about her lab results and the plan for admission. The patient was able to ask questions at this time and agrees to the plan of admission.     6:13 PM - I discussed the patient's case and the above findings with Dr. Persaud (Surgery) who recommends that a NG-tube be placed for the nausea and vomiting in the setting of ileus versus obstruction and agrees to follow up with the patient once they are admitted. Sudha hospitalist.     6:30 PM - I discussed the patient's case and the above findings with Dr. Wren (Hospitalist) who agrees to admit the patient to the hospital.    Additionally patient was dehydrated and given crystalloid.  Potassium repleted.      ADDITIONAL PROBLEM LIST  Abdominal pain: CT scan demonstrated diverticulitis with ileus and/or obstruction, surgical recommend NG tube.  Patient treated with antibiotics  Sepsis: Antibiotics, blood cultures sepsis order set, fluid  Hypokalemia: Repleted    DISPOSITION AND DISCUSSIONS  I have discussed management of the patient with the following physicians and LOUEI's:  Dr. Persaud (Surgery), Dr. Wren (Hospitalist).    Discussion of management with other [facility] or appropriate source(s): None     DISPOSITION:  Patient will be  hospitalized by Dr. Wren (Hospitalist) in guarded condition.    FINAL DIAGNOSIS  1. Diverticulitis    2. Hypokalemia    3. Ileus of unspecified type (HCC)    4. Sepsis without acute organ dysfunction, due to unspecified organism (HCC)    5. Nausea and vomiting, unspecified vomiting type       I, Monica Do (Scribe), am scribing for, and in the presence of, Jerry Frederick D.O..    Electronically signed by: Monica Do (Scribe), 11/19/2024    IJerry D.O. personally performed the services described in this documentation, as scribed by Monica Do in my presence, and it is both accurate and complete.    The note accurately reflects work and decisions made by me.  Jerry Frederick D.O.  11/19/2024  10:54 PM

## 2024-11-20 NOTE — PROGRESS NOTES
4 Eyes Skin Assessment Completed by NORY Santana and NORY Li.    Head WDL  Ears WDL  Nose WDL  Mouth WDL  Neck WDL  Breast/Chest WDL  Shoulder Blades WDL  Spine WDL  (R) Arm/Elbow/Hand Bruising  (L) Arm/Elbow/Hand Bruising  Abdomen Scar  Groin WDL  Scrotum/Coccyx/Buttocks Redness and Blanching  (R) Leg WDL  (L) Leg WDL  (R) Heel/Foot/Toe Redness, Blanching, and Boggy, Calloused, Bruising  (L) Heel/Foot/Toe Redness, Blanching, and Boggy, Calloused, Bruising          Devices In Places Tele Box, Pulse Ox, and OG/NG      Interventions In Place NC W/Ear Foams and Pillows    Possible Skin Injury No    Pictures Uploaded Into Epic N/A  Wound Consult Placed N/A  RN Wound Prevention Protocol Ordered No

## 2024-11-20 NOTE — HOSPITAL COURSE
Guerda Washington has past medical history which includes diabetes, colitis, and diverticulitis.  Surgical history includes appendectomy and hysterectomy.  She presented to the emergency room 11/19/2024 for evaluation of epigastric pain and distention.  CT imaging was concerning for diffusely dilated colon with some bowel wall thickening.  Differential diagnosis includes ileus as well as distal colonic obstruction.  The patient was hospitalized, NG tube was placed, intravenous fluids were ordered, and surgery was consulted.  On 11/24/2024, the patient underwent laparoscopic transverse loop colostomy.

## 2024-11-20 NOTE — PROGRESS NOTES
Telemetry Shift Summary     Rhythm: SR/ST  Rate: 90s-100s  Measurements: 0.16/0.10/0.38  Ectopy (reported by Monitor Tech): frequent PAC, rare PVC     Normal Values  Rhythm: Sinus  HR:   Measurements: 0.12-0.20/0.06-0.10/0.30-0.52

## 2024-11-20 NOTE — H&P
Hospital Medicine History & Physical Note    Date of Service  11/19/2024    Primary Care Physician  Celso Tipton M.D.    Consultants  General Surgery, Dr Persaud     Code Status  Full Code    Chief Complaint  Chief Complaint   Patient presents with    Epigastric Pain     Has been persisting throughout the day paired with SOB.      History of Presenting Illness  Guerda Lunsford is a 73 y.o. female with a past medical history of morbid obesity with a BMI of 45, hypertension, diabetes and hyperlipidemia who presented 11/19/2024 with abdominal distention, generalized weakness and abdominal pain.  Patient reports that she has not been feeling well over the past 2 days.  She has been having progressive worsening abdominal pain, distention in addition to nausea.  She denies having fevers or chills.      I discussed the plan of care with emergency physician, and the patient.       Review of Systems  Review of Systems   Constitutional:  Positive for malaise/fatigue. Negative for chills and fever.   Eyes:  Negative for discharge and redness.   Respiratory:  Negative for cough, shortness of breath and stridor.    Cardiovascular:  Negative for chest pain and leg swelling.   Gastrointestinal:  Positive for abdominal pain and nausea.   Genitourinary:  Negative for flank pain.   Musculoskeletal:  Negative for myalgias.   Skin: Negative.    Neurological:  Negative for focal weakness.   Endo/Heme/Allergies:  Does not bruise/bleed easily.   Psychiatric/Behavioral:  The patient is not nervous/anxious.      Past Medical History   has a past medical history of Allergy, Anemia (1997), Anxiety, Arthritis, Bronchitis, Cataract, Chest pain (2/1/2016), Cholesterol serum elevated, Dental disorder (10/2020), Depression, Detached retina, left, Diverticulitis, Diverticulitis (3/28/2016), Gynecological disorder (1982), Headache, Headache(784.0), Heart murmur, Hemorrhoids (1997), High cholesterol, Hypertension, Hyponatremia (3/29/2016),  "Indigestion, Infectious disease, Macular hole of right eye, Pain, Pneumonia, Shortness of breath (2/28/2014), SIRS (systemic inflammatory response syndrome) (HCC) (3/29/2016), and Urinary tract infection, site not specified.    Surgical History   has a past surgical history that includes laminotomy (1982); breast biopsy (2009); other abdominal surgery; appendectomy; gyn surgery; abdominal hysterectomy total (1982); other orthopedic surgery; other; other (Right); other (Right); pr total knee arthroplasty (Left, 5/18/2021); and pr total knee arthroplasty (Right, 11/16/2021).     Family History  family history includes Alcohol/Drug in her maternal aunt, maternal grandfather, maternal grandmother, and paternal grandmother; Diabetes in her mother; Heart Disease in her father; Heart Disease (age of onset: 60) in her mother; Heart Disease (age of onset: 65) in her maternal grandmother.      Social History   reports that she has never smoked. She has never used smokeless tobacco. She reports that she does not drink alcohol and does not use drugs.    Allergies  Allergies   Allergen Reactions    Azithromycin Unspecified     Pt states she feels a burning and hot sensation \"I can feel it in my veins, all the way through my body down to the bottom of my feet.\"    Cymbalta [Duloxetine Hcl] Unspecified     Make blood pressure go up    Lisinopril Cough     Cough      Demerol Rash     Rash at injection site, N/V.    Montelukast Anxiety     Gets nightmares - will never take it again     Medications  Prior to Admission Medications   Prescriptions Last Dose Informant Patient Reported? Taking?   Ibuprofen-Acetaminophen (ADVIL DUAL ACTION PO) 10/22/2024 Patient Yes No   Sig: Take 2 Tablets by mouth 2 times a day as needed (For pain). (OTC)   albuterol 108 (90 Base) MCG/ACT Aero Soln inhalation aerosol 11/17/2024 Patient No No   Sig: Inhale 2 Puffs every 6 hours as needed for Shortness of Breath.   atorvastatin (LIPITOR) 20 MG Tab " 11/17/2024 Bedtime Patient Yes No   Sig: Take 20 mg by mouth every evening.   buPROPion (WELLBUTRIN XL) 300 MG XL tablet 11/18/2024 at  8:00 AM Patient Yes Yes   Sig: Take 300 mg by mouth every morning.   doxycycline (VIBRAMYCIN) 100 MG Tab 11/14/2024 Patient Yes Yes   Sig: Take 100 mg by mouth 2 times a day. Pt started on 11/8/2024 for 7 day course, PRESCRIPTION COURSE WAS COMPLETED   glipiZIDE (GLUCOTROL) 5 MG Tab 11/19/2024 at  8:00 AM Patient Yes Yes   Sig: Take 5 mg by mouth every day. Pt reports that she takes this medication QDAY, not BID   hydrochlorothiazide (HYDRODIURIL) 25 MG Tab 11/18/2024 at  8:00 AM Patient Yes Yes   Sig: Take 25 mg by mouth every day.   linaCLOtide (LINZESS) 290 MCG Cap 11/16/2024 Patient Yes Yes   Sig: Take 290 mg by mouth 1 time a day as needed (For constipation).   methylPREDNISolone (MEDROL DOSEPAK) 4 MG Tablet Therapy Pack 11/13/2024 Patient No No   Sig: Follow schedule on package instructions.   Patient taking differently: Take 4 mg by mouth see administration instructions. Follow schedule on package instructions.  Pt started on 11/8/2024 for 6 day course, PRESCRIPTION COURSE WAS COMPLETED   metoprolol SR (TOPROL XL) 25 MG TABLET SR 24 HR 11/17/2024 Evening Patient Yes No   Sig: Take 25 mg by mouth every evening.   ondansetron (ZOFRAN ODT) 4 MG TABLET DISPERSIBLE 11/12/2024 Patient Yes Yes   Sig: Take 4 mg by mouth every 6 hours as needed for Nausea/Vomiting.   pantoprazole (PROTONIX) 40 MG Tablet Delayed Response 11/17/2024 Evening Patient Yes No   Sig: Take 40 mg by mouth every evening.   sertraline (ZOLOFT) 50 MG Tab 11/18/2024 at  8:00 AM Patient Yes Yes   Sig: Take 50 mg by mouth every day.      Facility-Administered Medications: None     Physical Exam  Temp:  [36.4 °C (97.6 °F)] 36.4 °C (97.6 °F)  Pulse:  [] 99  Resp:  [20] 20  BP: (130-134)/(65-74) 130/65  SpO2:  [91 %-92 %] 91 %  Blood Pressure : 130/65   Temperature: 36.4 °C (97.6 °F)   Pulse: 99   Respiration: 20    Pulse Oximetry: 91 %     Physical Exam  Constitutional:       General: She is not in acute distress.  HENT:      Head: Normocephalic and atraumatic.      Right Ear: External ear normal.      Left Ear: External ear normal.      Nose: No congestion or rhinorrhea.      Mouth/Throat:      Mouth: Mucous membranes are dry.      Pharynx: No oropharyngeal exudate or posterior oropharyngeal erythema.   Eyes:      General: No scleral icterus.        Right eye: No discharge.         Left eye: No discharge.      Conjunctiva/sclera: Conjunctivae normal.      Pupils: Pupils are equal, round, and reactive to light.   Cardiovascular:      Rate and Rhythm: Regular rhythm. Tachycardia present.      Heart sounds:      No friction rub. No gallop.   Pulmonary:      Comments: Reduced air entry bilaterally.  Is able to speak in full sentences  Abdominal:      General: Abdomen is flat. There is distension.      Tenderness: There is abdominal tenderness. There is no guarding or rebound.      Comments: Hypoactive bowel sounds   Musculoskeletal:         General: No swelling.      Cervical back: Neck supple. No rigidity. No muscular tenderness.      Right lower leg: No edema.      Left lower leg: No edema.   Skin:     General: Skin is dry.      Capillary Refill: Capillary refill takes 2 to 3 seconds.      Coloration: Skin is pale. Skin is not jaundiced.      Findings: No bruising or erythema.   Neurological:      Mental Status: She is alert and oriented to person, place, and time.   Psychiatric:         Mood and Affect: Mood normal.         Judgment: Judgment normal.       Laboratory:  Recent Labs     11/19/24  1601   WBC 15.1*   RBC 5.06   HEMOGLOBIN 14.7   HEMATOCRIT 41.5   MCV 82.0   MCH 29.1   MCHC 35.4   RDW 39.8   PLATELETCT 408   MPV 8.8*     Recent Labs     11/19/24  1601   SODIUM 133*   POTASSIUM 2.9*   CHLORIDE 89*   CO2 29   GLUCOSE 135*   BUN 17   CREATININE 1.13   CALCIUM 8.6     Recent Labs     11/19/24  1601   ALTSGPT 9  "  ASTSGOT 16   ALKPHOSPHAT 113*   TBILIRUBIN 0.9   LIPASE 11   GLUCOSE 135*         No results for input(s): \"NTPROBNP\" in the last 72 hours.      Recent Labs     11/19/24  1601   TROPONINT 29*     Imaging:  DX-CHEST-PORTABLE (1 VIEW)   Final Result      Cardiomegaly.      CT-ABDOMEN-PELVIS WITH   Final Result      1.  Diffusely dilated colon with some bowel wall thickening. This extends to the level of the upper sigmoid colon where there is some focal narrowing. Small intestine is also dilated with air/fluid levels. Differential diagnosis includes ileus as well as    distal colonic obstruction.      2.  Sigmoid diverticulosis. There is some mild stranding attenuation seen in the area of the narrowed sigmoid colon which may represent a component of diverticulitis.      3.  Small hiatal hernia.      EC-ECHOCARDIOGRAM COMPLETE W/O CONT    (Results Pending)     I personally reviewed patient EKG shows sinus tachycardia with a rate of 125.  There is 1 mm ST depression in lead I, aVL and leads V2-V6.  QTc is 517.     Assessment/Plan:  Justification for Admission Status  I anticipate this patient will require at least two midnights for appropriate medical management, necessitating inpatient admission because the patient has bowel obstruction and acute diverticulitis.  The patient will need intravenous antibiotics, general surgery consultation and possible surgical intervention.    Patient will need a Telemetry bed on MEDICAL service.     * Bowel obstruction (HCC)- (present on admission)  Assessment & Plan  I will place on N.P.O. status for now   I will start Intravenous fluids, encourage ambulation  Monitor electrolytes and replace accordingly   We will place NG tube to low intermittent suction   General Surgery consulted.  Appreciate recommendations    Preoperative examination- (present on admission)  Assessment & Plan  Medical Assessment Risk:  High    Age more than 65   Has morbid obesity  Has abnormal EKG concerning " for cardiac ischemia  Has active infection requiring antibiotics  May require expedited surgical intervention  Chronic kidney disease    Surgical Risk:   Intermediate    Cardiovascular:   Patient has history of CHF: Continue home beta blocker and rate control medications.   I will order a Pre-op EKG  I will order an Echo     Pulmonary:  At higher risk for atelectasis/hypoxia.  I will order continuous pulse oximetry  Emphasized incentive spirometry      Renal:   I will start intravenous fluids  I will order follow-up BUN and creatinine     Neurologic:   Avoid fentanyl (short-acting)  Acetaminophen PO TID PRN  Try to minimize using opioid Pain medications.    If patient develops delirium or agitation consider quetiapine 12.5 mg PO x1 PRN agitation (can repeat x1 in 2 hours PRN agitation).      Hematologic:  Plan on pharmacologic DVT prophylaxis post operative day #1. Hold for decreasing hemoglobin. Notify provider for hemoglobin less than 8.    Sigmoid diverticulitis- (present on admission)  Assessment & Plan  I will start ceftriaxone and metronidazole    Abnormal EKG- (present on admission)  Assessment & Plan  EKG shows sinus tachycardia with a rate of 125.  There is 1 mm ST depression in lead I, aVL and leads V2-V6.  QTc is 517.     I will check a troponin  Troponin is elevated, I will trend troponins.  I will place on continuous cardiac monitoring  I will check echocardiography, to further stratify her surgical risk    Dehydration- (present on admission)  Assessment & Plan  Likely secondary to reduced oral intake secondary to bowel obstruction  Encourage oral intake as tolerated, antiemetics as needed.  Intravenous hydration until adequate oral intake is achieved.     Sinus tachycardia- (present on admission)  Assessment & Plan  Multifactorial.  Secondary to pain, dehydration  I will check an EKG.  I will start intravenous fluids.  Multimodal pain control.  I will check heart function test  EKG shows sinus  tachycardia with a rate of 125.  There is 1 mm ST depression in lead I, aVL and leads V2-V6.  QTc is 517.      Stage 3a chronic kidney disease- (present on admission)  Assessment & Plan  Avoid/minimize nephrotoxins as much as possible, renally dose medications, monitor inputs and outputs      Class 3 severe obesity due to excess calories without serious comorbidity with body mass index (BMI) of 40.0 to 44.9 in adult (HCC)- (present on admission)  Assessment & Plan  At higher risk for atelectasis/hypoxia.  I will order continuous pulse oximetry  Emphasized incentive spirometry      Primary hypertension- (present on admission)  Assessment & Plan  The patient is currently n.p.o. and cannot take her oral antihypertensive medications.  I will start scheduled intravenous labetalol, until patient is able to tolerate oral intake   I will start as needed labetalol IV for extreme hypertension     Hyponatremia- (present on admission)  Assessment & Plan  Hypovolemic hyponatremia  I expect Na to improve with IVF.      Hypokalemia- (present on admission)  Assessment & Plan  Replacing, checking magnesium   I will check an EKG  Continue to monitor replace as needed   I will monitor on telemetry    ACP (advance care planning)- (present on admission)  Assessment & Plan  I had a discussion with the patient regarding goals of care, diagnoses, prognosis, and CODE STATUS. We discussed her prognosis and comorbidities.  The patient has advanced age and a number of chronic medical problems including chronic kidney disease, primary hypertension, hyperlipidemia, morbid obesity and is presenting with multiple acute medical problems including bowel obstruction and sigmoid diverticulitis.  At this point the patient wants to maintain a full code.  She is open to all forms of invasive or noninvasive diagnostic and therapeutic interventions.  She understand that she is a high risk surgical candidate given her comorbidities but is willing to proceed  with a surgical intervention if needed     Depression- (present on admission)  Assessment & Plan  Resume home bupropion and sertraline when able to take orally      VTE prophylaxis: SCDs/TEDs and heparin ppx    I had a discussion with the patient regarding goals of care, diagnoses, prognosis, and CODE STATUS. We discussed her prognosis and comorbidities.  The patient has advanced age and a number of chronic medical problems including chronic kidney disease, primary hypertension, hyperlipidemia, morbid obesity and is presenting with multiple acute medical problems including bowel obstruction and sigmoid diverticulitis.  At this point the patient wants to maintain a full code.  She is open to all forms of invasive or noninvasive diagnostic and therapeutic interventions.  She understand that she is a high risk surgical candidate given her comorbidities but is willing to proceed with a surgical intervention if needed.  I spent 17 minutes on advanced care planning.

## 2024-11-20 NOTE — PROGRESS NOTES
Hospital Medicine Daily Progress Note    Date of Service  11/20/2024    Chief Complaint  Guerda Lunsford is a 73 y.o. female admitted 11/19/2024 with abdominal pain and distention    Hospital Course  Guerda Washington has past medical history which includes diabetes, colitis, and diverticulitis.  Surgical history includes appendectomy and hysterectomy.  She presented to the emergency room 11/19/2024 for evaluation of epigastric pain and distention.  CT imaging was concerning for diffusely dilated colon with some bowel wall thickening.  Differential diagnosis includes ileus as well as distal colonic obstruction.  The patient was hospitalized, NG tube was placed, intravenous fluids were ordered, and surgery was consulted.    Interval Problem Update  Patient seen and examined  Continues to have abdominal distention, pain is slightly better than at admission  No bowel movement, she reports flatus  NG tube in place, 100 mL of output charted since admission, fluid is green    I have discussed this patient's plan of care and discharge plan at IDT rounds today with Case Management, Nursing, Nursing leadership, and other members of the IDT team.    Consultants/Specialty  general surgery    Code Status  Full Code    Disposition  The patient is not medically cleared for discharge to home or a post-acute facility.  Anticipate discharge to: home with organized home healthcare and close outpatient follow-up    I have placed the appropriate orders for post-discharge needs.    Review of Systems  Review of Systems   Constitutional:  Negative for chills and malaise/fatigue.   Respiratory:  Negative for cough, hemoptysis and sputum production.    Cardiovascular:  Negative for chest pain, palpitations and orthopnea.   Gastrointestinal:  Positive for abdominal pain. Negative for nausea and vomiting.   Skin:  Negative for itching and rash.   Neurological:  Negative for dizziness.   All other systems reviewed and are negative.        Physical Exam  Temp:  [36.4 °C (97.5 °F)-36.7 °C (98.1 °F)] 36.6 °C (97.8 °F)  Pulse:  [] 97  Resp:  [11-20] 20  BP: (127-142)/(61-84) 136/61  SpO2:  [87 %-97 %] 93 %    Physical Exam  Constitutional:       General: She is not in acute distress.     Appearance: Normal appearance. She is obese.   HENT:      Head: Normocephalic and atraumatic.      Right Ear: External ear normal.      Left Ear: External ear normal.      Nose: Nose normal.   Eyes:      Extraocular Movements: Extraocular movements intact.   Cardiovascular:      Rate and Rhythm: Normal rate and regular rhythm.      Pulses: Normal pulses.   Pulmonary:      Effort: Pulmonary effort is normal.      Breath sounds: Normal breath sounds.   Abdominal:      General: Abdomen is flat. Bowel sounds are normal. There is distension.      Palpations: Abdomen is soft.      Comments: Patient has normal active bowel sounds   Musculoskeletal:         General: Normal range of motion.      Cervical back: Normal range of motion and neck supple.   Skin:     General: Skin is warm and dry.   Neurological:      General: No focal deficit present.      Mental Status: She is alert and oriented to person, place, and time.      Cranial Nerves: No cranial nerve deficit.   Psychiatric:         Mood and Affect: Mood normal.         Behavior: Behavior normal.         Fluids    Intake/Output Summary (Last 24 hours) at 11/20/2024 1341  Last data filed at 11/20/2024 0600  Gross per 24 hour   Intake --   Output 350 ml   Net -350 ml        Laboratory  Recent Labs     11/19/24  1601 11/20/24  0226   WBC 15.1* 13.6*   RBC 5.06 4.78   HEMOGLOBIN 14.7 13.8   HEMATOCRIT 41.5 40.5   MCV 82.0 84.7   MCH 29.1 28.9   MCHC 35.4 34.1   RDW 39.8 41.2   PLATELETCT 408 385   MPV 8.8* 8.9*     Recent Labs     11/19/24  1601 11/19/24  2229 11/20/24 0226   SODIUM 133* 134* 134*   POTASSIUM 2.9* 3.1* 3.2*   CHLORIDE 89* 92* 93*   CO2 29 26 27   GLUCOSE 135* 142* 121*   BUN 17 18 18   CREATININE 1.13  1.06 1.10   CALCIUM 8.6 8.3* 8.3*             Recent Labs     11/20/24  0226   TRIGLYCERIDE 86   HDL 47   LDL 41       Imaging  DX-ABDOMEN FOR TUBE PLACEMENT   Final Result      1.  NG tube extends into the fundus of the stomach.      DX-CHEST-PORTABLE (1 VIEW)   Final Result      Cardiomegaly.      CT-ABDOMEN-PELVIS WITH   Final Result      1.  Diffusely dilated colon with some bowel wall thickening. This extends to the level of the upper sigmoid colon where there is some focal narrowing. Small intestine is also dilated with air/fluid levels. Differential diagnosis includes ileus as well as    distal colonic obstruction.      2.  Sigmoid diverticulosis. There is some mild stranding attenuation seen in the area of the narrowed sigmoid colon which may represent a component of diverticulitis.      3.  Small hiatal hernia.      EC-ECHOCARDIOGRAM COMPLETE W/O CONT    (Results Pending)        Assessment/Plan  * Bowel obstruction (HCC)- (present on admission)  Assessment & Plan  11/20:  Continue NG tube to low intermittent suction  Continue IV fluids  General Surgery following    Dehydration- (present on admission)  Assessment & Plan  11/20:  Patient requires ongoing IV fluids as she is n.p.o.    Sigmoid diverticulitis- (present on admission)  Assessment & Plan  11/20:   Labs reviewed, white blood cell count is improving  Continue ceftriaxone and metronidazole    ACP (advance care planning)- (present on admission)  Assessment & Plan  See prior documentation, the patient is full code    Preoperative examination- (present on admission)  Assessment & Plan  See history and physical for documentation of preoperative exam    Sinus tachycardia- (present on admission)  Assessment & Plan  Fluid resuscitation    Stage 3a chronic kidney disease- (present on admission)  Assessment & Plan  Avoid/minimize nephrotoxins as much as possible, renally dose medications, monitor inputs and outputs      Class 3 severe obesity due to excess  calories without serious comorbidity with body mass index (BMI) of 40.0 to 44.9 in adult (HCC)- (present on admission)  Assessment & Plan  Body mass index is 46.04 kg/m².      Primary hypertension- (present on admission)  Assessment & Plan  11/20:  Unable to take p.o. medications as she is n.p.o.  As needed labetalol for extreme hypertension    Hyponatremia- (present on admission)  Assessment & Plan  11/20:  Hyponatremia persists  Continue IV fluids  I ordered follow-up labs to assess response to treatment    Hypokalemia- (present on admission)  Assessment & Plan  11/20:  I ordered potassium replacement  I ordered repeat labs to assess response to treatment    Abnormal EKG- (present on admission)  Assessment & Plan  11/20:  Echocardiogram pending    Depression- (present on admission)  Assessment & Plan  Resume home bupropion and sertraline when able to take orally         VTE prophylaxis:    enoxaparin ppx      I have performed a physical exam and reviewed and updated ROS and Plan today (11/20/2024). In review of yesterday's note (11/19/2024), there are no changes except as documented above.

## 2024-11-20 NOTE — CONSULTS
Thoracic & General Surgery Consultation      Date of Service  11/20/2024    Referring Physician  Stephen Escalante M.D.    Consulting Physician  Compa Persaud M.D.    Reason for Consultation  Bowel obstruction    History of Presenting Illness  Ms. Guerda Lunsford is a 73 y.o. female who presented 11/19/2024 with abdominal pain for several days, nausea and vomiting.  She did have abdominal distention associated with this.  No fevers or chills.  No chest pain or shortness of breath.  She states she has had trouble with diverticulitis requiring multiple hospitalizations and antibiotics.  She states her last colonoscopy was within a year.  I do not have the records from this.  Per the patient she states the colonoscopy was normal.  She has had a prior appendectomy and hysterectomy    Review of Systems  All other systems reviewed and negative except as noted above    Past Medical History   has a past medical history of Allergy, Anemia (1997), Anxiety, Arthritis, Bronchitis, Cataract, Chest pain (2/1/2016), Cholesterol serum elevated, Dental disorder (10/2020), Depression, Detached retina, left, Diverticulitis, Diverticulitis (3/28/2016), Gynecological disorder (1982), Headache, Headache(784.0), Heart murmur, Hemorrhoids (1997), High cholesterol, Hypertension, Hyponatremia (3/29/2016), Indigestion, Infectious disease, Macular hole of right eye, Pain, Pneumonia, Shortness of breath (2/28/2014), SIRS (systemic inflammatory response syndrome) (HCC) (3/29/2016), and Urinary tract infection, site not specified.    She has no past medical history of Addisons disease (HCC), Adrenal disorder (HCC), Backpain, Blood transfusion, Breast cancer (HCC), Clotting disorder (HCC), COPD, Cushings syndrome (HCC), Fall, GERD (gastroesophageal reflux disease), Goiter, HIV (human immunodeficiency virus infection), Hyperlipidemia, IBD (inflammatory bowel disease), Meningitis, Migraine, Muscle disorder, OSTEOPOROSIS, Parathyroid disorder  (HCC), Pituitary disease (HCC), Substance abuse (HCC), or Ulcer.    Surgical History   has a past surgical history that includes laminotomy (1982); breast biopsy (2009); other abdominal surgery; appendectomy; gyn surgery; abdominal hysterectomy total (1982); other orthopedic surgery; other; other (Right); other (Right); pr total knee arthroplasty (Left, 5/18/2021); and pr total knee arthroplasty (Right, 11/16/2021).    Family History  family history includes Alcohol/Drug in her maternal aunt, maternal grandfather, maternal grandmother, and paternal grandmother; Diabetes in her mother; Heart Disease in her father; Heart Disease (age of onset: 60) in her mother; Heart Disease (age of onset: 65) in her maternal grandmother.    Social History   reports that she has never smoked. She has never used smokeless tobacco. She reports that she does not drink alcohol and does not use drugs.    Medications  Current Facility-Administered Medications   Medication Dose Route Frequency Provider Last Rate Last Admin    0.9 % NaCl with KCl 20 mEq infusion   Intravenous Continuous Stephen Escalante M.D. 75 mL/hr at 11/20/24 1345 Rate Change at 11/20/24 1345    enoxaparin (Lovenox) inj 40 mg  40 mg Subcutaneous DAILY AT 1800 Stephen Escalante M.D.        albuterol inhaler 2 Puff  2 Puff Inhalation Q6HRS PRN (RT) Rob Wren M.D.        atorvastatin (Lipitor) tablet 20 mg  20 mg Oral Q EVENING Rob Wren M.D.        buPROPion SR (Wellbutrin-SR) tablet 150 mg  150 mg Oral DAILY Rob Wren M.D.        metoprolol SR (Toprol XL) tablet 25 mg  25 mg Oral Q EVENING Rob Wren M.D.        sertraline (Zoloft) tablet 50 mg  50 mg Oral DAILY Rob Wren M.D.        pantoprazole (Protonix) injection 40 mg  40 mg Intravenous BID Rob Wren M.D.   40 mg at 11/20/24 0518    labetalol (Normodyne/Trandate) injection 10 mg  10 mg Intravenous Q8HRS Rob Wren M.D.        labetalol (Normodyne/Trandate) injection 10 mg  10 mg  "Intravenous Q4HRS PRN Asem TRAVON Wren M.D.        Metoprolol Tartrate (Lopressor) injection 5 mg  5 mg Intravenous Q5 MIN PRN Asemateo Wren M.D.        acetaminophen (Tylenol) tablet 650 mg  650 mg Oral Q6HRS PRN Asem TRAVON Wren M.D.        ondansetron (Zofran) syringe/vial injection 4 mg  4 mg Intravenous Q4HRS PRN Asemateo Wren M.D.        ondansetron (Zofran ODT) dispertab 4 mg  4 mg Oral Q4HRS PRN Asem TRAVON Wren M.D.        insulin lispro (HumaLOG,AdmeLOG) subcutaneous injection  1-6 Units Subcutaneous Q6HRS Rob Wren M.D.        And    dextrose 50% (D50W) injection 25 g  25 g Intravenous Q15 MIN PRN Asem TRAVON Wren M.D.        cefTRIAXone (Rocephin) 2,000 mg in  mL IVPB  2,000 mg Intravenous Q24HRS Asemateo Wren M.D.   Stopped at 11/20/24 1010    metroNIDAZOLE (Flagyl) IVPB 500 mg  500 mg Intravenous Q8HRS Rob Wren M.D.   Stopped at 11/20/24 1132    LORazepam (Ativan) injection 0.5 mg  0.5 mg Intravenous Once PRN Asem TRAVON Wren M.D.           Allergies  Allergies   Allergen Reactions    Azithromycin Unspecified     Pt states she feels a burning and hot sensation \"I can feel it in my veins, all the way through my body down to the bottom of my feet.\"    Cymbalta [Duloxetine Hcl] Unspecified     Make blood pressure go up    Lisinopril Cough     Cough      Demerol Rash     Rash at injection site, N/V.    Montelukast Anxiety     Gets nightmares - will never take it again       Physical Exam  Temp:  [36.4 °C (97.5 °F)-36.7 °C (98.1 °F)] 36.6 °C (97.8 °F)  Pulse:  [] 97  Resp:  [11-20] 20  BP: (127-142)/() 127/53  SpO2:  [87 %-97 %] 93 %    GEN: Healthy appearing, well developed, no acute distress.  PSYCH: Alert and oriented x3. Normal  memory, mood, and affect.  HEENT     -Head: Normocephalic, atraumatic     -Eyes: PERRL, No discharge or redness;     -Ears: External ears are normal.      -Nose: Normal nares.     -Throat: moist mucus membranes, good dentition    NECK: " Supple, trachea midline with no masses.  CV: RRR, radial pulses 2+, brisk cap refill on hands  LUNGS: no labored breathing on room air, no wheezing  ABD: Soft, nontender, nondistended, NG tube with dark brown output  SKIN: Warm, well perfused. No skin rashes or abnormal lesions.  MSK: strength 5/5 throughout. No deformities  EXT: No clubbing, cyanosis, or edema.  NEURO: No focal deficits      Fluids      Laboratory  Recent Labs     11/19/24  1601 11/20/24  0226   WBC 15.1* 13.6*   RBC 5.06 4.78   HEMOGLOBIN 14.7 13.8   HEMATOCRIT 41.5 40.5   MCV 82.0 84.7   MCH 29.1 28.9   MCHC 35.4 34.1   RDW 39.8 41.2   PLATELETCT 408 385   MPV 8.8* 8.9*     Recent Labs     11/19/24  1601 11/19/24  2229 11/20/24 0226   SODIUM 133* 134* 134*   POTASSIUM 2.9* 3.1* 3.2*   CHLORIDE 89* 92* 93*   CO2 29 26 27   GLUCOSE 135* 142* 121*   BUN 17 18 18   CREATININE 1.13 1.06 1.10   CALCIUM 8.6 8.3* 8.3*              Recent Labs     11/20/24  0226   TRIGLYCERIDE 86   HDL 47   LDL 41        Imaging  DX-ABDOMEN FOR TUBE PLACEMENT   Final Result      1.  NG tube extends into the fundus of the stomach.      DX-CHEST-PORTABLE (1 VIEW)   Final Result      Cardiomegaly.      CT-ABDOMEN-PELVIS WITH   Final Result      1.  Diffusely dilated colon with some bowel wall thickening. This extends to the level of the upper sigmoid colon where there is some focal narrowing. Small intestine is also dilated with air/fluid levels. Differential diagnosis includes ileus as well as    distal colonic obstruction.      2.  Sigmoid diverticulosis. There is some mild stranding attenuation seen in the area of the narrowed sigmoid colon which may represent a component of diverticulitis.      3.  Small hiatal hernia.      EC-ECHOCARDIOGRAM COMPLETE W/O CONT    (Results Pending)       Assessment/Plan  This is a 73 y.o. female who presents with abdominal pain nausea and vomiting.  CT scan shows possible large bowel obstruction with narrowing in her sigmoid  colon.    -She is a patient of [facility] and I recommend [facility] consult for possible flex sig to make sure there is no cancer or to see if there is a stricture that may be opened up with balloon dilation  -If this is a large bowel obstruction and is not able to open up patient will likely require a diverting colostomy which will be difficult due to her BMI of 46  -Continue n.p.o. with NG tube and IV fluids  -Mobilize patient with out of bed to chair, ambulate in hallways and incentive spirometry for respiratory toilet      Compa Persaud MD  Thoracic & General Surgeon  [facility]  [phone]

## 2024-11-20 NOTE — DIETARY
NUTRITION SERVICES: BMI - Pt with BMI >40 (=Body mass index is 46.04 kg/m².), morbid obesity. Weight loss counseling not appropriate in acute care setting. RECOMMEND - If appropriate at DC please refer to outpatient nutrition services for weight management.

## 2024-11-20 NOTE — ASSESSMENT & PLAN NOTE
Patient underwent laparoscopic transverse loop colostomy 11/24  Patient has had good ostomy output  On full liquid diet   Mobilize

## 2024-11-20 NOTE — CARE PLAN
The patient is Stable - Low risk of patient condition declining or worsening    Shift Goals  Clinical Goals: NPO, NGT to LIS, surg consult, echo  Patient Goals: Comfort    Progress made toward(s) clinical / shift goals:    Problem: Pain - Standard  Goal: Alleviation of pain or a reduction in pain to the patient’s comfort goal  Outcome: Progressing     Problem: Knowledge Deficit - Standard  Goal: Patient and family/care givers will demonstrate understanding of plan of care, disease process/condition, diagnostic tests and medications  Outcome: Progressing  Note: POC discussed. All questions answered to patient satisfaction.        Problem: Fall Risk  Goal: Patient will remain free from falls  Outcome: Progressing  Note: Fall precautions in place. Pt educated on fall policy. Pt calls appropriately.        Patient is not progressing towards the following goals:

## 2024-11-21 ENCOUNTER — ANESTHESIA EVENT (OUTPATIENT)
Dept: SURGERY | Facility: MEDICAL CENTER | Age: 74
End: 2024-11-21
Payer: MEDICARE

## 2024-11-21 ENCOUNTER — ANESTHESIA (OUTPATIENT)
Dept: SURGERY | Facility: MEDICAL CENTER | Age: 74
End: 2024-11-21
Payer: MEDICARE

## 2024-11-21 LAB
ANION GAP SERPL CALC-SCNC: 13 MMOL/L (ref 7–16)
BASOPHILS # BLD AUTO: 0.4 % (ref 0–1.8)
BASOPHILS # BLD: 0.04 K/UL (ref 0–0.12)
BUN SERPL-MCNC: 15 MG/DL (ref 8–22)
CALCIUM SERPL-MCNC: 8.1 MG/DL (ref 8.4–10.2)
CHLORIDE SERPL-SCNC: 99 MMOL/L (ref 96–112)
CO2 SERPL-SCNC: 26 MMOL/L (ref 20–33)
CREAT SERPL-MCNC: 1.05 MG/DL (ref 0.5–1.4)
EKG IMPRESSION: NORMAL
EOSINOPHIL # BLD AUTO: 0.47 K/UL (ref 0–0.51)
EOSINOPHIL NFR BLD: 4.2 % (ref 0–6.9)
ERYTHROCYTE [DISTWIDTH] IN BLOOD BY AUTOMATED COUNT: 42.6 FL (ref 35.9–50)
GFR SERPLBLD CREATININE-BSD FMLA CKD-EPI: 56 ML/MIN/1.73 M 2
GLUCOSE BLD STRIP.AUTO-MCNC: 123 MG/DL (ref 65–99)
GLUCOSE BLD STRIP.AUTO-MCNC: 66 MG/DL (ref 65–99)
GLUCOSE BLD STRIP.AUTO-MCNC: 74 MG/DL (ref 65–99)
GLUCOSE BLD STRIP.AUTO-MCNC: 77 MG/DL (ref 65–99)
GLUCOSE BLD STRIP.AUTO-MCNC: 86 MG/DL (ref 65–99)
GLUCOSE SERPL-MCNC: 83 MG/DL (ref 65–99)
HCT VFR BLD AUTO: 39.6 % (ref 37–47)
HGB BLD-MCNC: 13.2 G/DL (ref 12–16)
IMM GRANULOCYTES # BLD AUTO: 0.08 K/UL (ref 0–0.11)
IMM GRANULOCYTES NFR BLD AUTO: 0.7 % (ref 0–0.9)
LYMPHOCYTES # BLD AUTO: 1.4 K/UL (ref 1–4.8)
LYMPHOCYTES NFR BLD: 12.6 % (ref 22–41)
MCH RBC QN AUTO: 28.7 PG (ref 27–33)
MCHC RBC AUTO-ENTMCNC: 33.3 G/DL (ref 32.2–35.5)
MCV RBC AUTO: 86.1 FL (ref 81.4–97.8)
MONOCYTES # BLD AUTO: 0.78 K/UL (ref 0–0.85)
MONOCYTES NFR BLD AUTO: 7 % (ref 0–13.4)
NEUTROPHILS # BLD AUTO: 8.35 K/UL (ref 1.82–7.42)
NEUTROPHILS NFR BLD: 75.1 % (ref 44–72)
NRBC # BLD AUTO: 0 K/UL
NRBC BLD-RTO: 0 /100 WBC (ref 0–0.2)
PATHOLOGY CONSULT NOTE: NORMAL
PLATELET # BLD AUTO: 354 K/UL (ref 164–446)
PMV BLD AUTO: 9.2 FL (ref 9–12.9)
POTASSIUM SERPL-SCNC: 3.1 MMOL/L (ref 3.6–5.5)
RBC # BLD AUTO: 4.6 M/UL (ref 4.2–5.4)
SODIUM SERPL-SCNC: 138 MMOL/L (ref 135–145)
WBC # BLD AUTO: 11.1 K/UL (ref 4.8–10.8)

## 2024-11-21 PROCEDURE — 160048 HCHG OR STATISTICAL LEVEL 1-5: Performed by: INTERNAL MEDICINE

## 2024-11-21 PROCEDURE — 88305 TISSUE EXAM BY PATHOLOGIST: CPT

## 2024-11-21 PROCEDURE — 700101 HCHG RX REV CODE 250: Performed by: HOSPITALIST

## 2024-11-21 PROCEDURE — C1889 IMPLANT/INSERT DEVICE, NOC: HCPCS | Performed by: INTERNAL MEDICINE

## 2024-11-21 PROCEDURE — 700111 HCHG RX REV CODE 636 W/ 250 OVERRIDE (IP): Performed by: HOSPITALIST

## 2024-11-21 PROCEDURE — 82962 GLUCOSE BLOOD TEST: CPT | Mod: 91

## 2024-11-21 PROCEDURE — 93005 ELECTROCARDIOGRAM TRACING: CPT | Performed by: HOSPITALIST

## 2024-11-21 PROCEDURE — 160035 HCHG PACU - 1ST 60 MINS PHASE I: Performed by: INTERNAL MEDICINE

## 2024-11-21 PROCEDURE — 85025 COMPLETE CBC W/AUTO DIFF WBC: CPT

## 2024-11-21 PROCEDURE — 36415 COLL VENOUS BLD VENIPUNCTURE: CPT

## 2024-11-21 PROCEDURE — 700101 HCHG RX REV CODE 250: Performed by: ANESTHESIOLOGY

## 2024-11-21 PROCEDURE — 160009 HCHG ANES TIME/MIN: Performed by: INTERNAL MEDICINE

## 2024-11-21 PROCEDURE — 700105 HCHG RX REV CODE 258: Performed by: HOSPITALIST

## 2024-11-21 PROCEDURE — 0DBN8ZX EXCISION OF SIGMOID COLON, VIA NATURAL OR ARTIFICIAL OPENING ENDOSCOPIC, DIAGNOSTIC: ICD-10-PCS | Performed by: INTERNAL MEDICINE

## 2024-11-21 PROCEDURE — 700111 HCHG RX REV CODE 636 W/ 250 OVERRIDE (IP): Performed by: ANESTHESIOLOGY

## 2024-11-21 PROCEDURE — 700111 HCHG RX REV CODE 636 W/ 250 OVERRIDE (IP): Mod: JZ | Performed by: STUDENT IN AN ORGANIZED HEALTH CARE EDUCATION/TRAINING PROGRAM

## 2024-11-21 PROCEDURE — 160201 HCHG ENDO MINUTES - 1ST 30 MINS LEVEL 2: Performed by: INTERNAL MEDICINE

## 2024-11-21 PROCEDURE — 99233 SBSQ HOSP IP/OBS HIGH 50: CPT | Performed by: HOSPITALIST

## 2024-11-21 PROCEDURE — 93010 ELECTROCARDIOGRAM REPORT: CPT | Performed by: INTERNAL MEDICINE

## 2024-11-21 PROCEDURE — 770001 HCHG ROOM/CARE - MED/SURG/GYN PRIV*

## 2024-11-21 PROCEDURE — 160002 HCHG RECOVERY MINUTES (STAT): Performed by: INTERNAL MEDICINE

## 2024-11-21 PROCEDURE — 160206 HCHG ENDO MINUTES - EA ADDL 1 MIN LEVEL 2: Performed by: INTERNAL MEDICINE

## 2024-11-21 PROCEDURE — 80048 BASIC METABOLIC PNL TOTAL CA: CPT

## 2024-11-21 RX ORDER — ONDANSETRON 2 MG/ML
4 INJECTION INTRAMUSCULAR; INTRAVENOUS
Status: DISCONTINUED | OUTPATIENT
Start: 2024-11-21 | End: 2024-11-21 | Stop reason: HOSPADM

## 2024-11-21 RX ORDER — DEXTROSE MONOHYDRATE AND SODIUM CHLORIDE 5; .9 G/100ML; G/100ML
INJECTION, SOLUTION INTRAVENOUS ONCE
Status: COMPLETED | OUTPATIENT
Start: 2024-11-21 | End: 2024-11-22

## 2024-11-21 RX ORDER — SODIUM CHLORIDE, SODIUM LACTATE, POTASSIUM CHLORIDE, CALCIUM CHLORIDE 600; 310; 30; 20 MG/100ML; MG/100ML; MG/100ML; MG/100ML
INJECTION, SOLUTION INTRAVENOUS CONTINUOUS
Status: DISCONTINUED | OUTPATIENT
Start: 2024-11-21 | End: 2024-11-21

## 2024-11-21 RX ORDER — MORPHINE SULFATE 4 MG/ML
4 INJECTION INTRAVENOUS EVERY 4 HOURS PRN
Status: DISCONTINUED | OUTPATIENT
Start: 2024-11-21 | End: 2024-11-29 | Stop reason: HOSPADM

## 2024-11-21 RX ORDER — SODIUM CHLORIDE 9 MG/ML
INJECTION, SOLUTION INTRAVENOUS CONTINUOUS
Status: DISCONTINUED | OUTPATIENT
Start: 2024-11-21 | End: 2024-11-21

## 2024-11-21 RX ORDER — LIDOCAINE HYDROCHLORIDE 20 MG/ML
INJECTION, SOLUTION EPIDURAL; INFILTRATION; INTRACAUDAL; PERINEURAL PRN
Status: DISCONTINUED | OUTPATIENT
Start: 2024-11-21 | End: 2024-11-21 | Stop reason: SURG

## 2024-11-21 RX ORDER — METRONIDAZOLE 500 MG/100ML
500 INJECTION, SOLUTION INTRAVENOUS EVERY 12 HOURS
Status: DISCONTINUED | OUTPATIENT
Start: 2024-11-21 | End: 2024-11-22

## 2024-11-21 RX ORDER — DIPHENHYDRAMINE HYDROCHLORIDE 50 MG/ML
12.5 INJECTION INTRAMUSCULAR; INTRAVENOUS
Status: DISCONTINUED | OUTPATIENT
Start: 2024-11-21 | End: 2024-11-21 | Stop reason: HOSPADM

## 2024-11-21 RX ORDER — HALOPERIDOL 5 MG/ML
1 INJECTION INTRAMUSCULAR
Status: DISCONTINUED | OUTPATIENT
Start: 2024-11-21 | End: 2024-11-21 | Stop reason: HOSPADM

## 2024-11-21 RX ORDER — POTASSIUM CHLORIDE 7.45 MG/ML
10 INJECTION INTRAVENOUS
Status: COMPLETED | OUTPATIENT
Start: 2024-11-21 | End: 2024-11-21

## 2024-11-21 RX ADMIN — DEXTROSE AND SODIUM CHLORIDE: 5; 900 INJECTION, SOLUTION INTRAVENOUS at 18:26

## 2024-11-21 RX ADMIN — PANTOPRAZOLE SODIUM 40 MG: 40 INJECTION, POWDER, FOR SOLUTION INTRAVENOUS at 16:56

## 2024-11-21 RX ADMIN — POTASSIUM CHLORIDE 10 MEQ: 7.46 INJECTION, SOLUTION INTRAVENOUS at 10:28

## 2024-11-21 RX ADMIN — SODIUM CHLORIDE: 9 INJECTION, SOLUTION INTRAVENOUS at 13:14

## 2024-11-21 RX ADMIN — METRONIDAZOLE 500 MG: 5 INJECTION, SOLUTION INTRAVENOUS at 03:00

## 2024-11-21 RX ADMIN — PANTOPRAZOLE SODIUM 40 MG: 40 INJECTION, POWDER, FOR SOLUTION INTRAVENOUS at 05:38

## 2024-11-21 RX ADMIN — POTASSIUM CHLORIDE 10 MEQ: 7.46 INJECTION, SOLUTION INTRAVENOUS at 09:12

## 2024-11-21 RX ADMIN — METRONIDAZOLE 500 MG: 5 INJECTION, SOLUTION INTRAVENOUS at 13:12

## 2024-11-21 RX ADMIN — LIDOCAINE HYDROCHLORIDE 100 MG: 20 INJECTION, SOLUTION EPIDURAL; INFILTRATION; INTRACAUDAL; PERINEURAL at 11:42

## 2024-11-21 RX ADMIN — CEFTRIAXONE SODIUM 2000 MG: 2 INJECTION, POWDER, FOR SOLUTION INTRAMUSCULAR; INTRAVENOUS at 10:18

## 2024-11-21 RX ADMIN — DEXTROSE MONOHYDRATE 25 G: 25 INJECTION, SOLUTION INTRAVENOUS at 16:50

## 2024-11-21 RX ADMIN — ENOXAPARIN SODIUM 40 MG: 100 INJECTION SUBCUTANEOUS at 16:56

## 2024-11-21 RX ADMIN — MORPHINE SULFATE 4 MG: 4 INJECTION, SOLUTION INTRAMUSCULAR; INTRAVENOUS at 22:44

## 2024-11-21 RX ADMIN — ONDANSETRON 4 MG: 2 INJECTION INTRAMUSCULAR; INTRAVENOUS at 22:12

## 2024-11-21 RX ADMIN — POTASSIUM CHLORIDE 10 MEQ: 7.46 INJECTION, SOLUTION INTRAVENOUS at 08:15

## 2024-11-21 RX ADMIN — PROPOFOL 150 MG: 10 INJECTION, EMULSION INTRAVENOUS at 11:42

## 2024-11-21 RX ADMIN — SODIUM CHLORIDE: 9 INJECTION, SOLUTION INTRAVENOUS at 10:27

## 2024-11-21 RX ADMIN — METRONIDAZOLE 500 MG: 5 INJECTION, SOLUTION INTRAVENOUS at 17:24

## 2024-11-21 ASSESSMENT — ENCOUNTER SYMPTOMS
DIZZINESS: 0
NAUSEA: 0
HEMOPTYSIS: 0
CHILLS: 0
VOMITING: 0
ABDOMINAL PAIN: 1
PALPITATIONS: 0
SPUTUM PRODUCTION: 0
ORTHOPNEA: 0
COUGH: 0

## 2024-11-21 ASSESSMENT — COGNITIVE AND FUNCTIONAL STATUS - GENERAL
SUGGESTED CMS G CODE MODIFIER MOBILITY: CK
SUGGESTED CMS G CODE MODIFIER DAILY ACTIVITY: CJ
MOBILITY SCORE: 19
CLIMB 3 TO 5 STEPS WITH RAILING: A LOT
DRESSING REGULAR UPPER BODY CLOTHING: A LITTLE
WALKING IN HOSPITAL ROOM: A LITTLE
MOVING TO AND FROM BED TO CHAIR: A LITTLE
HELP NEEDED FOR BATHING: A LITTLE
DAILY ACTIVITIY SCORE: 20
STANDING UP FROM CHAIR USING ARMS: A LITTLE
TOILETING: A LITTLE
DRESSING REGULAR LOWER BODY CLOTHING: A LITTLE

## 2024-11-21 ASSESSMENT — PAIN DESCRIPTION - PAIN TYPE
TYPE: ACUTE PAIN

## 2024-11-21 ASSESSMENT — PAIN SCALES - GENERAL: PAIN_LEVEL: 0

## 2024-11-21 NOTE — ANESTHESIA POSTPROCEDURE EVALUATION
Patient: Guerda Lunsford    Procedure Summary       Date: 11/21/24 Room / Location:  ENDOSCOPIC ULTRASOUND ROOM / SURGERY [facility]    Anesthesia Start: 1135 Anesthesia Stop: 1218    Procedure: SIGMOIDOSCOPY, FLEXIBLE (Anus) Diagnosis: (SIGMOID DIVERTICULOSIS)    Surgeons: Cecelia Adam M.D. Responsible Provider: Cas Mueller M.D.    Anesthesia Type: general ASA Status: 3            Final Anesthesia Type: general  Last vitals  BP   Blood Pressure : 112/55    Temp   36.3 °C (97.3 °F)    Pulse   93   Resp   15    SpO2   94 %      Anesthesia Post Evaluation    Patient location during evaluation: PACU  Patient participation: complete - patient participated  Level of consciousness: awake and alert  Pain score: 0    Airway patency: patent  Anesthetic complications: no  Cardiovascular status: hemodynamically stable  Respiratory status: acceptable  Hydration status: euvolemic    PONV: none          There were no known notable events for this encounter.     Nurse Pain Score: 0 (NPRS)

## 2024-11-21 NOTE — ANESTHESIA PROCEDURE NOTES
Airway    Date/Time: 11/21/2024 11:42 AM    Performed by: Cas Mueller M.D.  Authorized by: Cas Mueller M.D.    Location:  OR  Urgency:  Elective  Indications for Airway Management:  Anesthesia      Spontaneous Ventilation: absent    Sedation Level:  Deep  Preoxygenated: Yes    Final Airway Type:  Supraglottic airway  Final Supraglottic Airway:  Standard LMA    SGA Size:  4  Number of Attempts at Approach:  1

## 2024-11-21 NOTE — CARE PLAN
The patient is Watcher - Medium risk of patient condition declining or worsening    Shift Goals  Clinical Goals: NPO, NG tube  Patient Goals: rest and comfort    Progress made toward(s) clinical / shift goals:    Problem: Pain - Standard  Goal: Alleviation of pain or a reduction in pain to the patient’s comfort goal  Outcome: Progressing     Problem: Knowledge Deficit - Standard  Goal: Patient and family/care givers will demonstrate understanding of plan of care, disease process/condition, diagnostic tests and medications  Outcome: Progressing     Problem: Fall Risk  Goal: Patient will remain free from falls  Outcome: Progressing       Patient is not progressing towards the following goals:

## 2024-11-21 NOTE — ANESTHESIA PREPROCEDURE EVALUATION
Case: [id number] Date/Time: 11/21/24 1115    Procedure: SIGMOIDOSCOPY, FLEXIBLE    Location:  ENDOSCOPIC ULTRASOUND ROOM / SURGERY [facility]    Surgeons: Cecelia Adam M.D.            Relevant Problems   CARDIAC   (positive) Heart murmur   (positive) Hemorrhoids   (positive) Primary hypertension         (positive) Stage 3a chronic kidney disease       Physical Exam    Airway   Mallampati: II  TM distance: >3 FB  Neck ROM: full       Cardiovascular - normal exam  Rhythm: regular  Rate: normal  (-) murmur     Dental - normal exam           Pulmonary - normal exam  Breath sounds clear to auscultation     Abdominal    Neurological - normal exam                   Anesthesia Plan    ASA 3   ASA physical status 3 criteria: morbid obesity - BMI greater than or equal to 40    Plan - general       Airway plan will be LMA          Induction: intravenous    Postoperative Plan: Postoperative administration of opioids is intended.    Pertinent diagnostic labs and testing reviewed    Informed Consent:    Anesthetic plan and risks discussed with patient.    Use of blood products discussed with: patient whom consented to blood products.

## 2024-11-21 NOTE — CARE PLAN
The patient is Stable - Low risk of patient condition declining or worsening    Shift Goals  Clinical Goals: enema, colonoscopy  Patient Goals: comfort    Progress made toward(s) clinical / shift goals:    Problem: Pain - Standard  Goal: Alleviation of pain or a reduction in pain to the patient’s comfort goal  Description: Target End Date:  Prior to discharge or change in level of care    Document on Vitals flowsheet    1.  Document pain using the appropriate pain scale per order or unit policy  2.  Educate and implement non-pharmacologic comfort measures (i.e. relaxation, distraction, massage, cold/heat therapy, etc.)  3.  Pain management medications as ordered  4.  Reassess pain after pain med administration per policy  5.  If opiods administered assess patient's response to pain medication is appropriate per POSS sedation scale  6.  Follow pain management plan developed in collaboration with patient and interdisciplinary team (including palliative care or pain specialists if applicable)  Outcome: Progressing  Note: Pt declines pain, aware to inform RN of breakthrough pain     Problem: Knowledge Deficit - Standard  Goal: Patient and family/care givers will demonstrate understanding of plan of care, disease process/condition, diagnostic tests and medications  Description: Target End Date:  1-3 days or as soon as patient condition allows    Document in Patient Education    1.  Patient and family/caregiver oriented to unit, equipment, visitation policy and means for communicating concern  2.  Complete/review Learning Assessment  3.  Assess knowledge level of disease process/condition, treatment plan, diagnostic tests and medications  4.  Explain disease process/condition, treatment plan, diagnostic tests and medications  Outcome: Progressing     Problem: Fall Risk  Goal: Patient will remain free from falls  Description: Target End Date:  Prior to discharge or change in level of care    Document interventions on the  Sanjana Malik Fall Risk Assessment    1.  Assess for fall risk factors  2.  Implement fall precautions  Outcome: Progressing  Note: Fall precautions in place       Patient is not progressing towards the following goals:

## 2024-11-21 NOTE — PROCEDURES
Procedure: Flexible sigmoidoscopy     Date of procedure:  11/21/2024    Enodscopist: Cecelia Adam M.D.    Anesthesia: MAC    Anesthesiologist:Cas Mueller M.D.     Pre-op diagnosis:  Sigmoid colon obstruction    Post-op diagnosis:  Colon polyps  Sigmoid diverticulosis    Complications: None    Estimated blood loss: Less than 5 cc    Specimens:  Sigmoid colon polyps    Indications:  73-year-old female presenting with sigmoid colon obstruction.      Description of procedure:  After discussion of indications,risks and benefits including the risks of perforation and bleeding informed consent was signed by the patient. Sedation was administered and documented by anesthesia. Time out was performed.  Patient was placed in the left lateral position. Digital rectal examination revealed no perianal lesions and no palpable anorectal pathology. Olympus Video colonoscope was placed in the rectum and was passed under direct visualization through to the floor of the cecum identified by appendiceal orifice and ileocecal valve. Upon careful withdrawal of the colonoscope entire mucosa was carefully examined. The anal verge was examined by retroflexion. After deflating the colon, the scope was removed and procedure was terminated. Findings and maneuvers as listed below.Photodocumentation of cecal landmarks and anal verge obtained. Patient tolerated procedure well.    Findings:  Procedure was performed after tapwater enemas only.  Patient did not take any bowel prep.  Large amount of liquid stool present in the rectum and distal sigmoid colon obscuring visualization.  There is an area of colonic narrowing due to thick inflamed mucosa at 40 cm from the anal verge.  There are 2 inflammatory type polyps located in this area.  No malignant appearing lesion identified.  Adult gastroscope could not be advanced past this area due to poor visualization and edema.  Biopsies obtained from the polypoidal lesions.  Area was tattooed with 1 cc  of spot.  Scattered diverticula present in the mid and distal sigmoid colon.        Plan:  Sigmoid colon obstruction secondary to inflammatory stricture.  Neoplasm unlikely.  Continue IV antibiotics for management of diverticulitis.  Gradually advance diet as tolerated.  Consider complete colonoscopy after bowel prep after 4 to 6 weeks timeframe.  In the meantime, if there is no improvement in proximal obstruction, may need surgical intervention.

## 2024-11-21 NOTE — DISCHARGE PLANNING
PM&R referral from Dr. Escalante. Medical debility ongoing medical management Pending therapy evaluations. Medicare provider. Consult pended at this time.

## 2024-11-21 NOTE — PROGRESS NOTES
12-hour chart check complete.    Monitor Summary  Rhythm: SR, ST  Rate: 's  Ectopy: fPACs, rPVCs  Measurements: 0.20/0.08/0.36

## 2024-11-21 NOTE — DISCHARGE PLANNING
Case Management Discharge Planning    Admission Date: 11/19/2024  GMLOS: 3  ALOS: 2    6-Clicks ADL Score: 21  6-Clicks Mobility Score: 20      Anticipated Discharge Dispo: Discharge Disposition: D/T to SNF with Medicare cert in anticipation of skilled care (03)     Action(s) Taken: Referral(s) sent and Completed PASSR/LOC    @1200: Patient discussed during IDT rounds. Per MD, patient may need placement after hospital stay. I have sent referrals to [place]. Created PASRR: [id number]    @1341: I spoke to patient at bedside. Introduced myself and my role. Patient reports she live sin [place] with spouse, Keo. She states prior to admission she was independent with ADLs and IADLs. States she uses a cane as needed at baseline, no oxygen at home. Patient states her PCP Dr Tipton happened to retire today. She states she plans on working with the clinic to get set up with another PCP. I discussed PT/OT will eventually work with her to which she's agreeable. I discussed whether she would be open to acute rehab or SNF or home health; she states she would prefer acute rehab over SNF if it's recommended. She states spouse, Keo, is able to provide support. I let patient know I would touch base with her again regarding discharge plan once PT/OT work with her. She was in agreement.     Care Transition Team Assessment    Information Source  Orientation Level: Oriented X4  Information Given By: Patient  Who is responsible for making decisions for patient? : Patient    Readmission Evaluation  Is this a readmission?: No    Elopement Risk  Legal Hold: No  Ambulatory or Self Mobile in Wheelchair: Yes  Disoriented: No  Psychiatric Symptoms: None  History of Wandering: No  Elopement this Admit: No  Vocalizing Wanting to Leave: No  Displays Behaviors, Body Language Wanting to Leave: No-Not at Risk for Elopement  Elopement Risk: Not at Risk for Elopement    Interdisciplinary Discharge Planning  Lives with - Patient's Self  Care Capacity: Significant Other  Patient or legal guardian wants to designate a caregiver: No  Support Systems: Spouse / Significant Other  Housing / Facility: 1 Story House    Discharge Preparedness  What is your plan after discharge?: Uncertain - pending medical team collaboration  What are your discharge supports?: Spouse  Prior Functional Level: Independent with Activities of Daily Living, Independent with Medication Management, Uses Cane    Functional Assesment  Prior Functional Level: Independent with Activities of Daily Living, Independent with Medication Management, Uses Cane    Finances  Financial Barriers to Discharge: No  Prescription Coverage: Yes    Vision / Hearing Impairment  Vision Impairment : Yes  Right Eye Vision: Wears Glasses  Left Eye Vision: Wears Glasses  Hearing Impairment : No    Advance Directive  Advance Directive?: None    Domestic Abuse  Have you ever been the victim of abuse or violence?: No  Possible Abuse/Neglect Reported to:: Not Applicable    Anticipated Discharge Information  Discharge Disposition: Disch to  rehab facility or distinct part unit (62)

## 2024-11-21 NOTE — PROGRESS NOTES
Telemetry Shift Summary    Rhythm SR/SA/ST  HR Range   Ectopy F-PAC; O-PVC; R-Coup  Measurements 0.18/0.08/0.32        Normal Values  Rhythm SR  HR Range    Measurements 0.12-0.20 / 0.06-0.10  / 0.30-0.52

## 2024-11-21 NOTE — PROGRESS NOTES
4 Eyes Skin Assessment Completed by NORY Alberto and NORY Cross.    Head WDL  Ears Redness and Blanching  Nose Redness and Blanching, left nare NG tube  Mouth WDL  Neck WDL  Breast/Chest Redness and Blanching under breast          Shoulder Blades WDL  Spine Scar  (R) Arm/Elbow/Hand Bruising  (L) Arm/Elbow/Hand WDL  Abdomen Scar  Groin Redness and Blanching  Scrotum/Coccyx/Buttocks Redness and Blanching  (R) Leg Scar  (L) Leg Scar  (R) Heel/Foot/Toe Redness and Blanching  (L) Heel/Foot/Toe Redness and Blanching          Devices In Places Tele Box, Blood Pressure Cuff, Pulse Ox, and OG/NG      Interventions In Place Gray Ear Foams, NC W/Ear Foams, and Pillows    Possible Skin Injury No    Pictures Uploaded Into Epic Yes  Wound Consult Placed N/A  RN Wound Prevention Protocol Ordered Yes

## 2024-11-21 NOTE — PROGRESS NOTES
[facility] Medicine Daily Progress Note    Date of Service  11/21/2024    Chief Complaint  Guerda Lunsford is a 73 y.o. female admitted 11/19/2024 with abdominal pain and distention    Hospital Course  Guerda Washington has past medical history which includes diabetes, colitis, and diverticulitis.  Surgical history includes appendectomy and hysterectomy.  She presented to the emergency room 11/19/2024 for evaluation of epigastric pain and distention.  CT imaging was concerning for diffusely dilated colon with some bowel wall thickening.  Differential diagnosis includes ileus as well as distal colonic obstruction.  The patient was hospitalized, NG tube was placed, intravenous fluids were ordered, and surgery was consulted.    Interval Problem Update  Patient seen and examined  NG tube in place, 450 mL of output charted over last 24 hours, fluid is green  No emesis  Patient is completing enema bowel prep this morning for flex sig    55 minutes of aggregate pateint care time including review of the medical records, coordinating with specialists, patient interview and examination, discussion with RN and case managment, and documentation    I have discussed this patient's plan of care and discharge plan at IDT rounds today with Case Management, Nursing, Nursing leadership, and other members of the IDT team.    Consultants/Specialty  general surgery    Code Status  Full Code    Disposition  Medically Cleared  I have placed the appropriate orders for post-discharge needs.    Review of Systems  Review of Systems   Constitutional:  Negative for chills and malaise/fatigue.   Respiratory:  Negative for cough, hemoptysis and sputum production.    Cardiovascular:  Negative for chest pain, palpitations and orthopnea.   Gastrointestinal:  Positive for abdominal pain. Negative for nausea and vomiting.   Neurological:  Negative for dizziness.        Physical Exam  Temp:  [36.2 °C (97.2 °F)-37.1 °C (98.7 °F)] 36.3 °C (97.3  °F)  Pulse:  [] 85  Resp:  [15-20] 15  BP: (112-136)/() 112/55  SpO2:  [91 %-98 %] 94 %    Physical Exam  Constitutional:       General: She is not in acute distress.     Appearance: Normal appearance. She is obese.   HENT:      Head: Normocephalic and atraumatic.      Right Ear: External ear normal.      Left Ear: External ear normal.      Nose: Nose normal.   Eyes:      Extraocular Movements: Extraocular movements intact.   Cardiovascular:      Rate and Rhythm: Normal rate and regular rhythm.      Pulses: Normal pulses.   Pulmonary:      Effort: Pulmonary effort is normal.      Breath sounds: Normal breath sounds.   Abdominal:      General: Abdomen is flat. Bowel sounds are normal. There is distension.      Palpations: Abdomen is soft.      Comments: Patient has normal active bowel sounds   Musculoskeletal:         General: Normal range of motion.      Cervical back: Normal range of motion and neck supple.   Skin:     General: Skin is warm and dry.   Neurological:      General: No focal deficit present.      Mental Status: She is alert and oriented to person, place, and time.      Cranial Nerves: No cranial nerve deficit.   Psychiatric:         Mood and Affect: Mood normal.         Behavior: Behavior normal.         Fluids    Intake/Output Summary (Last 24 hours) at 11/21/2024 1240  Last data filed at 11/21/2024 1218  Gross per 24 hour   Intake 125 ml   Output 650 ml   Net -525 ml        Laboratory  Recent Labs     11/19/24  1601 11/20/24  0226 11/21/24  0129   WBC 15.1* 13.6* 11.1*   RBC 5.06 4.78 4.60   HEMOGLOBIN 14.7 13.8 13.2   HEMATOCRIT 41.5 40.5 39.6   MCV 82.0 84.7 86.1   MCH 29.1 28.9 28.7   MCHC 35.4 34.1 33.3   RDW 39.8 41.2 42.6   PLATELETCT 408 385 354   MPV 8.8* 8.9* 9.2     Recent Labs     11/19/24  2229 11/20/24  0226 11/21/24  0129   SODIUM 134* 134* 138   POTASSIUM 3.1* 3.2* 3.1*   CHLORIDE 92* 93* 99   CO2 26 27 26   GLUCOSE 142* 121* 83   BUN 18 18 15   CREATININE 1.06 1.10 1.05    CALCIUM 8.3* 8.3* 8.1*             Recent Labs     11/20/24  0226   TRIGLYCERIDE 86   HDL 47   LDL 41       Imaging  DX-ABDOMEN FOR TUBE PLACEMENT   Final Result         1.  Nonspecific bowel gas pattern in the upper abdomen.   2.  Nasogastric tube tip terminates overlying the expected location of the gastric fundus.      EC-ECHOCARDIOGRAM COMPLETE W/ CONT   Final Result      DX-ABDOMEN FOR TUBE PLACEMENT   Final Result      1.  NG tube extends into the fundus of the stomach.      DX-CHEST-PORTABLE (1 VIEW)   Final Result      Cardiomegaly.      CT-ABDOMEN-PELVIS WITH   Final Result      1.  Diffusely dilated colon with some bowel wall thickening. This extends to the level of the upper sigmoid colon where there is some focal narrowing. Small intestine is also dilated with air/fluid levels. Differential diagnosis includes ileus as well as    distal colonic obstruction.      2.  Sigmoid diverticulosis. There is some mild stranding attenuation seen in the area of the narrowed sigmoid colon which may represent a component of diverticulitis.      3.  Small hiatal hernia.           Assessment/Plan  * Bowel obstruction (HCC)- (present on admission)  Assessment & Plan  11/21:  Continue NG tube to low intermittent suction  Continue IV fluids  General Surgery following  I consulted gastroenterology  Plan for flex sig today    Dehydration- (present on admission)  Assessment & Plan  11/21:  With sinus tachycardia this morning  Patient requires ongoing IV fluids as she is n.p.o.    Sigmoid diverticulitis- (present on admission)  Assessment & Plan  11/21:   Continue ceftriaxone and metronidazole    ACP (advance care planning)- (present on admission)  Assessment & Plan  See prior documentation, the patient is full code    Preoperative examination- (present on admission)  Assessment & Plan  See history and physical for documentation of preoperative exam    Sinus tachycardia- (present on admission)  Assessment & Plan  Fluid  resuscitation    Stage 3a chronic kidney disease- (present on admission)  Assessment & Plan  Avoid/minimize nephrotoxins as much as possible, renally dose medications, monitor inputs and outputs      Class 3 severe obesity due to excess calories without serious comorbidity with body mass index (BMI) of 40.0 to 44.9 in adult (HCC)- (present on admission)  Assessment & Plan  Body mass index is 46.04 kg/m².      Primary hypertension- (present on admission)  Assessment & Plan  11/21:  Unable to take p.o. medications as she is n.p.o.  As needed labetalol for extreme hypertension    Hyponatremia- (present on admission)  Assessment & Plan  Resolved    Hypokalemia- (present on admission)  Assessment & Plan  11/21:  I ordered potassium replacement intravenously  I ordered repeat labs to assess response to treatment    Abnormal EKG- (present on admission)  Assessment & Plan  No chest pain    Depression- (present on admission)  Assessment & Plan  Resume home bupropion and sertraline when able to take orally         VTE prophylaxis:    enoxaparin ppx      I have performed a physical exam and reviewed and updated ROS and Plan today (11/21/2024). In review of yesterday's note (11/20/2024), there are no changes except as documented above.

## 2024-11-21 NOTE — OR NURSING
1215: Patient arrived to PACU from OR via OR. Report received from anesthesia and RN. Respirations are spontaneous and unlabored. VSS on 6L. Dressing is CDI. Cold pack applied. OPA in place.    1223: OPA removed, No c/o /10  pain.     1227: Family updated.     1245: pt meets criteria for transfer to stage II. Report called to receiving RN

## 2024-11-21 NOTE — PROGRESS NOTES
Pt AO x 4, sitting EOB. Tap water enema completed twice per order. BM clear at this time.     0948: Pt has been sustaining > 120 in ST/SA for 10 minutes, Dr. Escalante notified. IV fluids ordered by provider.

## 2024-11-21 NOTE — CONSULTS
`                                           Gastroenterology Consult Note     Date of Consult: 11/20/2024     Reason for consult: Sigmoid colon obstruction     HPI: 73-year-old female came to the hospital with complaints of worsening abdominal pain distention nausea and vomiting.  There was no associated fever or chills.  Patient has history of recurrent diverticulitis in the past.  More recently about a month ago she was evaluated for lower abdominal pain at which time CT abdomen revealed possible colitis versus diverticulitis in the sigmoid colon area.  She was treated with 2-week course of antibiotics.  Her abdominal pain temporarily improved after taking the antibiotics.  However since that episode, she has been experiencing progressively worsening constipation.  She was prescribed Linzess which gave her more abdominal cramping.  Over the past 2 days, the pain got worse was generalized and was associated with emesis.  Evaluation in the ER included a CT scan which revealed possible stricture in the upper sigmoid colon with proximal dilatation of the colon and some dilation of small bowel consistent with obstruction.  She has a NG tube in place and she is clinically feeling better with less abdominal pressure.  Labs are significant for mild leukocytosis which has been chronic for the past month.  Last colonoscopy was in September 2023 and was significant for sigmoid diverticulosis and 2 inflammatory polyps in the same area.  There is no family history of colorectal cancer.  She denies diarrhea or hematochezia.  She takes NSAIDs occasionally.     PMHX:  Past Medical History:   Diagnosis Date    Allergy     Anemia 1997    Bleeding hemorrhoids    Anxiety     Arthritis     Bronchitis     Cataract     bilateral    Chest pain 2/1/2016    Cholesterol serum elevated     diet controlled    Dental disorder 10/2020    Filled chipped front upper tooth    Depression     anxiety    Detached retina, left     Diverticulitis      Diverticulitis 3/28/2016    Gynecological disorder 1982    bleeding, cervical pecancer    Headache     Occasionally, can become severe if she does not take Advil. ICD-10 transition    Headache(784.0)     Occasionally, can become severe if she does not take Advil.    Heart murmur     Comes and goes    Hemorrhoids 1997    had surgery    High cholesterol     Hypertension     non-medicated    Hyponatremia 3/29/2016    Indigestion     Infectious disease     uti    Macular hole of right eye     Pain     Pneumonia     Shortness of breath 2/28/2014    SIRS (systemic inflammatory response syndrome) (HCC) 3/29/2016    Urinary tract infection, site not specified           PSurgHx:   Past Surgical History:   Procedure Laterality Date    PB TOTAL KNEE ARTHROPLASTY Right 11/16/2021    Procedure: ARTHROPLASTY, KNEE, TOTAL [place] cementless;  Surgeon: Sonido Amado M.D.;  Location: SURGERY [facility];  Service: Orthopedics    PB TOTAL KNEE ARTHROPLASTY Left 5/18/2021    Procedure: ARTHROPLASTY, KNEE, TOTAL.;  Surgeon: Sonido Amado M.D.;  Location: SURGERY [facility];  Service: Orthopedics    BREAST BIOPSY  2009    LAMINOTOMY  1982    2 Lumbar Discs    ABDOMINAL HYSTERECTOMY TOTAL  1982    Cervical precancer and bleeding.  No oophorectomy.    APPENDECTOMY      GYN SURGERY      hysterectomy - partial    OTHER      hemorrhoidectomy    OTHER Right     cataract extraction    OTHER Right     Macular Hole Surgery    OTHER ABDOMINAL SURGERY      appy    OTHER ORTHOPEDIC SURGERY      lami        ALLERGIES:Azithromycin, Cymbalta [duloxetine hcl], Lisinopril, Demerol, and Montelukast    No current facility-administered medications on file prior to encounter.     Current Outpatient Medications on File Prior to Encounter   Medication Sig Dispense Refill    linaCLOtide (LINZESS) 290 MCG Cap Take 290 mg by mouth 1 time a day as needed (For constipation).      ondansetron (ZOFRAN ODT) 4 MG TABLET DISPERSIBLE Take 4 mg by mouth every 6  hours as needed for Nausea/Vomiting.      doxycycline (VIBRAMYCIN) 100 MG Tab Take 100 mg by mouth 2 times a day. Pt started on 11/8/2024 for 7 day course, PRESCRIPTION COURSE WAS COMPLETED      glipiZIDE (GLUCOTROL) 5 MG Tab Take 5 mg by mouth every day. Pt reports that she takes this medication QDAY, not BID      sertraline (ZOLOFT) 50 MG Tab Take 50 mg by mouth every day.      buPROPion (WELLBUTRIN XL) 300 MG XL tablet Take 300 mg by mouth every morning.      hydrochlorothiazide (HYDRODIURIL) 25 MG Tab Take 25 mg by mouth every day.      methylPREDNISolone (MEDROL DOSEPAK) 4 MG Tablet Therapy Pack Follow schedule on package instructions. (Patient taking differently: Take 4 mg by mouth see administration instructions. Follow schedule on package instructions.  Pt started on 11/8/2024 for 6 day course, PRESCRIPTION COURSE WAS COMPLETED) 21 Tablet 0    Ibuprofen-Acetaminophen (ADVIL DUAL ACTION PO) Take 2 Tablets by mouth 2 times a day as needed (For pain). (OTC)      pantoprazole (PROTONIX) 40 MG Tablet Delayed Response Take 40 mg by mouth every evening.      albuterol 108 (90 Base) MCG/ACT Aero Soln inhalation aerosol Inhale 2 Puffs every 6 hours as needed for Shortness of Breath. 8.5 g 0    atorvastatin (LIPITOR) 20 MG Tab Take 20 mg by mouth every evening.      metoprolol SR (TOPROL XL) 25 MG TABLET SR 24 HR Take 25 mg by mouth every evening.            Current Facility-Administered Medications:     0.9 % NaCl with KCl 20 mEq infusion, , Intravenous, Continuous, Stephen Escalante M.D., Last Rate: 75 mL/hr at 11/20/24 1345, Rate Change at 11/20/24 1345    enoxaparin (Lovenox) inj 40 mg, 40 mg, Subcutaneous, DAILY AT 1800, Stephen Escalante M.D., 40 mg at 11/20/24 1710    albuterol inhaler 2 Puff, 2 Puff, Inhalation, Q6HRS PRN (RT), Rob Wren M.D.    atorvastatin (Lipitor) tablet 20 mg, 20 mg, Oral, Q EVENING, Rob Wren M.D.    buPROPion SR (Wellbutrin-SR) tablet 150 mg, 150 mg, Oral, DAILY, Rob Wren,  M.D.    metoprolol SR (Toprol XL) tablet 25 mg, 25 mg, Oral, Q EVENING, Rob Wren M.D.    sertraline (Zoloft) tablet 50 mg, 50 mg, Oral, DAILY, Rob Wren M.D.    pantoprazole (Protonix) injection 40 mg, 40 mg, Intravenous, BID, Rob Wren M.D., 40 mg at 11/20/24 1710    labetalol (Normodyne/Trandate) injection 10 mg, 10 mg, Intravenous, Q8HRS, Rob Wren M.D.    labetalol (Normodyne/Trandate) injection 10 mg, 10 mg, Intravenous, Q4HRS PRN, Rob Wren M.D.    Metoprolol Tartrate (Lopressor) injection 5 mg, 5 mg, Intravenous, Q5 MIN PRN, Rob Wren M.D.    acetaminophen (Tylenol) tablet 650 mg, 650 mg, Oral, Q6HRS PRN, Rob Wren M.D.    ondansetron (Zofran) syringe/vial injection 4 mg, 4 mg, Intravenous, Q4HRS PRN, Rob Wren M.D.    ondansetron (Zofran ODT) dispertab 4 mg, 4 mg, Oral, Q4HRS PRN, Rob Wren M.D.    insulin lispro (HumaLOG,AdmeLOG) subcutaneous injection, 1-6 Units, Subcutaneous, Q6HRS **AND** POC blood glucose manual result, , , Q6H **AND** NOTIFY MD and PharmD, , , Once **AND** Administer 20 grams of glucose (approximately 8 ounces of fruit juice) every 15 minutes PRN FSBG less than 70 mg/dL, , , PRN **AND** dextrose 50% (D50W) injection 25 g, 25 g, Intravenous, Q15 MIN PRN, Rob Wren M.D.    cefTRIAXone (Rocephin) 2,000 mg in  mL IVPB, 2,000 mg, Intravenous, Q24HRS, Rob Wren M.D., Stopped at 11/20/24 1010    metroNIDAZOLE (Flagyl) IVPB 500 mg, 500 mg, Intravenous, Q8HRS, Rob Wren M.D., Stopped at 11/20/24 1132    LORazepam (Ativan) injection 0.5 mg, 0.5 mg, Intravenous, Once PRN, Rob Wren M.D.      SocHx:   Social History     Socioeconomic History    Marital status:      Spouse name: Not on file    Number of children: Not on file    Years of education: Not on file    Highest education level: Not on file   Occupational History    Not on file   Tobacco Use    Smoking status: Never    Smokeless tobacco:  Never   Vaping Use    Vaping status: Never Used   Substance and Sexual Activity    Alcohol use: No     Alcohol/week: 0.0 oz    Drug use: No    Sexual activity: Not Currently     Partners: Male   Other Topics Concern    Not on file   Social History Narrative    Not on file     Social Drivers of Health     Financial Resource Strain: Not on file   Food Insecurity: No Food Insecurity (2024)    Hunger Vital Sign     Worried About Running Out of Food in the Last Year: Never true     Ran Out of Food in the Last Year: Never true   Transportation Needs: No Transportation Needs (2024)    PRAPARE - Transportation     Lack of Transportation (Medical): No     Lack of Transportation (Non-Medical): No   Physical Activity: Not on file   Stress: Not on file   Social Connections: Not on file   Intimate Partner Violence: Not At Risk (2024)    Humiliation, Afraid, Rape, and Kick questionnaire     Fear of Current or Ex-Partner: No     Emotionally Abused: No     Physically Abused: No     Sexually Abused: No   Housing Stability: Low Risk  (2024)    Housing Stability Vital Sign     Unable to Pay for Housing in the Last Year: No     Number of Times Moved in the Last Year: 1     Homeless in the Last Year: No        FAMHx:   Family History   Problem Relation Age of Onset    Diabetes Mother     Heart Disease Mother 60        MI then CABG    Heart Disease Father         CHF    Alcohol/Drug Maternal Aunt     Heart Disease Maternal Grandmother 65        MI,  with it    Alcohol/Drug Maternal Grandmother     Alcohol/Drug Maternal Grandfather     Alcohol/Drug Paternal Grandmother         ROS:  Constitutional: Negative for fever, chills, fatigue or unintentional weight loss  HEENT: Negative for visual changes or epistaxis  CARDIO: Negative for chest pain, palpitations  PULM: Negative for cough, breathing difficulty or hemoptysis  NEURO: Negative for seizure, stroke or mental status changes  GI: as above  : Negative for  dysuria or hematuria  HEME: Negative for anemia, thrombocytopenia or easy bruising  MUSCULOSKELETAL: Negative for arthritis, arthralgia or joint deformities  PSYCH: Negative for major depression or other psychiatric illnesses  SKIN: Negative for pruritus     PE:  Vitals:    11/20/24 1130 11/20/24 1413 11/20/24 1438 11/20/24 1600   BP: 136/61 (!) 130/102 127/53 114/52   Pulse: 97   61   Resp: 20   20   Temp: 36.6 °C (97.8 °F)   36.2 °C (97.2 °F)   TempSrc: Oral   Temporal   SpO2: 93%   94%   Weight:       Height:           General: Obese female in no acute distress she has a NG tube in place draining bilious liquid.  HEENT: Atraumatic, normocephalic, conjunctiva clear, no jaundice  Neck: No goiter, no cervical or supraclavicular adenopathy  CVS: Regular rate and rhythm, no murmur  Pulmonary: Trachea midline, chest expansion symmetrical, normal respiratory effort, no wheezing or crackles  Abdomen: Softly distended without tenderness, bowel sounds present.  Extremities: No edema  NEURO: Normal speech, moves all extremities, no focal deficit  Skin: No jaundice or rashes  Psychiatry: Normal affect     LABS:  Lab Results   Component Value Date/Time    SODIUM 134 (L) 11/20/2024 02:26 AM    POTASSIUM 3.2 (L) 11/20/2024 02:26 AM    CHLORIDE 93 (L) 11/20/2024 02:26 AM    CO2 27 11/20/2024 02:26 AM    GLUCOSE 121 (H) 11/20/2024 02:26 AM    BUN 18 11/20/2024 02:26 AM    CREATININE 1.10 11/20/2024 02:26 AM    CREATININE 0.86 07/19/2011 07:42 AM    BUNCREATRAT 19 07/19/2011 07:42 AM      Lab Results   Component Value Date/Time    WBC 13.6 (H) 11/20/2024 02:26 AM    WBC 6.8 07/19/2011 07:42 AM    RBC 4.78 11/20/2024 02:26 AM    RBC 4.79 07/19/2011 07:42 AM    HEMOGLOBIN 13.8 11/20/2024 02:26 AM    HEMATOCRIT 40.5 11/20/2024 02:26 AM    MCV 84.7 11/20/2024 02:26 AM    MCV 88 07/19/2011 07:42 AM    MCH 28.9 11/20/2024 02:26 AM    MCH 29.9 07/19/2011 07:42 AM    MCHC 34.1 11/20/2024 02:26 AM    MPV 8.9 (L) 11/20/2024 02:26 AM     NEUTSPOLYS 80.00 (H) 11/19/2024 04:01 PM    LYMPHOCYTES 10.70 (L) 11/19/2024 04:01 PM    MONOCYTES 7.20 11/19/2024 04:01 PM    EOSINOPHILS 1.40 11/19/2024 04:01 PM    BASOPHILS 0.20 11/19/2024 04:01 PM        Lab Results   Component Value Date/Time    PROTHROMBTM 13.9 11/09/2021 02:27 PM    INR 1.16 (H) 11/09/2021 02:27 PM      Recent Labs     11/19/24  1601 11/20/24  0226   ASTSGOT 16 18   ALTSGPT 9 11   TBILIRUBIN 0.9 1.1   GLOBULIN 2.7 2.7         IMAGING: Reviewed personally and summarized in HPI  EC-ECHOCARDIOGRAM COMPLETE W/ CONT   Final Result      DX-ABDOMEN FOR TUBE PLACEMENT   Final Result      1.  NG tube extends into the fundus of the stomach.      DX-CHEST-PORTABLE (1 VIEW)   Final Result      Cardiomegaly.      CT-ABDOMEN-PELVIS WITH   Final Result      1.  Diffusely dilated colon with some bowel wall thickening. This extends to the level of the upper sigmoid colon where there is some focal narrowing. Small intestine is also dilated with air/fluid levels. Differential diagnosis includes ileus as well as    distal colonic obstruction.      2.  Sigmoid diverticulosis. There is some mild stranding attenuation seen in the area of the narrowed sigmoid colon which may represent a component of diverticulitis.      3.  Small hiatal hernia.              ASSESSMENT:   -Sigmoid colon obstruction with features of possible colitis versus diverticulitis.  Inflammatory stricture most likely.  -Recent change in bowel habits with worsening constipation due to sigmoid colon stricture  -Segmental colitis by CT scan, could be diverticulosis associated colitis, inflammatory bowel disease or ischemic colitis  -Morbid obesity       PLAN:   -For further evaluation of sigmoid colon area and to rule out neoplastic stricture, will do flexible sigmoidoscopy after tapwater enemas tomorrow.  Based on findings, she may eventually need surgical intervention either resection or diverting colostomy.  Discussed various possible  etiologies for her clinical presentation and benefit of sigmoidoscopy.  She is agreeable to proceed.      This note was generated using voice recognition software and has a small chance of producing errors of grammar and possibly content.  Reasonable attempt has been made to find and correct any obvious errors but expect that some may not be found prior to finalization of this note.    Cecelia Adam M.D.  [facility]  [phone]

## 2024-11-21 NOTE — ANESTHESIA TIME REPORT
Anesthesia Start and Stop Event Times       Date Time Event    11/21/2024 1116 Ready for Procedure     1135 Anesthesia Start     1218 Anesthesia Stop          Responsible Staff  11/21/24      Name Role Begin End    Cas Mueller M.D. Anesth 1135 1218          Overtime Reason:  no overtime (within assigned shift)    Comments:

## 2024-11-22 ENCOUNTER — APPOINTMENT (OUTPATIENT)
Dept: RADIOLOGY | Facility: MEDICAL CENTER | Age: 74
End: 2024-11-22
Attending: HOSPITALIST
Payer: MEDICARE

## 2024-11-22 PROBLEM — I48.91 A-FIB (HCC): Status: ACTIVE | Noted: 2024-11-22

## 2024-11-22 LAB
ANION GAP SERPL CALC-SCNC: 13 MMOL/L (ref 7–16)
BACTERIA UR CULT: ABNORMAL
BACTERIA UR CULT: ABNORMAL
BASOPHILS # BLD AUTO: 0.2 % (ref 0–1.8)
BASOPHILS # BLD: 0.03 K/UL (ref 0–0.12)
BUN SERPL-MCNC: 14 MG/DL (ref 8–22)
CALCIUM SERPL-MCNC: 8 MG/DL (ref 8.4–10.2)
CHLORIDE SERPL-SCNC: 101 MMOL/L (ref 96–112)
CO2 SERPL-SCNC: 25 MMOL/L (ref 20–33)
CREAT SERPL-MCNC: 0.83 MG/DL (ref 0.5–1.4)
EOSINOPHIL # BLD AUTO: 0.42 K/UL (ref 0–0.51)
EOSINOPHIL NFR BLD: 3.3 % (ref 0–6.9)
ERYTHROCYTE [DISTWIDTH] IN BLOOD BY AUTOMATED COUNT: 43 FL (ref 35.9–50)
GFR SERPLBLD CREATININE-BSD FMLA CKD-EPI: 74 ML/MIN/1.73 M 2
GLUCOSE BLD STRIP.AUTO-MCNC: 105 MG/DL (ref 65–99)
GLUCOSE BLD STRIP.AUTO-MCNC: 136 MG/DL (ref 65–99)
GLUCOSE BLD STRIP.AUTO-MCNC: 91 MG/DL (ref 65–99)
GLUCOSE BLD STRIP.AUTO-MCNC: 98 MG/DL (ref 65–99)
GLUCOSE SERPL-MCNC: 105 MG/DL (ref 65–99)
HCT VFR BLD AUTO: 38.6 % (ref 37–47)
HGB BLD-MCNC: 13.1 G/DL (ref 12–16)
IMM GRANULOCYTES # BLD AUTO: 0.08 K/UL (ref 0–0.11)
IMM GRANULOCYTES NFR BLD AUTO: 0.6 % (ref 0–0.9)
LYMPHOCYTES # BLD AUTO: 1.56 K/UL (ref 1–4.8)
LYMPHOCYTES NFR BLD: 12.4 % (ref 22–41)
MAGNESIUM SERPL-MCNC: 2 MG/DL (ref 1.5–2.5)
MCH RBC QN AUTO: 29.1 PG (ref 27–33)
MCHC RBC AUTO-ENTMCNC: 33.9 G/DL (ref 32.2–35.5)
MCV RBC AUTO: 85.8 FL (ref 81.4–97.8)
MONOCYTES # BLD AUTO: 0.8 K/UL (ref 0–0.85)
MONOCYTES NFR BLD AUTO: 6.4 % (ref 0–13.4)
NEUTROPHILS # BLD AUTO: 9.69 K/UL (ref 1.82–7.42)
NEUTROPHILS NFR BLD: 77.1 % (ref 44–72)
NRBC # BLD AUTO: 0 K/UL
NRBC BLD-RTO: 0 /100 WBC (ref 0–0.2)
PLATELET # BLD AUTO: 337 K/UL (ref 164–446)
PMV BLD AUTO: 9.1 FL (ref 9–12.9)
POTASSIUM SERPL-SCNC: 3.2 MMOL/L (ref 3.6–5.5)
RBC # BLD AUTO: 4.5 M/UL (ref 4.2–5.4)
SIGNIFICANT IND 70042: ABNORMAL
SITE SITE: ABNORMAL
SODIUM SERPL-SCNC: 139 MMOL/L (ref 135–145)
SOURCE SOURCE: ABNORMAL
WBC # BLD AUTO: 12.6 K/UL (ref 4.8–10.8)

## 2024-11-22 PROCEDURE — 82962 GLUCOSE BLOOD TEST: CPT

## 2024-11-22 PROCEDURE — 74270 X-RAY XM COLON 1CNTRST STD: CPT

## 2024-11-22 PROCEDURE — 80048 BASIC METABOLIC PNL TOTAL CA: CPT

## 2024-11-22 PROCEDURE — 36415 COLL VENOUS BLD VENIPUNCTURE: CPT

## 2024-11-22 PROCEDURE — 85025 COMPLETE CBC W/AUTO DIFF WBC: CPT

## 2024-11-22 PROCEDURE — 700111 HCHG RX REV CODE 636 W/ 250 OVERRIDE (IP): Performed by: STUDENT IN AN ORGANIZED HEALTH CARE EDUCATION/TRAINING PROGRAM

## 2024-11-22 PROCEDURE — 94760 N-INVAS EAR/PLS OXIMETRY 1: CPT

## 2024-11-22 PROCEDURE — 700111 HCHG RX REV CODE 636 W/ 250 OVERRIDE (IP): Performed by: HOSPITALIST

## 2024-11-22 PROCEDURE — 700105 HCHG RX REV CODE 258: Performed by: HOSPITALIST

## 2024-11-22 PROCEDURE — 700101 HCHG RX REV CODE 250: Performed by: HOSPITALIST

## 2024-11-22 PROCEDURE — 83735 ASSAY OF MAGNESIUM: CPT

## 2024-11-22 PROCEDURE — 770020 HCHG ROOM/CARE - TELE (206)

## 2024-11-22 PROCEDURE — 99233 SBSQ HOSP IP/OBS HIGH 50: CPT | Performed by: HOSPITALIST

## 2024-11-22 RX ORDER — METRONIDAZOLE 500 MG/100ML
500 INJECTION, SOLUTION INTRAVENOUS EVERY 12 HOURS
Status: DISCONTINUED | OUTPATIENT
Start: 2024-11-22 | End: 2024-11-25

## 2024-11-22 RX ORDER — POTASSIUM CHLORIDE 7.45 MG/ML
10 INJECTION INTRAVENOUS
Status: COMPLETED | OUTPATIENT
Start: 2024-11-22 | End: 2024-11-22

## 2024-11-22 RX ORDER — BUPROPION HYDROCHLORIDE 150 MG/1
150 TABLET, EXTENDED RELEASE ORAL 2 TIMES DAILY
Status: DISCONTINUED | OUTPATIENT
Start: 2024-11-23 | End: 2024-11-29 | Stop reason: HOSPADM

## 2024-11-22 RX ORDER — METOPROLOL TARTRATE 1 MG/ML
5 INJECTION, SOLUTION INTRAVENOUS EVERY 6 HOURS
Status: DISCONTINUED | OUTPATIENT
Start: 2024-11-22 | End: 2024-11-24

## 2024-11-22 RX ORDER — METRONIDAZOLE 500 MG/100ML
500 INJECTION, SOLUTION INTRAVENOUS EVERY 12 HOURS
Status: DISCONTINUED | OUTPATIENT
Start: 2024-11-22 | End: 2024-11-22

## 2024-11-22 RX ADMIN — POTASSIUM CHLORIDE 10 MEQ: 7.46 INJECTION, SOLUTION INTRAVENOUS at 04:58

## 2024-11-22 RX ADMIN — LABETALOL HYDROCHLORIDE 10 MG: 5 INJECTION, SOLUTION INTRAVENOUS at 06:04

## 2024-11-22 RX ADMIN — POTASSIUM CHLORIDE 10 MEQ: 7.46 INJECTION, SOLUTION INTRAVENOUS at 08:19

## 2024-11-22 RX ADMIN — METRONIDAZOLE 500 MG: 5 INJECTION, SOLUTION INTRAVENOUS at 17:23

## 2024-11-22 RX ADMIN — METRONIDAZOLE 500 MG: 5 INJECTION, SOLUTION INTRAVENOUS at 06:15

## 2024-11-22 RX ADMIN — PANTOPRAZOLE SODIUM 40 MG: 40 INJECTION, POWDER, FOR SOLUTION INTRAVENOUS at 06:00

## 2024-11-22 RX ADMIN — ENOXAPARIN SODIUM 40 MG: 100 INJECTION SUBCUTANEOUS at 17:14

## 2024-11-22 RX ADMIN — POTASSIUM CHLORIDE 10 MEQ: 7.46 INJECTION, SOLUTION INTRAVENOUS at 06:17

## 2024-11-22 RX ADMIN — POTASSIUM CHLORIDE 10 MEQ: 7.46 INJECTION, SOLUTION INTRAVENOUS at 07:22

## 2024-11-22 RX ADMIN — METOPROLOL TARTRATE 5 MG: 5 INJECTION INTRAVENOUS at 13:06

## 2024-11-22 RX ADMIN — CEFTRIAXONE SODIUM 2000 MG: 2 INJECTION, POWDER, FOR SOLUTION INTRAMUSCULAR; INTRAVENOUS at 11:10

## 2024-11-22 RX ADMIN — METOPROLOL TARTRATE 5 MG: 5 INJECTION INTRAVENOUS at 08:20

## 2024-11-22 RX ADMIN — PANTOPRAZOLE SODIUM 40 MG: 40 INJECTION, POWDER, FOR SOLUTION INTRAVENOUS at 17:13

## 2024-11-22 RX ADMIN — METOPROLOL TARTRATE 5 MG: 5 INJECTION INTRAVENOUS at 18:20

## 2024-11-22 ASSESSMENT — ENCOUNTER SYMPTOMS
CARDIOVASCULAR NEGATIVE: 1
CHILLS: 0
PSYCHIATRIC NEGATIVE: 1
SPUTUM PRODUCTION: 0
COUGH: 0
CONSTITUTIONAL NEGATIVE: 1
NEUROLOGICAL NEGATIVE: 1
VOMITING: 0
DIZZINESS: 0
ORTHOPNEA: 0
NAUSEA: 0
EYES NEGATIVE: 1
PALPITATIONS: 0
RESPIRATORY NEGATIVE: 1
ABDOMINAL PAIN: 1
HEMOPTYSIS: 0

## 2024-11-22 ASSESSMENT — PAIN DESCRIPTION - PAIN TYPE
TYPE: ACUTE PAIN
TYPE: ACUTE PAIN

## 2024-11-22 ASSESSMENT — FIBROSIS 4 INDEX: FIB4 SCORE: 1.18

## 2024-11-22 NOTE — CARE PLAN
The patient is Watcher - Medium risk of patient condition declining or worsening    Shift Goals  Clinical Goals: Barrium enema, pain management, monitor labs  Patient Goals: Comfort, rest  Family Goals: GIOVANNI    Progress made toward(s) clinical / shift goals:    Problem: Pain - Standard  Goal: Alleviation of pain or a reduction in pain to the patient’s comfort goal  Description: Target End Date:  Prior to discharge or change in level of care    Document on Vitals flowsheet    1.  Document pain using the appropriate pain scale per order or unit policy  2.  Educate and implement non-pharmacologic comfort measures (i.e. relaxation, distraction, massage, cold/heat therapy, etc.)  3.  Pain management medications as ordered  4.  Reassess pain after pain med administration per policy  5.  If opiods administered assess patient's response to pain medication is appropriate per POSS sedation scale  6.  Follow pain management plan developed in collaboration with patient and interdisciplinary team (including palliative care or pain specialists if applicable)  Outcome: Progressing  Note: Declines pain, pt aware to inform RN of breakthrough pain     Problem: Knowledge Deficit - Standard  Goal: Patient and family/care givers will demonstrate understanding of plan of care, disease process/condition, diagnostic tests and medications  Description: Target End Date:  1-3 days or as soon as patient condition allows    Document in Patient Education    1.  Patient and family/caregiver oriented to unit, equipment, visitation policy and means for communicating concern  2.  Complete/review Learning Assessment  3.  Assess knowledge level of disease process/condition, treatment plan, diagnostic tests and medications  4.  Explain disease process/condition, treatment plan, diagnostic tests and medications  Outcome: Progressing     Problem: Fall Risk  Goal: Patient will remain free from falls  Description: Target End Date:  Prior to discharge or  change in level of care    Document interventions on the Allantodd Malik Fall Risk Assessment    1.  Assess for fall risk factors  2.  Implement fall precautions  Outcome: Progressing  Note: Fall precautions in place       Patient is not progressing towards the following goals:

## 2024-11-22 NOTE — ASSESSMENT & PLAN NOTE
New onset atrial fibrillation with rapid ventricular response during this hospitalization  P.o. Toprol has been resumed  Eliquis 5 mg po BID given high janelle vasc score   Telemetry reviewed, she is rate controlled  Continue telemetry monitoring for now

## 2024-11-22 NOTE — CARE PLAN
The patient is Stable - Low risk of patient condition declining or worsening    Shift Goals  Clinical Goals: Barrium Enema/scan, Monitor BG/Vitals, pain control  Patient Goals: Comfort, results  Family Goals: GIOVANNI    Progress made toward(s) clinical / shift goals:    Problem: Pain - Standard  Goal: Alleviation of pain or a reduction in pain to the patient’s comfort goal  Description: Target End Date:  Prior to discharge or change in level of care    Document on Vitals flowsheet    1.  Document pain using the appropriate pain scale per order or unit policy  2.  Educate and implement non-pharmacologic comfort measures (i.e. relaxation, distraction, massage, cold/heat therapy, etc.)  3.  Pain management medications as ordered  4.  Reassess pain after pain med administration per policy  5.  If opiods administered assess patient's response to pain medication is appropriate per POSS sedation scale  6.  Follow pain management plan developed in collaboration with patient and interdisciplinary team (including palliative care or pain specialists if applicable)  Outcome: Progressing  Note: Patient developed epi gastric pain. Patient given one dose of IV pain medication. Patients pain decreased and she was able to sleep.      Problem: Knowledge Deficit - Standard  Goal: Patient and family/care givers will demonstrate understanding of plan of care, disease process/condition, diagnostic tests and medications  Description: Target End Date:  1-3 days or as soon as patient condition allows    Document in Patient Education    1.  Patient and family/caregiver oriented to unit, equipment, visitation policy and means for communicating concern  2.  Complete/review Learning Assessment  3.  Assess knowledge level of disease process/condition, treatment plan, diagnostic tests and medications  4.  Explain disease process/condition, treatment plan, diagnostic tests and medications  Outcome: Progressing  Note: Plan of care discussed with  patient. Patient understands the plan of care.     Problem: Fall Risk  Goal: Patient will remain free from falls  Description: Target End Date:  Prior to discharge or change in level of care    Document interventions on the Sanjana Malik Fall Risk Assessment    1.  Assess for fall risk factors  2.  Implement fall precautions  Outcome: Progressing  Note: Patient remains free of calls this shift. Patient calls for assistance and will not exit her bed unless staff is present to assist.        Patient is not progressing towards the following goals:

## 2024-11-22 NOTE — PROGRESS NOTES
Infectious Disease Progress Note    Author: Denis Park M.D. Date & Time created: 2024  12:51 PM    Interval History:  Tired.   Completed barium enema     Review of Systems:  Review of Systems   Constitutional: Negative.    HENT: Negative.     Eyes: Negative.    Respiratory: Negative.     Cardiovascular: Negative.    Gastrointestinal:  Positive for abdominal pain.   Genitourinary: Negative.    Neurological: Negative.    Psychiatric/Behavioral: Negative.         Physical Exam:  Physical Exam  Constitutional:       General: She is not in acute distress.     Appearance: She is not ill-appearing or toxic-appearing.   HENT:      Mouth/Throat:      Mouth: Mucous membranes are dry.   Cardiovascular:      Rate and Rhythm: Normal rate.   Pulmonary:      Effort: Pulmonary effort is normal.   Abdominal:      General: There is distension.   Skin:     General: Skin is warm and dry.   Neurological:      General: No focal deficit present.      Mental Status: She is alert and oriented to person, place, and time.   Psychiatric:         Mood and Affect: Mood normal.         Behavior: Behavior normal.         Thought Content: Thought content normal.         Judgment: Judgment normal.         Labs:  Recent Results (from the past 24 hours)   POCT glucose device results    Collection Time: 24  4:44 PM   Result Value Ref Range    POC Glucose, Blood 66 65 - 99 mg/dL   POCT glucose device results    Collection Time: 24  5:13 PM   Result Value Ref Range    POC Glucose, Blood 123 (H) 65 - 99 mg/dL   EKG    Collection Time: 24  6:46 PM   Result Value Ref Range    Report       [facility]    Test Date:  2024  Pt Name:    KARYNA BARBER             Department: MED  MRN:        [id number]                      Room:       3316  Gender:     Female                       Technician: [id number]  :        [id number]                   Requested By:MARIANA HUNTER  Order #:    [id number]                    Vargas MD: Moris  MARIVEL Carranza MD    Measurements  Intervals                                Axis  Rate:       119                          P:          0  OH:         0                            QRS:        -45  QRSD:       96                           T:          73  QT:         338  QTc:        476    Interpretive Statements  Atrial fibrillation  Consider right ventricular hypertrophy  Inferior infarct, old  Compared to ECG 11/19/2024 14:46:06  Myocardial infarct finding now present  Sinus arrhythmia no longer present    Electronically Signed On 11- 19:18:03 PST by Moris Carranza MD     POCT glucose device results    Collection Time: 11/22/24 12:02 AM   Result Value Ref Range    POC Glucose, Blood 105 (H) 65 - 99 mg/dL   CBC WITH DIFFERENTIAL    Collection Time: 11/22/24  3:04 AM   Result Value Ref Range    WBC 12.6 (H) 4.8 - 10.8 K/uL    RBC 4.50 4.20 - 5.40 M/uL    Hemoglobin 13.1 12.0 - 16.0 g/dL    Hematocrit 38.6 37.0 - 47.0 %    MCV 85.8 81.4 - 97.8 fL    MCH 29.1 27.0 - 33.0 pg    MCHC 33.9 32.2 - 35.5 g/dL    RDW 43.0 35.9 - 50.0 fL    Platelet Count 337 164 - 446 K/uL    MPV 9.1 9.0 - 12.9 fL    Neutrophils-Polys 77.10 (H) 44.00 - 72.00 %    Lymphocytes 12.40 (L) 22.00 - 41.00 %    Monocytes 6.40 0.00 - 13.40 %    Eosinophils 3.30 0.00 - 6.90 %    Basophils 0.20 0.00 - 1.80 %    Immature Granulocytes 0.60 0.00 - 0.90 %    Nucleated RBC 0.00 0.00 - 0.20 /100 WBC    Neutrophils (Absolute) 9.69 (H) 1.82 - 7.42 K/uL    Lymphs (Absolute) 1.56 1.00 - 4.80 K/uL    Monos (Absolute) 0.80 0.00 - 0.85 K/uL    Eos (Absolute) 0.42 0.00 - 0.51 K/uL    Baso (Absolute) 0.03 0.00 - 0.12 K/uL    Immature Granulocytes (abs) 0.08 0.00 - 0.11 K/uL    NRBC (Absolute) 0.00 K/uL   Basic Metabolic Panel    Collection Time: 11/22/24  3:04 AM   Result Value Ref Range    Sodium 139 135 - 145 mmol/L    Potassium 3.2 (L) 3.6 - 5.5 mmol/L    Chloride 101 96 - 112 mmol/L    Co2 25 20 - 33 mmol/L    Glucose 105 (H) 65 - 99 mg/dL    Bun 14 8 - 22  mg/dL    Creatinine 0.83 0.50 - 1.40 mg/dL    Calcium 8.0 (L) 8.4 - 10.2 mg/dL    Anion Gap 13.0 7.0 - 16.0   ESTIMATED GFR    Collection Time: 11/22/24  3:04 AM   Result Value Ref Range    GFR (CKD-EPI) 74 >60 mL/min/1.73 m 2   MAGNESIUM    Collection Time: 11/22/24  3:04 AM   Result Value Ref Range    Magnesium 2.0 1.5 - 2.5 mg/dL   POCT glucose device results    Collection Time: 11/22/24  6:09 AM   Result Value Ref Range    POC Glucose, Blood 98 65 - 99 mg/dL   POCT glucose device results    Collection Time: 11/22/24 11:09 AM   Result Value Ref Range    POC Glucose, Blood 136 (H) 65 - 99 mg/dL     Results       Procedure Component Value Units Date/Time    Urine Culture (New) [[id number]] Collected: 11/19/24 2245    Order Status: Completed Specimen: Urine Updated: 11/21/24 1721     Significant Indicator NEG     Source UR     Site -     Culture Result No growth at 24 hours.    Blood Culture - Draw one from central line and one from peripheral site [[id number]] Collected: 11/19/24 1806    Order Status: Completed Specimen: Blood from Line Updated: 11/20/24 2006     Significant Indicator NEG     Source BLD     Site Peripheral     Culture Result No Growth  Note: Blood cultures are incubated for 5 days and  are monitored continuously.Positive blood cultures  are called to the RN and reported as soon as  they are identified.      Blood Culture - Draw one from central line and one from peripheral site [[id number]] Collected: 11/19/24 1800    Order Status: Completed Specimen: Blood from Peripheral Updated: 11/20/24 2004     Significant Indicator NEG     Source BLD     Site PERIPHERAL     Culture Result No Growth  Note: Blood cultures are incubated for 5 days and  are monitored continuously.Positive blood cultures  are called to the RN and reported as soon as  they are identified.      Urinalysis [[id number]]  (Abnormal) Collected: 11/19/24 2245    Order Status: Completed Specimen: Urine Updated: 11/20/24 1843     Color Yellow      Character Clear     Specific Gravity 1.015     Ph 5.5     Glucose Negative mg/dL      Ketones Trace mg/dL      Protein Negative mg/dL      Bilirubin Small     Nitrite Negative     Leukocyte Esterase Small     Occult Blood Trace     Micro Urine Req Microscopic          Hemodynamics:  Temp (24hrs), Av.6 °C (97.9 °F), Min:36.2 °C (97.2 °F), Max:37.1 °C (98.7 °F)  Temperature: 36.2 °C (97.2 °F)  Pulse  Av.4  Min: 55  Max: 120   Blood Pressure : 132/58     Peripheral IV 24 20 G Left Antecubital (Active)   Site Assessment Clean;Dry;Intact 24   Dressing Type Transparent 24   Line Status Scrubbed the hub prior to access;No blood return;Flushed;Saline locked 24   Dressing Status Clean;Dry;Intact 24   Dressing Intervention N/A 24   Dressing Change Due 24 193   Date Primary Tubing Changed 24   Infiltration Grading ([facility]) 0 24   Phlebitis Scale ([facility] Only) 0 24     Wound:  @WOUNDLDA(4)@     Fluids:  Intake/Output                               24 0700 - 24 0659 24 0700 - 24 0659 24 0700 - 24 0659      Total 1900-0659 Total 1900-0659 Total                    Intake    P.O.  0  -- 0  --  -- --  --  -- --    P.O. 0 -- 0 -- -- -- -- -- --    I.V.  --  -- --  125  -- 125  --  -- --    Volume (mL) (NS infusion) -- -- -- 125 -- 125 -- -- --    Total Intake 0 -- 0 125 -- 125 -- -- --       Output    Urine  --  -- --  200  150 350  150  -- 150    Number of Times Voided -- -- -- 1 x 1 x 2 x 1 x -- 1 x    Urine Void (mL) -- -- -- 200 150 350 150 -- 150    Stool  --  -- --  --  -- --  --  -- --    Number of Times Stooled -- -- -- 2 x -- 2 x -- -- --    Emesis/NG output  --  -- --  450  300 750  --  -- --    Output (mL) (Enteral Tube 24 Nasogastric 16 Fr. Left nare) -- -- -- 450 300 750 -- -- --    Total Output -- -- -- 650  450 1100 150 -- 150       Net I/O     0 -- 0 -525 -450 -975 -150 -- -150          Weight: (!) 132 kg (290 lb 12.6 oz)  GI/Nutrition:  Orders Placed This Encounter   Procedures    Diet NPO Restrict to: Strict     Standing Status:   Standing     Number of Occurrences:   1     Order Specific Question:   Diet NPO Restrict to:     Answer:   Strict [1]     Medications:  Current Facility-Administered Medications   Medication Last Admin    Metoprolol Tartrate (Lopressor) injection 5 mg 5 mg at 11/22/24 0820    [START ON 11/23/2024] buPROPion SR (Wellbutrin-SR) tablet 150 mg      [START ON 11/23/2024] cefTRIAXone (Rocephin) 2,000 mg in  mL IVPB Stopped at 11/22/24 1140    metroNIDAZOLE (Flagyl) IVPB 500 mg      morphine 4 MG/ML injection 4 mg 4 mg at 11/21/24 2244    enoxaparin (Lovenox) inj 40 mg 40 mg at 11/21/24 1656    albuterol inhaler 2 Puff      atorvastatin (Lipitor) tablet 20 mg      [Held by provider] metoprolol SR (Toprol XL) tablet 25 mg      sertraline (Zoloft) tablet 50 mg      pantoprazole (Protonix) injection 40 mg 40 mg at 11/22/24 0600    Metoprolol Tartrate (Lopressor) injection 5 mg      acetaminophen (Tylenol) tablet 650 mg      ondansetron (Zofran) syringe/vial injection 4 mg 4 mg at 11/21/24 2212    ondansetron (Zofran ODT) dispertab 4 mg      insulin lispro (HumaLOG,AdmeLOG) subcutaneous injection      And    dextrose 50% (D50W) injection 25 g 25 g at 11/21/24 1650     Medical Decision Making, by Problem:  Active Hospital Problems    Diagnosis     *Bowel obstruction (HCC) [K56.609]     A-fib (HCC) [I48.91]     Stage 3a chronic kidney disease [N18.31]     Sinus tachycardia [R00.0]     Sigmoid diverticulitis [K57.32]     Preoperative examination [Z01.818]     ACP (advance care planning) [Z71.89]     Dehydration [E86.0]     Class 3 severe obesity due to excess calories without serious comorbidity with body mass index (BMI) of 40.0 to 44.9 in adult (HCC) [E66.813, E66.01, Z68.41]     Primary  "hypertension [I10]     Hypokalemia [E87.6]     Hyponatremia [E87.1]     Abnormal EKG [R94.31]     Depression [F32.A]        Plan:  Large bowel obstruction secondary to diverticular disease and diverticulitis complicated by colovesicular fistula as evidenced by the barium enema results from today: \"There is an irregular stricture of the sigmoid colon with what appears to be a colovaginal fistula from this region.\" Dr. Escalante was notified of these results and will contact surgery for treatment. Continue IV antibiotics.   Diet: NPO        "

## 2024-11-22 NOTE — PROGRESS NOTES
Dr. Escalante notified that patient appeared to be in A fib. EKG ordered and performed. Photo of EKG sent to provider, no further orders at this time.

## 2024-11-22 NOTE — PROGRESS NOTES
[facility] Medicine Daily Progress Note    Date of Service  11/22/2024    Chief Complaint  Guerda Lunsford is a 73 y.o. female admitted 11/19/2024 with abdominal pain and distention    Hospital Course  Guerda Washington has past medical history which includes diabetes, colitis, and diverticulitis.  Surgical history includes appendectomy and hysterectomy.  She presented to the emergency room 11/19/2024 for evaluation of epigastric pain and distention.  CT imaging was concerning for diffusely dilated colon with some bowel wall thickening.  Differential diagnosis includes ileus as well as distal colonic obstruction.  The patient was hospitalized, NG tube was placed, intravenous fluids were ordered, and surgery was consulted.    Interval Problem Update  Overnight, the patient has gone into atrial fibrillation rapid ventricular response  Heart rates reaching 130, some improvement with IV metoprolol  Patient's abdomen remains distended  No flatus, no bowel movement  We discussed barium enema today    Patient is critically ill with life threatening bowel obstruction and tachycarida requiring management of intravenous rate control agents to prevent cardiovascular collapse. Total of 45 minutes of critical care time spent with patient, discussed with surgery, with gastroenterology, with RN, with pharmacy, and with case management. This time is exclusive of any procedures performed and does not overlap with other physician's time.    I have discussed this patient's plan of care and discharge plan at IDT rounds today with Case Management, Nursing, Nursing leadership, and other members of the IDT team.    Consultants/Specialty  general surgery    Code Status  Full Code    Disposition  The patient is not medically cleared for discharge to home or a post-acute facility.  Anticipate discharge to: home with close outpatient follow-up    I have placed the appropriate orders for post-discharge needs.    Review of Systems  Review  of Systems   Constitutional:  Negative for chills and malaise/fatigue.   Respiratory:  Negative for cough, hemoptysis and sputum production.    Cardiovascular:  Negative for chest pain, palpitations and orthopnea.   Gastrointestinal:  Positive for abdominal pain. Negative for nausea and vomiting.   Neurological:  Negative for dizziness.        Physical Exam  Temp:  [36.2 °C (97.2 °F)-37.1 °C (98.7 °F)] 36.2 °C (97.2 °F)  Pulse:  [] 84  Resp:  [16-20] 20  BP: (118-146)/(50-72) 125/58  SpO2:  [91 %-99 %] 98 %    Physical Exam  Constitutional:       General: She is not in acute distress.     Appearance: Normal appearance. She is obese.   HENT:      Head: Normocephalic and atraumatic.      Right Ear: External ear normal.      Left Ear: External ear normal.      Nose: Nose normal.   Eyes:      Extraocular Movements: Extraocular movements intact.   Cardiovascular:      Rate and Rhythm: Tachycardia present. Rhythm irregular.      Pulses: Normal pulses.   Pulmonary:      Effort: Pulmonary effort is normal.      Breath sounds: Normal breath sounds.   Abdominal:      General: Abdomen is flat. Bowel sounds are normal. There is distension.      Palpations: Abdomen is soft.      Tenderness: There is abdominal tenderness.      Comments: Patient has normal active bowel sounds   Musculoskeletal:         General: Normal range of motion.      Cervical back: Normal range of motion and neck supple.   Skin:     General: Skin is warm and dry.   Neurological:      General: No focal deficit present.      Mental Status: She is alert and oriented to person, place, and time.      Cranial Nerves: No cranial nerve deficit.   Psychiatric:         Mood and Affect: Mood normal.         Behavior: Behavior normal.         Fluids    Intake/Output Summary (Last 24 hours) at 11/22/2024 1511  Last data filed at 11/22/2024 0800  Gross per 24 hour   Intake --   Output 600 ml   Net -600 ml        Laboratory  Recent Labs     11/20/24  5873  11/21/24  0129 11/22/24  0304   WBC 13.6* 11.1* 12.6*   RBC 4.78 4.60 4.50   HEMOGLOBIN 13.8 13.2 13.1   HEMATOCRIT 40.5 39.6 38.6   MCV 84.7 86.1 85.8   MCH 28.9 28.7 29.1   MCHC 34.1 33.3 33.9   RDW 41.2 42.6 43.0   PLATELETCT 385 354 337   MPV 8.9* 9.2 9.1     Recent Labs     11/20/24  0226 11/21/24  0129 11/22/24  0304   SODIUM 134* 138 139   POTASSIUM 3.2* 3.1* 3.2*   CHLORIDE 93* 99 101   CO2 27 26 25   GLUCOSE 121* 83 105*   BUN 18 15 14   CREATININE 1.10 1.05 0.83   CALCIUM 8.3* 8.1* 8.0*             Recent Labs     11/20/24 0226   TRIGLYCERIDE 86   HDL 47   LDL 41       Imaging  DX-BARIUM ENEMA   Final Result         1. 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      DX-ABDOMEN FOR TUBE PLACEMENT   Final Result         1.  Nonspecific bowel gas pattern in the upper abdomen.   2.  Nasogastric tube tip terminates overlying the expected location of the gastric fundus.      EC-ECHOCARDIOGRAM COMPLETE W/ CONT   Final Result      DX-ABDOMEN FOR TUBE PLACEMENT   Final Result      1.  NG tube extends into the fundus of the stomach.      DX-CHEST-PORTABLE (1 VIEW)   Final Result      Cardiomegaly.      CT-ABDOMEN-PELVIS WITH   Final Result      1.  Diffusely dilated colon with some bowel wall thickening. This extends to the level of the upper sigmoid colon where there is some focal narrowing. Small intestine is also dilated with air/fluid levels. Differential diagnosis includes ileus as well as    distal colonic obstruction.      2.  Sigmoid diverticulosis. There is some mild stranding attenuation seen in the area of the narrowed sigmoid colon which may represent a component of diverticulitis.      3.  Small hiatal hernia.           Assessment/Plan  * Bowel obstruction (HCC)- (present on admission)  Assessment & Plan  11/22:  Continue NG tube to low intermittent suction  Continue IV fluids  Discussed with Dr. Hou throughout the day  We  reviewed results of flex sig and barium enema  The patient will require surgery, likely over the weekend  Continue antibiotics    A-fib (HCC)- (present on admission)  Assessment & Plan  11/22:  New onset atrial fibrillation with rapid ventricular response  The patient is not taking any enteral medications at this time, I ordered scheduled and as needed intravenous metoprolol    Dehydration- (present on admission)  Assessment & Plan  IV fluids    Sigmoid diverticulitis- (present on admission)  Assessment & Plan  11/22:   Continue ceftriaxone and metronidazole    ACP (advance care planning)- (present on admission)  Assessment & Plan  See prior documentation, the patient is full code    Preoperative examination- (present on admission)  Assessment & Plan  See history and physical for documentation of preoperative exam    Sinus tachycardia- (present on admission)  Assessment & Plan  Fluid resuscitation    Stage 3a chronic kidney disease- (present on admission)  Assessment & Plan  Avoid/minimize nephrotoxins as much as possible, renally dose medications, monitor inputs and outputs      Class 3 severe obesity due to excess calories without serious comorbidity with body mass index (BMI) of 40.0 to 44.9 in adult ([facility])- (present on admission)  Assessment & Plan  Body mass index is 46.04 kg/m².      Primary hypertension- (present on admission)  Assessment & Plan  IV antihypertensives for extreme hypertensive    Hyponatremia- (present on admission)  Assessment & Plan  Resolved    Hypokalemia- (present on admission)  Assessment & Plan  11/22:  Potassium replacement intravenously  I ordered repeat labs to assess response to treatment    Abnormal EKG- (present on admission)  Assessment & Plan  No chest pain    Depression- (present on admission)  Assessment & Plan  Resume home bupropion and sertraline when able to take orally         VTE prophylaxis:    enoxaparin ppx      I have performed a physical exam and reviewed and updated  ROS and Plan today (11/22/2024). In review of yesterday's note (11/21/2024), there are no changes except as documented above.

## 2024-11-22 NOTE — PROGRESS NOTES
"      Pain comes and goes  NG tube bothersome  Wants to eat    /67   Pulse 88   Temp 36.3 °C (97.4 °F) (Temporal)   Resp 18   Ht 1.651 m (5' 5\")   Wt (!) 132 kg (290 lb 12.6 oz)   SpO2 95%   BMI 48.39 kg/m²     No obvious distress  NG tube clear  Abdomen very obese, tympanic  No scars or hernias  No overt tenderness    Assessment and plan:  Sigmoid stricture of uncertain etiology, but suspect diverticulitis related  Likely obstructed but awaiting contrast study.  If contrast does not pass the stricture then patient will need exploration with colectomy and likely colostomy.  If contrast does pass through the stricture then we could attempt GoLytely to clear the bowel and see if we can avoid urgent surgery    Continue n.p.o./NGT    "

## 2024-11-22 NOTE — PROGRESS NOTES
Telemetry Shift Summary     Rhythm: SR/SA/Afib  HR:   Ectopy: rPVC, fPAC  Measurements: -/0.08/-       Normal Values  Rhythm: SR  HR:   Measurements: 0.12-0.20 / 0.04-0.10 / 0.30-0.52

## 2024-11-22 NOTE — PROGRESS NOTES
Telemetry Shift Summary printed at 0018    Per monitor tech:  Rhythm A.Fib/SA/SR  HR Range , reached 169  Ectopy R-PVC  Measurements 0.18/0.08/0.34      Normal Values  Rhythm SR  HR Range:   Measurements: 0.12-0.20/0.06-0.10/0.30-0.52

## 2024-11-22 NOTE — PROGRESS NOTES
Hypoglycemia Intervention    Hypoglycemia protocol intervention:  Blood glucose 66 at 1644.  Intervention: 25 g IV dextrose per MAR   Repeat blood glucose 123 at 1713.  Intervention: Patient NPO, recheck blood glucose in 1 hour   Additional interventions needed: NA  Dr. powell notified of the above at 1711.

## 2024-11-23 ENCOUNTER — ANESTHESIA EVENT (OUTPATIENT)
Dept: SURGERY | Facility: MEDICAL CENTER | Age: 74
End: 2024-11-23
Payer: MEDICARE

## 2024-11-23 LAB
ANION GAP SERPL CALC-SCNC: 16 MMOL/L (ref 7–16)
BASOPHILS # BLD AUTO: 0.2 % (ref 0–1.8)
BASOPHILS # BLD: 0.03 K/UL (ref 0–0.12)
BUN SERPL-MCNC: 15 MG/DL (ref 8–22)
CALCIUM SERPL-MCNC: 8.5 MG/DL (ref 8.4–10.2)
CHLORIDE SERPL-SCNC: 99 MMOL/L (ref 96–112)
CO2 SERPL-SCNC: 23 MMOL/L (ref 20–33)
CREAT SERPL-MCNC: 0.79 MG/DL (ref 0.5–1.4)
EOSINOPHIL # BLD AUTO: 0.36 K/UL (ref 0–0.51)
EOSINOPHIL NFR BLD: 2.8 % (ref 0–6.9)
ERYTHROCYTE [DISTWIDTH] IN BLOOD BY AUTOMATED COUNT: 43.6 FL (ref 35.9–50)
GFR SERPLBLD CREATININE-BSD FMLA CKD-EPI: 78 ML/MIN/1.73 M 2
GLUCOSE BLD STRIP.AUTO-MCNC: 107 MG/DL (ref 65–99)
GLUCOSE BLD STRIP.AUTO-MCNC: 108 MG/DL (ref 65–99)
GLUCOSE BLD STRIP.AUTO-MCNC: 123 MG/DL (ref 65–99)
GLUCOSE BLD STRIP.AUTO-MCNC: 99 MG/DL (ref 65–99)
GLUCOSE SERPL-MCNC: 110 MG/DL (ref 65–99)
HCT VFR BLD AUTO: 40.8 % (ref 37–47)
HGB BLD-MCNC: 13.6 G/DL (ref 12–16)
IMM GRANULOCYTES # BLD AUTO: 0.08 K/UL (ref 0–0.11)
IMM GRANULOCYTES NFR BLD AUTO: 0.6 % (ref 0–0.9)
LYMPHOCYTES # BLD AUTO: 1.18 K/UL (ref 1–4.8)
LYMPHOCYTES NFR BLD: 9.1 % (ref 22–41)
MCH RBC QN AUTO: 28.9 PG (ref 27–33)
MCHC RBC AUTO-ENTMCNC: 33.3 G/DL (ref 32.2–35.5)
MCV RBC AUTO: 86.8 FL (ref 81.4–97.8)
MONOCYTES # BLD AUTO: 0.82 K/UL (ref 0–0.85)
MONOCYTES NFR BLD AUTO: 6.3 % (ref 0–13.4)
NEUTROPHILS # BLD AUTO: 10.45 K/UL (ref 1.82–7.42)
NEUTROPHILS NFR BLD: 81 % (ref 44–72)
NRBC # BLD AUTO: 0 K/UL
NRBC BLD-RTO: 0 /100 WBC (ref 0–0.2)
PLATELET # BLD AUTO: 365 K/UL (ref 164–446)
PMV BLD AUTO: 9 FL (ref 9–12.9)
POTASSIUM SERPL-SCNC: 3.4 MMOL/L (ref 3.6–5.5)
RBC # BLD AUTO: 4.7 M/UL (ref 4.2–5.4)
SODIUM SERPL-SCNC: 138 MMOL/L (ref 135–145)
WBC # BLD AUTO: 12.9 K/UL (ref 4.8–10.8)

## 2024-11-23 PROCEDURE — A9270 NON-COVERED ITEM OR SERVICE: HCPCS | Performed by: HOSPITALIST

## 2024-11-23 PROCEDURE — 700105 HCHG RX REV CODE 258: Performed by: HOSPITALIST

## 2024-11-23 PROCEDURE — 700102 HCHG RX REV CODE 250 W/ 637 OVERRIDE(OP): Performed by: HOSPITALIST

## 2024-11-23 PROCEDURE — 94760 N-INVAS EAR/PLS OXIMETRY 1: CPT

## 2024-11-23 PROCEDURE — 36415 COLL VENOUS BLD VENIPUNCTURE: CPT

## 2024-11-23 PROCEDURE — 770020 HCHG ROOM/CARE - TELE (206)

## 2024-11-23 PROCEDURE — 700111 HCHG RX REV CODE 636 W/ 250 OVERRIDE (IP): Performed by: HOSPITALIST

## 2024-11-23 PROCEDURE — 85025 COMPLETE CBC W/AUTO DIFF WBC: CPT

## 2024-11-23 PROCEDURE — 82962 GLUCOSE BLOOD TEST: CPT | Mod: 91

## 2024-11-23 PROCEDURE — 700101 HCHG RX REV CODE 250: Performed by: HOSPITALIST

## 2024-11-23 PROCEDURE — 80048 BASIC METABOLIC PNL TOTAL CA: CPT

## 2024-11-23 RX ORDER — DEXTROSE MONOHYDRATE AND SODIUM CHLORIDE 5; .9 G/100ML; G/100ML
INJECTION, SOLUTION INTRAVENOUS CONTINUOUS
Status: DISCONTINUED | OUTPATIENT
Start: 2024-11-23 | End: 2024-11-24

## 2024-11-23 RX ORDER — POTASSIUM CHLORIDE 7.45 MG/ML
10 INJECTION INTRAVENOUS
Status: COMPLETED | OUTPATIENT
Start: 2024-11-23 | End: 2024-11-23

## 2024-11-23 RX ADMIN — POTASSIUM CHLORIDE 10 MEQ: 7.46 INJECTION, SOLUTION INTRAVENOUS at 10:46

## 2024-11-23 RX ADMIN — METOPROLOL TARTRATE 5 MG: 5 INJECTION INTRAVENOUS at 00:22

## 2024-11-23 RX ADMIN — METRONIDAZOLE 500 MG: 5 INJECTION, SOLUTION INTRAVENOUS at 18:03

## 2024-11-23 RX ADMIN — PANTOPRAZOLE SODIUM 40 MG: 40 INJECTION, POWDER, FOR SOLUTION INTRAVENOUS at 06:16

## 2024-11-23 RX ADMIN — ENOXAPARIN SODIUM 40 MG: 100 INJECTION SUBCUTANEOUS at 17:36

## 2024-11-23 RX ADMIN — ATORVASTATIN CALCIUM 20 MG: 20 TABLET, FILM COATED ORAL at 17:35

## 2024-11-23 RX ADMIN — METOPROLOL TARTRATE 5 MG: 5 INJECTION INTRAVENOUS at 06:16

## 2024-11-23 RX ADMIN — METOPROLOL TARTRATE 5 MG: 5 INJECTION INTRAVENOUS at 11:59

## 2024-11-23 RX ADMIN — POTASSIUM CHLORIDE 10 MEQ: 7.46 INJECTION, SOLUTION INTRAVENOUS at 12:00

## 2024-11-23 RX ADMIN — METOPROLOL TARTRATE 5 MG: 5 INJECTION INTRAVENOUS at 17:42

## 2024-11-23 RX ADMIN — POTASSIUM CHLORIDE 10 MEQ: 7.46 INJECTION, SOLUTION INTRAVENOUS at 08:23

## 2024-11-23 RX ADMIN — METRONIDAZOLE 500 MG: 5 INJECTION, SOLUTION INTRAVENOUS at 04:54

## 2024-11-23 RX ADMIN — PANTOPRAZOLE SODIUM 40 MG: 40 INJECTION, POWDER, FOR SOLUTION INTRAVENOUS at 17:36

## 2024-11-23 RX ADMIN — METOPROLOL TARTRATE 5 MG: 5 INJECTION INTRAVENOUS at 23:35

## 2024-11-23 RX ADMIN — BUPROPION HYDROCHLORIDE 150 MG: 150 TABLET, FILM COATED, EXTENDED RELEASE ORAL at 17:35

## 2024-11-23 RX ADMIN — CEFTRIAXONE SODIUM 2000 MG: 2 INJECTION, POWDER, FOR SOLUTION INTRAMUSCULAR; INTRAVENOUS at 06:23

## 2024-11-23 ASSESSMENT — PAIN DESCRIPTION - PAIN TYPE
TYPE: ACUTE PAIN
TYPE: ACUTE PAIN

## 2024-11-23 ASSESSMENT — FIBROSIS 4 INDEX: FIB4 SCORE: 1.09

## 2024-11-23 NOTE — CARE PLAN
The patient is Watcher - Medium risk of patient condition declining or worsening    Shift Goals  Clinical Goals: Monitor dx results and labs, MD consult on tx plan  Patient Goals: Drink, eat, shower  Family Goals: GIOVANNI    Progress made toward(s) clinical / shift goals:  Surgical MD explained surgery will be completed on 11/23. Patient has been cleared for sips of clear liquids.      Problem: Pain - Standard  Goal: Alleviation of pain or a reduction in pain to the patient’s comfort goal  Outcome: Progressing     Problem: Knowledge Deficit - Standard  Goal: Patient and family/care givers will demonstrate understanding of plan of care, disease process/condition, diagnostic tests and medications  Outcome: Progressing     Problem: Fall Risk  Goal: Patient will remain free from falls  Outcome: Progressing     Problem: Skin Integrity  Goal: Skin integrity is maintained or improved  Outcome: Progressing

## 2024-11-23 NOTE — CARE PLAN
The patient is Stable - Low risk of patient condition declining or worsening    Shift Goals  Clinical Goals: Pain management, monitor labs/BG  Patient Goals: Comfort, rest  Family Goals: GIOVANNI    Progress made toward(s) clinical / shift goals:    Problem: Pain - Standard  Goal: Alleviation of pain or a reduction in pain to the patient’s comfort goal  Description: Target End Date:  Prior to discharge or change in level of care    Document on Vitals flowsheet    1.  Document pain using the appropriate pain scale per order or unit policy  2.  Educate and implement non-pharmacologic comfort measures (i.e. relaxation, distraction, massage, cold/heat therapy, etc.)  3.  Pain management medications as ordered  4.  Reassess pain after pain med administration per policy  5.  If opiods administered assess patient's response to pain medication is appropriate per POSS sedation scale  6.  Follow pain management plan developed in collaboration with patient and interdisciplinary team (including palliative care or pain specialists if applicable)  Outcome: Progressing  Note: Patient had minimal pain over night. Patient did not require pain medication this shift.     Problem: Knowledge Deficit - Standard  Goal: Patient and family/care givers will demonstrate understanding of plan of care, disease process/condition, diagnostic tests and medications  Description: Target End Date:  1-3 days or as soon as patient condition allows    Document in Patient Education    1.  Patient and family/caregiver oriented to unit, equipment, visitation policy and means for communicating concern  2.  Complete/review Learning Assessment  3.  Assess knowledge level of disease process/condition, treatment plan, diagnostic tests and medications  4.  Explain disease process/condition, treatment plan, diagnostic tests and medications  Outcome: Progressing  Note: Plan of care discussed with the patient. Patient understands the plan of care     Problem: Fall  Risk  Goal: Patient will remain free from falls  Description: Target End Date:  Prior to discharge or change in level of care    Document interventions on the Sanjana Malik Fall Risk Assessment    1.  Assess for fall risk factors  2.  Implement fall precautions  Outcome: Progressing  Note: Patient remains free of falls this shift. Patient calls for assistance when she needs to exit her bed.        Patient is not progressing towards the following goals:

## 2024-11-23 NOTE — PROGRESS NOTES
Telemetry Shift Summary     Rhythm: Sr/SA  HR: 75-92  Ectopy: fPVC, rPAC  Measurements: 0.18/0.08/0.36       Normal Values  Rhythm: SR  HR:   Measurements: 0.12-0.20 / 0.04-0.10 / 0.30-0.52

## 2024-11-23 NOTE — PROGRESS NOTES
Telemetry Shift Summary printed at 0048    Per monitor tech:  Rhythm SR/ST/SA  HR Range   Ectopy R-PVC  Measurements 0.18/0.08/0.38      Normal Values  Rhythm SR  HR Range:   Measurements: 0.12-0.20/0.06-0.10/0.30-0.52

## 2024-11-24 ENCOUNTER — ANESTHESIA (OUTPATIENT)
Dept: SURGERY | Facility: MEDICAL CENTER | Age: 74
End: 2024-11-24
Payer: MEDICARE

## 2024-11-24 LAB
ANION GAP SERPL CALC-SCNC: 16 MMOL/L (ref 7–16)
BACTERIA BLD CULT: NORMAL
BACTERIA BLD CULT: NORMAL
BASOPHILS # BLD AUTO: 0.4 % (ref 0–1.8)
BASOPHILS # BLD: 0.05 K/UL (ref 0–0.12)
BUN SERPL-MCNC: 19 MG/DL (ref 8–22)
CALCIUM SERPL-MCNC: 8.4 MG/DL (ref 8.4–10.2)
CHLORIDE SERPL-SCNC: 99 MMOL/L (ref 96–112)
CO2 SERPL-SCNC: 22 MMOL/L (ref 20–33)
CREAT SERPL-MCNC: 0.86 MG/DL (ref 0.5–1.4)
EOSINOPHIL # BLD AUTO: 0.29 K/UL (ref 0–0.51)
EOSINOPHIL NFR BLD: 2.5 % (ref 0–6.9)
ERYTHROCYTE [DISTWIDTH] IN BLOOD BY AUTOMATED COUNT: 43.8 FL (ref 35.9–50)
GFR SERPLBLD CREATININE-BSD FMLA CKD-EPI: 71 ML/MIN/1.73 M 2
GLUCOSE BLD STRIP.AUTO-MCNC: 106 MG/DL (ref 65–99)
GLUCOSE BLD STRIP.AUTO-MCNC: 119 MG/DL (ref 65–99)
GLUCOSE BLD STRIP.AUTO-MCNC: 137 MG/DL (ref 65–99)
GLUCOSE BLD STRIP.AUTO-MCNC: 213 MG/DL (ref 65–99)
GLUCOSE SERPL-MCNC: 136 MG/DL (ref 65–99)
HCT VFR BLD AUTO: 39.1 % (ref 37–47)
HGB BLD-MCNC: 13 G/DL (ref 12–16)
IMM GRANULOCYTES # BLD AUTO: 0.08 K/UL (ref 0–0.11)
IMM GRANULOCYTES NFR BLD AUTO: 0.7 % (ref 0–0.9)
LYMPHOCYTES # BLD AUTO: 1.37 K/UL (ref 1–4.8)
LYMPHOCYTES NFR BLD: 11.6 % (ref 22–41)
MCH RBC QN AUTO: 28.7 PG (ref 27–33)
MCHC RBC AUTO-ENTMCNC: 33.2 G/DL (ref 32.2–35.5)
MCV RBC AUTO: 86.3 FL (ref 81.4–97.8)
MONOCYTES # BLD AUTO: 0.79 K/UL (ref 0–0.85)
MONOCYTES NFR BLD AUTO: 6.7 % (ref 0–13.4)
NEUTROPHILS # BLD AUTO: 9.18 K/UL (ref 1.82–7.42)
NEUTROPHILS NFR BLD: 78.1 % (ref 44–72)
NRBC # BLD AUTO: 0 K/UL
NRBC BLD-RTO: 0 /100 WBC (ref 0–0.2)
PLATELET # BLD AUTO: 339 K/UL (ref 164–446)
PMV BLD AUTO: 9.2 FL (ref 9–12.9)
POTASSIUM SERPL-SCNC: 3.3 MMOL/L (ref 3.6–5.5)
RBC # BLD AUTO: 4.53 M/UL (ref 4.2–5.4)
SIGNIFICANT IND 70042: NORMAL
SIGNIFICANT IND 70042: NORMAL
SITE SITE: NORMAL
SITE SITE: NORMAL
SODIUM SERPL-SCNC: 137 MMOL/L (ref 135–145)
SOURCE SOURCE: NORMAL
SOURCE SOURCE: NORMAL
WBC # BLD AUTO: 11.8 K/UL (ref 4.8–10.8)

## 2024-11-24 PROCEDURE — 99232 SBSQ HOSP IP/OBS MODERATE 35: CPT | Performed by: HOSPITALIST

## 2024-11-24 PROCEDURE — 700105 HCHG RX REV CODE 258: Performed by: STUDENT IN AN ORGANIZED HEALTH CARE EDUCATION/TRAINING PROGRAM

## 2024-11-24 PROCEDURE — 160009 HCHG ANES TIME/MIN: Performed by: SURGERY

## 2024-11-24 PROCEDURE — 700111 HCHG RX REV CODE 636 W/ 250 OVERRIDE (IP): Performed by: HOSPITALIST

## 2024-11-24 PROCEDURE — 700111 HCHG RX REV CODE 636 W/ 250 OVERRIDE (IP): Performed by: STUDENT IN AN ORGANIZED HEALTH CARE EDUCATION/TRAINING PROGRAM

## 2024-11-24 PROCEDURE — 160048 HCHG OR STATISTICAL LEVEL 1-5: Performed by: SURGERY

## 2024-11-24 PROCEDURE — 85025 COMPLETE CBC W/AUTO DIFF WBC: CPT

## 2024-11-24 PROCEDURE — A9270 NON-COVERED ITEM OR SERVICE: HCPCS | Performed by: HOSPITALIST

## 2024-11-24 PROCEDURE — 160036 HCHG PACU - EA ADDL 30 MINS PHASE I: Performed by: SURGERY

## 2024-11-24 PROCEDURE — 160035 HCHG PACU - 1ST 60 MINS PHASE I: Performed by: SURGERY

## 2024-11-24 PROCEDURE — 700111 HCHG RX REV CODE 636 W/ 250 OVERRIDE (IP): Mod: JZ | Performed by: HOSPITALIST

## 2024-11-24 PROCEDURE — 94760 N-INVAS EAR/PLS OXIMETRY 1: CPT

## 2024-11-24 PROCEDURE — 82962 GLUCOSE BLOOD TEST: CPT | Mod: 91

## 2024-11-24 PROCEDURE — 770020 HCHG ROOM/CARE - TELE (206)

## 2024-11-24 PROCEDURE — 160039 HCHG SURGERY MINUTES - EA ADDL 1 MIN LEVEL 3: Performed by: SURGERY

## 2024-11-24 PROCEDURE — 700102 HCHG RX REV CODE 250 W/ 637 OVERRIDE(OP): Performed by: HOSPITALIST

## 2024-11-24 PROCEDURE — 700101 HCHG RX REV CODE 250: Performed by: HOSPITALIST

## 2024-11-24 PROCEDURE — 80048 BASIC METABOLIC PNL TOTAL CA: CPT

## 2024-11-24 PROCEDURE — 700105 HCHG RX REV CODE 258: Performed by: HOSPITALIST

## 2024-11-24 PROCEDURE — 302094 BELT OSTOMY (HOLLISTER): Performed by: HOSPITALIST

## 2024-11-24 PROCEDURE — 36415 COLL VENOUS BLD VENIPUNCTURE: CPT

## 2024-11-24 PROCEDURE — 160028 HCHG SURGERY MINUTES - 1ST 30 MINS LEVEL 3: Performed by: SURGERY

## 2024-11-24 PROCEDURE — 700101 HCHG RX REV CODE 250: Performed by: SURGERY

## 2024-11-24 PROCEDURE — 160002 HCHG RECOVERY MINUTES (STAT): Performed by: SURGERY

## 2024-11-24 PROCEDURE — 0D1L4Z4 BYPASS TRANSVERSE COLON TO CUTANEOUS, PERCUTANEOUS ENDOSCOPIC APPROACH: ICD-10-PCS | Performed by: SURGERY

## 2024-11-24 PROCEDURE — 700101 HCHG RX REV CODE 250: Performed by: STUDENT IN AN ORGANIZED HEALTH CARE EDUCATION/TRAINING PROGRAM

## 2024-11-24 RX ORDER — LIDOCAINE HYDROCHLORIDE 20 MG/ML
INJECTION, SOLUTION EPIDURAL; INFILTRATION; INTRACAUDAL; PERINEURAL PRN
Status: DISCONTINUED | OUTPATIENT
Start: 2024-11-24 | End: 2024-11-24 | Stop reason: SURG

## 2024-11-24 RX ORDER — HALOPERIDOL 5 MG/ML
1 INJECTION INTRAMUSCULAR
Status: DISCONTINUED | OUTPATIENT
Start: 2024-11-24 | End: 2024-11-24 | Stop reason: HOSPADM

## 2024-11-24 RX ORDER — HYDRALAZINE HYDROCHLORIDE 20 MG/ML
5 INJECTION INTRAMUSCULAR; INTRAVENOUS
Status: DISCONTINUED | OUTPATIENT
Start: 2024-11-24 | End: 2024-11-24 | Stop reason: HOSPADM

## 2024-11-24 RX ORDER — DIPHENHYDRAMINE HYDROCHLORIDE 50 MG/ML
12.5 INJECTION INTRAMUSCULAR; INTRAVENOUS
Status: DISCONTINUED | OUTPATIENT
Start: 2024-11-24 | End: 2024-11-24 | Stop reason: HOSPADM

## 2024-11-24 RX ORDER — BUPIVACAINE HYDROCHLORIDE AND EPINEPHRINE 5; 5 MG/ML; UG/ML
INJECTION, SOLUTION EPIDURAL; INTRACAUDAL; PERINEURAL
Status: DISCONTINUED | OUTPATIENT
Start: 2024-11-24 | End: 2024-11-24 | Stop reason: HOSPADM

## 2024-11-24 RX ORDER — EPHEDRINE SULFATE 50 MG/ML
5 INJECTION, SOLUTION INTRAVENOUS
Status: DISCONTINUED | OUTPATIENT
Start: 2024-11-24 | End: 2024-11-24 | Stop reason: HOSPADM

## 2024-11-24 RX ORDER — SODIUM CHLORIDE, SODIUM LACTATE, POTASSIUM CHLORIDE, CALCIUM CHLORIDE 600; 310; 30; 20 MG/100ML; MG/100ML; MG/100ML; MG/100ML
INJECTION, SOLUTION INTRAVENOUS CONTINUOUS
Status: DISCONTINUED | OUTPATIENT
Start: 2024-11-24 | End: 2024-11-24 | Stop reason: HOSPADM

## 2024-11-24 RX ORDER — OXYCODONE HCL 5 MG/5 ML
5 SOLUTION, ORAL ORAL
Status: DISCONTINUED | OUTPATIENT
Start: 2024-11-24 | End: 2024-11-24 | Stop reason: HOSPADM

## 2024-11-24 RX ORDER — HYDROMORPHONE HYDROCHLORIDE 1 MG/ML
0.1 INJECTION, SOLUTION INTRAMUSCULAR; INTRAVENOUS; SUBCUTANEOUS
Status: DISCONTINUED | OUTPATIENT
Start: 2024-11-24 | End: 2024-11-24 | Stop reason: HOSPADM

## 2024-11-24 RX ORDER — CEFOTETAN DISODIUM 2 G/20ML
INJECTION, POWDER, FOR SOLUTION INTRAMUSCULAR; INTRAVENOUS PRN
Status: DISCONTINUED | OUTPATIENT
Start: 2024-11-24 | End: 2024-11-24 | Stop reason: SURG

## 2024-11-24 RX ORDER — SODIUM CHLORIDE, SODIUM LACTATE, POTASSIUM CHLORIDE, CALCIUM CHLORIDE 600; 310; 30; 20 MG/100ML; MG/100ML; MG/100ML; MG/100ML
INJECTION, SOLUTION INTRAVENOUS CONTINUOUS
Status: ACTIVE | OUTPATIENT
Start: 2024-11-24 | End: 2024-11-24

## 2024-11-24 RX ORDER — HYDROMORPHONE HYDROCHLORIDE 1 MG/ML
0.4 INJECTION, SOLUTION INTRAMUSCULAR; INTRAVENOUS; SUBCUTANEOUS
Status: DISCONTINUED | OUTPATIENT
Start: 2024-11-24 | End: 2024-11-24 | Stop reason: HOSPADM

## 2024-11-24 RX ORDER — DEXAMETHASONE SODIUM PHOSPHATE 4 MG/ML
INJECTION, SOLUTION INTRA-ARTICULAR; INTRALESIONAL; INTRAMUSCULAR; INTRAVENOUS; SOFT TISSUE PRN
Status: DISCONTINUED | OUTPATIENT
Start: 2024-11-24 | End: 2024-11-24 | Stop reason: SURG

## 2024-11-24 RX ORDER — POTASSIUM CHLORIDE 7.45 MG/ML
10 INJECTION INTRAVENOUS
Status: COMPLETED | OUTPATIENT
Start: 2024-11-24 | End: 2024-11-24

## 2024-11-24 RX ORDER — SODIUM CHLORIDE, SODIUM LACTATE, POTASSIUM CHLORIDE, CALCIUM CHLORIDE 600; 310; 30; 20 MG/100ML; MG/100ML; MG/100ML; MG/100ML
INJECTION, SOLUTION INTRAVENOUS
Status: DISCONTINUED | OUTPATIENT
Start: 2024-11-24 | End: 2024-11-24 | Stop reason: SURG

## 2024-11-24 RX ORDER — ALBUTEROL SULFATE 5 MG/ML
2.5 SOLUTION RESPIRATORY (INHALATION)
Status: DISCONTINUED | OUTPATIENT
Start: 2024-11-24 | End: 2024-11-24 | Stop reason: HOSPADM

## 2024-11-24 RX ORDER — HYDROMORPHONE HYDROCHLORIDE 1 MG/ML
0.2 INJECTION, SOLUTION INTRAMUSCULAR; INTRAVENOUS; SUBCUTANEOUS
Status: DISCONTINUED | OUTPATIENT
Start: 2024-11-24 | End: 2024-11-24 | Stop reason: HOSPADM

## 2024-11-24 RX ORDER — PHENYLEPHRINE HCL IN 0.9% NACL 1 MG/10 ML
SYRINGE (ML) INTRAVENOUS PRN
Status: DISCONTINUED | OUTPATIENT
Start: 2024-11-24 | End: 2024-11-24 | Stop reason: SURG

## 2024-11-24 RX ORDER — ONDANSETRON 2 MG/ML
INJECTION INTRAMUSCULAR; INTRAVENOUS PRN
Status: DISCONTINUED | OUTPATIENT
Start: 2024-11-24 | End: 2024-11-24 | Stop reason: SURG

## 2024-11-24 RX ORDER — ONDANSETRON 2 MG/ML
4 INJECTION INTRAMUSCULAR; INTRAVENOUS
Status: DISCONTINUED | OUTPATIENT
Start: 2024-11-24 | End: 2024-11-24 | Stop reason: HOSPADM

## 2024-11-24 RX ORDER — OXYCODONE HCL 5 MG/5 ML
10 SOLUTION, ORAL ORAL
Status: DISCONTINUED | OUTPATIENT
Start: 2024-11-24 | End: 2024-11-24 | Stop reason: HOSPADM

## 2024-11-24 RX ORDER — SUCCINYLCHOLINE CHLORIDE 20 MG/ML
INJECTION INTRAMUSCULAR; INTRAVENOUS PRN
Status: DISCONTINUED | OUTPATIENT
Start: 2024-11-24 | End: 2024-11-24 | Stop reason: SURG

## 2024-11-24 RX ORDER — HYDROMORPHONE HYDROCHLORIDE 2 MG/ML
INJECTION, SOLUTION INTRAMUSCULAR; INTRAVENOUS; SUBCUTANEOUS PRN
Status: DISCONTINUED | OUTPATIENT
Start: 2024-11-24 | End: 2024-11-24 | Stop reason: HOSPADM

## 2024-11-24 RX ORDER — ROCURONIUM BROMIDE 10 MG/ML
INJECTION, SOLUTION INTRAVENOUS PRN
Status: DISCONTINUED | OUTPATIENT
Start: 2024-11-24 | End: 2024-11-24 | Stop reason: SURG

## 2024-11-24 RX ADMIN — ROCURONIUM BROMIDE 30 MG: 50 INJECTION, SOLUTION INTRAVENOUS at 09:28

## 2024-11-24 RX ADMIN — METRONIDAZOLE 500 MG: 5 INJECTION, SOLUTION INTRAVENOUS at 06:47

## 2024-11-24 RX ADMIN — SUGAMMADEX 200 MG: 100 INJECTION, SOLUTION INTRAVENOUS at 10:02

## 2024-11-24 RX ADMIN — CEFOTETAN DISODIUM 2 G: 2 INJECTION, POWDER, FOR SOLUTION INTRAMUSCULAR; INTRAVENOUS at 08:33

## 2024-11-24 RX ADMIN — HYDROMORPHONE HYDROCHLORIDE 0.5 MG: 2 INJECTION INTRAMUSCULAR; INTRAVENOUS; SUBCUTANEOUS at 09:54

## 2024-11-24 RX ADMIN — LIDOCAINE HYDROCHLORIDE 100 MG: 20 INJECTION, SOLUTION EPIDURAL; INFILTRATION; INTRACAUDAL; PERINEURAL at 08:20

## 2024-11-24 RX ADMIN — INSULIN LISPRO 2 UNITS: 100 INJECTION, SOLUTION INTRAVENOUS; SUBCUTANEOUS at 17:32

## 2024-11-24 RX ADMIN — METOPROLOL SUCCINATE 25 MG: 25 TABLET, FILM COATED, EXTENDED RELEASE ORAL at 17:10

## 2024-11-24 RX ADMIN — METRONIDAZOLE 500 MG: 5 INJECTION, SOLUTION INTRAVENOUS at 18:11

## 2024-11-24 RX ADMIN — FENTANYL CITRATE 100 MCG: 50 INJECTION, SOLUTION INTRAMUSCULAR; INTRAVENOUS at 08:20

## 2024-11-24 RX ADMIN — PROPOFOL 150 MG: 10 INJECTION, EMULSION INTRAVENOUS at 08:20

## 2024-11-24 RX ADMIN — BUPROPION HYDROCHLORIDE 150 MG: 150 TABLET, FILM COATED, EXTENDED RELEASE ORAL at 17:10

## 2024-11-24 RX ADMIN — DEXTROSE AND SODIUM CHLORIDE: 5; 900 INJECTION, SOLUTION INTRAVENOUS at 14:08

## 2024-11-24 RX ADMIN — METOPROLOL TARTRATE 5 MG: 5 INJECTION INTRAVENOUS at 12:31

## 2024-11-24 RX ADMIN — HYDROMORPHONE HYDROCHLORIDE 0.5 MG: 2 INJECTION INTRAMUSCULAR; INTRAVENOUS; SUBCUTANEOUS at 08:45

## 2024-11-24 RX ADMIN — PANTOPRAZOLE SODIUM 40 MG: 40 INJECTION, POWDER, FOR SOLUTION INTRAVENOUS at 04:50

## 2024-11-24 RX ADMIN — DEXAMETHASONE SODIUM PHOSPHATE 4 MG: 4 INJECTION INTRA-ARTICULAR; INTRALESIONAL; INTRAMUSCULAR; INTRAVENOUS; SOFT TISSUE at 08:33

## 2024-11-24 RX ADMIN — ONDANSETRON 4 MG: 2 INJECTION INTRAMUSCULAR; INTRAVENOUS at 10:02

## 2024-11-24 RX ADMIN — Medication 100 MCG: at 08:32

## 2024-11-24 RX ADMIN — POTASSIUM CHLORIDE 10 MEQ: 10 INJECTION, SOLUTION INTRAVENOUS at 17:09

## 2024-11-24 RX ADMIN — METOPROLOL TARTRATE 5 MG: 5 INJECTION INTRAVENOUS at 04:50

## 2024-11-24 RX ADMIN — POTASSIUM CHLORIDE 10 MEQ: 10 INJECTION, SOLUTION INTRAVENOUS at 16:08

## 2024-11-24 RX ADMIN — SODIUM CHLORIDE, POTASSIUM CHLORIDE, SODIUM LACTATE AND CALCIUM CHLORIDE: 600; 310; 30; 20 INJECTION, SOLUTION INTRAVENOUS at 08:10

## 2024-11-24 RX ADMIN — PANTOPRAZOLE SODIUM 40 MG: 40 INJECTION, POWDER, FOR SOLUTION INTRAVENOUS at 17:03

## 2024-11-24 RX ADMIN — SUCCINYLCHOLINE CHLORIDE 100 MG: 20 INJECTION, SOLUTION INTRAMUSCULAR; INTRAVENOUS at 08:20

## 2024-11-24 RX ADMIN — ENOXAPARIN SODIUM 40 MG: 100 INJECTION SUBCUTANEOUS at 17:10

## 2024-11-24 RX ADMIN — ROCURONIUM BROMIDE 50 MG: 50 INJECTION, SOLUTION INTRAVENOUS at 08:23

## 2024-11-24 RX ADMIN — ATORVASTATIN CALCIUM 20 MG: 20 TABLET, FILM COATED ORAL at 17:10

## 2024-11-24 RX ADMIN — POTASSIUM CHLORIDE 10 MEQ: 10 INJECTION, SOLUTION INTRAVENOUS at 15:10

## 2024-11-24 RX ADMIN — CEFTRIAXONE SODIUM 2000 MG: 2 INJECTION, POWDER, FOR SOLUTION INTRAMUSCULAR; INTRAVENOUS at 04:55

## 2024-11-24 ASSESSMENT — PAIN DESCRIPTION - PAIN TYPE
TYPE: SURGICAL PAIN
TYPE: SURGICAL PAIN
TYPE: ACUTE PAIN
TYPE: SURGICAL PAIN
TYPE: ACUTE PAIN;SURGICAL PAIN

## 2024-11-24 ASSESSMENT — ENCOUNTER SYMPTOMS
DIZZINESS: 0
ABDOMINAL PAIN: 1
HEMOPTYSIS: 0
COUGH: 0
PALPITATIONS: 0
ORTHOPNEA: 0
SPUTUM PRODUCTION: 0
CHILLS: 0
VOMITING: 0
NAUSEA: 0

## 2024-11-24 ASSESSMENT — PAIN SCALES - GENERAL: PAIN_LEVEL: 1

## 2024-11-24 NOTE — ANESTHESIA PROCEDURE NOTES
Airway    Date/Time: 11/24/2024 8:21 AM    Performed by: Caroline Velez M.D.  Authorized by: Caroline Velez M.D.    Location:  OR  Urgency:  Elective  Indications for Airway Management:  Anesthesia      Spontaneous Ventilation: absent    Sedation Level:  Deep  Preoxygenated: Yes    Patient Position:  Sniffing  Mask Difficulty Assessment:  0 - not attempted  Final Airway Type:  Endotracheal airway  Final Endotracheal Airway:  ETT  Cuffed: Yes    Technique Used for Successful ETT Placement:  Direct laryngoscopy  Devices/Methods Used in Placement:  Cricoid pressure and intubating stylet    Insertion Site:  Oral  Blade Type:  Jose  Laryngoscope Blade/Videolaryngoscope Blade Size:  3  ETT Size (mm):  7.5  Measured from:  Teeth  ETT to Teeth (cm):  21  Placement Verified by: auscultation and capnometry    Cormack-Lehane Classification:  Grade I - full view of glottis  Number of Attempts at Approach:  1

## 2024-11-24 NOTE — ANESTHESIA PREPROCEDURE EVALUATION
Case: [id number] Anesthesia Start Date/Time: 11/24/24 0810    Procedure: CHOLECYSTECTOMY, LAPAROSCOPIC possible open ileostomy vs colostomy    Location:  OR 01 / SURGERY [facility]    Surgeons: George Hou D.O.            Relevant Problems   CARDIAC   (positive) A-fib (HCC)   (positive) Heart murmur   (positive) Hemorrhoids   (positive) Primary hypertension         (positive) Stage 3a chronic kidney disease       Physical Exam    Airway   Mallampati: II  TM distance: >3 FB  Neck ROM: full       Cardiovascular - normal exam  Rhythm: regular  Rate: normal  (-) murmur     Dental - normal exam           Pulmonary - normal exam  Breath sounds clear to auscultation     Abdominal    Neurological - normal exam         Other findings: No loose teeth.  NGT in place.              Anesthesia Plan    ASA 3   ASA physical status 3 criteria: morbid obesity - BMI greater than or equal to 40    Plan - general       Airway plan will be ETT          Induction: intravenous    Postoperative Plan: Postoperative administration of opioids is intended.    Pertinent diagnostic labs and testing reviewed    Informed Consent:    Anesthetic plan and risks discussed with patient.    Use of blood products discussed with: patient whom consented to blood products.

## 2024-11-24 NOTE — CARE PLAN
The patient is Stable - Low risk of patient condition declining or worsening    Shift Goals  Clinical Goals: NPO 0000 for surgery, monitor for nausea  Patient Goals: Drink water, rest  Family Goals: GIOVANNI    Progress made toward(s) clinical / shift goals:      Problem: Pain - Standard  Goal: Alleviation of pain or a reduction in pain to the patient’s comfort goal  Outcome: Progressing     Problem: Knowledge Deficit - Standard  Goal: Patient and family/care givers will demonstrate understanding of plan of care, disease process/condition, diagnostic tests and medications  Outcome: Progressing     Problem: Fall Risk  Goal: Patient will remain free from falls  Outcome: Progressing       Patient is not progressing towards the following goals:

## 2024-11-24 NOTE — CARE PLAN
Problem: Pain - Standard  Goal: Alleviation of pain or a reduction in pain to the patient’s comfort goal  Outcome: Progressing     Problem: Knowledge Deficit - Standard  Goal: Patient and family/care givers will demonstrate understanding of plan of care, disease process/condition, diagnostic tests and medications  Outcome: Progressing   The patient is Stable - Low risk of patient condition declining or worsening    Shift Goals  Clinical Goals: IV fluids, Sx, pain management  Patient Goals: Comfort, wellness  Family Goals: GIOVANNI    Progress made toward(s) clinical / shift goals:  pt returned from surgery with new ostomy in left lower quad abdomen, pt denies pain at this time. Continue with post op vitals     Patient is not progressing towards the following goals:

## 2024-11-24 NOTE — DOCUMENTATION QUERY
[facility]                                                                       Query Response Note      PATIENT:               KARYNA BARBER  ACCT #:                  [id number]  MRN:                     [id number]  :                      1950  ADMIT DATE:       2024 3:05 PM  DISCH DATE:          RESPONDING  PROVIDER #:        [id number]           QUERY TEXT:    Can the type of CHF be further clarified based on the provided clinical indicators?    The patient's Clinical Indicators include:  Findings:  HP: Patient has history of CHF: Continue home beta blocker and rate control medications.    Echo :  left ventricular ejection fraction is visually estimated to be greater than 75%.Grossly normal regional wall motion. Diastolic function is difficult to assess with arrhythmia. Normal right ventricular systolic function    Chest xray  Cardiomegaly     Treatment: Imaging Home medications, hydrochlorothiazide, metoprolol,     Risk Factors: history of congestive heart failure, hypertension     Carmelina Beckwith RN BSN  Clinical Documentation   [email or web address]  Connect via [email or web address] Messenger    Note: If you agree with a diagnosis listed above, please remember to include it in your concurrent daily documentation and onto the Discharge Summary.  Options provided:   -- Diastolic / HFpEF   -- Other explanation, (please specify other explanation)      Query created by: aCrmelina Nagel on 2024 8:08 AM    RESPONSE TEXT:    Diastolic / HFpEF          Electronically signed by:  MARIANA HUNTER MD 2024 6:36 PM

## 2024-11-24 NOTE — PROGRESS NOTES
"    No acute changes    /79   Pulse 89   Temp 36.3 °C (97.4 °F) (Temporal)   Resp 16   Ht 1.651 m (5' 5\")   Wt (!) 129 kg (283 lb 4.7 oz)   SpO2 94%   BMI 47.14 kg/m²     NGT  Abd soft, nontender    A/P  Plan for Or today for colostomy/diversion  "

## 2024-11-24 NOTE — WOUND TEAM
Wound consult received from Dr. Hou regarding new transverse loop colostomy. Ostomy was created on 11/24/24. Ostomy RN to begin ostomy education with pt on POD #1 Monday 11/25/24.

## 2024-11-24 NOTE — PROGRESS NOTES
Hospital Medicine Daily Progress Note    Date of Service  11/24/2024    Chief Complaint  Guerda Lunsford is a 73 y.o. female admitted 11/19/2024 with abdominal pain and distention    Hospital Course  Guerda Washington has past medical history which includes diabetes, colitis, and diverticulitis.  Surgical history includes appendectomy and hysterectomy.  She presented to the emergency room 11/19/2024 for evaluation of epigastric pain and distention.  CT imaging was concerning for diffusely dilated colon with some bowel wall thickening.  Differential diagnosis includes ileus as well as distal colonic obstruction.  The patient was hospitalized, NG tube was placed, intravenous fluids were ordered, and surgery was consulted.    Interval Problem Update  Patient seen and examined postoperatively  As she is still sedated from surgery  States that her abdomen is less distended, anxious to be able to drink fluids  In sinus rhythm, blood pressure within normal limits    I have discussed this patient's plan of care and discharge plan at IDT rounds today with Case Management, Nursing, Nursing leadership, and other members of the IDT team.    Consultants/Specialty  general surgery    Code Status  Full Code    Disposition  The patient is not medically cleared for discharge to home or a post-acute facility.  Anticipate discharge to: home with organized home healthcare and close outpatient follow-up    I have placed the appropriate orders for post-discharge needs.    Review of Systems  Review of Systems   Constitutional:  Negative for chills.   Respiratory:  Negative for cough, hemoptysis and sputum production.    Cardiovascular:  Negative for chest pain, palpitations and orthopnea.   Gastrointestinal:  Positive for abdominal pain. Negative for nausea and vomiting.   Neurological:  Negative for dizziness.        Physical Exam  Temp:  [36.2 °C (97.2 °F)-37.1 °C (98.8 °F)] 36.3 °C (97.4 °F)  Pulse:  [72-94] 88  Resp:  [16-19]  18  BP: (112-139)/(49-83) 120/59  SpO2:  [91 %-98 %] 92 %    Physical Exam  Constitutional:       General: She is not in acute distress.     Appearance: Normal appearance. She is obese.   HENT:      Head: Normocephalic and atraumatic.      Right Ear: External ear normal.      Left Ear: External ear normal.      Nose: Nose normal.   Eyes:      Extraocular Movements: Extraocular movements intact.   Cardiovascular:      Rate and Rhythm: Tachycardia present. Rhythm irregular.      Pulses: Normal pulses.   Pulmonary:      Effort: Pulmonary effort is normal.      Breath sounds: Normal breath sounds.   Abdominal:      General: Bowel sounds are normal.      Palpations: Abdomen is soft.      Comments: Left upper quadrant ostomy  Laparoscopic incisions C/D/I   Musculoskeletal:         General: Normal range of motion.      Cervical back: Normal range of motion and neck supple.      Right lower leg: Edema present.      Left lower leg: Edema present.   Skin:     General: Skin is warm and dry.   Neurological:      General: No focal deficit present.      Mental Status: She is alert and oriented to person, place, and time.      Cranial Nerves: No cranial nerve deficit.   Psychiatric:         Mood and Affect: Mood normal.         Behavior: Behavior normal.         Fluids    Intake/Output Summary (Last 24 hours) at 11/24/2024 1448  Last data filed at 11/24/2024 1100  Gross per 24 hour   Intake 700 ml   Output 1200 ml   Net -500 ml        Laboratory  Recent Labs     11/22/24 0304 11/23/24 0142 11/24/24 0316   WBC 12.6* 12.9* 11.8*   RBC 4.50 4.70 4.53   HEMOGLOBIN 13.1 13.6 13.0   HEMATOCRIT 38.6 40.8 39.1   MCV 85.8 86.8 86.3   MCH 29.1 28.9 28.7   MCHC 33.9 33.3 33.2   RDW 43.0 43.6 43.8   PLATELETCT 337 365 339   MPV 9.1 9.0 9.2     Recent Labs     11/22/24  0304 11/23/24 0142 11/24/24 0316   SODIUM 139 138 137   POTASSIUM 3.2* 3.4* 3.3*   CHLORIDE 101 99 99   CO2 25 23 22   GLUCOSE 105* 110* 136*   BUN 14 15 19   CREATININE  0.83 0.79 0.86   CALCIUM 8.0* 8.5 8.4                     Imaging  DX-BARIUM ENEMA   Final Result         1. 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      DX-ABDOMEN FOR TUBE PLACEMENT   Final Result         1.  Nonspecific bowel gas pattern in the upper abdomen.   2.  Nasogastric tube tip terminates overlying the expected location of the gastric fundus.      EC-ECHOCARDIOGRAM COMPLETE W/ CONT   Final Result      DX-ABDOMEN FOR TUBE PLACEMENT   Final Result      1.  NG tube extends into the fundus of the stomach.      DX-CHEST-PORTABLE (1 VIEW)   Final Result      Cardiomegaly.      CT-ABDOMEN-PELVIS WITH   Final Result      1.  Diffusely dilated colon with some bowel wall thickening. This extends to the level of the upper sigmoid colon where there is some focal narrowing. Small intestine is also dilated with air/fluid levels. Differential diagnosis includes ileus as well as    distal colonic obstruction.      2.  Sigmoid diverticulosis. There is some mild stranding attenuation seen in the area of the narrowed sigmoid colon which may represent a component of diverticulitis.      3.  Small hiatal hernia.           Assessment/Plan  * Bowel obstruction (HCC)- (present on admission)  Assessment & Plan  11/24:  Patient today underwent laparoscopic transverse loop colostomy  I discussed with Dr. Hou after surgery  Continue postoperative care, await return of bowel function  Mobilize    A-fib (HCC)- (present on admission)  Assessment & Plan  11/24:  New onset atrial fibrillation with rapid ventricular response  Resume metoprolol    Dehydration- (present on admission)  Assessment & Plan  IV fluids    Sigmoid diverticulitis- (present on admission)  Assessment & Plan  11/24:  Ceftriaxone and metronidazole    ACP (advance care planning)- (present on admission)  Assessment & Plan  See prior documentation, the patient is full code    Preoperative  examination- (present on admission)  Assessment & Plan  See history and physical for documentation of preoperative exam    Sinus tachycardia- (present on admission)  Assessment & Plan  Fluid resuscitation    Stage 3a chronic kidney disease- (present on admission)  Assessment & Plan  Avoid/minimize nephrotoxins as much as possible, renally dose medications, monitor inputs and outputs      Class 3 severe obesity due to excess calories without serious comorbidity with body mass index (BMI) of 40.0 to 44.9 in adult (HCC)- (present on admission)  Assessment & Plan  Body mass index is 46.04 kg/m².      Primary hypertension- (present on admission)  Assessment & Plan  IV antihypertensives for extreme hypertensive    Hyponatremia- (present on admission)  Assessment & Plan  Resolved    Hypokalemia- (present on admission)  Assessment & Plan  11/23:  I ordered potassium replacement  I ordered repeat labs to assess response to treatment    Abnormal EKG- (present on admission)  Assessment & Plan  No chest pain    Depression- (present on admission)  Assessment & Plan  Resume home bupropion and sertraline when able to take orally         VTE prophylaxis:    enoxaparin ppx      I have performed a physical exam and reviewed and updated ROS and Plan today (11/24/2024). In review of yesterday's note (11/23/2024), there are no changes except as documented above.

## 2024-11-24 NOTE — OR NURSING
1030 To PACU from OR by a gurtati; pt sleeping, respirations spontaneous and nonlabored.    1045 pt rouses to voice, denies any pain or discomfort. Lap stabs x 3, NADIRA, clean and approximated.Left upper quadrant ostomy bag in placed.    1100 approx 1000 ml of brown loose stool emptied from ostomy bag.    1115 pt sleeping, VSS.    1130 pt meets criteria to return to [facility], VSS, no c/o pain or discomfort, no c/o nausea. Report called to receiving RN.     1140 stoma appliance and bag changed due to leaking at the site.     1200 pt transferred back to room via hospital bed w/ out incident.

## 2024-11-24 NOTE — OP REPORT
Operative report    PreOp Diagnosis: Large bowel obstruction due to complicated diverticulitis      PostOp Diagnosis: Same      Procedure(s):  laparoscopy transverse loop colostomy - Wound Class: Clean Contaminated    Surgeon(s):  George Hou D.O.    Anesthesiologist/Type of Anesthesia:  Anesthesiologist: Caroline Velez M.D./General    Surgical Staff:  Circulator: Carmelina Duarte R.N.  Scrub Person: Gutierrez Khoury    Specimens removed if any:  * No specimens in log *    Estimated Blood Loss: Minimal    Findings: Dilated colon to the sigmoid.  Almost entirely decompressed small bowel    Complications: None noted    Indication: 73-year-old female presented with signs and symptoms of bowel obstruction was found to have a presumed diverticular stricture.  Patient underwent nasogastric decompression initially.  Barium enema showed tight stricture of the sigmoid with what appeared to be a colovaginal fistula as well.  After discussion with surgical team, decision was made to proceed with diversion to allow for healing and more thorough surgical planning.    Operative report: Patient was taken to the operating room and placed supine on the operating table.  She was placed under general anesthesia and intubated by the anesthesiologist.  The abdomen was prepped and draped in standard fashion.  We infiltrated the area inferior to the umbilicus with local anesthetic.  Small incision was made.  Visiport entry was then performed and the abdomen was insufflated.  Diffusely dilated colon was initially identified.  The remainder the abdomen appeared to be relatively decompressed small bowel loops.  Stomach appeared to be decompressed as well.  We placed a 5 mm port in the left lower quadrant using identical technique as the first.  A third port was placed in the right upper quadrant as well.  We are able to easily mobilized the transverse colon anteriorly.  Omentum appeared to be above that.  Location for loop colostomy was chosen in  the left upper quadrant the skin and subcutaneous tissues were mature local anesthetic and a incision was made.  A 50 mm port was then inserted.  We initially attempted to pass a red rubber catheter down around the colon but due to the distention was difficult to mobilize for fear of perforation.  We then remove the port and were able to insert a very large [place] into the abdomen and grasped the colon and hold it in place.  We then cut a disc of skin and plugs up a plug of subcutaneous tissue around the port site and opened the anterior fascia in a cruciate fashion.  The muscle and posterior sheath was opened and dilated and we are able to pull the transverse colon up into the wound.  The posterior sheath was tacked to the colon in the 4 quadrants to help hold it in position.  We then incised the bowel and I matured the colostomy using 3-0 Vicryl suture in interrupted fashion.  A large amount of stool issued forth and was controlled with the suction.  We thoroughly examined the ostomy from inside the abdomen and all appeared well.  The abdomen was deflated and the ports were removed.  The skin at each port site was closed using a 4-0 Monocryl stitch in a subcuticular fashion.  The wounds were dressed with Dermabond.  We then cleaned and dried the rest of the abdominal wall and placed a colostomy appliance.  The patient was then awakened from anesthesia and taken the postanesthesia care unit in stable condition.  The sponge, needle and instrument counts were correct at the end of the case.        11/24/2024 10:14 AM George Hou D.O.

## 2024-11-24 NOTE — ANESTHESIA POSTPROCEDURE EVALUATION
Patient: Guerda Lunsford    Procedure Summary       Date: 11/24/24 Room / Location: [place] [id number] / SURGERY [facility]    Anesthesia Start: 0810 Anesthesia Stop: 1035    Procedure: laparoscopy possible open ileostomy vs colostomy (Abdomen) Diagnosis:     Surgeons: George Hou D.O. Responsible Provider: Caroline Velez M.D.    Anesthesia Type: general ASA Status: 3            Final Anesthesia Type: general  Last vitals  BP   Blood Pressure : 139/72    Temp   36.2 °C (97.2 °F)    Pulse   94   Resp   16    SpO2   94 %      Anesthesia Post Evaluation    Patient location during evaluation: PACU  Patient participation: complete - patient participated  Level of consciousness: awake and alert  Pain score: 1    Airway patency: patent  Anesthetic complications: no  Cardiovascular status: hemodynamically stable  Respiratory status: acceptable and face mask  Hydration status: euvolemic    PONV: none          No notable events documented.     Nurse Pain Score: 0 (NPRS)

## 2024-11-24 NOTE — ANESTHESIA TIME REPORT
Anesthesia Start and Stop Event Times       Date Time Event    11/24/2024 0748 Ready for Procedure     0810 Anesthesia Start     1035 Anesthesia Stop          Responsible Staff  11/24/24      Name Role Begin End    Caroline Velez M.D. Anesth 0810 1035          Overtime Reason:  no overtime (within assigned shift)    Comments:

## 2024-11-24 NOTE — PROGRESS NOTES
Telemetry Shift Summary     Rhythm SR  HR Range 81-88  Ectopy oPAC/rPVC  Measurements  0.14/0.08/0.44     Normal Values  Rhythm SR  HR Range    Measurements 0.12-0.20 / 0.06-0.10  / 0.30-0.52

## 2024-11-25 PROBLEM — N82.4 COLOVAGINAL FISTULA: Status: ACTIVE | Noted: 2024-11-25

## 2024-11-25 LAB
ANION GAP SERPL CALC-SCNC: 12 MMOL/L (ref 7–16)
BASOPHILS # BLD AUTO: 0.2 % (ref 0–1.8)
BASOPHILS # BLD: 0.02 K/UL (ref 0–0.12)
BUN SERPL-MCNC: 15 MG/DL (ref 8–22)
CALCIUM SERPL-MCNC: 7.9 MG/DL (ref 8.4–10.2)
CHLORIDE SERPL-SCNC: 103 MMOL/L (ref 96–112)
CO2 SERPL-SCNC: 26 MMOL/L (ref 20–33)
CREAT SERPL-MCNC: 0.78 MG/DL (ref 0.5–1.4)
EOSINOPHIL # BLD AUTO: 0.04 K/UL (ref 0–0.51)
EOSINOPHIL NFR BLD: 0.4 % (ref 0–6.9)
ERYTHROCYTE [DISTWIDTH] IN BLOOD BY AUTOMATED COUNT: 43.1 FL (ref 35.9–50)
GFR SERPLBLD CREATININE-BSD FMLA CKD-EPI: 80 ML/MIN/1.73 M 2
GLUCOSE BLD STRIP.AUTO-MCNC: 114 MG/DL (ref 65–99)
GLUCOSE BLD STRIP.AUTO-MCNC: 123 MG/DL (ref 65–99)
GLUCOSE BLD STRIP.AUTO-MCNC: 124 MG/DL (ref 65–99)
GLUCOSE BLD STRIP.AUTO-MCNC: 131 MG/DL (ref 65–99)
GLUCOSE BLD STRIP.AUTO-MCNC: 138 MG/DL (ref 65–99)
GLUCOSE SERPL-MCNC: 96 MG/DL (ref 65–99)
HCT VFR BLD AUTO: 34.7 % (ref 37–47)
HGB BLD-MCNC: 11.7 G/DL (ref 12–16)
IMM GRANULOCYTES # BLD AUTO: 0.05 K/UL (ref 0–0.11)
IMM GRANULOCYTES NFR BLD AUTO: 0.5 % (ref 0–0.9)
LYMPHOCYTES # BLD AUTO: 1.27 K/UL (ref 1–4.8)
LYMPHOCYTES NFR BLD: 12.5 % (ref 22–41)
MCH RBC QN AUTO: 28.9 PG (ref 27–33)
MCHC RBC AUTO-ENTMCNC: 33.7 G/DL (ref 32.2–35.5)
MCV RBC AUTO: 85.7 FL (ref 81.4–97.8)
MONOCYTES # BLD AUTO: 0.85 K/UL (ref 0–0.85)
MONOCYTES NFR BLD AUTO: 8.3 % (ref 0–13.4)
NEUTROPHILS # BLD AUTO: 7.95 K/UL (ref 1.82–7.42)
NEUTROPHILS NFR BLD: 78.1 % (ref 44–72)
NRBC # BLD AUTO: 0 K/UL
NRBC BLD-RTO: 0 /100 WBC (ref 0–0.2)
PLATELET # BLD AUTO: 319 K/UL (ref 164–446)
PMV BLD AUTO: 9.5 FL (ref 9–12.9)
POTASSIUM SERPL-SCNC: 3.4 MMOL/L (ref 3.6–5.5)
RBC # BLD AUTO: 4.05 M/UL (ref 4.2–5.4)
SODIUM SERPL-SCNC: 141 MMOL/L (ref 135–145)
WBC # BLD AUTO: 10.2 K/UL (ref 4.8–10.8)

## 2024-11-25 PROCEDURE — A9270 NON-COVERED ITEM OR SERVICE: HCPCS | Performed by: HOSPITALIST

## 2024-11-25 PROCEDURE — 36415 COLL VENOUS BLD VENIPUNCTURE: CPT

## 2024-11-25 PROCEDURE — 99232 SBSQ HOSP IP/OBS MODERATE 35: CPT | Performed by: HOSPITALIST

## 2024-11-25 PROCEDURE — 97162 PT EVAL MOD COMPLEX 30 MIN: CPT

## 2024-11-25 PROCEDURE — 80048 BASIC METABOLIC PNL TOTAL CA: CPT

## 2024-11-25 PROCEDURE — 97602 WOUND(S) CARE NON-SELECTIVE: CPT

## 2024-11-25 PROCEDURE — 85025 COMPLETE CBC W/AUTO DIFF WBC: CPT

## 2024-11-25 PROCEDURE — 700111 HCHG RX REV CODE 636 W/ 250 OVERRIDE (IP): Mod: JZ | Performed by: HOSPITALIST

## 2024-11-25 PROCEDURE — 97166 OT EVAL MOD COMPLEX 45 MIN: CPT

## 2024-11-25 PROCEDURE — 302106 OSTOMY POWDER: Performed by: HOSPITALIST

## 2024-11-25 PROCEDURE — 700111 HCHG RX REV CODE 636 W/ 250 OVERRIDE (IP): Performed by: HOSPITALIST

## 2024-11-25 PROCEDURE — 700105 HCHG RX REV CODE 258: Performed by: HOSPITALIST

## 2024-11-25 PROCEDURE — 700102 HCHG RX REV CODE 250 W/ 637 OVERRIDE(OP): Performed by: HOSPITALIST

## 2024-11-25 PROCEDURE — 302108 TAPE SECURITY OSTOMY (PINK): Performed by: HOSPITALIST

## 2024-11-25 PROCEDURE — 770020 HCHG ROOM/CARE - TELE (206)

## 2024-11-25 PROCEDURE — 82962 GLUCOSE BLOOD TEST: CPT | Mod: 91

## 2024-11-25 RX ORDER — POTASSIUM CHLORIDE 1500 MG/1
20 TABLET, EXTENDED RELEASE ORAL DAILY
Status: DISCONTINUED | OUTPATIENT
Start: 2024-11-25 | End: 2024-11-27

## 2024-11-25 RX ORDER — METRONIDAZOLE 500 MG/1
500 TABLET ORAL EVERY 12 HOURS
Status: DISCONTINUED | OUTPATIENT
Start: 2024-11-25 | End: 2024-11-26

## 2024-11-25 RX ORDER — ACETAMINOPHEN 325 MG/1
650 TABLET ORAL EVERY 6 HOURS PRN
Status: DISCONTINUED | OUTPATIENT
Start: 2024-11-25 | End: 2024-11-29 | Stop reason: HOSPADM

## 2024-11-25 RX ADMIN — OMEPRAZOLE 20 MG: 20 CAPSULE, DELAYED RELEASE ORAL at 09:13

## 2024-11-25 RX ADMIN — METRONIDAZOLE 500 MG: 5 INJECTION, SOLUTION INTRAVENOUS at 05:47

## 2024-11-25 RX ADMIN — BUPROPION HYDROCHLORIDE 150 MG: 150 TABLET, FILM COATED, EXTENDED RELEASE ORAL at 17:05

## 2024-11-25 RX ADMIN — BUPROPION HYDROCHLORIDE 150 MG: 150 TABLET, FILM COATED, EXTENDED RELEASE ORAL at 05:40

## 2024-11-25 RX ADMIN — CEFTRIAXONE SODIUM 2000 MG: 2 INJECTION, POWDER, FOR SOLUTION INTRAMUSCULAR; INTRAVENOUS at 05:47

## 2024-11-25 RX ADMIN — SERTRALINE 50 MG: 50 TABLET, FILM COATED ORAL at 05:40

## 2024-11-25 RX ADMIN — ATORVASTATIN CALCIUM 20 MG: 20 TABLET, FILM COATED ORAL at 17:05

## 2024-11-25 RX ADMIN — POTASSIUM CHLORIDE 20 MEQ: 1500 TABLET, EXTENDED RELEASE ORAL at 08:10

## 2024-11-25 RX ADMIN — PANTOPRAZOLE SODIUM 40 MG: 40 INJECTION, POWDER, FOR SOLUTION INTRAVENOUS at 05:42

## 2024-11-25 RX ADMIN — METOPROLOL SUCCINATE 25 MG: 25 TABLET, FILM COATED, EXTENDED RELEASE ORAL at 17:05

## 2024-11-25 RX ADMIN — ENOXAPARIN SODIUM 40 MG: 100 INJECTION SUBCUTANEOUS at 17:05

## 2024-11-25 RX ADMIN — METRONIDAZOLE 500 MG: 500 TABLET ORAL at 17:05

## 2024-11-25 ASSESSMENT — GAIT ASSESSMENTS
DEVIATION: SHUFFLED GAIT
GAIT LEVEL OF ASSIST: STANDBY ASSIST
DISTANCE (FEET): 100
DISTANCE (FEET): 100
ASSISTIVE DEVICE: FRONT WHEEL WALKER

## 2024-11-25 ASSESSMENT — ACTIVITIES OF DAILY LIVING (ADL): TOILETING: INDEPENDENT

## 2024-11-25 ASSESSMENT — COGNITIVE AND FUNCTIONAL STATUS - GENERAL
SUGGESTED CMS G CODE MODIFIER DAILY ACTIVITY: CK
TOILETING: A LITTLE
CLIMB 3 TO 5 STEPS WITH RAILING: A LITTLE
TURNING FROM BACK TO SIDE WHILE IN FLAT BAD: A LITTLE
CLIMB 3 TO 5 STEPS WITH RAILING: A LITTLE
SUGGESTED CMS G CODE MODIFIER MOBILITY: CK
DAILY ACTIVITIY SCORE: 19
STANDING UP FROM CHAIR USING ARMS: A LITTLE
MOBILITY SCORE: 16
MOVING FROM LYING ON BACK TO SITTING ON SIDE OF FLAT BED: A LITTLE
DRESSING REGULAR UPPER BODY CLOTHING: A LITTLE
SUGGESTED CMS G CODE MODIFIER MOBILITY: CK
TURNING FROM BACK TO SIDE WHILE IN FLAT BAD: A LOT
TOILETING: A LITTLE
SUGGESTED CMS G CODE MODIFIER DAILY ACTIVITY: CJ
WALKING IN HOSPITAL ROOM: A LITTLE
HELP NEEDED FOR BATHING: A LITTLE
DRESSING REGULAR UPPER BODY CLOTHING: A LITTLE
PERSONAL GROOMING: A LITTLE
DRESSING REGULAR LOWER BODY CLOTHING: A LITTLE
STANDING UP FROM CHAIR USING ARMS: A LITTLE
MOVING TO AND FROM BED TO CHAIR: A LITTLE
MOVING TO AND FROM BED TO CHAIR: A LITTLE
MOBILITY SCORE: 18
DAILY ACTIVITIY SCORE: 20
HELP NEEDED FOR BATHING: A LITTLE
DRESSING REGULAR LOWER BODY CLOTHING: A LITTLE
WALKING IN HOSPITAL ROOM: A LITTLE
MOVING FROM LYING ON BACK TO SITTING ON SIDE OF FLAT BED: A LOT

## 2024-11-25 ASSESSMENT — PAIN DESCRIPTION - PAIN TYPE: TYPE: ACUTE PAIN

## 2024-11-25 ASSESSMENT — ENCOUNTER SYMPTOMS
PALPITATIONS: 0
SPUTUM PRODUCTION: 0
CHILLS: 0
DIZZINESS: 0
ABDOMINAL PAIN: 1
HEMOPTYSIS: 0
ORTHOPNEA: 0
NAUSEA: 0
VOMITING: 0
COUGH: 0

## 2024-11-25 NOTE — DIETARY
Nutrition Services: Update   Day 6 of admit.  Guerda Lunsford is a 73 y.o. female with admitting DX of Bowel obstruction (HCC) [K56.393]    Pt is currently on clear liquid diet w/ PO x 1 of %. This diet was started yesterday. Pt NPO/clears x 6 days. Pt w/ laparoscopy transverse loop colostomy yesterday.      Wt 11/23: 128.5 kg via stand up scale - Overall wt has been stable during hospitalization.     Malnutrition Risk: ASPEN criteria not met but risk noted due to NPO/clears x 6 days.     Recommendations/Plan:  When medically feasible, advance diet beyond clear liquids.    Encourage intake of meals  Document intake of all meals as % taken in ADL's to provide interdisciplinary communication across all shifts.   Monitor weight.  Nutrition rep will continue to see patient for ongoing meal and snack preferences.    RD following

## 2024-11-25 NOTE — DISCHARGE PLANNING
Case Management Discharge Planning    Admission Date: 11/19/2024  GMLOS: 4.9  ALOS: 6    6-Clicks ADL Score: 19  6-Clicks Mobility Score: 16    Anticipated Discharge Dispo: Discharge Disposition: D/T to home under HHA care in anticipation of covered skilled care (06)    Attempted obtaining home health choice from patient. Patient reported wanting to look over companies with . Choice form left with patient. Will follow up tomorrow.

## 2024-11-25 NOTE — PROGRESS NOTES
"    High ostomy output throughout the day.  Tolerating clears.  Pain controlled.    BP 98/49   Pulse (!) 102   Temp 36.3 °C (97.4 °F) (Oral)   Resp 18   Ht 1.651 m (5' 5\")   Wt (!) 129 kg (283 lb 4.7 oz)   SpO2 92%   BMI 47.14 kg/m²     NGT  Abd soft, nontender  Ostomy PPP incision CDI    A/P  Diet as tolerated Multimodal pain control ambulation as possible.  Continue with ostomy learning.  "

## 2024-11-25 NOTE — WOUND TEAM
[facility]  Inpatient Services  New Ostomy Initial Evaluation    HPI:  Reviewed  PMH: Reviewed   SH: Reviewed         Past Surgical History:   Procedure Laterality Date    ID LAP,DIAGNOSTIC ABDOMEN N/A 11/24/2024    Procedure: laparoscopy;  Surgeon: George Hou D.O.;  Location: [facility];  Service: General    ID COLOSTOMY Left 11/24/2024    Procedure: CREATION, COLOSTOMY, -transverse loop colostomy;  Surgeon: George Hou D.O.;  Location: [facility];  Service: General    ID SIGMOIDOSCOPY,DIAGNOSTIC  11/21/2024    Procedure: SIGMOIDOSCOPY, FLEXIBLE;  Surgeon: Cecelia Adam M.D.;  Location: [facility];  Service: Gastroenterology    PB TOTAL KNEE ARTHROPLASTY Right 11/16/2021    Procedure: ARTHROPLASTY, KNEE, TOTAL Lupillo cementless;  Surgeon: Sonido Amado M.D.;  Location: [facility];  Service: Orthopedics    PB TOTAL KNEE ARTHROPLASTY Left 5/18/2021    Procedure: ARTHROPLASTY, KNEE, TOTAL.;  Surgeon: Sonido Amado M.D.;  Location: [facility];  Service: Orthopedics    BREAST BIOPSY  2009    LAMINOTOMY  1982    2 Lumbar Discs    ABDOMINAL HYSTERECTOMY TOTAL  1982    Cervical precancer and bleeding.  No oophorectomy.    APPENDECTOMY      GYN SURGERY      hysterectomy - partial    OTHER      hemorrhoidectomy    OTHER Right     cataract extraction    OTHER Right     Macular Hole Surgery    OTHER ABDOMINAL SURGERY      appy    OTHER ORTHOPEDIC SURGERY      lami       Surgery Date: 11/24/24    Surgeon(s):  George Hou D.O.    Procedure(s):  CHOLECYSTECTOMY, LAPAROSCOPIC possible open ileostomy vs colostomy     Permanence: To be determined    Pertinent History:  She presented to the emergency room 11/19/2024 for evaluation of epigastric pain and distention. CT imaging was concerning for diffusely dilated colon with some bowel wall thickening. Differential diagnosis includes ileus as well as distal colonic obstruction.                       "  Colostomy 11/24/24 LUQ (Active)   Stomal Appliance Assessment Clean;Dry;Intact;Changed 11/25/24 1300   Stoma Assessment Clean;Intact;Beefy red 11/25/24 1300   Stoma Shape Oval, flush, retracted 11/25/24 1300   Stoma Size (in) 1.75 11/25/24 1300   Peristomal Assessment Clean;Dry;Intact 11/25/24 1300   Mucocutaneous Junction Intact 11/25/24 1300   Treatment Appliance Changed;Bag Change;Cleansed with water/washcloth;Site care 11/25/24 1300   Peristomal Protectant Paste Ring 11/25/24 1300   Stomal Appliance Paste Ring, 2\";2 3/4\" (70mm) CTF 11/25/24 1300   Output (mL) 400 mL 11/25/24 1300   Output Color Brown 11/25/24 1300   WOUND RN ONLY - Stomal Appliance  2 Piece;Paste Ring, 2\";Soft Convex;2 3/4\" (70mm) CTF;Transparent Pouch Lock & Roll 11/25/24 1300   Appliance (Pouch) # [id number] 11/25/24 1300   Appliance Brand New [place] 11/25/24 1300   Secure Start completed No 11/25/24 1300   WOUND NURSE ONLY - Time Spent with Patient (mins) 60 11/25/24 1300                                                       Colostomy Interventions:  Removed appliance (using push pull method) - By Ostomy RN  Cleansed jose-stomal skin with moist warm washcloth - By Ostomy RN  Created/Checked template fit - By Ostomy RN  Traced Shape to back of barrier - By Ostomy RN  Cut barrier to stoma size - By Ostomy RN  Confirmed fit - By Ostomy RN  Removed plastic backing and \"Dog Eared\" paper edges - By Ostomy RN  Stretched paste ring to fit barrier opening - By Ostomy RN  Applied paste ring to back of barrier - By Ostomy RN  Applied barrier to skin and adhered with friction - By Ostomy RN  Attached pouch - By Ostomy RN  Closed Pouch end - By Ostomy RN    Ostomy Nurse Plan of Care - Frequency of Follow-up:   Ostomy nurses to continue to follow for ostomy needs and teaching until patient independent with care or discharge.  Ostomy Nurse follow-up frequency:  Every other day    Patient Education:   All questions answered, procedure explained, and education " provided.      Ostomy RN to follow-up daily for education     Date:  11/25/24  Folder/paperwork delivered  Folder/Paperwork discussed in detail (Follow up, supply ordering and support groups discussed as well as accessories that may be beneficial.)  Educated regarding the following things: stoma size/shape. Stoma will likely change sizes in the first 4 to 6 weeks. Currently using the most basic supplies while in the hospital, there are many brands and options in regards to supplies.  Educated on when to empty and how to empty, burping appliance, Wear time, Diet (chewing food well) and hydration, Medications, activity including showering and swimming. Pt aware that they should keep an extra set of appliances with them at all times (do not leave in warm cars).      Needs for next visit: convex barrier #71841 (patient has 5 barriers left in her bag), possible oval convex ring 38x44 or 30x38    Evaluation:      Date:  11/25/24    Stoma is flush to the skin & in a bowl, the stoma is pink dull in color.  Large amount of liquid stool still coming from stoma from patient being blocked up for a while.  Patient expressed that she had to empty the bag 4 times during the night because it was filling up every couple of hours.  Patient had a lot of gas and liquid brown stool.  Stoma sits in a bowl, convex barrier #[id number] used with paste ring to help pop the stoma out a bit.  May need to add an oval convex ring to help with the deep bowl.  Patient very responsive to education and booklet when going over the material.  Left booklet at bedside for patient to look over after we went over the pertinent portions.  Supplies left at bedside.          Flatus: Present  Stool Output: large, brown, and liquid  Urine Output: NA, Fecal Ostomy  Mobility: Up to chair and Ambulate     DIET ORDERS (From admission to next 24h)       Start     Ordered    11/24/24 1013  Diet Order Diet: Clear Liquid  ALL MEALS        Question:  Diet:  Answer:  Clear  Liquid    11/24/24 1012                     Secure Start Signed:  No  Outpatient Referral Placed:  Yes     5 Sets of appliances in Ostomy bag for discharge:  No    INSURANCE OPTIONS:   If patient has [facility] care plus patient will need an [employer] book. If patientt has Medicaid be sure patient has care chest paperwork.    Currently Active Insurance       Payor Plan    MEDICARE MEDICARE PART A & B    AARP AARP            Anticipated Discharge Plans:  TBD, Self/Family Care, Home Health Care, Outpatient Wound Center, and Skilled Nursing    Ostomy Supplies for DC:  To be determined in 4 to 6 weeks once stomal edema has fully resolved

## 2024-11-25 NOTE — THERAPY
"Occupational Therapy   Initial Evaluation     Patient Name: Guerda Lunsford  Age:  73 y.o., Sex:  female  Medical Record #: [id number]  Today's Date: 11/25/2024     Precautions  Comments: abdominal incisions, new ostomy    Assessment  Patient is a 73-year-old female presented with signs and symptoms of bowel obstruction was found to have a presumed diverticular stricture.  Surgery with bowel resection and Ostomy created on 11/24/24. Past medical history which includes diabetes, colitis, and diverticulitis.  Surgical history includes appendectomy and hysterectomy.  Pt was indep with ADL's and mobility prior.  During OT eval, pt limited by pain, generalized weakness and decreased activity tolerance impacting ADL's and ADL transfers.  Pt will benefit from Home Health OT services and spouse assist at home, and mobilizing regularly with nursing while in house. No further OT needs in this setting at this time.      Plan    Occupational Therapy Initial Treatment Plan   Duration: Evaluation only       Discharge Recommendations: Recommend home health for continued occupational therapy services     Subjective    \"I think I'll be fine when I get home, these beds are the worst.\"     Objective       11/25/24 1017   Prior Living Situation   Prior Services Home-Independent   Housing / Facility 1 Story House   Steps Into Home 2   Steps In Home 0   Bathroom Set up Bathtub / Shower Combination;Shower Curtain;Shower Chair  (has WIS avail, doesn't like to use it)   Equipment Owned Front-Wheel Walker;4-Wheel Walker;Tub / Shower Seat;Grab Bar(s) In Tub / Shower;Reacher   Lives with - Patient's Self Care Capacity Spouse   Comments Spouse home and supportive, 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, can be flexible   Prior Level of ADL Function   Self Feeding Independent   Grooming / Hygiene Independent   Bathing Independent   Dressing Independent   Toileting Independent   Prior Level of IADL Function   Medication Management Independent "   Laundry Independent   Kitchen Mobility Independent   Finances Independent   Home Management Independent   Shopping Independent   Prior Level Of Mobility Independent With Device in Community;Independent With Device in Home  (cane)   Driving / Transportation Driving Independent   Occupation (Pre-Hospital Vocational) Retired Due To Age   Leisure Interests Unable To Determine At This Time   Precautions   Comments abdominal incisions, new ostomy   Vitals   O2 Delivery Device None - Room Air   Pain 0 - 10 Group   Location Abdomen   Location Orientation Mid;Right;Lower   Description Aching;Sore   Therapist Pain Assessment Prior to Activity;During Activity;Post Activity Pain Same as Prior to Activity;Nurse Notified   Cognition    Level of Consciousness Alert   Comments Pt verbose, tangential.  Needed intermittent redirection in order to complete eval/PLOF questioning.  Ox4   Active ROM Upper Body   Active ROM Upper Body  WDL   Dominant Hand Right   Strength Upper Body   Upper Body Strength  WDL   Coordination Upper Body   Coordination WDL   Balance Assessment   Sitting Balance (Static) Good   Sitting Balance (Dynamic) Good   Standing Balance (Static) Fair +   Standing Balance (Dynamic) Fair   Weight Shift Sitting Good   Weight Shift Standing Fair   Comments with FWW   Bed Mobility    Comments NT- educ on log roll.  Pt UIC pre and post, educ that recliner might be easier than bed initially   ADL Assessment   Eating Independent   Grooming Supervision;Modified Independent;Seated   Upper Body Dressing Modified Independent   Lower Body Dressing Minimal Assist   Toileting Minimal Assist  (urine, still working with nursing on ostomy care)   How much help from another person does the patient currently need...   Putting on and taking off regular lower body clothing? 3   Bathing (including washing, rinsing, and drying)? 3   Toileting, which includes using a toilet, bedpan, or urinal? 3   Putting on and taking off regular upper body  clothing? 3   Taking care of personal grooming such as brushing teeth? 3   Eating meals? 4   6 Clicks Daily Activity Score 19   Functional Mobility   Sit to Stand Standby Assist   Bed, Chair, Wheelchair Transfer Standby Assist   Toilet Transfers Standby Assist   Transfer Method Stand Step   Mobility SBA with FWW   Distance (Feet) 100   # of Times Distance was Traveled 1   Visual Perception   Visual Perception  WDL   Edema / Skin Assessment   Edema / Skin  Not Assessed   Activity Tolerance   Sitting in Chair as toil   Standing 3-4 min   Patient / Family Goals   Patient / Family Goal #1 home   Education Group   Education Provided Role of Occupational Therapist;Activities of Daily Living;Adaptive Equipment   Role of Occupational Therapist Patient Response Patient;Acceptance;Explanation;Verbal Demonstration   ADL Patient Response Patient;Acceptance;Explanation;Verbal Demonstration   Adaptive Equipment Patient Response Patient;Acceptance;Explanation;Verbal Demonstration   Additional Comments educ on using reacher to help with LB dressing, use of shower chair and WIS as needed until able to comfortably step into tub/shower.  Educ on splinting abdomen for coughing/sneezing

## 2024-11-25 NOTE — PROGRESS NOTES
Telemetry Shift Summary     Rhythm SR  HR Range 83-97  Ectopy rPVC/PAC  Measurements  0.20/0.08/0.36     Normal Values  Rhythm SR  HR Range    Measurements 0.12-0.20 / 0.06-0.10  / 0.30-0.52

## 2024-11-25 NOTE — PROGRESS NOTES
Telemetry Strip     Strip printed: 1239  Measurements from am strip were as follows:  Rhythm: SR  HR: 74-94  Measurements: 0.18/ 0.08/ 0.38  Ectopy: r-o PAC, r PVC             Normal Values  Rhythm SR  HR Range    Measurements 0.12-0.20 / 0.06-0.10  / 0.30-0.52

## 2024-11-25 NOTE — CARE PLAN
The patient is Stable - Low risk of patient condition declining or worsening    Shift Goals  Clinical Goals: Monitor new colostomy and pain  Patient Goals: Comfort  Family Goals: GIOVANNI    Progress made toward(s) clinical / shift goals:      Problem: Pain - Standard  Goal: Alleviation of pain or a reduction in pain to the patient’s comfort goal  Outcome: Progressing     Problem: Knowledge Deficit - Standard  Goal: Patient and family/care givers will demonstrate understanding of plan of care, disease process/condition, diagnostic tests and medications  Outcome: Progressing  Note: Patient updated on POC, all questions answered.      Problem: Skin Care - Ostomy  Goal: Skin remains free from irritation  Outcome: Progressing  Note: Skin is clean and dry, no irritation present. Appliance remains free of leakage, pouch is emptied regularly.     Problem: Knowledge Deficit - Ostomy  Goal: Patient will demonstrate ability to manage and maintain ostomy  Outcome: Progressing  Note: Teaching ongoing for care of ostomy, patient observing techniques for cleaning and emptying pouch.       Patient is not progressing towards the following goals:

## 2024-11-25 NOTE — THERAPY
Physical Therapy   Initial Evaluation     Patient Name: Guerda Lunsford  Age:  73 y.o., Sex:  female  Medical Record #: [id number]  Today's Date: 11/25/2024     Precautions  Precautions: Other (See Comments)  Comments: abdominal incisions, new ostomy    Assessment  Patient is 73 y.o. female s/p laparoscopy transverse loop colostomy POD#1. Past medical history which includes diabetes, colitis, and diverticulitis.  Pt is presenting with minimal post-op pain and is able to ambulate safely with FWW.  Pt has supportive spouse at home, recommend HHPT. See below for further details re: mobility and post-DC recs.     Plan    Physical Therapy Initial Treatment Plan   Duration: Evaluation only    DC Equipment Recommendations: None  Discharge Recommendations: Recommend home health for continued physical therapy services        11/25/24 1016   Prior Living Situation   Housing / Facility 1 Story House   Steps Into Home 2   Steps In Home 0   Bathroom Set up Walk In Shower;Bathtub / Shower Combination   Equipment Owned Front-Wheel Walker;4-Wheel Walker;Tub / Shower Seat;Single Point Cane   Lives with - Patient's Self Care Capacity Spouse   Comments supportive spouse   Prior Level of Functional Mobility   Bed Mobility Independent   Transfer Status Independent   Ambulation Independent   Assistive Devices Used Single Point Cane   Stairs Independent   Cognition    Level of Consciousness Alert   Comments oriented x4   Active ROM Lower Body    Active ROM Lower Body  WDL   Strength Lower Body   Lower Body Strength  WDL   Other Treatments   Other Treatments Provided reviewed with pt recs to use recliner to sleep in initially when DC home due to difficulty with sup <> sit/ pain, pt states she would prefer to sleep in recliner. Reviewed log rolling tech with pt for when she is ready to sleep in bed   Balance Assessment   Sitting Balance (Static) Good   Sitting Balance (Dynamic) Fair +   Standing Balance (Static) Fair   Standing Balance  (Dynamic) Fair   Weight Shift Sitting Good   Weight Shift Standing Fair   Comments improved balance with FWW   Bed Mobility    Supine to Sit   (NT, pt sitting up in chair)   Gait Analysis   Gait Level Of Assist Standby Assist   Assistive Device Front Wheel Walker   Distance (Feet) 100   # of Times Distance was Traveled 1   Deviation Shuffled Gait  (slight flexed posture)   Functional Mobility   Sit to Stand Standby Assist   Bed, Chair, Wheelchair Transfer Standby Assist   Transfer Method Stand Step  (with FWW)

## 2024-11-25 NOTE — DISCHARGE PLANNING
Follow up for post acute services per chart review anticipate home with HH per patient choice. Therapy notes reviewed, limited therapy need for IRF level of care.

## 2024-11-25 NOTE — PROGRESS NOTES
[facility] Medicine Daily Progress Note    Date of Service  11/25/2024    Chief Complaint  Guerda Lunsford is a 73 y.o. female admitted 11/19/2024 with abdominal pain and distention    Hospital Course  Guerda Washington has past medical history which includes diabetes, colitis, and diverticulitis.  Surgical history includes appendectomy and hysterectomy.  She presented to the emergency room 11/19/2024 for evaluation of epigastric pain and distention.  CT imaging was concerning for diffusely dilated colon with some bowel wall thickening.  Differential diagnosis includes ileus as well as distal colonic obstruction.  The patient was hospitalized, NG tube was placed, intravenous fluids were ordered, and surgery was consulted.    Interval Problem Update  Patient seen and examined postoperatively  As she is still sedated from surgery  States that her abdomen is less distended, anxious to be able to drink fluids  In sinus rhythm, blood pressure within normal limits    I have discussed this patient's plan of care and discharge plan at IDT rounds today with Case Management, Nursing, Nursing leadership, and other members of the IDT team.    Consultants/Specialty  general surgery    Code Status  Full Code    Disposition  Medically Cleared  I have placed the appropriate orders for post-discharge needs.    Review of Systems  Review of Systems   Constitutional:  Negative for chills.   Respiratory:  Negative for cough, hemoptysis and sputum production.    Cardiovascular:  Negative for chest pain, palpitations and orthopnea.   Gastrointestinal:  Positive for abdominal pain. Negative for nausea and vomiting.   Neurological:  Negative for dizziness.      Physical Exam  Temp:  [36.2 °C (97.2 °F)-37.2 °C (99 °F)] 36.4 °C (97.6 °F)  Pulse:  [] 92  Resp:  [16-20] 18  BP: (103-138)/(47-76) 124/55  SpO2:  [90 %-98 %] 98 %    Physical Exam  Constitutional:       General: She is not in acute distress.     Appearance: Normal  appearance. She is obese.   HENT:      Head: Normocephalic and atraumatic.      Right Ear: External ear normal.      Left Ear: External ear normal.      Nose: Nose normal.   Eyes:      Extraocular Movements: Extraocular movements intact.   Cardiovascular:      Rate and Rhythm: Tachycardia present. Rhythm irregular.      Pulses: Normal pulses.   Pulmonary:      Effort: Pulmonary effort is normal.      Breath sounds: Normal breath sounds.   Abdominal:      General: Bowel sounds are normal.      Palpations: Abdomen is soft.      Comments: Left upper quadrant ostomy  Brown liquid stool  Laparoscopic incisions C/D/I   Musculoskeletal:         General: Normal range of motion.      Cervical back: Normal range of motion and neck supple.      Right lower leg: Edema present.      Left lower leg: Edema present.   Skin:     General: Skin is warm and dry.   Neurological:      General: No focal deficit present.      Mental Status: She is alert and oriented to person, place, and time.      Cranial Nerves: No cranial nerve deficit.   Psychiatric:         Mood and Affect: Mood normal.         Behavior: Behavior normal.         Fluids    Intake/Output Summary (Last 24 hours) at 11/25/2024 0902  Last data filed at 11/25/2024 0742  Gross per 24 hour   Intake 700 ml   Output 3250 ml   Net -2550 ml        Laboratory  Recent Labs     11/23/24  0142 11/24/24  0316 11/25/24  0107   WBC 12.9* 11.8* 10.2   RBC 4.70 4.53 4.05*   HEMOGLOBIN 13.6 13.0 11.7*   HEMATOCRIT 40.8 39.1 34.7*   MCV 86.8 86.3 85.7   MCH 28.9 28.7 28.9   MCHC 33.3 33.2 33.7   RDW 43.6 43.8 43.1   PLATELETCT 365 339 319   MPV 9.0 9.2 9.5     Recent Labs     11/23/24  0142 11/24/24  0316 11/25/24  0107   SODIUM 138 137 141   POTASSIUM 3.4* 3.3* 3.4*   CHLORIDE 99 99 103   CO2 23 22 26   GLUCOSE 110* 136* 96   BUN 15 19 15   CREATININE 0.79 0.86 0.78   CALCIUM 8.5 8.4 7.9*                     Imaging  DX-BARIUM ENEMA   Final Result         1. There is an irregular stricture  of the sigmoid colon with what appears to be a colovaginal fistula from this region.      DX-ABDOMEN FOR TUBE PLACEMENT   Final Result         1.  Nonspecific bowel gas pattern in the upper abdomen.   2.  Nasogastric tube tip terminates overlying the expected location of the gastric fundus.      EC-ECHOCARDIOGRAM COMPLETE W/ CONT   Final Result      DX-ABDOMEN FOR TUBE PLACEMENT   Final Result      1.  NG tube extends into the fundus of the stomach.      DX-CHEST-PORTABLE (1 VIEW)   Final Result      Cardiomegaly.      CT-ABDOMEN-PELVIS WITH   Final Result      1.  Diffusely dilated colon with some bowel wall thickening. This extends to the level of the upper sigmoid colon where there is some focal narrowing. Small intestine is also dilated with air/fluid levels. Differential diagnosis includes ileus as well as    distal colonic obstruction.      2.  Sigmoid diverticulosis. There is some mild stranding attenuation seen in the area of the narrowed sigmoid colon which may represent a component of diverticulitis.      3.  Small hiatal hernia.           Assessment/Plan  * Bowel obstruction (HCC)- (present on admission)  Assessment & Plan  11/25:  Patient underwent laparoscopic transverse loop colostomy 11/24  Patient has had good ostomy output  Will discuss advancing diet with surgery  Mobilize  PT/OT evaluation    A-fib (HCC)- (present on admission)  Assessment & Plan  New onset atrial fibrillation with rapid ventricular response during this hospitalization    11/25:  P.o. Toprol has been resumed  Telemetry reviewed, she is rate controlled  Continue telemetry monitoring for now    Colovaginal fistula- (present on admission)  Assessment & Plan  11/25:  Will need follow-up with colorectal surgery    Sigmoid diverticulitis- (present on admission)  Assessment & Plan  11/25:  Ceftriaxone and metronidazole    Dehydration- (present on admission)  Assessment & Plan  IV fluids    ACP (advance care planning)- (present on  admission)  Assessment & Plan  See prior documentation, the patient is full code    Preoperative examination- (present on admission)  Assessment & Plan  See history and physical for documentation of preoperative exam    Sinus tachycardia- (present on admission)  Assessment & Plan  Fluid resuscitation    Stage 3a chronic kidney disease- (present on admission)  Assessment & Plan  Avoid/minimize nephrotoxins as much as possible, renally dose medications, monitor inputs and outputs      Class 3 severe obesity due to excess calories without serious comorbidity with body mass index (BMI) of 40.0 to 44.9 in adult (HCC)- (present on admission)  Assessment & Plan  Body mass index is 46.04 kg/m².      Primary hypertension- (present on admission)  Assessment & Plan  IV antihypertensives for extreme hypertensive    Hyponatremia- (present on admission)  Assessment & Plan  Resolved    Hypokalemia- (present on admission)  Assessment & Plan  Replace    Abnormal EKG- (present on admission)  Assessment & Plan  No chest pain    Depression- (present on admission)  Assessment & Plan  Resume home bupropion and sertraline when able to take orally         VTE prophylaxis:    enoxaparin ppx      I have performed a physical exam and reviewed and updated ROS and Plan today (11/25/2024). In review of yesterday's note (11/24/2024), there are no changes except as documented above.

## 2024-11-25 NOTE — CARE PLAN
Problem: Knowledge Deficit - Standard  Goal: Patient and family/care givers will demonstrate understanding of plan of care, disease process/condition, diagnostic tests and medications  Outcome: Progressing     Problem: Skin Care - Ostomy  Goal: Skin remains free from irritation  Outcome: Progressing     Problem: Knowledge Deficit - Ostomy  Goal: Patient will demonstrate ability to manage and maintain ostomy  Outcome: Progressing   The patient is Stable - Low risk of patient condition declining or worsening    Shift Goals  Clinical Goals: monitor colostomy output, encourage mobility  Patient Goals: comfort  Family Goals: GIOVANNI    Progress made toward(s) clinical / shift goals:  updated pt on plan of care, encourage mobility. Advance diet as tolerated. Encourage pt to participate in ostomy care.     Patient is not progressing towards the following goals:

## 2024-11-26 ENCOUNTER — HOME HEALTH ADMISSION (OUTPATIENT)
Dept: HOME HEALTH SERVICES | Facility: HOME HEALTHCARE | Age: 74
End: 2024-11-26
Payer: MEDICARE

## 2024-11-26 LAB
ANION GAP SERPL CALC-SCNC: 11 MMOL/L (ref 7–16)
BASOPHILS # BLD AUTO: 0.3 % (ref 0–1.8)
BASOPHILS # BLD: 0.03 K/UL (ref 0–0.12)
BUN SERPL-MCNC: 9 MG/DL (ref 8–22)
CALCIUM SERPL-MCNC: 7.1 MG/DL (ref 8.4–10.2)
CHLORIDE SERPL-SCNC: 103 MMOL/L (ref 96–112)
CO2 SERPL-SCNC: 26 MMOL/L (ref 20–33)
CREAT SERPL-MCNC: 0.68 MG/DL (ref 0.5–1.4)
EOSINOPHIL # BLD AUTO: 0.44 K/UL (ref 0–0.51)
EOSINOPHIL NFR BLD: 4.8 % (ref 0–6.9)
ERYTHROCYTE [DISTWIDTH] IN BLOOD BY AUTOMATED COUNT: 43.4 FL (ref 35.9–50)
GFR SERPLBLD CREATININE-BSD FMLA CKD-EPI: 91 ML/MIN/1.73 M 2
GLUCOSE BLD STRIP.AUTO-MCNC: 120 MG/DL (ref 65–99)
GLUCOSE BLD STRIP.AUTO-MCNC: 122 MG/DL (ref 65–99)
GLUCOSE BLD STRIP.AUTO-MCNC: 127 MG/DL (ref 65–99)
GLUCOSE BLD STRIP.AUTO-MCNC: 127 MG/DL (ref 65–99)
GLUCOSE SERPL-MCNC: 124 MG/DL (ref 65–99)
HCT VFR BLD AUTO: 33.6 % (ref 37–47)
HGB BLD-MCNC: 11.3 G/DL (ref 12–16)
IMM GRANULOCYTES # BLD AUTO: 0.07 K/UL (ref 0–0.11)
IMM GRANULOCYTES NFR BLD AUTO: 0.8 % (ref 0–0.9)
LYMPHOCYTES # BLD AUTO: 1.7 K/UL (ref 1–4.8)
LYMPHOCYTES NFR BLD: 18.6 % (ref 22–41)
MAGNESIUM SERPL-MCNC: 1.6 MG/DL (ref 1.5–2.5)
MCH RBC QN AUTO: 29.1 PG (ref 27–33)
MCHC RBC AUTO-ENTMCNC: 33.6 G/DL (ref 32.2–35.5)
MCV RBC AUTO: 86.6 FL (ref 81.4–97.8)
MONOCYTES # BLD AUTO: 0.71 K/UL (ref 0–0.85)
MONOCYTES NFR BLD AUTO: 7.8 % (ref 0–13.4)
NEUTROPHILS # BLD AUTO: 6.21 K/UL (ref 1.82–7.42)
NEUTROPHILS NFR BLD: 67.7 % (ref 44–72)
NRBC # BLD AUTO: 0 K/UL
NRBC BLD-RTO: 0 /100 WBC (ref 0–0.2)
PLATELET # BLD AUTO: 252 K/UL (ref 164–446)
PMV BLD AUTO: 8.9 FL (ref 9–12.9)
POTASSIUM SERPL-SCNC: 2.8 MMOL/L (ref 3.6–5.5)
RBC # BLD AUTO: 3.88 M/UL (ref 4.2–5.4)
SODIUM SERPL-SCNC: 140 MMOL/L (ref 135–145)
WBC # BLD AUTO: 9.2 K/UL (ref 4.8–10.8)

## 2024-11-26 PROCEDURE — 700102 HCHG RX REV CODE 250 W/ 637 OVERRIDE(OP): Performed by: HOSPITALIST

## 2024-11-26 PROCEDURE — 94760 N-INVAS EAR/PLS OXIMETRY 1: CPT

## 2024-11-26 PROCEDURE — 83735 ASSAY OF MAGNESIUM: CPT

## 2024-11-26 PROCEDURE — A9270 NON-COVERED ITEM OR SERVICE: HCPCS | Performed by: HOSPITALIST

## 2024-11-26 PROCEDURE — 36415 COLL VENOUS BLD VENIPUNCTURE: CPT

## 2024-11-26 PROCEDURE — 700111 HCHG RX REV CODE 636 W/ 250 OVERRIDE (IP): Mod: JZ | Performed by: HOSPITALIST

## 2024-11-26 PROCEDURE — 82962 GLUCOSE BLOOD TEST: CPT

## 2024-11-26 PROCEDURE — 770020 HCHG ROOM/CARE - TELE (206)

## 2024-11-26 PROCEDURE — A9270 NON-COVERED ITEM OR SERVICE: HCPCS | Performed by: STUDENT IN AN ORGANIZED HEALTH CARE EDUCATION/TRAINING PROGRAM

## 2024-11-26 PROCEDURE — 80048 BASIC METABOLIC PNL TOTAL CA: CPT

## 2024-11-26 PROCEDURE — 99233 SBSQ HOSP IP/OBS HIGH 50: CPT | Performed by: STUDENT IN AN ORGANIZED HEALTH CARE EDUCATION/TRAINING PROGRAM

## 2024-11-26 PROCEDURE — 85025 COMPLETE CBC W/AUTO DIFF WBC: CPT

## 2024-11-26 PROCEDURE — 700105 HCHG RX REV CODE 258: Performed by: HOSPITALIST

## 2024-11-26 PROCEDURE — 700102 HCHG RX REV CODE 250 W/ 637 OVERRIDE(OP): Performed by: STUDENT IN AN ORGANIZED HEALTH CARE EDUCATION/TRAINING PROGRAM

## 2024-11-26 RX ORDER — HYDROCHLOROTHIAZIDE 25 MG/1
25 TABLET ORAL DAILY
Status: DISCONTINUED | OUTPATIENT
Start: 2024-11-26 | End: 2024-11-29 | Stop reason: HOSPADM

## 2024-11-26 RX ORDER — POTASSIUM CHLORIDE 1500 MG/1
40 TABLET, EXTENDED RELEASE ORAL ONCE
Status: COMPLETED | OUTPATIENT
Start: 2024-11-26 | End: 2024-11-26

## 2024-11-26 RX ADMIN — METRONIDAZOLE 500 MG: 500 TABLET ORAL at 05:25

## 2024-11-26 RX ADMIN — ATORVASTATIN CALCIUM 20 MG: 20 TABLET, FILM COATED ORAL at 17:43

## 2024-11-26 RX ADMIN — SERTRALINE 50 MG: 50 TABLET, FILM COATED ORAL at 05:25

## 2024-11-26 RX ADMIN — BUPROPION HYDROCHLORIDE 150 MG: 150 TABLET, FILM COATED, EXTENDED RELEASE ORAL at 05:26

## 2024-11-26 RX ADMIN — METOPROLOL SUCCINATE 25 MG: 25 TABLET, FILM COATED, EXTENDED RELEASE ORAL at 17:43

## 2024-11-26 RX ADMIN — ENOXAPARIN SODIUM 40 MG: 100 INJECTION SUBCUTANEOUS at 17:43

## 2024-11-26 RX ADMIN — BUPROPION HYDROCHLORIDE 150 MG: 150 TABLET, FILM COATED, EXTENDED RELEASE ORAL at 17:43

## 2024-11-26 RX ADMIN — CEFTRIAXONE SODIUM 2000 MG: 2 INJECTION, POWDER, FOR SOLUTION INTRAMUSCULAR; INTRAVENOUS at 05:24

## 2024-11-26 RX ADMIN — POTASSIUM CHLORIDE 20 MEQ: 1500 TABLET, EXTENDED RELEASE ORAL at 05:26

## 2024-11-26 RX ADMIN — OMEPRAZOLE 20 MG: 20 CAPSULE, DELAYED RELEASE ORAL at 05:25

## 2024-11-26 RX ADMIN — POTASSIUM CHLORIDE 40 MEQ: 1500 TABLET, EXTENDED RELEASE ORAL at 07:57

## 2024-11-26 RX ADMIN — HYDROCHLOROTHIAZIDE 25 MG: 25 TABLET ORAL at 11:06

## 2024-11-26 ASSESSMENT — ENCOUNTER SYMPTOMS
CHILLS: 0
PALPITATIONS: 0
DIZZINESS: 0
ORTHOPNEA: 0
COUGH: 0
HEMOPTYSIS: 0
ABDOMINAL PAIN: 0
NAUSEA: 0
VOMITING: 0
SPUTUM PRODUCTION: 0

## 2024-11-26 ASSESSMENT — PAIN DESCRIPTION - PAIN TYPE
TYPE: ACUTE PAIN
TYPE: ACUTE PAIN

## 2024-11-26 NOTE — PROGRESS NOTES
"    High ostomy output throughout the day.  Tolerating diet.  Pain controlled.    /52   Pulse 87   Temp 36.4 °C (97.5 °F) (Oral)   Resp 18   Ht 1.651 m (5' 5\")   Wt (!) 129 kg (283 lb 4.7 oz)   SpO2 95%   BMI 47.14 kg/m²     NGT  Abd soft, nontender  Ostomy PPP incision CDI    A/P  Diet as tolerated Multimodal pain control ambulation as possible.  Continue with ostomy learning.  Likely able to DC home soon.  "

## 2024-11-26 NOTE — PROGRESS NOTES
Telemetry Strip     Strip printed: 1130  Measurements from am strip were as follows:  Rhythm: SR  HR: 87-97  Measurements: 0.18/ 0.08/ 0.36  Ectopy: r-o PVC             Normal Values  Rhythm SR  HR Range    Measurements 0.12-0.20 / 0.06-0.10  / 0.30-0.52

## 2024-11-26 NOTE — FACE TO FACE
Face to Face Supporting Documentation - Home Health    The encounter with this patient was in whole or in part the primary reason for home health admission.    Date of encounter:   Patient:                    MRN:                       YOB: 2024  Guerda Lunsford  [id number]  1950     Home health to see patient for:  Skilled Nursing care for assessment, interventions & education, Physical Therapy evaluation and treatment, and Occupational therapy evaluation and treatment    Skilled need for:  Comment: Generalized weakness and ostomy     Skilled nursing interventions to include:  Comment: PT/OT /Ostomy care     Homebound status evidenced by:  Need the aid of supportive devices such as crutches, canes, wheelchairs or walkers or Have a condition such that leaving his or her home is medically contraindicated. Leaving home requires a considerable and taxing effort. There is a normal inability to leave the home.    Community Physician to provide follow up care: Celso Tipton M.D.     Optional Interventions? No      I certify the face to face encounter for this home health care referral meets the CMS requirements and the encounter/clinical assessment with the patient was, in whole, or in part, for the medical condition(s) listed above, which is the primary reason for home health care. Based on my clinical findings: the service(s) are medically necessary, support the need for home health care, and the homebound criteria are met.  I certify that this patient has had a face to face encounter by myself.  Kobe Lara M.D. - NPI: [id number]

## 2024-11-26 NOTE — CARE PLAN
The patient is Stable - Low risk of patient condition declining or worsening    Shift Goals  Clinical Goals: Monitor colostomy output, mobility, safety  Patient Goals: Sleep  Family Goals: GIOVANNI    Progress made toward(s) clinical / shift goals:      Problem: Pain - Standard  Goal: Alleviation of pain or a reduction in pain to the patient’s comfort goal  Outcome: Progressing     Problem: Knowledge Deficit - Standard  Goal: Patient and family/care givers will demonstrate understanding of plan of care, disease process/condition, diagnostic tests and medications  Outcome: Progressing     Problem: Fall Risk  Goal: Patient will remain free from falls  Outcome: Progressing     Problem: Knowledge Deficit - Ostomy  Goal: Patient will demonstrate ability to manage and maintain ostomy  Outcome: Progressing       Patient is not progressing towards the following goals:

## 2024-11-26 NOTE — CARE PLAN
The patient is Stable - Low risk of patient condition declining or worsening    Shift Goals  Clinical Goals: Monitor colostomy output, mobility, safety  Patient Goals: Sleep  Family Goals: GIOVANNI      Problem: Pain - Standard  Goal: Alleviation of pain or a reduction in pain to the patient’s comfort goal  Outcome: Progressing     Problem: Knowledge Deficit - Standard  Goal: Patient and family/care givers will demonstrate understanding of plan of care, disease process/condition, diagnostic tests and medications  Outcome: Progressing     Problem: Fall Risk  Goal: Patient will remain free from falls  Outcome: Progressing     Problem: Skin Integrity  Goal: Skin integrity is maintained or improved  Outcome: Progressing

## 2024-11-26 NOTE — DISCHARGE PLANNING
Thank you for sending this referral to [facility]. Per office, patient's PCP Dr. Tipton retired last week. Please set up an appt for patient to establish with a new PCP.     Additionally, please clarify whether patient will be going home with IV abx. If so, please provide [facility] orders.    Referral will remain on hold.

## 2024-11-26 NOTE — DISCHARGE PLANNING
Received Choice Form at: 1134  Agency/Facility Name: [facility]   Outcome: Scanned to media only, orders needed    Orders placed and referral submitted

## 2024-11-26 NOTE — PROGRESS NOTES
[facility] Medicine Daily Progress Note    Date of Service  11/26/2024    Chief Complaint  Guerda Lunsford is a 73 y.o. female admitted 11/19/2024 with abdominal pain and distention    Hospital Course    Guerda Washington has past medical history which includes diabetes, colitis, and diverticulitis.  Surgical history includes appendectomy and hysterectomy.      She presented to the emergency room 11/19/2024 for evaluation of epigastric pain and distention.      CT imaging was concerning for diffusely dilated colon with some bowel wall thickening.  Differential diagnosis includes ileus as well as distal colonic obstruction.  The patient was hospitalized, NG tube was placed, intravenous fluids were ordered, and surgery was consulted.  On 11/24/2024, the patient underwent laparoscopic transverse loop colostomy.    Interval Problem Update    Evaluated patient at the bedside  Patient denies fever and chills  No nausea or vomiting   Ostomy with good output, output is slowing down.   White counts trending down   K 2.8, mag 1.6, repleted with 40 po K  She reports lower extremity edema and would like to resume her Po thiazide.   Reviewed CT scan and showed sigmoid diverticulosis and mild stranding in the area of sigmoid colon which may represent diverticulitis. Discussed with ID pharmacist and adequately treated with flagyl and ceftriaxone. Patient has no white counts. Afebrile.         I have discussed this patient's plan of care and discharge plan at IDT rounds today with Case Management, Nursing, Nursing leadership, and other members of the IDT team.    Consultants/Specialty  general surgery    Code Status  Full Code    Disposition  Not Medically Cleared  I have placed the appropriate orders for post-discharge needs.    Review of Systems  Review of Systems   Constitutional:  Negative for chills.   Respiratory:  Negative for cough, hemoptysis and sputum production.    Cardiovascular:  Negative for chest pain,  palpitations and orthopnea.   Gastrointestinal:  Negative for abdominal pain, nausea and vomiting.   Neurological:  Negative for dizziness.      Physical Exam  Temp:  [36.4 °C (97.5 °F)-36.9 °C (98.5 °F)] 36.4 °C (97.5 °F)  Pulse:  [75-98] 87  Resp:  [18] 18  BP: (103-135)/(52-63) 119/52  SpO2:  [91 %-95 %] 95 %    Physical Exam  Constitutional:       General: She is not in acute distress.     Appearance: Normal appearance. She is obese.   HENT:      Head: Normocephalic and atraumatic.      Right Ear: External ear normal.      Left Ear: External ear normal.      Nose: Nose normal.   Eyes:      Extraocular Movements: Extraocular movements intact.   Cardiovascular:      Rate and Rhythm: Normal rate. Rhythm irregular.      Pulses: Normal pulses.   Pulmonary:      Effort: Pulmonary effort is normal.      Breath sounds: Normal breath sounds.   Abdominal:      General: Bowel sounds are normal.      Palpations: Abdomen is soft.      Comments: Ostomy in place with brownish liquid stool   Surgical site clean dry and intact    Musculoskeletal:         General: Normal range of motion.      Cervical back: Normal range of motion and neck supple.      Right lower leg: Edema present.      Left lower leg: Edema present.   Skin:     General: Skin is warm and dry.   Neurological:      General: No focal deficit present.      Mental Status: She is alert and oriented to person, place, and time.      Cranial Nerves: No cranial nerve deficit.   Psychiatric:         Mood and Affect: Mood normal.         Behavior: Behavior normal.         Fluids    Intake/Output Summary (Last 24 hours) at 11/26/2024 1433  Last data filed at 11/26/2024 1225  Gross per 24 hour   Intake 360 ml   Output 400 ml   Net -40 ml        Laboratory  Recent Labs     11/24/24  0316 11/25/24  0107 11/26/24  0313   WBC 11.8* 10.2 9.2   RBC 4.53 4.05* 3.88*   HEMOGLOBIN 13.0 11.7* 11.3*   HEMATOCRIT 39.1 34.7* 33.6*   MCV 86.3 85.7 86.6   MCH 28.7 28.9 29.1   MCHC 33.2 33.7  33.6   RDW 43.8 43.1 43.4   PLATELETCT 339 319 252   MPV 9.2 9.5 8.9*     Recent Labs     11/24/24  0316 11/25/24  0107 11/26/24  0313   SODIUM 137 141 140   POTASSIUM 3.3* 3.4* 2.8*   CHLORIDE 99 103 103   CO2 22 26 26   GLUCOSE 136* 96 124*   BUN 19 15 9   CREATININE 0.86 0.78 0.68   CALCIUM 8.4 7.9* 7.1*                     Imaging  DX-BARIUM ENEMA   Final Result         1. 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      DX-ABDOMEN FOR TUBE PLACEMENT   Final Result         1.  Nonspecific bowel gas pattern in the upper abdomen.   2.  Nasogastric tube tip terminates overlying the expected location of the gastric fundus.      EC-ECHOCARDIOGRAM COMPLETE W/ CONT   Final Result      DX-ABDOMEN FOR TUBE PLACEMENT   Final Result      1.  NG tube extends into the fundus of the stomach.      DX-CHEST-PORTABLE (1 VIEW)   Final Result      Cardiomegaly.      CT-ABDOMEN-PELVIS WITH   Final Result      1.  Diffusely dilated colon with some bowel wall thickening. This extends to the level of the upper sigmoid colon where there is some focal narrowing. Small intestine is also dilated with air/fluid levels. Differential diagnosis includes ileus as well as    distal colonic obstruction.      2.  Sigmoid diverticulosis. There is some mild stranding attenuation seen in the area of the narrowed sigmoid colon which may represent a component of diverticulitis.      3.  Small hiatal hernia.           Assessment/Plan  * Bowel obstruction (HCC)- (present on admission)  Assessment & Plan  Patient underwent laparoscopic transverse loop colostomy 11/24  Patient has had good ostomy output  On full liquid diet   Mobilize    Colovaginal fistula- (present on admission)  Assessment & Plan  Outpatient follow-up with colorectal surgery  I placed the referral     A-fib (HCC)- (present on admission)  Assessment & Plan  New onset atrial fibrillation with rapid ventricular  response during this hospitalization  P.o. Toprol has been resumed  Telemetry reviewed, she is rate controlled  Continue telemetry monitoring for now    Dehydration- (present on admission)  Assessment & Plan  Resolved     ACP (advance care planning)- (present on admission)  Assessment & Plan  See prior documentation, the patient is full code    Preoperative examination- (present on admission)  Assessment & Plan  See history and physical for documentation of preoperative exam    Sigmoid diverticulitis- (present on admission)  Assessment & Plan  Treated with ceftriaxone and flagyl   Discussed with ID pharmacist and adequately treated     Sinus tachycardia- (present on admission)  Assessment & Plan  Resolved     Stage 3a chronic kidney disease- (present on admission)  Assessment & Plan  Renally dose medication   Avoid nephrotoxin agent       Class 3 severe obesity due to excess calories without serious comorbidity with body mass index (BMI) of 40.0 to 44.9 in adult (HCC)- (present on admission)  Assessment & Plan  BMI 47  Weight loss counseling       Primary hypertension- (present on admission)  Assessment & Plan  Hydrochlorothiazide and metoprolol       Hyponatremia- (present on admission)  Assessment & Plan  Resolved    Hypokalemia- (present on admission)  Assessment & Plan  Replete and monitor     Abnormal EKG- (present on admission)  Assessment & Plan  No chest pain    Depression- (present on admission)  Assessment & Plan  Bupropion and sertaline          VTE prophylaxis: Lovenox    I have performed a physical exam and reviewed and updated ROS and Plan today (11/26/2024). In review of yesterday's note (11/25/2024), there are no changes except as documented above.    Patient has a high medical complexity, complex decision making and is at high risk of complication, morbidity, and mortality    Greater than 51 minutes spent prepping to see patient (e.g. review of tests) obtaining and/or reviewing separately obtained  history. Performing a medically appropriate examination and/ evaluation.  Counseling and educating the patient/family/caregiver.  Ordering medications, tests, or procedures.  Referring and communicating with other health care professionals.  Documenting clinical information in EPIC.  Independently interpreting results and communicating results to patient/family/caregiver.  Care coordination

## 2024-11-26 NOTE — PROGRESS NOTES
Telemetry Shift Summary     Rhythm SR/ST  HR Range   Ectopy rPVC/PAC  Measurements  0.20/0.08/0.40     Normal Values  Rhythm SR  HR Range    Measurements 0.12-0.20 / 0.06-0.10  / 0.30-0.52

## 2024-11-27 LAB
ANION GAP SERPL CALC-SCNC: 12 MMOL/L (ref 7–16)
BASOPHILS # BLD AUTO: 0.5 % (ref 0–1.8)
BASOPHILS # BLD: 0.05 K/UL (ref 0–0.12)
BUN SERPL-MCNC: 6 MG/DL (ref 8–22)
CALCIUM SERPL-MCNC: 8.1 MG/DL (ref 8.4–10.2)
CHLORIDE SERPL-SCNC: 99 MMOL/L (ref 96–112)
CO2 SERPL-SCNC: 26 MMOL/L (ref 20–33)
CREAT SERPL-MCNC: 0.66 MG/DL (ref 0.5–1.4)
EKG IMPRESSION: NORMAL
EOSINOPHIL # BLD AUTO: 0.46 K/UL (ref 0–0.51)
EOSINOPHIL NFR BLD: 5 % (ref 0–6.9)
ERYTHROCYTE [DISTWIDTH] IN BLOOD BY AUTOMATED COUNT: 42.7 FL (ref 35.9–50)
GFR SERPLBLD CREATININE-BSD FMLA CKD-EPI: 92 ML/MIN/1.73 M 2
GLUCOSE BLD STRIP.AUTO-MCNC: 116 MG/DL (ref 65–99)
GLUCOSE BLD STRIP.AUTO-MCNC: 127 MG/DL (ref 65–99)
GLUCOSE BLD STRIP.AUTO-MCNC: 182 MG/DL (ref 65–99)
GLUCOSE SERPL-MCNC: 147 MG/DL (ref 65–99)
HCT VFR BLD AUTO: 37 % (ref 37–47)
HGB BLD-MCNC: 12.4 G/DL (ref 12–16)
IMM GRANULOCYTES # BLD AUTO: 0.12 K/UL (ref 0–0.11)
IMM GRANULOCYTES NFR BLD AUTO: 1.3 % (ref 0–0.9)
LYMPHOCYTES # BLD AUTO: 1.7 K/UL (ref 1–4.8)
LYMPHOCYTES NFR BLD: 18.6 % (ref 22–41)
MAGNESIUM SERPL-MCNC: 1.5 MG/DL (ref 1.5–2.5)
MCH RBC QN AUTO: 28.8 PG (ref 27–33)
MCHC RBC AUTO-ENTMCNC: 33.5 G/DL (ref 32.2–35.5)
MCV RBC AUTO: 86 FL (ref 81.4–97.8)
MONOCYTES # BLD AUTO: 0.62 K/UL (ref 0–0.85)
MONOCYTES NFR BLD AUTO: 6.8 % (ref 0–13.4)
NEUTROPHILS # BLD AUTO: 6.2 K/UL (ref 1.82–7.42)
NEUTROPHILS NFR BLD: 67.8 % (ref 44–72)
NRBC # BLD AUTO: 0 K/UL
NRBC BLD-RTO: 0 /100 WBC (ref 0–0.2)
PLATELET # BLD AUTO: 268 K/UL (ref 164–446)
PMV BLD AUTO: 9.1 FL (ref 9–12.9)
POTASSIUM SERPL-SCNC: 2.6 MMOL/L (ref 3.6–5.5)
POTASSIUM SERPL-SCNC: 3.3 MMOL/L (ref 3.6–5.5)
RBC # BLD AUTO: 4.3 M/UL (ref 4.2–5.4)
SODIUM SERPL-SCNC: 137 MMOL/L (ref 135–145)
WBC # BLD AUTO: 9.2 K/UL (ref 4.8–10.8)

## 2024-11-27 PROCEDURE — 83735 ASSAY OF MAGNESIUM: CPT

## 2024-11-27 PROCEDURE — 700111 HCHG RX REV CODE 636 W/ 250 OVERRIDE (IP): Mod: JZ | Performed by: HOSPITALIST

## 2024-11-27 PROCEDURE — 700102 HCHG RX REV CODE 250 W/ 637 OVERRIDE(OP): Performed by: HOSPITALIST

## 2024-11-27 PROCEDURE — 82962 GLUCOSE BLOOD TEST: CPT | Mod: 91

## 2024-11-27 PROCEDURE — 36415 COLL VENOUS BLD VENIPUNCTURE: CPT

## 2024-11-27 PROCEDURE — 80048 BASIC METABOLIC PNL TOTAL CA: CPT

## 2024-11-27 PROCEDURE — 99233 SBSQ HOSP IP/OBS HIGH 50: CPT | Performed by: STUDENT IN AN ORGANIZED HEALTH CARE EDUCATION/TRAINING PROGRAM

## 2024-11-27 PROCEDURE — 97602 WOUND(S) CARE NON-SELECTIVE: CPT

## 2024-11-27 PROCEDURE — 85025 COMPLETE CBC W/AUTO DIFF WBC: CPT

## 2024-11-27 PROCEDURE — 700101 HCHG RX REV CODE 250: Performed by: HOSPITALIST

## 2024-11-27 PROCEDURE — A9270 NON-COVERED ITEM OR SERVICE: HCPCS | Performed by: HOSPITALIST

## 2024-11-27 PROCEDURE — 700111 HCHG RX REV CODE 636 W/ 250 OVERRIDE (IP): Performed by: STUDENT IN AN ORGANIZED HEALTH CARE EDUCATION/TRAINING PROGRAM

## 2024-11-27 PROCEDURE — 93005 ELECTROCARDIOGRAM TRACING: CPT | Performed by: STUDENT IN AN ORGANIZED HEALTH CARE EDUCATION/TRAINING PROGRAM

## 2024-11-27 PROCEDURE — 93010 ELECTROCARDIOGRAM REPORT: CPT | Performed by: STUDENT IN AN ORGANIZED HEALTH CARE EDUCATION/TRAINING PROGRAM

## 2024-11-27 PROCEDURE — 94760 N-INVAS EAR/PLS OXIMETRY 1: CPT

## 2024-11-27 PROCEDURE — A9270 NON-COVERED ITEM OR SERVICE: HCPCS | Mod: JZ | Performed by: STUDENT IN AN ORGANIZED HEALTH CARE EDUCATION/TRAINING PROGRAM

## 2024-11-27 PROCEDURE — 770020 HCHG ROOM/CARE - TELE (206)

## 2024-11-27 PROCEDURE — 84132 ASSAY OF SERUM POTASSIUM: CPT

## 2024-11-27 PROCEDURE — 700102 HCHG RX REV CODE 250 W/ 637 OVERRIDE(OP): Mod: JZ | Performed by: STUDENT IN AN ORGANIZED HEALTH CARE EDUCATION/TRAINING PROGRAM

## 2024-11-27 PROCEDURE — A9270 NON-COVERED ITEM OR SERVICE: HCPCS | Mod: JZ | Performed by: HOSPITALIST

## 2024-11-27 PROCEDURE — 700102 HCHG RX REV CODE 250 W/ 637 OVERRIDE(OP): Mod: JZ | Performed by: HOSPITALIST

## 2024-11-27 RX ORDER — MAGNESIUM SULFATE HEPTAHYDRATE 40 MG/ML
2 INJECTION, SOLUTION INTRAVENOUS ONCE
Status: COMPLETED | OUTPATIENT
Start: 2024-11-27 | End: 2024-11-27

## 2024-11-27 RX ORDER — POTASSIUM CHLORIDE 7.45 MG/ML
10 INJECTION INTRAVENOUS
Status: COMPLETED | OUTPATIENT
Start: 2024-11-27 | End: 2024-11-27

## 2024-11-27 RX ORDER — POTASSIUM CHLORIDE 1500 MG/1
40 TABLET, EXTENDED RELEASE ORAL ONCE
Status: COMPLETED | OUTPATIENT
Start: 2024-11-27 | End: 2024-11-27

## 2024-11-27 RX ORDER — POTASSIUM CHLORIDE 1500 MG/1
40 TABLET, EXTENDED RELEASE ORAL 2 TIMES DAILY
Status: COMPLETED | OUTPATIENT
Start: 2024-11-27 | End: 2024-11-28

## 2024-11-27 RX ADMIN — POTASSIUM CHLORIDE 40 MEQ: 1500 TABLET, EXTENDED RELEASE ORAL at 16:28

## 2024-11-27 RX ADMIN — POTASSIUM CHLORIDE 20 MEQ: 1500 TABLET, EXTENDED RELEASE ORAL at 05:55

## 2024-11-27 RX ADMIN — BUPROPION HYDROCHLORIDE 150 MG: 150 TABLET, FILM COATED, EXTENDED RELEASE ORAL at 16:28

## 2024-11-27 RX ADMIN — ATORVASTATIN CALCIUM 20 MG: 20 TABLET, FILM COATED ORAL at 16:28

## 2024-11-27 RX ADMIN — METOPROLOL TARTRATE 5 MG: 5 INJECTION INTRAVENOUS at 18:31

## 2024-11-27 RX ADMIN — POTASSIUM CHLORIDE 10 MEQ: 10 INJECTION, SOLUTION INTRAVENOUS at 05:44

## 2024-11-27 RX ADMIN — OMEPRAZOLE 20 MG: 20 CAPSULE, DELAYED RELEASE ORAL at 05:55

## 2024-11-27 RX ADMIN — POTASSIUM CHLORIDE 40 MEQ: 1500 TABLET, EXTENDED RELEASE ORAL at 07:39

## 2024-11-27 RX ADMIN — POTASSIUM CHLORIDE 40 MEQ: 1500 TABLET, EXTENDED RELEASE ORAL at 04:37

## 2024-11-27 RX ADMIN — INSULIN LISPRO 1 UNITS: 100 INJECTION, SOLUTION INTRAVENOUS; SUBCUTANEOUS at 05:50

## 2024-11-27 RX ADMIN — METOPROLOL SUCCINATE 25 MG: 25 TABLET, FILM COATED, EXTENDED RELEASE ORAL at 16:28

## 2024-11-27 RX ADMIN — MAGNESIUM SULFATE HEPTAHYDRATE 2 G: 2 INJECTION, SOLUTION INTRAVENOUS at 11:48

## 2024-11-27 RX ADMIN — SERTRALINE 50 MG: 50 TABLET, FILM COATED ORAL at 05:55

## 2024-11-27 RX ADMIN — ENOXAPARIN SODIUM 40 MG: 100 INJECTION SUBCUTANEOUS at 16:28

## 2024-11-27 RX ADMIN — POTASSIUM CHLORIDE 10 MEQ: 10 INJECTION, SOLUTION INTRAVENOUS at 04:41

## 2024-11-27 RX ADMIN — BUPROPION HYDROCHLORIDE 150 MG: 150 TABLET, FILM COATED, EXTENDED RELEASE ORAL at 05:55

## 2024-11-27 ASSESSMENT — CHA2DS2 SCORE
HYPERTENSION: YES
AGE 75 OR GREATER: NO
CHF OR LEFT VENTRICULAR DYSFUNCTION: NO
CHA2DS2 VASC SCORE: 3
VASCULAR DISEASE: NO
SEX: FEMALE
AGE 65 TO 74: YES
DIABETES: NO
PRIOR STROKE OR TIA OR THROMBOEMBOLISM: NO

## 2024-11-27 ASSESSMENT — PAIN DESCRIPTION - PAIN TYPE
TYPE: ACUTE PAIN

## 2024-11-27 ASSESSMENT — ENCOUNTER SYMPTOMS
ORTHOPNEA: 0
SPUTUM PRODUCTION: 0
CHILLS: 0
ABDOMINAL PAIN: 0
HEMOPTYSIS: 0
DIZZINESS: 0
NAUSEA: 0
PALPITATIONS: 0
COUGH: 0
VOMITING: 0

## 2024-11-27 NOTE — DISCHARGE PLANNING
Thank you for sending this referral to [facility]. Per office, patient's PCP Dr. Tipton retired last week. Please set up an appt for patient to establish with a new PCP.      Additionally, please clarify whether patient will be going home with IV abx. If so, please provide [facility] orders.     Referral will remain on hold.

## 2024-11-27 NOTE — PROGRESS NOTES
"    Good ostomy output.  Tolerating diet.  Pain controlled.    /55   Pulse 61   Temp 36.6 °C (97.9 °F) (Temporal)   Resp 15   Ht 1.651 m (5' 5\")   Wt (!) 129 kg (283 lb 4.7 oz)   SpO2 93%   BMI 47.14 kg/m²     NGT  Abd soft, nontender  Ostomy PPP incision CDI    A/P  Diet as tolerated Multimodal pain control ambulation as possible.  Continue with ostomy learning.  Remainder of care per primary team likely able to DC home soon.  "

## 2024-11-27 NOTE — CARE PLAN
The patient is Stable - Low risk of patient condition declining or worsening    Shift Goals  Clinical Goals: Ostomy education, ADAT  Patient Goals: Get better  Family Goals: jj    Progress made toward(s) clinical / shift goals:  Continuing education on the ostomy. Pt verbalizes needs appropriately. Electrolytes replaced per order. Potassium recheck scheduled.   Problem: Pain - Standard  Goal: Alleviation of pain or a reduction in pain to the patient’s comfort goal  Outcome: Progressing     Problem: Knowledge Deficit - Standard  Goal: Patient and family/care givers will demonstrate understanding of plan of care, disease process/condition, diagnostic tests and medications  Outcome: Progressing     Problem: Fall Risk  Goal: Patient will remain free from falls  Outcome: Progressing     Problem: Skin Integrity  Goal: Skin integrity is maintained or improved  Outcome: Progressing     Problem: Skin Care - Ostomy  Goal: Skin remains free from irritation  Outcome: Progressing     Problem: Knowledge Deficit - Ostomy  Goal: Patient will demonstrate ability to manage and maintain ostomy  Outcome: Progressing     Problem: Discharge Barriers/Self-Care - Ostomy  Goal: Ostomy patient's continuum of care needs will be met  Outcome: Progressing         Patient is not progressing towards the following goals:

## 2024-11-27 NOTE — DIETARY
Nutrition Update: Follow-up for NPO/Clear x 6 days   Day 8 of admit.  Guerda Lunsford is a 73 y.o. female with admitting DX of Bowel obstruction (HCC) [K56.609].    Current Diet: GI soft     Malnutrition risk :  ASPEN criteria not met but risk noted due to NPO/clears x 6 days.     Nutrition Dx Status: Ongoing or Resolved  n/a     Diet is advanced to GI Soft/ Low fiber start 11/27. Limited PO documentation for advanced diet at this time.     Problem: Nutritional:  Outcome: GOAL MET     RD :  Following

## 2024-11-27 NOTE — PROGRESS NOTES
Jose M from Lab called with critical result of potassium: 2.6 at 0405. Critical lab result read back to Jose M.   Dr. Palmer notified of critical lab result at 0410.  Critical lab result read back by Dr. Palmer. New orders received for 40 mEq PO Kdur and 20 mEq IV KCl.

## 2024-11-27 NOTE — PROGRESS NOTES
[facility] Medicine Daily Progress Note    Date of Service  11/27/2024    Chief Complaint  Guerda Lunsford is a 73 y.o. female admitted 11/19/2024 with abdominal pain and distention    Hospital Course    Guerda Washington has past medical history which includes diabetes, colitis, and diverticulitis.  Surgical history includes appendectomy and hysterectomy.      She presented to the emergency room 11/19/2024 for evaluation of epigastric pain and distention.      CT imaging was concerning for diffusely dilated colon with some bowel wall thickening.  Differential diagnosis includes ileus as well as distal colonic obstruction.  The patient was hospitalized, NG tube was placed, intravenous fluids were ordered, and surgery was consulted.  On 11/24/2024, the patient underwent laparoscopic transverse loop colostomy.    Interval Problem Update    Evaluated patient at the bedside  Patient denies fever and chills  No nausea or vomiting   Ostomy with good output, output is slowing down.   White counts trending down   K 2.8, mag 1.6, repleted with 40 po K  She reports lower extremity edema and would like to resume her Po thiazide.   Reviewed CT scan and showed sigmoid diverticulosis and mild stranding in the area of sigmoid colon which may represent diverticulitis. Discussed with ID pharmacist and adequately treated with flagyl and ceftriaxone. Patient has no white counts. Afebrile.     11/27:  Evaluated patient at the bedside.   She denies nausea or vomiting  Denies fever and chills  Normal white counts  K 2.6 this morning patient was given IV and PO potassium. Also given 2 gm of mag. K is now 3.3  Patient has been receiving ostomy teaching   [facility] is ordered  Check BMP in am to monitor hypokalemia       I have discussed this patient's plan of care and discharge plan at IDT rounds today with Case Management, Nursing, Nursing leadership, and other members of the IDT team.    Consultants/Specialty  general  surgery    Code Status  Full Code    Disposition  Not Medically Cleared  I have placed the appropriate orders for post-discharge needs.    Review of Systems  Review of Systems   Constitutional:  Negative for chills.   Respiratory:  Negative for cough, hemoptysis and sputum production.    Cardiovascular:  Negative for chest pain, palpitations and orthopnea.   Gastrointestinal:  Negative for abdominal pain, nausea and vomiting.   Neurological:  Negative for dizziness.      Physical Exam  Temp:  [36.4 °C (97.5 °F)-36.7 °C (98.1 °F)] 36.6 °C (97.9 °F)  Pulse:  [57-87] 87  Resp:  [15-19] 18  BP: (103-127)/(55-68) 127/56  SpO2:  [92 %-95 %] 94 %    Physical Exam  Constitutional:       General: She is not in acute distress.     Appearance: Normal appearance. She is obese.   HENT:      Head: Normocephalic and atraumatic.      Right Ear: External ear normal.      Left Ear: External ear normal.      Nose: Nose normal.   Eyes:      Extraocular Movements: Extraocular movements intact.   Cardiovascular:      Rate and Rhythm: Normal rate. Rhythm irregular.      Pulses: Normal pulses.   Pulmonary:      Effort: Pulmonary effort is normal.      Breath sounds: Normal breath sounds.   Abdominal:      General: Bowel sounds are normal.      Palpations: Abdomen is soft.      Comments: Ostomy in place with brownish liquid stool   Surgical site clean dry and intact    Musculoskeletal:         General: Normal range of motion.      Cervical back: Normal range of motion and neck supple.      Right lower leg: Edema present.      Left lower leg: Edema present.   Skin:     General: Skin is warm and dry.   Neurological:      General: No focal deficit present.      Mental Status: She is alert and oriented to person, place, and time.      Cranial Nerves: No cranial nerve deficit.   Psychiatric:         Mood and Affect: Mood normal.         Behavior: Behavior normal.         Fluids    Intake/Output Summary (Last 24 hours) at 11/27/2024 1505  Last  data filed at 11/27/2024 0845  Gross per 24 hour   Intake 1427.07 ml   Output 1100 ml   Net 327.07 ml        Laboratory  Recent Labs     11/25/24 0107 11/26/24 0313 11/27/24  0303   WBC 10.2 9.2 9.2   RBC 4.05* 3.88* 4.30   HEMOGLOBIN 11.7* 11.3* 12.4   HEMATOCRIT 34.7* 33.6* 37.0   MCV 85.7 86.6 86.0   MCH 28.9 29.1 28.8   MCHC 33.7 33.6 33.5   RDW 43.1 43.4 42.7   PLATELETCT 319 252 268   MPV 9.5 8.9* 9.1     Recent Labs     11/25/24 0107 11/26/24 0313 11/27/24  0303 11/27/24  1255   SODIUM 141 140 137  --    POTASSIUM 3.4* 2.8* 2.6* 3.3*   CHLORIDE 103 103 99  --    CO2 26 26 26  --    GLUCOSE 96 124* 147*  --    BUN 15 9 6*  --    CREATININE 0.78 0.68 0.66  --    CALCIUM 7.9* 7.1* 8.1*  --                      Imaging  DX-BARIUM ENEMA   Final Result         1. 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      DX-ABDOMEN FOR TUBE PLACEMENT   Final Result         1.  Nonspecific bowel gas pattern in the upper abdomen.   2.  Nasogastric tube tip terminates overlying the expected location of the gastric fundus.      EC-ECHOCARDIOGRAM COMPLETE W/ CONT   Final Result      DX-ABDOMEN FOR TUBE PLACEMENT   Final Result      1.  NG tube extends into the fundus of the stomach.      DX-CHEST-PORTABLE (1 VIEW)   Final Result      Cardiomegaly.      CT-ABDOMEN-PELVIS WITH   Final Result      1.  Diffusely dilated colon with some bowel wall thickening. This extends to the level of the upper sigmoid colon where there is some focal narrowing. Small intestine is also dilated with air/fluid levels. Differential diagnosis includes ileus as well as    distal colonic obstruction.      2.  Sigmoid diverticulosis. There is some mild stranding attenuation seen in the area of the narrowed sigmoid colon which may represent a component of diverticulitis.      3.  Small hiatal hernia.           Assessment/Plan  * Bowel obstruction (HCC)- (present on  admission)  Assessment & Plan  Patient underwent laparoscopic transverse loop colostomy 11/24  Patient has had good ostomy output  On full liquid diet   Mobilize    Colovaginal fistula- (present on admission)  Assessment & Plan  Outpatient follow-up with colorectal surgery  I placed the referral     A-fib ([facility])- (present on admission)  Assessment & Plan  New onset atrial fibrillation with rapid ventricular response during this hospitalization  P.o. Toprol has been resumed  Telemetry reviewed, she is rate controlled  Continue telemetry monitoring for now    Dehydration- (present on admission)  Assessment & Plan  Resolved     ACP (advance care planning)- (present on admission)  Assessment & Plan  See prior documentation, the patient is full code    Preoperative examination- (present on admission)  Assessment & Plan  See history and physical for documentation of preoperative exam    Sigmoid diverticulitis- (present on admission)  Assessment & Plan  Treated with ceftriaxone and flagyl   Discussed with ID pharmacist and adequately treated     Sinus tachycardia- (present on admission)  Assessment & Plan  Resolved     Stage 3a chronic kidney disease- (present on admission)  Assessment & Plan  Renally dose medication   Avoid nephrotoxin agent       Class 3 severe obesity due to excess calories without serious comorbidity with body mass index (BMI) of 40.0 to 44.9 in adult ([facility])- (present on admission)  Assessment & Plan  BMI 47  Weight loss counseling       Primary hypertension- (present on admission)  Assessment & Plan  Hydrochlorothiazide and metoprolol       Hyponatremia- (present on admission)  Assessment & Plan  Resolved    Hypokalemia- (present on admission)  Assessment & Plan  Replete and monitor     Abnormal EKG- (present on admission)  Assessment & Plan  No chest pain    Depression- (present on admission)  Assessment & Plan  Bupropion and sertaline          VTE prophylaxis: Lovenox    I have performed a physical  exam and reviewed and updated ROS and Plan today (11/27/2024). In review of yesterday's note (11/26/2024), there are no changes except as documented above.    Patient has a high medical complexity, complex decision making and is at high risk of complication, morbidity, and mortality    Greater than 51 minutes spent prepping to see patient (e.g. review of tests) obtaining and/or reviewing separately obtained history. Performing a medically appropriate examination and/ evaluation.  Counseling and educating the patient/family/caregiver.  Ordering medications, tests, or procedures.  Referring and communicating with other health care professionals.  Documenting clinical information in EPIC.  Independently interpreting results and communicating results to patient/family/caregiver.  Care coordination

## 2024-11-27 NOTE — PROGRESS NOTES
Telemetry Shift Summary      Rhythm: SR/SA/ST  HR:   Ectopy: fPVC/PAC  Measurements: .20/.10/.40     Normal Values  Rhythm: SR  HR:   Measurements: 0.12-0.20/0.08-0.10/0.30-0.52

## 2024-11-27 NOTE — DISCHARGE PLANNING
ATTN: Case Management  RE: Referral for Home Health    As of 11/27/24, we have accepted the Home Health referral for the patient listed above.    [facility]  will contact the patient within 48 hours. If you have any questions or concerns regarding the patient’s transition to Home Health, please do not hesitate to contact us at [phone].      We look forward to collaborating with you,  [facility] Team     Patient's belongings returned

## 2024-11-27 NOTE — DISCHARGE PLANNING
PCP appointment scheduled for patient as her former PCP retired. Information on AVS. Message sent to [facility]. Patient will not be on IV antibiotics.     Physician Assistant Dannielle Lackey P.A.-C.  Tuesday Dec 3, 2024 2:25 PM [address] [address]  [phone]

## 2024-11-28 LAB
ANION GAP SERPL CALC-SCNC: 7 MMOL/L (ref 7–16)
BASOPHILS # BLD AUTO: 1.2 % (ref 0–1.8)
BASOPHILS # BLD: 0.11 K/UL (ref 0–0.12)
BUN SERPL-MCNC: 5 MG/DL (ref 8–22)
CALCIUM SERPL-MCNC: 7.8 MG/DL (ref 8.4–10.2)
CHLORIDE SERPL-SCNC: 100 MMOL/L (ref 96–112)
CO2 SERPL-SCNC: 28 MMOL/L (ref 20–33)
CREAT SERPL-MCNC: 0.71 MG/DL (ref 0.5–1.4)
EOSINOPHIL # BLD AUTO: 0.46 K/UL (ref 0–0.51)
EOSINOPHIL NFR BLD: 5.1 % (ref 0–6.9)
ERYTHROCYTE [DISTWIDTH] IN BLOOD BY AUTOMATED COUNT: 46.6 FL (ref 35.9–50)
GFR SERPLBLD CREATININE-BSD FMLA CKD-EPI: 89 ML/MIN/1.73 M 2
GLUCOSE BLD STRIP.AUTO-MCNC: 126 MG/DL (ref 65–99)
GLUCOSE BLD STRIP.AUTO-MCNC: 129 MG/DL (ref 65–99)
GLUCOSE BLD STRIP.AUTO-MCNC: 135 MG/DL (ref 65–99)
GLUCOSE BLD STRIP.AUTO-MCNC: 144 MG/DL (ref 65–99)
GLUCOSE SERPL-MCNC: 145 MG/DL (ref 65–99)
HCT VFR BLD AUTO: 40.8 % (ref 37–47)
HGB BLD-MCNC: 12.7 G/DL (ref 12–16)
IMM GRANULOCYTES # BLD AUTO: 0.2 K/UL (ref 0–0.11)
IMM GRANULOCYTES NFR BLD AUTO: 2.2 % (ref 0–0.9)
LYMPHOCYTES # BLD AUTO: 1.8 K/UL (ref 1–4.8)
LYMPHOCYTES NFR BLD: 20.1 % (ref 22–41)
MAGNESIUM SERPL-MCNC: 1.8 MG/DL (ref 1.5–2.5)
MCH RBC QN AUTO: 28.7 PG (ref 27–33)
MCHC RBC AUTO-ENTMCNC: 31.1 G/DL (ref 32.2–35.5)
MCV RBC AUTO: 92.1 FL (ref 81.4–97.8)
MONOCYTES # BLD AUTO: 0.75 K/UL (ref 0–0.85)
MONOCYTES NFR BLD AUTO: 8.4 % (ref 0–13.4)
NEUTROPHILS # BLD AUTO: 5.64 K/UL (ref 1.82–7.42)
NEUTROPHILS NFR BLD: 63 % (ref 44–72)
NRBC # BLD AUTO: 0 K/UL
NRBC BLD-RTO: 0 /100 WBC (ref 0–0.2)
PLATELET # BLD AUTO: 256 K/UL (ref 164–446)
PMV BLD AUTO: 9.2 FL (ref 9–12.9)
POTASSIUM SERPL-SCNC: 3.1 MMOL/L (ref 3.6–5.5)
RBC # BLD AUTO: 4.43 M/UL (ref 4.2–5.4)
SODIUM SERPL-SCNC: 135 MMOL/L (ref 135–145)
WBC # BLD AUTO: 9 K/UL (ref 4.8–10.8)

## 2024-11-28 PROCEDURE — 700102 HCHG RX REV CODE 250 W/ 637 OVERRIDE(OP): Performed by: HOSPITALIST

## 2024-11-28 PROCEDURE — 770020 HCHG ROOM/CARE - TELE (206)

## 2024-11-28 PROCEDURE — 85025 COMPLETE CBC W/AUTO DIFF WBC: CPT

## 2024-11-28 PROCEDURE — 700102 HCHG RX REV CODE 250 W/ 637 OVERRIDE(OP): Performed by: STUDENT IN AN ORGANIZED HEALTH CARE EDUCATION/TRAINING PROGRAM

## 2024-11-28 PROCEDURE — 80048 BASIC METABOLIC PNL TOTAL CA: CPT

## 2024-11-28 PROCEDURE — 700111 HCHG RX REV CODE 636 W/ 250 OVERRIDE (IP): Performed by: STUDENT IN AN ORGANIZED HEALTH CARE EDUCATION/TRAINING PROGRAM

## 2024-11-28 PROCEDURE — 82962 GLUCOSE BLOOD TEST: CPT

## 2024-11-28 PROCEDURE — 99233 SBSQ HOSP IP/OBS HIGH 50: CPT | Performed by: STUDENT IN AN ORGANIZED HEALTH CARE EDUCATION/TRAINING PROGRAM

## 2024-11-28 PROCEDURE — A9270 NON-COVERED ITEM OR SERVICE: HCPCS | Performed by: HOSPITALIST

## 2024-11-28 PROCEDURE — 51798 US URINE CAPACITY MEASURE: CPT

## 2024-11-28 PROCEDURE — 83735 ASSAY OF MAGNESIUM: CPT

## 2024-11-28 PROCEDURE — A9270 NON-COVERED ITEM OR SERVICE: HCPCS | Performed by: STUDENT IN AN ORGANIZED HEALTH CARE EDUCATION/TRAINING PROGRAM

## 2024-11-28 PROCEDURE — 36415 COLL VENOUS BLD VENIPUNCTURE: CPT

## 2024-11-28 RX ORDER — METOPROLOL SUCCINATE 25 MG/1
50 TABLET, EXTENDED RELEASE ORAL EVERY EVENING
Status: DISCONTINUED | OUTPATIENT
Start: 2024-11-28 | End: 2024-11-29 | Stop reason: HOSPADM

## 2024-11-28 RX ORDER — MAGNESIUM SULFATE 1 G/100ML
1 INJECTION INTRAVENOUS ONCE
Status: COMPLETED | OUTPATIENT
Start: 2024-11-28 | End: 2024-11-28

## 2024-11-28 RX ADMIN — BUPROPION HYDROCHLORIDE 150 MG: 150 TABLET, FILM COATED, EXTENDED RELEASE ORAL at 17:34

## 2024-11-28 RX ADMIN — SERTRALINE 50 MG: 50 TABLET, FILM COATED ORAL at 06:00

## 2024-11-28 RX ADMIN — POTASSIUM CHLORIDE 40 MEQ: 1500 TABLET, EXTENDED RELEASE ORAL at 17:34

## 2024-11-28 RX ADMIN — ACETAMINOPHEN 650 MG: 325 TABLET ORAL at 22:07

## 2024-11-28 RX ADMIN — HYDROCHLOROTHIAZIDE 25 MG: 25 TABLET ORAL at 06:00

## 2024-11-28 RX ADMIN — BUPROPION HYDROCHLORIDE 150 MG: 150 TABLET, FILM COATED, EXTENDED RELEASE ORAL at 06:00

## 2024-11-28 RX ADMIN — OMEPRAZOLE 20 MG: 20 CAPSULE, DELAYED RELEASE ORAL at 06:00

## 2024-11-28 RX ADMIN — ATORVASTATIN CALCIUM 20 MG: 20 TABLET, FILM COATED ORAL at 17:33

## 2024-11-28 RX ADMIN — APIXABAN 5 MG: 5 TABLET, FILM COATED ORAL at 17:34

## 2024-11-28 RX ADMIN — METOPROLOL SUCCINATE 50 MG: 25 TABLET, FILM COATED, EXTENDED RELEASE ORAL at 17:33

## 2024-11-28 RX ADMIN — MAGNESIUM SULFATE IN DEXTROSE 1 G: 10 INJECTION, SOLUTION INTRAVENOUS at 08:32

## 2024-11-28 RX ADMIN — POTASSIUM CHLORIDE 40 MEQ: 1500 TABLET, EXTENDED RELEASE ORAL at 06:19

## 2024-11-28 RX ADMIN — APIXABAN 5 MG: 5 TABLET, FILM COATED ORAL at 06:00

## 2024-11-28 ASSESSMENT — FIBROSIS 4 INDEX: FIB4 SCORE: 1.55

## 2024-11-28 ASSESSMENT — ENCOUNTER SYMPTOMS
FEVER: 0
CHILLS: 0
SPUTUM PRODUCTION: 0
ORTHOPNEA: 0
HEMOPTYSIS: 0
DIZZINESS: 0
ABDOMINAL PAIN: 0
NAUSEA: 0
PALPITATIONS: 0
COUGH: 0
VOMITING: 0

## 2024-11-28 ASSESSMENT — PAIN DESCRIPTION - PAIN TYPE: TYPE: ACUTE PAIN

## 2024-11-28 NOTE — PROGRESS NOTES
Telemetry Shift Summary    Rhythm ST/SR  HR Range   Ectopy rPVC, oPAC  Measurements 0.20/0.08/0.38    Normal Values  Rhythm SR  HR Range:   Measurements: 0.12-0.20/0.06-0.10/0.30-0.52

## 2024-11-28 NOTE — PROGRESS NOTES
Assumed care of patient at bedside report from day shift RN. Updated on POC. Patient currently A & O x 4; on room air  O2 93 percent saturation; up one person assistance with front wheel walker; without complaints of acute pain. Assessment completed. Call light within reach. Whiteboard updated. Fall precautions in place. Bed locked and in lowest position. All questions answered. No other needs indicated at this time.

## 2024-11-28 NOTE — WOUND TEAM
[facility]  Inpatient Services  New Ostomy Follow-up Management & Teaching      Ostomy Nurse Plan of Care - Frequency of Follow-up:   Ostomy nurses to continue to follow for ostomy needs and teaching until patient independent with care or discharge.  Ostomy Nurse follow-up frequency:  Every other day       Past Surgical History:   Procedure Laterality Date    VA LAP,DIAGNOSTIC ABDOMEN N/A 11/24/2024    Procedure: laparoscopy;  Surgeon: George Hou D.O.;  Location: SURGERY [facility];  Service: General    VA COLOSTOMY Left 11/24/2024    Procedure: CREATION, COLOSTOMY, -transverse loop colostomy;  Surgeon: George Hou D.O.;  Location: SURGERY [facility];  Service: General    VA SIGMOIDOSCOPY,DIAGNOSTIC  11/21/2024    Procedure: SIGMOIDOSCOPY, FLEXIBLE;  Surgeon: Cecelia Adam M.D.;  Location: [facility];  Service: Gastroenterology    PB TOTAL KNEE ARTHROPLASTY Right 11/16/2021    Procedure: ARTHROPLASTY, KNEE, TOTAL [place] cementless;  Surgeon: Sonido Amado M.D.;  Location: SURGERY [facility];  Service: Orthopedics    PB TOTAL KNEE ARTHROPLASTY Left 5/18/2021    Procedure: ARTHROPLASTY, KNEE, TOTAL.;  Surgeon: Sonido Amado M.D.;  Location: SURGERY [facility];  Service: Orthopedics    BREAST BIOPSY  2009    LAMINOTOMY  1982    2 Lumbar Discs    ABDOMINAL HYSTERECTOMY TOTAL  1982    Cervical precancer and bleeding.  No oophorectomy.    APPENDECTOMY      GYN SURGERY      hysterectomy - partial    OTHER      hemorrhoidectomy    OTHER Right     cataract extraction    OTHER Right     Macular Hole Surgery    OTHER ABDOMINAL SURGERY      appy    OTHER ORTHOPEDIC SURGERY      lami       Surgery Date: 11/24/24    Surgeon(s):  George Hou D.O.    Procedure(s):  CHOLECYSTECTOMY, LAPAROSCOPIC possible open ileostomy vs colostomy     Permanence: To be determined    Pertinent History:  She presented to the emergency room 11/19/2024 for evaluation of epigastric pain and  "distention. CT imaging was concerning for diffusely dilated colon with some bowel wall thickening. Differential diagnosis includes ileus as well as distal colonic obstruction.                        Colostomy 11/24/24 LUQ (Active)   Wound Image   11/27/24 1900   Stomal Appliance Assessment Changed 11/27/24 1900   Stoma Assessment Beefy red;Prolapse 11/27/24 1900   Stoma Shape Oval 11/27/24 1900   Stoma Size (in) 1.75 11/25/24 1300   Peristomal Assessment Pink 11/27/24 1900   Mucocutaneous Junction  11/27/24 1900   Treatment Appliance Changed;Bag Change;Cleansed with water/washcloth;Site care 11/27/24 1900   Peristomal Protectant Paste Ring 11/27/24 1900   Stomal Appliance Paste Ring, 2\";2 3/4\" (70mm) [facility] 11/27/24 1900   Output (mL) 100 mL 11/27/24 1900   Output Color Green 11/27/24 1900   WOUND RN ONLY - Stomal Appliance  2 Piece;Convex Barrier Ring, Oval, [id number];Paste Ring, 2\";2 3/4\" (70mm) [facility] 11/27/24 1900   Appliance (Pouch) # [id number] 11/25/24 1300   Appliance Brand Sherice 11/25/24 1300   Secure Start completed No 11/25/24 1300   WOUND NURSE ONLY - Time Spent with Patient (mins) 60 11/27/24 1900                                                       Colostomy Interventions:  Removed appliance (using push pull method) - By patient   Cleansed jose-stomal skin with moist warm washcloth - By Ostomy RN with patient assistance    Created/Checked template fit - By Ostomy RN  Traced Shape to back of barrier - By Ostomy RN  Cut barrier to stoma size - By Ostomy RN  Confirmed fit - By Ostomy RN  Removed plastic backing and \"Dog Eared\" paper edges - By Ostomy RN  Stretched paste ring to fit barrier opening - By Ostomy RN  Applied paste ring to back of barrier - By Ostomy RN  Treated wound/separation with stoma powder - By Ostomy RN  Applied barrier to skin and adhered with friction - By Ostomy RN  Attached pouch - By Ostomy RN  Closed Pouch end - By Ostomy RN    Patient Education:   All questions answered, " procedure explained, previous education reinforced    Ostomy RN to follow-up daily for education     Date:  11/27/24    Reinforced education provided during last visit  Educated regarding the following things: stoma size/shape. Stoma will likely change sizes in the first 4 to 6 weeks. Currently using the most basic supplies while in the hospital, there are many brands and options in regards to supplies.  Educated on when to empty and how to empty, burping appliance, Wear time, Diet (chewing food well) and hydration, Medications, activity including showering and swimming. Pt aware that they should keep an extra set of appliances with them at all times (do not leave in warm cars).  Patient practiced emptying pouch with bedside RN/CNA  Date:  11/25/24  Folder/paperwork delivered  Folder/Paperwork discussed in detail (Follow up, supply ordering and support groups discussed as well as accessories that may be beneficial.)  Educated regarding the following things: stoma size/shape. Stoma will likely change sizes in the first 4 to 6 weeks. Currently using the most basic supplies while in the hospital, there are many brands and options in regards to supplies.  Educated on when to empty and how to empty, burping appliance, Wear time, Diet (chewing food well) and hydration, Medications, activity including showering and swimming. Pt aware that they should keep an extra set of appliances with them at all times (do not leave in warm cars).      Needs for next visit: more practice with hands on     Evaluation:      Date:  11/27/24    Stoma is below the skin a little. Continued with the convex oval barrier, large amount of stool documented less watery than previous assessment.  Patient is eating more solid foods.    Date:  11/25/24    Stoma is flush to the skin & in a bowl, the stoma is pink dull in color.  Large amount of liquid stool still coming from stoma from patient being blocked up for a while.  Patient expressed that she  had to empty the bag 4 times during the night because it was filling up every couple of hours.  Patient had a lot of gas and liquid brown stool.  Stoma sits in a bowl, convex barrier #[id number] used with paste ring to help pop the stoma out a bit.  May need to add an oval convex ring to help with the deep bowl.  Patient very responsive to education and booklet when going over the material.  Left booklet at bedside for patient to look over after we went over the pertinent portions.  Supplies left at bedside.          Flatus: Present  Stool Output: large and green  Urine Output: NA, Fecal Ostomy  Mobility: Up to chair and Ambulate     DIET ORDERS (From admission to next 24h)       Start     Ordered    11/27/24 0826  Diet Order Diet: Low Fiber(GI Soft)  ALL MEALS        Question:  Diet:  Answer:  Low Fiber(GI Soft)    11/27/24 0825                     Secure Start Signed:  No  Outpatient Referral Placed:  Yes       5 Sets of appliances in Ostomy bag for discharge:  Yes    INSURANCE OPTIONS:  If patient has [facility] care plus patient will need an [employer] book. If patientt has Medicaid be sure patient has care chest paperwork.    Currently Active Insurance       Payor Plan    MEDICARE MEDICARE PART A & B    AARP AARP            Anticipated Discharge Plans:  TBD    Ostomy Supplies for DC:  To be determined in 4 to 6 weeks once stomal edema has fully resolved

## 2024-11-28 NOTE — PROGRESS NOTES
At 1600 : Received report from Ashley CUETO. Patient awake, sitting up in bed. Bed is locked and in lowest position. Fall and Safety precautions in place. Call light within reach. Patient verbalizes No other needs at this time.

## 2024-11-28 NOTE — PROGRESS NOTES
Charge Nurse Rounding Note    Bedside rounding completed to address quality of care and overall patient experience.    Patient Satisfaction addressed including staff responsiveness. Patient/family are aware of the POC and any questions answered. Thorough safety education completed including use of call light prior to all mobility throughout the entirety of the hospital stay.     Patient/family aware of time of next Hourly Round.    No further questions/concerns currently.

## 2024-11-28 NOTE — PROGRESS NOTES
At 1740: Monitor tech notified this RN of patient rhythm change from ST/SR into Afib with heart rate between 118-120. 12 lead EKG obtained and presented to MD Delacruz and charge RN. Heart rate still sustaining between 100-119 per monitors. New orders in MAR, per MD give 5mg of IV push Lopressor now. Patient denies chest pain, SOB, or discomfort at this time.

## 2024-11-28 NOTE — PROGRESS NOTES
[facility] Medicine Daily Progress Note    Date of Service  11/28/2024    Chief Complaint  Guerda Lunsford is a 73 y.o. female admitted 11/19/2024 with abdominal pain and distention    Hospital Course    Guerda Washington has past medical history which includes diabetes, colitis, and diverticulitis.  Surgical history includes appendectomy and hysterectomy.      She presented to the emergency room 11/19/2024 for evaluation of epigastric pain and distention.      CT imaging was concerning for diffusely dilated colon with some bowel wall thickening.  Differential diagnosis includes ileus as well as distal colonic obstruction.  The patient was hospitalized, NG tube was placed, intravenous fluids were ordered, and surgery was consulted.  On 11/24/2024, the patient underwent laparoscopic transverse loop colostomy.    Interval Problem Update    Evaluated patient at the bedside  Patient denies fever and chills  No nausea or vomiting   Ostomy with good output, output is slowing down.   White counts trending down   K 2.8, mag 1.6, repleted with 40 po K  She reports lower extremity edema and would like to resume her Po thiazide.   Reviewed CT scan and showed sigmoid diverticulosis and mild stranding in the area of sigmoid colon which may represent diverticulitis. Discussed with ID pharmacist and adequately treated with flagyl and ceftriaxone. Patient has no white counts. Afebrile.     11/27:  Evaluated patient at the bedside.   She denies nausea or vomiting  Denies fever and chills  Normal white counts  K 2.6 this morning patient was given IV and PO potassium. Also given 2 gm of mag. K is now 3.3  Patient has been receiving ostomy teaching   Home health is ordered  Check BMP in am to monitor hypokalemia     11/28:  Evaluated patient at the bedside.   She was in and out of afib last night. Started patient on eliquis given high janelle vas score. Currently rate controlled. Increased metoprolol to 50 mg   K is 3.1 and mag  1.8  Home health has been set up   Patient still receiving ostomy teaching   Likely discharge tomorrow if clinically stable.       I have discussed this patient's plan of care and discharge plan at IDT rounds today with Case Management, Nursing, Nursing leadership, and other members of the IDT team.    Consultants/Specialty  general surgery    Code Status  Full Code    Disposition  Not Medically Cleared  I have placed the appropriate orders for post-discharge needs.    Review of Systems  Review of Systems   Constitutional:  Negative for chills.   Respiratory:  Negative for cough, hemoptysis and sputum production.    Cardiovascular:  Negative for chest pain, palpitations and orthopnea.   Gastrointestinal:  Negative for abdominal pain, nausea and vomiting.   Neurological:  Negative for dizziness.      Physical Exam  Temp:  [36.3 °C (97.3 °F)-36.7 °C (98.1 °F)] 36.3 °C (97.3 °F)  Pulse:  [] 83  Resp:  [16-18] 18  BP: (106-120)/(52-84) 109/52  SpO2:  [93 %-96 %] 96 %    Physical Exam  Constitutional:       General: She is not in acute distress.     Appearance: Normal appearance. She is obese.   HENT:      Head: Normocephalic and atraumatic.      Right Ear: External ear normal.      Left Ear: External ear normal.      Nose: Nose normal.   Eyes:      Extraocular Movements: Extraocular movements intact.   Cardiovascular:      Rate and Rhythm: Normal rate. Rhythm irregular.      Pulses: Normal pulses.   Pulmonary:      Effort: Pulmonary effort is normal.      Breath sounds: Normal breath sounds.   Abdominal:      General: Bowel sounds are normal.      Palpations: Abdomen is soft.      Comments: Ostomy in place with brownish liquid stool   Surgical site clean dry and intact    Musculoskeletal:         General: Normal range of motion.      Cervical back: Normal range of motion and neck supple.      Right lower leg: Edema present.      Left lower leg: Edema present.   Skin:     General: Skin is warm and dry.    Neurological:      General: No focal deficit present.      Mental Status: She is alert and oriented to person, place, and time.      Cranial Nerves: No cranial nerve deficit.   Psychiatric:         Mood and Affect: Mood normal.         Behavior: Behavior normal.         Fluids    Intake/Output Summary (Last 24 hours) at 11/28/2024 1514  Last data filed at 11/28/2024 1430  Gross per 24 hour   Intake --   Output 1050 ml   Net -1050 ml        Laboratory  Recent Labs     11/26/24  0313 11/27/24  0303 11/28/24  0257   WBC 9.2 9.2 9.0   RBC 3.88* 4.30 4.43   HEMOGLOBIN 11.3* 12.4 12.7   HEMATOCRIT 33.6* 37.0 40.8   MCV 86.6 86.0 92.1   MCH 29.1 28.8 28.7   MCHC 33.6 33.5 31.1*   RDW 43.4 42.7 46.6   PLATELETCT 252 268 256   MPV 8.9* 9.1 9.2     Recent Labs     11/26/24  0313 11/27/24  0303 11/27/24  1255 11/28/24  0447   SODIUM 140 137  --  135   POTASSIUM 2.8* 2.6* 3.3* 3.1*   CHLORIDE 103 99  --  100   CO2 26 26  --  28   GLUCOSE 124* 147*  --  145*   BUN 9 6*  --  5*   CREATININE 0.68 0.66  --  0.71   CALCIUM 7.1* 8.1*  --  7.8*                     Imaging  DX-BARIUM ENEMA   Final Result         1. 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      DX-ABDOMEN FOR TUBE PLACEMENT   Final Result         1.  Nonspecific bowel gas pattern in the upper abdomen.   2.  Nasogastric tube tip terminates overlying the expected location of the gastric fundus.      EC-ECHOCARDIOGRAM COMPLETE W/ CONT   Final Result      DX-ABDOMEN FOR TUBE PLACEMENT   Final Result      1.  NG tube extends into the fundus of the stomach.      DX-CHEST-PORTABLE (1 VIEW)   Final Result      Cardiomegaly.      CT-ABDOMEN-PELVIS WITH   Final Result      1.  Diffusely dilated colon with some bowel wall thickening. This extends to the level of the upper sigmoid colon where there is some focal narrowing. Small intestine is also dilated with air/fluid levels. Differential diagnosis  includes ileus as well as    distal colonic obstruction.      2.  Sigmoid diverticulosis. There is some mild stranding attenuation seen in the area of the narrowed sigmoid colon which may represent a component of diverticulitis.      3.  Small hiatal hernia.           Assessment/Plan  * Bowel obstruction (formerly [facility])- (present on admission)  Assessment & Plan  Patient underwent laparoscopic transverse loop colostomy 11/24  Patient has had good ostomy output  On full liquid diet   Mobilize    Colovaginal fistula- (present on admission)  Assessment & Plan  Outpatient follow-up with colorectal surgery  I placed the referral     A-fib (formerly [facility])- (present on admission)  Assessment & Plan  New onset atrial fibrillation with rapid ventricular response during this hospitalization  P.o. Toprol has been resumed  Eliquis 5 mg po BID given high janelle vasc score   Telemetry reviewed, she is rate controlled  Continue telemetry monitoring for now    Dehydration- (present on admission)  Assessment & Plan  Resolved     ACP (advance care planning)- (present on admission)  Assessment & Plan  See prior documentation, the patient is full code    Preoperative examination- (present on admission)  Assessment & Plan  See history and physical for documentation of preoperative exam    Sigmoid diverticulitis- (present on admission)  Assessment & Plan  Treated with ceftriaxone and flagyl   Discussed with ID pharmacist and adequately treated     Sinus tachycardia- (present on admission)  Assessment & Plan  Resolved     Stage 3a chronic kidney disease- (present on admission)  Assessment & Plan  Renally dose medication   Avoid nephrotoxin agent       Class 3 severe obesity due to excess calories without serious comorbidity with body mass index (BMI) of 40.0 to 44.9 in adult (formerly [facility])- (present on admission)  Assessment & Plan  BMI 47  Weight loss counseling       Primary hypertension- (present on admission)  Assessment & Plan  Hydrochlorothiazide and metoprolol        Hyponatremia- (present on admission)  Assessment & Plan  Resolved    Hypokalemia- (present on admission)  Assessment & Plan  Replete and monitor     Abnormal EKG- (present on admission)  Assessment & Plan  No chest pain    Depression- (present on admission)  Assessment & Plan  Bupropion and sertaline          VTE prophylaxis: Lovenox    I have performed a physical exam and reviewed and updated ROS and Plan today (11/28/2024). In review of yesterday's note (11/27/2024), there are no changes except as documented above.    Patient has a high medical complexity, complex decision making and is at high risk of complication, morbidity, and mortality    Greater than 51 minutes spent prepping to see patient (e.g. review of tests) obtaining and/or reviewing separately obtained history. Performing a medically appropriate examination and/ evaluation.  Counseling and educating the patient/family/caregiver.  Ordering medications, tests, or procedures.  Referring and communicating with other health care professionals.  Documenting clinical information in EPIC.  Independently interpreting results and communicating results to patient/family/caregiver.  Care coordination

## 2024-11-28 NOTE — PROGRESS NOTES
Telemetry Shift Summary     Rhythm: SR/SA/ST  HR: 68-83  Ectopy: Rare PVC's, Rare PAC's    Measurements: 0.18/0.08/0.40    Normal Values  Rhythm: SR  HR:   Measurements: 0.12-0.20/0.08-0.10/0.30-0.52

## 2024-11-28 NOTE — PROGRESS NOTES
Author: Renetta Fulton M.D. Date & Time created: 2024  2:04 PM     Interval History:  Ostomy functioning well. Pain tolerable. No N/V.    Review of Systems:  Review of Systems   Constitutional:  Negative for chills and fever.   Gastrointestinal:  Negative for nausea and vomiting.       Physical Exam:  Physical Exam  Constitutional:       Appearance: Normal appearance.   HENT:      Head: Normocephalic and atraumatic.      Right Ear: External ear normal.      Left Ear: External ear normal.      Nose: Nose normal.      Mouth/Throat:      Mouth: Mucous membranes are moist.   Eyes:      Extraocular Movements: Extraocular movements intact.   Cardiovascular:      Rate and Rhythm: Normal rate.   Pulmonary:      Effort: Pulmonary effort is normal.   Abdominal:      Comments: Ostomy pink and productive, mildly edematous   Neurological:      Mental Status: She is alert.         Labs:          Recent Labs     243 24  1255 24  0447   SODIUM 140 137  --  135   POTASSIUM 2.8* 2.6* 3.3* 3.1*   CHLORIDE 103 99  --  100   CO2   --     BUN 9 6*  --  5*   CREATININE 0.68 0.66  --  0.71   MAGNESIUM 1.6 1.5  --  1.8   CALCIUM 7.1* 8.1*  --  7.8*     Recent Labs     24  0303 24  0447   GLUCOSE 124* 147* 145*     Recent Labs     24  0303 24  0257   RBC 3.88* 4.30 4.43   HEMOGLOBIN 11.3* 12.4 12.7   HEMATOCRIT 33.6* 37.0 40.8   PLATELETCT 252 268 256     Recent Labs     24  0303 24  0257   WBC 9.2 9.2 9.0   NEUTSPOLYS 67.70 67.80 63.00   LYMPHOCYTES 18.60* 18.60* 20.10*   MONOCYTES 7.80 6.80 8.40   EOSINOPHILS 4.80 5.00 5.10   BASOPHILS 0.30 0.50 1.20           Hemodynamics:  Temp (24hrs), Av.6 °C (97.9 °F), Min:36.3 °C (97.3 °F), Max:36.7 °C (98.1 °F)  Temperature: 36.3 °C (97.3 °F)  Pulse  Av.2  Min: 55  Max: 120   Blood Pressure : 109/52     Respiratory:    Respiration: 18,  Pulse Oximetry: 96 %        RUL Breath Sounds: Clear, RML Breath Sounds: Clear, RLL Breath Sounds: Clear, SARAI Breath Sounds: Clear, LLL Breath Sounds: Clear  Fluids:    Intake/Output Summary (Last 24 hours) at 11/28/2024 1404  Last data filed at 11/28/2024 0830  Gross per 24 hour   Intake --   Output 1100 ml   Net -1100 ml     Weight: (!) 127 kg (280 lb 13.9 oz)  GI/Nutrition:  Orders Placed This Encounter   Procedures    Diet Order Diet: Low Fiber(GI Soft)     Standing Status:   Standing     Number of Occurrences:   1     Order Specific Question:   Diet:     Answer:   Low Fiber(GI Soft) [2]     Medical Decision Making, by Problem:  Active Hospital Problems    Diagnosis     *Bowel obstruction (HCC) [K56.609]     Colovaginal fistula [N82.4]     A-fib (HCC) [I48.91]     Stage 3a chronic kidney disease [N18.31]     Sinus tachycardia [R00.0]     Sigmoid diverticulitis [K57.32]     Preoperative examination [Z01.818]     ACP (advance care planning) [Z71.89]     Dehydration [E86.0]     Class 3 severe obesity due to excess calories without serious comorbidity with body mass index (BMI) of 40.0 to 44.9 in adult ([facility]) [E66.813, E66.01, Z68.41]     Primary hypertension [I10]     Hypokalemia [E87.6]     Hyponatremia [E87.1]     Abnormal EKG [R94.31]     Depression [F32.A]      Doing well post diverting ostomy for suspected colovaginal fistula  Home health orders placed.   Follow up with Dr. Cooper for definitive care.   Will follow while in the hospital.

## 2024-11-28 NOTE — CARE PLAN
The patient is Stable - Low risk of patient condition declining or worsening    Shift Goals  Clinical Goals: Ostomy care and education, monitor labs  Patient Goals: Get comfortable with Ostomy  Family Goals: jj    Progress made toward(s) clinical / shift goals:    Problem: Knowledge Deficit - Standard  Goal: Patient and family/care givers will demonstrate understanding of plan of care, disease process/condition, diagnostic tests and medications  Outcome: Progressing  Note: Reviewed POC; discussed and provided education on medications, monitoring vitals, Ostomy care and maintenance, labs, IV fluids, and monitoring blood glucose.      Problem: Skin Care - Ostomy  Goal: Skin remains free from irritation  Outcome: Progressing  Note: Pouch assessed, no leaks. Education provided to patient to change ostomy appliance if leaking.      Problem: Knowledge Deficit - Ostomy  Goal: Patient will demonstrate ability to manage and maintain ostomy  Outcome: Progressing  Note: Patient demonstrated how to recognize when to empty ostomy bag and how to empty it. Patient seeks assistance with ostomy needs.        Patient is not progressing towards the following goals:

## 2024-11-28 NOTE — PROGRESS NOTES
Received report from Neida CUETO. Bed is locked and in lowest position. Fall and Safety precautions in place. Patient awake, sitting up in bed eating breakfast.  Reviewed POC. Call light within reach. Patient verbalizes No other needs at this time.

## 2024-11-28 NOTE — CARE PLAN
The patient is Stable - Low risk of patient condition declining or worsening    Shift Goals  Clinical Goals: Ostomy care and education, saftey, monitor glucose, monitor labs, monitor vitals  Patient Goals: rest and feel better  Family Goals: jj    Progress made toward(s) clinical / shift goals:    Problem: Knowledge Deficit - Standard  Goal: Patient and family/care givers will demonstrate understanding of plan of care, disease process/condition, diagnostic tests and medications  Description: Target End Date:  1-3 days or as soon as patient condition allows    Document in Patient Education    1.  Patient and family/caregiver oriented to unit, equipment, visitation policy and means for communicating concern  2.  Complete/review Learning Assessment  3.  Assess knowledge level of disease process/condition, treatment plan, diagnostic tests and medications  4.  Explain disease process/condition, treatment plan, diagnostic tests and medications  Outcome: Progressing    Patient verbalized understanding of plan of care. All questions answered.     Problem: Fall Risk  Goal: Patient will remain free from falls  Description: Target End Date:  Prior to discharge or change in level of care    Document interventions on the [facility] Fall Risk Assessment    1.  Assess for fall risk factors  2.  Implement fall precautions  Outcome: Progressing    Patient remained free from falls during shift. Fall precautions in place with bed locked in lowest position and bed alarm on.    Patient is not progressing towards the following goals:

## 2024-11-29 ENCOUNTER — PHARMACY VISIT (OUTPATIENT)
Dept: PHARMACY | Facility: MEDICAL CENTER | Age: 74
End: 2024-11-29
Payer: COMMERCIAL

## 2024-11-29 VITALS
SYSTOLIC BLOOD PRESSURE: 117 MMHG | HEIGHT: 65 IN | TEMPERATURE: 97.5 F | BODY MASS INDEX: 46.8 KG/M2 | OXYGEN SATURATION: 95 % | RESPIRATION RATE: 16 BRPM | WEIGHT: 280.87 LBS | DIASTOLIC BLOOD PRESSURE: 55 MMHG | HEART RATE: 83 BPM

## 2024-11-29 LAB
ANION GAP SERPL CALC-SCNC: 7 MMOL/L (ref 7–16)
BASOPHILS # BLD AUTO: 0.6 % (ref 0–1.8)
BASOPHILS # BLD: 0.06 K/UL (ref 0–0.12)
BUN SERPL-MCNC: 7 MG/DL (ref 8–22)
CALCIUM SERPL-MCNC: 8.2 MG/DL (ref 8.4–10.2)
CHLORIDE SERPL-SCNC: 100 MMOL/L (ref 96–112)
CO2 SERPL-SCNC: 30 MMOL/L (ref 20–33)
CREAT SERPL-MCNC: 0.65 MG/DL (ref 0.5–1.4)
EOSINOPHIL # BLD AUTO: 0.45 K/UL (ref 0–0.51)
EOSINOPHIL NFR BLD: 4.9 % (ref 0–6.9)
ERYTHROCYTE [DISTWIDTH] IN BLOOD BY AUTOMATED COUNT: 42.5 FL (ref 35.9–50)
GFR SERPLBLD CREATININE-BSD FMLA CKD-EPI: 92 ML/MIN/1.73 M 2
GLUCOSE BLD STRIP.AUTO-MCNC: 133 MG/DL (ref 65–99)
GLUCOSE BLD STRIP.AUTO-MCNC: 137 MG/DL (ref 65–99)
GLUCOSE BLD STRIP.AUTO-MCNC: 146 MG/DL (ref 65–99)
GLUCOSE SERPL-MCNC: 147 MG/DL (ref 65–99)
HCT VFR BLD AUTO: 35.8 % (ref 37–47)
HGB BLD-MCNC: 12 G/DL (ref 12–16)
IMM GRANULOCYTES # BLD AUTO: 0.19 K/UL (ref 0–0.11)
IMM GRANULOCYTES NFR BLD AUTO: 2.1 % (ref 0–0.9)
LYMPHOCYTES # BLD AUTO: 1.9 K/UL (ref 1–4.8)
LYMPHOCYTES NFR BLD: 20.6 % (ref 22–41)
MCH RBC QN AUTO: 28.6 PG (ref 27–33)
MCHC RBC AUTO-ENTMCNC: 33.5 G/DL (ref 32.2–35.5)
MCV RBC AUTO: 85.4 FL (ref 81.4–97.8)
MONOCYTES # BLD AUTO: 0.8 K/UL (ref 0–0.85)
MONOCYTES NFR BLD AUTO: 8.7 % (ref 0–13.4)
NEUTROPHILS # BLD AUTO: 5.84 K/UL (ref 1.82–7.42)
NEUTROPHILS NFR BLD: 63.1 % (ref 44–72)
NRBC # BLD AUTO: 0 K/UL
NRBC BLD-RTO: 0 /100 WBC (ref 0–0.2)
PLATELET # BLD AUTO: 273 K/UL (ref 164–446)
PMV BLD AUTO: 9.5 FL (ref 9–12.9)
POTASSIUM SERPL-SCNC: 3.3 MMOL/L (ref 3.6–5.5)
RBC # BLD AUTO: 4.19 M/UL (ref 4.2–5.4)
SODIUM SERPL-SCNC: 137 MMOL/L (ref 135–145)
WBC # BLD AUTO: 9.2 K/UL (ref 4.8–10.8)

## 2024-11-29 PROCEDURE — 82962 GLUCOSE BLOOD TEST: CPT

## 2024-11-29 PROCEDURE — RXMED WILLOW AMBULATORY MEDICATION CHARGE: Performed by: STUDENT IN AN ORGANIZED HEALTH CARE EDUCATION/TRAINING PROGRAM

## 2024-11-29 PROCEDURE — 99239 HOSP IP/OBS DSCHRG MGMT >30: CPT | Performed by: STUDENT IN AN ORGANIZED HEALTH CARE EDUCATION/TRAINING PROGRAM

## 2024-11-29 PROCEDURE — 85025 COMPLETE CBC W/AUTO DIFF WBC: CPT

## 2024-11-29 PROCEDURE — 700102 HCHG RX REV CODE 250 W/ 637 OVERRIDE(OP): Performed by: STUDENT IN AN ORGANIZED HEALTH CARE EDUCATION/TRAINING PROGRAM

## 2024-11-29 PROCEDURE — 97602 WOUND(S) CARE NON-SELECTIVE: CPT

## 2024-11-29 PROCEDURE — A9270 NON-COVERED ITEM OR SERVICE: HCPCS | Performed by: HOSPITALIST

## 2024-11-29 PROCEDURE — A9270 NON-COVERED ITEM OR SERVICE: HCPCS | Performed by: STUDENT IN AN ORGANIZED HEALTH CARE EDUCATION/TRAINING PROGRAM

## 2024-11-29 PROCEDURE — 36415 COLL VENOUS BLD VENIPUNCTURE: CPT

## 2024-11-29 PROCEDURE — 80048 BASIC METABOLIC PNL TOTAL CA: CPT

## 2024-11-29 PROCEDURE — 94760 N-INVAS EAR/PLS OXIMETRY 1: CPT

## 2024-11-29 PROCEDURE — 700102 HCHG RX REV CODE 250 W/ 637 OVERRIDE(OP): Performed by: HOSPITALIST

## 2024-11-29 RX ORDER — METOPROLOL SUCCINATE 50 MG/1
50 TABLET, EXTENDED RELEASE ORAL EVERY EVENING
Qty: 30 TABLET | Refills: 0 | Status: SHIPPED | OUTPATIENT
Start: 2024-11-29 | End: 2024-12-03 | Stop reason: SDUPTHER

## 2024-11-29 RX ORDER — POTASSIUM CHLORIDE 1.5 G/1.58G
40 POWDER, FOR SOLUTION ORAL DAILY
Qty: 6 PACKET | Refills: 0 | Status: SHIPPED | OUTPATIENT
Start: 2024-11-29 | End: 2024-12-02

## 2024-11-29 RX ADMIN — HYDROCHLOROTHIAZIDE 25 MG: 25 TABLET ORAL at 05:58

## 2024-11-29 RX ADMIN — BUPROPION HYDROCHLORIDE 150 MG: 150 TABLET, FILM COATED, EXTENDED RELEASE ORAL at 05:58

## 2024-11-29 RX ADMIN — OMEPRAZOLE 20 MG: 20 CAPSULE, DELAYED RELEASE ORAL at 05:58

## 2024-11-29 RX ADMIN — APIXABAN 5 MG: 5 TABLET, FILM COATED ORAL at 05:58

## 2024-11-29 RX ADMIN — SERTRALINE 50 MG: 50 TABLET, FILM COATED ORAL at 05:58

## 2024-11-29 ASSESSMENT — ENCOUNTER SYMPTOMS
VOMITING: 0
ABDOMINAL PAIN: 0
FEVER: 0
CHILLS: 0

## 2024-11-29 ASSESSMENT — PAIN DESCRIPTION - PAIN TYPE: TYPE: ACUTE PAIN

## 2024-11-29 NOTE — CARE PLAN
The patient is Stable - Low risk of patient condition declining or worsening    Shift Goals  Clinical Goals: Education, pain control  Patient Goals: Sleep/rest  Family Goals: jj    Progress made toward(s) clinical / shift goals:  Patient given education throughout the night regarding her new ostomy. Patient able to demonstrate emptying and burping ostomy.     Care clustered to promote sleep/rest.

## 2024-11-29 NOTE — PROGRESS NOTES
Received report from Radha CUETO. Bed is locked and in lowest position. Fall and Safety precautions in place. Patient awake, alert, and sitting up in bed eating breakfast. Reviewed POC. Call light within reach. Patient verbalizes No other needs at this time.

## 2024-11-29 NOTE — PROGRESS NOTES
Telemetry Summary    Rhythm: SR, ST, SA  Rate: 70's-110's  Ectopy: Rare PVC's, PAC's  Measurements: (.16/.08/.40)    Normal Values  Rhythm: SR  Rate:   Measurements: 0.12-0.20/0.06-0.10/0.30-0.52

## 2024-11-29 NOTE — DISCHARGE INSTRUCTIONS
D/C instructions:     1. DIET: Upon discharge from the hospital you may resume your normal preoperative diet. Depending on how you are feeling and whether you have nausea or not, you may wish to stay with a bland diet for the first few days. However, you can advance this as quickly as you feel ready.     2. ACTIVITIES: After discharge from the hospital, you may resume full routine activities. However, there should be no heavy lifting (greater than 15 pounds) and no strenuous activities until after your follow-up visit. Otherwise, routine activities of daily living are acceptable.     3. DRIVING: You may drive whenever you are off pain medications and are able to perform the activities needed to drive, i.e. turning, bending, twisting, etc.     4. BATHING: You may get the wound wet at any time after leaving the hospital. You may shower, but do not submerge in a bath for at least a week. Dressings may come off after 48 hours, but will peel off by themselves in the next 7-10 days.     5. BOWEL FUNCTION: Constipation is common after an operation, especially with pain medications. The combination of pain medication and decreased activity level can cause constipation in otherwise normal patients. If you feel this is occurring, take a laxative (Milk of Magnesia, Ex-Lax, Senokot, etc.) until the problem has resolved.     6. PAIN MEDICATION: You will be given a prescription for pain medication at discharge. Please take these as directed. It is important to remember not to take medications on an empty stomach as this may cause nausea.     7. LAPAROSCOPIC SURGERY: Many people have pain in the neck or shoulder after laparoscopic surgery from the pressure of the gas used in your abdomen during the procedure. This can last 2-3 days after surgery. Going for short walks around the house can help with this pain, but narcotic pain medication doesn't usually help very much.      8.CALL IF YOU HAVE: (1) Fevers to more than 101F, (2)  Unusual chest or leg pain, (3) Drainage or fluid from incision that may be foul smelling, increased tenderness or soreness at the wound or the wound edges are no longer together, redness or swelling at the incision site. Please do not hesitate to call with any other questions.      9. APPOINTMENT: Contact our office at [phone] for a follow-up appointment in 1 week following your procedure.     If you have any additional questions, please do not hesitate to call the office and speak to either myself or the physician on call.     Office address:  [address] [facility] [address]     Renetta Edmonds MD  [facility]  [phone]

## 2024-11-29 NOTE — PROGRESS NOTES
Date of Service:  11/29/2024    Chief Complaint  73 y.o.-year-old female admitted 11/19/2024 with colonic stricture and possible colovaginal fistula, POD 5 loop colostomy    Interval Problem Update  Doing well, tolerating diet, good bowel function, comfortable caring for ostomy.      Review of Systems   Constitutional:  Negative for chills and fever.   Gastrointestinal:  Negative for abdominal pain and vomiting.      Physical Exam Laboratory/Imaging   Vitals:    11/28/24 1909 11/29/24 0002 11/29/24 0509 11/29/24 0809   BP: 115/69 105/58 111/58 106/53   Pulse: 74 79 71 79   Resp: 17 16 17 16   Temp: 36.8 °C (98.2 °F) 36.8 °C (98.2 °F) 36.7 °C (98.1 °F) 36.4 °C (97.5 °F)   TempSrc: Oral Oral Oral Temporal   SpO2: 94% 93% 94% 95%   Weight:       Height:         Physical Exam  Constitutional:       Appearance: Normal appearance.   HENT:      Head: Normocephalic and atraumatic.      Right Ear: External ear normal.      Left Ear: External ear normal.      Mouth/Throat:      Mouth: Mucous membranes are dry.   Eyes:      Extraocular Movements: Extraocular movements intact.   Cardiovascular:      Rate and Rhythm: Normal rate and regular rhythm.   Pulmonary:      Effort: Pulmonary effort is normal.   Abdominal:      General: Abdomen is flat.      Comments: Ostomy pink and patent, incisions c/d/i   Neurological:      Mental Status: She is alert.      Lab Results   Component Value Date/Time    WBC 9.2 11/29/2024 04:30 AM    WBC 6.8 07/19/2011 07:42 AM    HEMOGLOBIN 12.0 11/29/2024 04:30 AM    HEMATOCRIT 35.8 (L) 11/29/2024 04:30 AM    PLATELETCT 273 11/29/2024 04:30 AM     Lab Results   Component Value Date/Time    SODIUM 137 11/29/2024 04:30 AM    POTASSIUM 3.3 (L) 11/29/2024 04:30 AM    CHLORIDE 100 11/29/2024 04:30 AM    CO2 30 11/29/2024 04:30 AM    GLUCOSE 147 (H) 11/29/2024 04:30 AM    BUN 7 (L) 11/29/2024 04:30 AM    CREATININE 0.65 11/29/2024 04:30 AM    CREATININE 0.86 07/19/2011 07:42 AM      Assessment/Plan    Ok  to d/c home from surgical standpoint.   Follow up with Dr. Cooper for further evaluation of this fistula and stricture, as well as future definitive therapy.     D/C instructions:    1. DIET: Upon discharge from the hospital you may resume your normal preoperative diet. Depending on how you are feeling and whether you have nausea or not, you may wish to stay with a bland diet for the first few days. However, you can advance this as quickly as you feel ready.    2. ACTIVITIES: After discharge from the hospital, you may resume full routine activities. However, there should be no heavy lifting (greater than 15 pounds) and no strenuous activities until after your follow-up visit. Otherwise, routine activities of daily living are acceptable.    3. DRIVING: You may drive whenever you are off pain medications and are able to perform the activities needed to drive, i.e. turning, bending, twisting, etc.    4. BATHING: You may get the wound wet at any time after leaving the hospital. You may shower, but do not submerge in a bath for at least a week. Dressings may come off after 48 hours, but will peel off by themselves in the next 7-10 days.    5. BOWEL FUNCTION: Constipation is common after an operation, especially with pain medications. The combination of pain medication and decreased activity level can cause constipation in otherwise normal patients. If you feel this is occurring, take a laxative (Milk of Magnesia, Ex-Lax, Senokot, etc.) until the problem has resolved.    6. PAIN MEDICATION: You will be given a prescription for pain medication at discharge. Please take these as directed. It is important to remember not to take medications on an empty stomach as this may cause nausea.    7. LAPAROSCOPIC SURGERY: Many people have pain in the neck or shoulder after laparoscopic surgery from the pressure of the gas used in your abdomen during the procedure. This can last 2-3 days after surgery. Going for short walks around  the house can help with this pain, but narcotic pain medication doesn't usually help very much.     8.CALL IF YOU HAVE: (1) Fevers to more than 101F, (2) Unusual chest or leg pain, (3) Drainage or fluid from incision that may be foul smelling, increased tenderness or soreness at the wound or the wound edges are no longer together, redness or swelling at the incision site. Please do not hesitate to call with any other questions.     9. APPOINTMENT: Contact our office at [phone] for a follow-up appointment in 1 week following your procedure.    If you have any additional questions, please do not hesitate to call the office and speak to either myself or the physician on call.    Office address:  [address] [facility] [address]    Renetta Edmonds MD  [facility]  [phone]           [employer] Measures

## 2024-11-29 NOTE — DISCHARGE SUMMARY
Discharge Summary    CHIEF COMPLAINT ON ADMISSION  Chief Complaint   Patient presents with    Epigastric Pain     Has been persisting throughout the day paired with SOB.        Reason for Admission  Chest Pain     Admission Date  11/19/2024    CODE STATUS  Full Code    HPI & HOSPITAL COURSE  For full details please refer to HPI    Guerda Washington has past medical history which includes diabetes, colitis, and diverticulitis.  Surgical history includes appendectomy and hysterectomy.       She presented to the emergency room 11/19/2024 for evaluation of epigastric pain and distention.       CT imaging was concerning for diffusely dilated colon with some bowel wall thickening.  Differential diagnosis includes ileus as well as distal colonic obstruction.  The patient was hospitalized, NG tube was placed, intravenous fluids were ordered, and surgery was consulted.  On 11/24/2024, the patient underwent laparoscopic transverse loop colostomy.    Reviewed CT scan and showed sigmoid diverticulosis and mild stranding in the area of sigmoid colon which may represent diverticulitis. Discussed with ID pharmacist and adequately treated with flagyl and ceftriaxone. Patient has no white counts. Afebrile.     Also while in hospital patient went into afib with RVR. Patient is on metoprolol. Currently rate controlled. Given high janelle vasc score patient was started on eliquis.     Patient is currently tolerating the diet. Electrolytes imbalance has been resolved. Colostomy with good output. Patient received teaching and she feels comfortable with it. Home health has been set up. Patient will follow up with Dr. Cooper for colovaginal fistula outpatient. Thus discharged in stable condition.         Therefore, she is discharged in good and stable condition to home with organized home healthcare and close outpatient follow-up.    The patient met 2-midnight criteria for an inpatient stay at the time of discharge.    Discharge  Date  11/29    FOLLOW UP ITEMS POST DISCHARGE  PCP  General surgery     DISCHARGE DIAGNOSES  Principal Problem:    Bowel obstruction (HCC) (POA: Yes)  Active Problems:    Depression (POA: Yes)    Abnormal EKG (POA: Yes)    Hypokalemia (POA: Yes)    Hyponatremia (POA: Yes)    Primary hypertension (POA: Yes)    Class 3 severe obesity due to excess calories without serious comorbidity with body mass index (BMI) of 40.0 to 44.9 in adult (HCC) (Chronic) (POA: Yes)    Stage 3a chronic kidney disease (POA: Yes)    Sinus tachycardia (POA: Yes)    Sigmoid diverticulitis (POA: Yes)    Preoperative examination (POA: Yes)    ACP (advance care planning) (POA: Yes)    Dehydration (POA: Yes)    A-fib (HCC) (POA: Yes)    Colovaginal fistula (POA: Yes)  Resolved Problems:    * No resolved hospital problems. *      FOLLOW UP  Future Appointments   Date Time Provider Department Center   12/3/2024  2:40 PM Dannielle Lackey P.A.-C. [address] [facility]  [address]  [facility] [address]  [phone]        Shelly Cooper M.D.  [address]  [address]  [place] [address] [phone]    Follow up in 1 month(s)      George Hou D.O.  [address] [facility] [address]  [place] [id number] [phone]    Follow up in 1 week(s)        MEDICATIONS ON DISCHARGE     Medication List        START taking these medications        Instructions   apixaban 5mg Tabs  Commonly known as: Eliquis   Take 1 Tablet by mouth 2 times a day for 30 days. Indications: Thromboembolism secondary to Atrial Fibrillation  Dose: 5 mg     potassium chloride 20 MEQ Pack  Commonly known as: Klor-Con   Take 2 Packets by mouth every day for 3 days.  Dose: 40 mEq            CHANGE how you take these medications        Instructions   metoprolol SR 50 MG Tb24  What changed:   medication strength  how much to take  Commonly known as: Toprol XL   Take 1 Tablet by mouth every evening for 30 days.  Dose: 50 mg            CONTINUE taking these  "medications        Instructions   albuterol 108 (90 Base) MCG/ACT Aers inhalation aerosol   Inhale 2 Puffs every 6 hours as needed for Shortness of Breath.  Dose: 2 Puff     atorvastatin 20 MG Tabs  Commonly known as: Lipitor   Take 20 mg by mouth every evening.  Dose: 20 mg     buPROPion 300 MG XL tablet  Commonly known as: Wellbutrin XL   Take 300 mg by mouth every morning.  Dose: 300 mg     glipiZIDE 5 MG Tabs  Commonly known as: Glucotrol   Take 5 mg by mouth every day. Pt reports that she takes this medication QDAY, not BID  Dose: 5 mg     hydroCHLOROthiazide 25 MG Tabs   Take 25 mg by mouth every day.  Dose: 25 mg     ondansetron 4 MG Tbdp  Commonly known as: Zofran ODT   Take 4 mg by mouth every 6 hours as needed for Nausea/Vomiting.  Dose: 4 mg     pantoprazole 40 MG Tbec  Commonly known as: Protonix   Take 40 mg by mouth every evening.  Dose: 40 mg     sertraline 50 MG Tabs  Commonly known as: Zoloft   Take 50 mg by mouth every day.  Dose: 50 mg            STOP taking these medications      ADVIL DUAL ACTION PO     doxycycline 100 MG Tabs  Commonly known as: Vibramycin     Linzess 290 MCG Caps  Generic drug: linaCLOtide     methylPREDNISolone 4 MG Tbpk  Commonly known as: Medrol Dosepak              Allergies  Allergies   Allergen Reactions    Azithromycin Unspecified     Pt states she feels a burning and hot sensation \"I can feel it in my veins, all the way through my body down to the bottom of my feet.\"    Cymbalta [Duloxetine Hcl] Unspecified     Make blood pressure go up    Lisinopril Cough     Cough      Demerol Rash     Rash at injection site, N/V.    Montelukast Anxiety     Gets nightmares - will never take it again       DIET  Orders Placed This Encounter   Procedures    Diet Order Diet: Low Fiber(GI Soft)     Standing Status:   Standing     Number of Occurrences:   1     Order Specific Question:   Diet:     Answer:   Low Fiber(GI Soft) [2]       ACTIVITY  As tolerated.  Weight bearing as " tolerated    CONSULTATIONS  General surgery     PROCEDURES  laparoscopic transverse loop colostomy.    LABORATORY  Lab Results   Component Value Date    SODIUM 137 11/29/2024    POTASSIUM 3.3 (L) 11/29/2024    CHLORIDE 100 11/29/2024    CO2 30 11/29/2024    GLUCOSE 147 (H) 11/29/2024    BUN 7 (L) 11/29/2024    CREATININE 0.65 11/29/2024    CREATININE 0.86 07/19/2011        Lab Results   Component Value Date    WBC 9.2 11/29/2024    WBC 6.8 07/19/2011    HEMOGLOBIN 12.0 11/29/2024    HEMATOCRIT 35.8 (L) 11/29/2024    PLATELETCT 273 11/29/2024        Total time of the discharge process exceeds 35 minutes.

## 2024-11-29 NOTE — WOUND TEAM
[facility]  Inpatient Services  New Ostomy Follow-up Management & Teaching      Ostomy Nurse Plan of Care - Frequency of Follow-up:   Ostomy nurses to continue to follow for ostomy needs and teaching until patient independent with care or discharge.  Ostomy Nurse follow-up frequency:  Every other day       Past Surgical History:   Procedure Laterality Date    NY LAP,DIAGNOSTIC ABDOMEN N/A 11/24/2024    Procedure: laparoscopy;  Surgeon: George Hou D.O.;  Location: SURGERY [facility];  Service: General    NY COLOSTOMY Left 11/24/2024    Procedure: CREATION, COLOSTOMY, -transverse loop colostomy;  Surgeon: George Hou D.O.;  Location: SURGERY [facility];  Service: General    NY SIGMOIDOSCOPY,DIAGNOSTIC  11/21/2024    Procedure: SIGMOIDOSCOPY, FLEXIBLE;  Surgeon: Cecelia Adam M.D.;  Location: [facility];  Service: Gastroenterology    PB TOTAL KNEE ARTHROPLASTY Right 11/16/2021    Procedure: ARTHROPLASTY, KNEE, TOTAL [place] cementless;  Surgeon: Sonido Amado M.D.;  Location: SURGERY [facility];  Service: Orthopedics    PB TOTAL KNEE ARTHROPLASTY Left 5/18/2021    Procedure: ARTHROPLASTY, KNEE, TOTAL.;  Surgeon: Sonido Amado M.D.;  Location: SURGERY [facility];  Service: Orthopedics    BREAST BIOPSY  2009    LAMINOTOMY  1982    2 Lumbar Discs    ABDOMINAL HYSTERECTOMY TOTAL  1982    Cervical precancer and bleeding.  No oophorectomy.    APPENDECTOMY      GYN SURGERY      hysterectomy - partial    OTHER      hemorrhoidectomy    OTHER Right     cataract extraction    OTHER Right     Macular Hole Surgery    OTHER ABDOMINAL SURGERY      appy    OTHER ORTHOPEDIC SURGERY      lami       Surgery Date: 11/24/24    Surgeon(s):  George Hou D.O.    Procedure(s):  CHOLECYSTECTOMY, LAPAROSCOPIC possible open ileostomy vs colostomy     Permanence: To be determined    Pertinent History:  She presented to the emergency room 11/19/2024 for evaluation of epigastric pain and  "distention. CT imaging was concerning for diffusely dilated colon with some bowel wall thickening. Differential diagnosis includes ileus as well as distal colonic obstruction.                        Colostomy 11/24/24 LUQ (Active)   Wound Image   11/29/24 1100   Stomal Appliance Assessment Changed 11/29/24 1100   Stoma Assessment Beefy red 11/29/24 1100   Stoma Shape Oval;Retracted 11/29/24 1100   Stoma Size (in) 1.75 11/29/24 1100   Peristomal Assessment Red;Painful 11/29/24 1100   Mucocutaneous Junction  11/29/24 1100   Treatment Appliance Changed 11/29/24 1100   Peristomal Protectant No Sting Skin Prep;Paste Ring 11/29/24 1100   Stomal Appliance 2 3/4\" (70mm) CTF;Paste Ring, 2\" 11/29/24 1100   Output (mL) 300 mL 11/29/24 0809   Output Color Brown 11/29/24 1100   WOUND RN ONLY - Stomal Appliance  2 Piece;Paste Ring, 2\";Soft Convex;2 3/4\" (70mm) CTF;Belt, Large 11/29/24 1100   Appliance (Pouch) # [id number] 11/25/24 1300   Appliance Brand [place] 11/29/24 1100   Secure Start completed No 11/25/24 1300   WOUND NURSE ONLY - Time Spent with Patient (mins) 70 11/29/24 1100                                                       Colostomy Interventions:  Removed appliance (using push pull method) - By patient   Cleansed jose-stomal skin with moist warm washcloth - By patient   Created/Checked template fit - By Ostomy RN with patient assistance    Traced Shape to back of barrier - By Ostomy RN  Cut barrier to stoma size - By patient   Confirmed fit - By Ostomy RN with patient assistance    Removed plastic backing and \"Dog Eared\" paper edges - By patient   Stretched paste ring to fit barrier opening - By patient   Applied paste ring to back of barrier - By patient   Applied barrier to skin and adhered with friction - By patient   Attached pouch - By patient   Closed Pouch end - By patient     Patient Education:   All questions answered, procedure explained, previous education reinforced    Ostomy RN to follow-up daily " "for education     Date:  11/29/24    Reinforced education provided during last visit  Date:  11/27/24    Reinforced education provided during last visit  Educated regarding the following things: stoma size/shape. Stoma will likely change sizes in the first 4 to 6 weeks. Currently using the most basic supplies while in the hospital, there are many brands and options in regards to supplies.  Educated on when to empty and how to empty, burping appliance, Wear time, Diet (chewing food well) and hydration, Medications, activity including showering and swimming. Pt aware that they should keep an extra set of appliances with them at all times (do not leave in warm cars).  Patient practiced emptying pouch with bedside RN/CNA  Date:  11/25/24  Folder/paperwork delivered  Folder/Paperwork discussed in detail (Follow up, supply ordering and support groups discussed as well as accessories that may be beneficial.)  Educated regarding the following things: stoma size/shape. Stoma will likely change sizes in the first 4 to 6 weeks. Currently using the most basic supplies while in the hospital, there are many brands and options in regards to supplies.  Educated on when to empty and how to empty, burping appliance, Wear time, Diet (chewing food well) and hydration, Medications, activity including showering and swimming. Pt aware that they should keep an extra set of appliances with them at all times (do not leave in warm cars).      Needs for next visit: None    Evaluation:      Date:  11/29/24    Stoma retracted, peristomal skin red and painful- Cavilon Advanced applied to assist with healing peristomal skin integrity.  Patient stated appliance on Wed leaked shortly after, but the appliance nursing placed on after remained intact.  Patient able to perform entire appliance change with minimal verbal assistance from this RN, utilized the 2 3/4\" convex barrier (item number 80440) with a flat paste ring as the convex barrier with the " convex ring did not work for patient.  Additionally introduced size L ostomy belt to assist with appliance adhesion.  Patient discharging today, will have [facility]. Organized supplies for discharge and went over supplies with patient.    Date:  11/27/24    Stoma is below the skin a little. Continued with the convex oval barrier, large amount of stool documented less watery than previous assessment.  Patient is eating more solid foods.    Date:  11/25/24    Stoma is flush to the skin & in a bowl, the stoma is pink dull in color.  Large amount of liquid stool still coming from stoma from patient being blocked up for a while.  Patient expressed that she had to empty the bag 4 times during the night because it was filling up every couple of hours.  Patient had a lot of gas and liquid brown stool.  Stoma sits in a bowl, convex barrier #46998 used with paste ring to help pop the stoma out a bit.  May need to add an oval convex ring to help with the deep bowl.  Patient very responsive to education and booklet when going over the material.  Left booklet at bedside for patient to look over after we went over the pertinent portions.  Supplies left at bedside.          Flatus: Present  Stool Output: small, brown, and soft  Urine Output: NA, Fecal Ostomy  Mobility: Ambulating at Baseline    DIET ORDERS (From admission to next 24h)       Start     Ordered    11/27/24 0826  Diet Order Diet: Low Fiber(GI Soft)  ALL MEALS        Question:  Diet:  Answer:  Low Fiber(GI Soft)    11/27/24 0825                     Secure Start Signed:  No  Outpatient Referral Placed:  Yes         5 Sets of appliances in Ostomy bag for discharge:  Yes    INSURANCE OPTIONS:  If patient has [facility] plus patient will need an [employer] book. If patientt has Medicaid be sure patient has care chest paperwork.    Currently Active Insurance       Payor Plan    MEDICARE MEDICARE PART A & B    AARP AARP            Anticipated Discharge  Plans:  Self/Family Care, Home Health Care, and Outpatient Wound Center    Ostomy Supplies for DC:  To be determined in 4 to 6 weeks once stomal edema has fully resolved

## 2024-11-29 NOTE — PROGRESS NOTES
Telemetry Shift Summary    Rhythm SR/ST  HR Range   Ectopy : rPVC/PAC  Measurements : 0.18/0.08/0.38    Normal Values  Rhythm SR  HR Range:   Measurements: 0.12-0.20/0.06-0.10/0.30-0.52

## 2024-11-29 NOTE — PROGRESS NOTES
Patient discharged home. IV removed and tele box removed and returned to monitor tech. Patient states all questions and concerns have been addressed appropriately. Verbalized understanding of discharge instructions and to follow up with PCP and General Surgery. Meds to beds delivered at bedside. All ostomy education booklets and supplies provided. Patient verbalizes understanding of teachings and is aware that Home Health will also being involved. Patient verbalized that she has 2 walkers at home.    Patient awaiting transport.     At 1430: Patient off unit with transport

## 2024-12-02 ENCOUNTER — HOME CARE VISIT (OUTPATIENT)
Dept: HOME HEALTH SERVICES | Facility: HOME HEALTHCARE | Age: 74
End: 2024-12-02
Payer: MEDICARE

## 2024-12-02 VITALS
OXYGEN SATURATION: 94 % | RESPIRATION RATE: 18 BRPM | SYSTOLIC BLOOD PRESSURE: 128 MMHG | TEMPERATURE: 97.6 F | HEART RATE: 80 BPM | BODY MASS INDEX: 43.32 KG/M2 | DIASTOLIC BLOOD PRESSURE: 64 MMHG | HEIGHT: 65 IN | WEIGHT: 260 LBS

## 2024-12-02 PROCEDURE — 665001 SOC-HOME HEALTH

## 2024-12-02 PROCEDURE — 665005 NO-PAY RAP - HOME HEALTH

## 2024-12-02 PROCEDURE — 665999 HH PPS REVENUE DEBIT

## 2024-12-02 PROCEDURE — G0299 HHS/HOSPICE OF RN EA 15 MIN: HCPCS

## 2024-12-02 PROCEDURE — 665998 HH PPS REVENUE CREDIT

## 2024-12-02 SDOH — ECONOMIC STABILITY: HOUSING INSECURITY
HOME SAFETY: NO FIRE EXTINGUISHES, PATIENT STATES SHE WILL HAVE HUSBAND BUY A FIRE EXTINGUISHER  LIVING AREA CLUTTERED/DIRTY, INSTRUCTED PATIENT TO HAVE HUSBAND MOVE BOXES FROM HALLWAY TO LIVING ROOM TO AID IN PREVENTION OF FALLS, VERBALIZED UNDERSTANDING

## 2024-12-02 SDOH — ECONOMIC STABILITY: INCOME INSECURITY: IN THE LAST 12 MONTHS, WAS THERE A TIME WHEN YOU WERE NOT ABLE TO PAY THE MORTGAGE OR RENT ON TIME?: NO

## 2024-12-02 SDOH — ECONOMIC STABILITY: FOOD INSECURITY: WITHIN THE PAST 12 MONTHS, THE FOOD YOU BOUGHT JUST DIDN'T LAST AND YOU DIDN'T HAVE MONEY TO GET MORE.: NEVER TRUE

## 2024-12-02 SDOH — ECONOMIC STABILITY: FOOD INSECURITY: WITHIN THE PAST 12 MONTHS, YOU WORRIED THAT YOUR FOOD WOULD RUN OUT BEFORE YOU GOT MONEY TO BUY MORE.: NEVER TRUE

## 2024-12-02 SDOH — HEALTH STABILITY: PHYSICAL HEALTH

## 2024-12-02 SDOH — ECONOMIC STABILITY: INCOME INSECURITY: HOW HARD IS IT FOR YOU TO PAY FOR THE VERY BASICS LIKE FOOD, HOUSING, MEDICAL CARE, AND HEATING?: NOT VERY HARD

## 2024-12-02 SDOH — HEALTH STABILITY: MENTAL HEALTH
STRESS IS WHEN SOMEONE FEELS TENSE, NERVOUS, ANXIOUS, OR CAN'T SLEEP AT NIGHT BECAUSE THEIR MIND IS TROUBLED. HOW STRESSED ARE YOU?: ONLY A LITTLE

## 2024-12-02 SDOH — ECONOMIC STABILITY: HOUSING INSECURITY
IN THE LAST 12 MONTHS, WAS THERE A TIME WHEN YOU DID NOT HAVE A STEADY PLACE TO SLEEP OR SLEPT IN A SHELTER (INCLUDING NOW)?: NO

## 2024-12-02 SDOH — ECONOMIC STABILITY: TRANSPORTATION INSECURITY
IN THE PAST 12 MONTHS, HAS LACK OF TRANSPORTATION KEPT YOU FROM MEETINGS, WORK, OR FROM GETTING THINGS NEEDED FOR DAILY LIVING?: NO

## 2024-12-02 ASSESSMENT — SOCIAL DETERMINANTS OF HEALTH (SDOH)
HOW OFTEN DO YOU HAVE SIX OR MORE DRINKS ON ONE OCCASION: NEVER
HOW OFTEN DO YOU ATTEND CHURCH OR RELIGIOUS SERVICES?: NEVER
HOW OFTEN DO YOU GET TOGETHER WITH FRIENDS OR RELATIVES?: ONCE A WEEK
IN THE PAST 12 MONTHS, HAS THE ELECTRIC, GAS, OIL, OR WATER COMPANY THREATENED TO SHUT OFF SERVICE IN YOUR HOME?: NO
IN A TYPICAL WEEK, HOW MANY TIMES DO YOU TALK ON THE PHONE WITH FAMILY, FRIENDS, OR NEIGHBORS?: MORE THAN THREE TIMES A WEEK
DO YOU BELONG TO ANY CLUBS OR ORGANIZATIONS SUCH AS CHURCH GROUPS UNIONS, FRATERNAL OR ATHLETIC GROUPS, OR SCHOOL GROUPS?: YES
HOW MANY DRINKS CONTAINING ALCOHOL DO YOU HAVE ON A TYPICAL DAY WHEN YOU ARE DRINKING: PATIENT DOES NOT DRINK
DO YOU BELONG TO ANY CLUBS OR ORGANIZATIONS SUCH AS CHURCH GROUPS UNIONS, FRATERNAL OR ATHLETIC GROUPS, OR SCHOOL GROUPS?: YES
HOW OFTEN DO YOU HAVE A DRINK CONTAINING ALCOHOL: NEVER
HOW HARD IS IT FOR YOU TO PAY FOR THE VERY BASICS LIKE FOOD, HOUSING, MEDICAL CARE, AND HEATING?: NOT VERY HARD
HOW OFTEN DO YOU ATTENT MEETINGS OF THE CLUB OR ORGANIZATION YOU BELONG TO?: MORE THAN 4 TIMES PER YEAR
IN A TYPICAL WEEK, HOW MANY TIMES DO YOU TALK ON THE PHONE WITH FAMILY, FRIENDS, OR NEIGHBORS?: MORE THAN THREE TIMES A WEEK
WITHIN THE PAST 12 MONTHS, YOU WORRIED THAT YOUR FOOD WOULD RUN OUT BEFORE YOU GOT THE MONEY TO BUY MORE: NEVER TRUE
HOW OFTEN DO YOU ATTENT MEETINGS OF THE CLUB OR ORGANIZATION YOU BELONG TO?: MORE THAN 4 TIMES PER YEAR
HOW OFTEN DO YOU ATTEND CHURCH OR RELIGIOUS SERVICES?: NEVER
HOW OFTEN DO YOU GET TOGETHER WITH FRIENDS OR RELATIVES?: ONCE A WEEK

## 2024-12-02 ASSESSMENT — LIFESTYLE VARIABLES
AUDIT-C TOTAL SCORE: 0
HOW MANY STANDARD DRINKS CONTAINING ALCOHOL DO YOU HAVE ON A TYPICAL DAY: PATIENT DOES NOT DRINK
SKIP TO QUESTIONS 9-10: 1
HOW OFTEN DO YOU HAVE A DRINK CONTAINING ALCOHOL: NEVER
HOW OFTEN DO YOU HAVE SIX OR MORE DRINKS ON ONE OCCASION: NEVER

## 2024-12-02 ASSESSMENT — FIBROSIS 4 INDEX: FIB4 SCORE: 1.45

## 2024-12-02 ASSESSMENT — ACTIVITIES OF DAILY LIVING (ADL)
ORAL_CARE_ASSESSED: 1
USING THE TELPHONE: INDEPENDENT
TRANSPORTATION COMMENTS: AMBULATES WITH WALKER
ORAL_CARE_CURRENT_FUNCTION: INDEPENDENT
TELEPHONE USE ASSESSED: 1

## 2024-12-02 ASSESSMENT — ENCOUNTER SYMPTOMS
STOOL FREQUENCY: DAILY
LAST BOWEL MOVEMENT: 67176
ABDOMINAL PAIN: 1
PAIN LOCATION - PAIN FREQUENCY: FREQUENT
SHORTNESS OF BREATH: 1
DYSPNEA ACTIVITY LEVEL: AFTER AMBULATING MORE THAN 20 FT
HIGHEST PAIN SEVERITY IN PAST 24 HOURS: 3/10
SUBJECTIVE PAIN PROGRESSION: GRADUALLY IMPROVING
LOWEST PAIN SEVERITY IN PAST 24 HOURS: 2/10
PAIN LOCATION - RELIEVING FACTORS: TYLENOL
FATIGUES EASILY: 1
VOMITING: DENIES
LOWER EXTREMITY EDEMA: 1
NAUSEA: DENIES
PAIN: 1
PAIN LOCATION - PAIN SEVERITY: 2/10
PAIN SEVERITY GOAL: 0/10

## 2024-12-02 NOTE — Clinical Note
----- Message -----  From: Khadijah Holloway R.N.  Sent: 12/2/2024   4:19 PM PST  To: Asuncion Lynn R.N.; Haley Grier R.N.; *      SOC [id number]  DX-A-FIB, DM, HTN, 11/24/24-S/P Laparoscopic transverse loop colostomy  Disciplines -SN, PT, OT  Cert period 12/2/24-1/30/25  Comments-stoma beefy red, oval, retracted, red, painful, appliance changed, no sting skin prep, paste ring, 70mm- 2 3/4, soft convex     house cluttered, dirty, advised to move clutter away from hallway to prevent falls, states she will have  move clutter/boxes from hallway to living room

## 2024-12-02 NOTE — Clinical Note
SOC [id number]  DX-A-FIB, DM, HTN, 11/24/24-S/P Laparoscopic transverse loop colostomy  Disciplines -SN, PT, OT  Cert period 12/2/24-1/30/25  Comments-stoma beefy red, oval, retracted, red, painful, appliance changed, no sting skin prep, paste ring, 70mm- 2 3/4, soft convex     house cluttered, dirty, advised to move clutter away from hallway to prevent falls, states she will have  move clutter/boxes from hallway to living room

## 2024-12-03 ENCOUNTER — HOSPITAL ENCOUNTER (OUTPATIENT)
Dept: LAB | Facility: MEDICAL CENTER | Age: 74
End: 2024-12-03
Attending: PHYSICIAN ASSISTANT
Payer: MEDICARE

## 2024-12-03 ENCOUNTER — OFFICE VISIT (OUTPATIENT)
Dept: MEDICAL GROUP | Age: 74
End: 2024-12-03
Payer: MEDICARE

## 2024-12-03 ENCOUNTER — DOCUMENTATION (OUTPATIENT)
Dept: CARDIOLOGY | Facility: MEDICAL CENTER | Age: 74
End: 2024-12-03
Payer: MEDICARE

## 2024-12-03 VITALS
DIASTOLIC BLOOD PRESSURE: 70 MMHG | SYSTOLIC BLOOD PRESSURE: 120 MMHG | BODY MASS INDEX: 45.32 KG/M2 | HEIGHT: 65 IN | TEMPERATURE: 97 F | WEIGHT: 272 LBS | HEART RATE: 80 BPM | OXYGEN SATURATION: 94 %

## 2024-12-03 DIAGNOSIS — E87.6 HYPOKALEMIA: ICD-10-CM

## 2024-12-03 DIAGNOSIS — K56.609 INTESTINAL OBSTRUCTION, UNSPECIFIED CAUSE, UNSPECIFIED WHETHER PARTIAL OR COMPLETE (HCC): ICD-10-CM

## 2024-12-03 DIAGNOSIS — Z93.3 COLOSTOMY IN PLACE (HCC): ICD-10-CM

## 2024-12-03 DIAGNOSIS — Z09 HOSPITAL DISCHARGE FOLLOW-UP: Primary | ICD-10-CM

## 2024-12-03 DIAGNOSIS — R60.0 LOWER EXTREMITY EDEMA: ICD-10-CM

## 2024-12-03 DIAGNOSIS — R73.01 IMPAIRED FASTING GLUCOSE: ICD-10-CM

## 2024-12-03 DIAGNOSIS — N18.31 STAGE 3A CHRONIC KIDNEY DISEASE: ICD-10-CM

## 2024-12-03 DIAGNOSIS — F33.0 MILD EPISODE OF RECURRENT MAJOR DEPRESSIVE DISORDER (HCC): ICD-10-CM

## 2024-12-03 DIAGNOSIS — I10 PRIMARY HYPERTENSION: ICD-10-CM

## 2024-12-03 DIAGNOSIS — I48.91 ATRIAL FIBRILLATION, UNSPECIFIED TYPE (HCC): ICD-10-CM

## 2024-12-03 DIAGNOSIS — E78.5 DYSLIPIDEMIA: ICD-10-CM

## 2024-12-03 DIAGNOSIS — E55.9 VITAMIN D DEFICIENCY: ICD-10-CM

## 2024-12-03 DIAGNOSIS — E66.01 MORBID OBESITY WITH BMI OF 45.0-49.9, ADULT (HCC): ICD-10-CM

## 2024-12-03 PROBLEM — E66.813 CLASS 3 SEVERE OBESITY DUE TO EXCESS CALORIES WITHOUT SERIOUS COMORBIDITY WITH BODY MASS INDEX (BMI) OF 40.0 TO 44.9 IN ADULT (HCC): Chronic | Status: RESOLVED | Noted: 2023-04-11 | Resolved: 2024-12-03

## 2024-12-03 PROBLEM — Z01.818 PREOPERATIVE EXAMINATION: Status: RESOLVED | Noted: 2024-11-19 | Resolved: 2024-12-03

## 2024-12-03 PROCEDURE — G2211 COMPLEX E/M VISIT ADD ON: HCPCS | Performed by: PHYSICIAN ASSISTANT

## 2024-12-03 PROCEDURE — 80048 BASIC METABOLIC PNL TOTAL CA: CPT

## 2024-12-03 PROCEDURE — 99214 OFFICE O/P EST MOD 30 MIN: CPT | Performed by: PHYSICIAN ASSISTANT

## 2024-12-03 PROCEDURE — 665998 HH PPS REVENUE CREDIT

## 2024-12-03 PROCEDURE — 3074F SYST BP LT 130 MM HG: CPT | Performed by: PHYSICIAN ASSISTANT

## 2024-12-03 PROCEDURE — 665999 HH PPS REVENUE DEBIT

## 2024-12-03 PROCEDURE — 3078F DIAST BP <80 MM HG: CPT | Performed by: PHYSICIAN ASSISTANT

## 2024-12-03 PROCEDURE — 36415 COLL VENOUS BLD VENIPUNCTURE: CPT

## 2024-12-03 RX ORDER — FUROSEMIDE 20 MG/1
20 TABLET ORAL DAILY
Qty: 60 TABLET | Refills: 0 | Status: SHIPPED | OUTPATIENT
Start: 2024-12-03 | End: 2024-12-20 | Stop reason: SDUPTHER

## 2024-12-03 RX ORDER — METOPROLOL SUCCINATE 50 MG/1
50 TABLET, EXTENDED RELEASE ORAL EVERY EVENING
Qty: 90 TABLET | Refills: 3 | Status: SHIPPED | OUTPATIENT
Start: 2024-12-03

## 2024-12-03 RX ORDER — POTASSIUM CHLORIDE 1500 MG/1
20 TABLET, EXTENDED RELEASE ORAL DAILY
Qty: 60 TABLET | Refills: 0 | Status: SHIPPED | OUTPATIENT
Start: 2024-12-03 | End: 2024-12-20 | Stop reason: SDUPTHER

## 2024-12-03 ASSESSMENT — FIBROSIS 4 INDEX: FIB4 SCORE: 1.45

## 2024-12-03 NOTE — PROGRESS NOTES
Medication chart review for [facility] services    Received referral from [facility].   Medications reviewed  compared with discharge summary if available.  Discharge summary date, if applicable:   11/29    Current medication list per [facility]     Medication list one, patient is currently taking    Current Outpatient Medications:     acetaminophen, 500-1,000 mg, Oral, Q6HRS PRN    Cholecalciferol (VITAMIN D3 MAXIMUM STRENGTH PO), 5,000 Capsule, Oral, Q WEDNESDAY    apixaban, 5 mg, Oral, BID    metoprolol SR, 50 mg, Oral, Q EVENING    pantoprazole, 40 mg, Oral, Q EVENING    glipiZIDE, 5 mg, Oral, DAILY    sertraline, 50 mg, Oral, DAILY    buPROPion, 300 mg, Oral, QAM    albuterol, 2 Puff, Inhalation, Q6HRS PRN    atorvastatin, 20 mg, Oral, Q EVENING    hydroCHLOROthiazide, 25 mg, Oral, DAILY      Medication list two, drugs that the patient has been prescribed or recommended to take by their healthcare provider on discharge summary          MEDICATIONS ON DISCHARGE      Medication List          START taking these medications         Instructions   apixaban 5mg Tabs  Commonly known as: Eliquis    Take 1 Tablet by mouth 2 times a day for 30 days. Indications: Thromboembolism secondary to Atrial Fibrillation  Dose: 5 mg      potassium chloride 20 MEQ Pack  Commonly known as: Klor-Con    Take 2 Packets by mouth every day for 3 days.  Dose: 40 mEq                CHANGE how you take these medications         Instructions   metoprolol SR 50 MG Tb24  What changed:   medication strength  how much to take  Commonly known as: Toprol XL    Take 1 Tablet by mouth every evening for 30 days.  Dose: 50 mg                CONTINUE taking these medications         Instructions   albuterol 108 (90 Base) MCG/ACT Aers inhalation aerosol    Inhale 2 Puffs every 6 hours as needed for Shortness of Breath.  Dose: 2 Puff      atorvastatin 20 MG Tabs  Commonly known as: Lipitor    Take 20 mg by mouth every evening.  Dose: 20 mg     "  buPROPion 300 MG XL tablet  Commonly known as: Wellbutrin XL    Take 300 mg by mouth every morning.  Dose: 300 mg      glipiZIDE 5 MG Tabs  Commonly known as: Glucotrol    Take 5 mg by mouth every day. Pt reports that she takes this medication QDAY, not BID  Dose: 5 mg      hydroCHLOROthiazide 25 MG Tabs    Take 25 mg by mouth every day.  Dose: 25 mg      ondansetron 4 MG Tbdp  Commonly known as: Zofran ODT    Take 4 mg by mouth every 6 hours as needed for Nausea/Vomiting.  Dose: 4 mg      pantoprazole 40 MG Tbec  Commonly known as: Protonix    Take 40 mg by mouth every evening.  Dose: 40 mg      sertraline 50 MG Tabs  Commonly known as: Zoloft    Take 50 mg by mouth every day.  Dose: 50 mg                STOP taking these medications       ADVIL DUAL ACTION PO      doxycycline 100 MG Tabs  Commonly known as: Vibramycin      Linzess 290 MCG Caps  Generic drug: linaCLOtide      methylPREDNISolone 4 MG Tbpk  Commonly known as: Medrol Dosepak          Allergies   Allergen Reactions    Azithromycin Unspecified     Pt states she feels a burning and hot sensation \"I can feel it in my veins, all the way through my body down to the bottom of my feet.\"    Cymbalta [Duloxetine Hcl] Unspecified     Make blood pressure go up    Lisinopril Cough     Cough      Ertugliflozin-Metformin Hcl     Demerol Rash     Rash at injection site, N/V.    Montelukast Anxiety     Gets nightmares - will never take it again       Labs     Lab Results   Component Value Date/Time    SODIUM 137 11/29/2024 04:30 AM    POTASSIUM 3.3 (L) 11/29/2024 04:30 AM    CHLORIDE 100 11/29/2024 04:30 AM    CO2 30 11/29/2024 04:30 AM    GLUCOSE 147 (H) 11/29/2024 04:30 AM    BUN 7 (L) 11/29/2024 04:30 AM    CREATININE 0.65 11/29/2024 04:30 AM    CREATININE 0.86 07/19/2011 07:42 AM    BUNCREATRAT 19 07/19/2011 07:42 AM     Lab Results   Component Value Date/Time    ALKPHOSPHAT 106 (H) 11/20/2024 02:26 AM    ASTSGOT 18 11/20/2024 02:26 AM    ALTSGPT 11 11/20/2024 " 02:26 AM    TBILIRUBIN 1.1 11/20/2024 02:26 AM    INR 1.16 (H) 11/09/2021 02:27 PM    ALBUMIN 3.4 11/20/2024 02:26 AM        Assessment for clinically significant drug interactions, drug omissions/additions, duplicative therapies.            CC   Celso Tipton M.D.  [address]  [place] [address]  Fax: [phone]    [facility]  Phone [phone] fax [phone]    This note was created using voice recognition software (Dragon). The accuracy of the dictation is limited by the abilities of the software. I have reviewed the note prior to signing, however some errors in grammar and context are still possible. If you have any questions related to this note please do not hesitate to contact our office.

## 2024-12-03 NOTE — PROGRESS NOTES
cc: [facility], hospital follow-up    Subjective:     HPI    History of Present Illness  The patient is a 73-year-old female presenting to [facility].    She had a recent hospitalization from 11/19/2023 through 11/29/2023. She initially presented to the ER for evaluation of epigastric pain and distention. CT imaging revealed a diffusely dilated colon with small bowel wall thickening. An NG tube was placed, and IV fluids were administered. Surgery was consulted on 11/24/2023, and she underwent a laparoscopic transverse loop colostomy. It appeared to be in good condition. There was also concern for possible diverticulitis, for which she was treated with Flagyl and ceftriaxone. During her hospital stay, she developed atrial fibrillation with rapid ventricular response (A. fib with RVR). She was managed with Eliquis and metoprolol for rate control due to a high CHADS-VASc score. She was started on 5 mg of Eliquis twice a day. An electrolyte imbalance was resolved before discharge. The colostomy had good output at discharge, and she received teaching and feels comfortable with it. Home health services were arranged.  She has not had a follow-up with general surgery.  Does not have referral to cardiology.    She reports feeling a little shaky but not too bad overall. She has no stomach aches and knows how to empty her colostomy bag, although she is still a little scared of changing it. She received hands-on training and is very protective of the colostomy site as it is still sore and tender.  She is happy to report that she does not have any abdominal pain any longer.  She did not have A. fib prior to her hospital admission.  During hospital admission echo was completed.  Normal ejection fraction.  She has experienced puffy feet and legs, which she has not had before, even after her knee surgeries.  She is currently taking 25 mg of hydrochlorothiazide but is not providing much benefit for the edema.  She has been  elevating her legs.  Denies redness or warmth    She last saw her primary care provider a few months ago. Her care has been continuous but sporadic in the past couple of months. She has been to [facility] due to her diverticulitis and other issues,      She is currently, Zoloft 50 mg, Wellbutrin 300 mg.  For her depression.  She has been on this medication for many years.  She is never trialed taper off.  At some point would be interested in trying to taper off some of the medications.  For now feels stable.    She is also on 5 mg of glipizide.  She is unsure if she has a diagnosis of diabetes or if she was placed on this due to sugars being elevated.  Do not see a recent A1c.      Review of systems:  See above.       Current Outpatient Medications:     apixaban (ELIQUIS) 5mg Tab, Take 1 Tablet by mouth 2 times a day for 90 days. Indications: Thromboembolism secondary to Atrial Fibrillation, Disp: 180 Tablet, Rfl: 0    furosemide (LASIX) 20 MG Tab, Take 1 Tablet by mouth every day., Disp: 60 Tablet, Rfl: 0    potassium chloride ER (K-TAB) 20 MEQ Tab CR tablet, Take 1 Tablet by mouth every day., Disp: 60 Tablet, Rfl: 0    metoprolol SR (TOPROL XL) 50 MG TABLET SR 24 HR, Take 1 Tablet by mouth every evening. Indications: High Blood Pressure, Disp: 90 Tablet, Rfl: 3    acetaminophen (TYLENOL) 500 MG Tab, Take 500-1,000 mg by mouth every 6 hours as needed for Mild Pain. Indications: Pain, Disp: , Rfl:     Cholecalciferol (VITAMIN D3 MAXIMUM STRENGTH PO), Take 5,000 Capsules by mouth every Wednesday. Indications: supplement, Disp: , Rfl:     pantoprazole (PROTONIX) 40 MG Tablet Delayed Response, Take 40 mg by mouth every evening. Indications: Heartburn, Disp: , Rfl:     glipiZIDE (GLUCOTROL) 5 MG Tab, Take 5 mg by mouth every day. Pt reports that she takes this medication QDAY, not BID  Indications: Type 2 Diabetes, Disp: , Rfl:     sertraline (ZOLOFT) 50 MG Tab, Take 50 mg by mouth every day. Indications: Major  "Depressive Disorder, Disp: , Rfl:     buPROPion (WELLBUTRIN XL) 300 MG XL tablet, Take 300 mg by mouth every morning. Indications: Depression, Disp: , Rfl:     albuterol 108 (90 Base) MCG/ACT Aero Soln inhalation aerosol, Inhale 2 Puffs every 6 hours as needed for Shortness of Breath., Disp: 8.5 g, Rfl: 0    atorvastatin (LIPITOR) 20 MG Tab, Take 20 mg by mouth every evening. Indications: High Amount of Fats in the Blood, Disp: , Rfl:     hydrochlorothiazide (HYDRODIURIL) 25 MG Tab, Take 25 mg by mouth every day. Indications: Edema, Disp: , Rfl:     Allergies, past medical history, past surgical history, family history, social history reviewed and updated    Objective:     Vitals: /70 (BP Location: Left arm, Patient Position: Sitting, BP Cuff Size: Large adult)   Pulse 80   Temp 36.1 °C (97 °F) (Temporal)   Ht 1.651 m (5' 5\")   Wt 123 kg (272 lb)   SpO2 94%   BMI 45.26 kg/m²   General: Alert, pleasant, NAD  HEENT: Normocephalic. Neck supple.  No thyromegaly or masses palpated. No cervical or supraclavicular lymphadenopathy. No carotid bruits   Heart: Regular rate and rhythm.  S1 and S2 normal.  No murmurs appreciated.  Respiratory: Normal respiratory effort.  Clear to auscultation bilaterally.  Abdomen colostomy bag:  Skin: Warm, dry, no rashes.  Extremities: 3+ pitting edema to the shins bilaterally, no redness or warmth psych:  Affect/mood is normal, judgement is good, memory is intact, grooming is appropriate.    Assessment/Plan:     Guerda was seen today for transitional care management hospital follow-up and establish care.    Diagnoses and all orders for this visit:    Hospital discharge follow-up  Discharge medications reviewed and reconciled    Colostomy in place (HCC)  Thus far is doing well with emptying colostomy bag.  Does have home health coming in changing and doing supplies.  Follow-up with gastroenterology as well as general surgery    Intestinal obstruction, unspecified cause, unspecified " whether partial or complete (HCC)  Resolved after hospital admission.  Follow-up with GI    Atrial fibrillation, unspecified type (HCC)  -New onset, discovered during hospital admission.  Rate and rhythm controlled today.  Continue 50 mg of metoprolol.  5 mg of Eliquis twice daily.  Referral placed to cardiology.  Echo completed in hospital admission  -     apixaban (ELIQUIS) 5mg Tab; Take 1 Tablet by mouth 2 times a day for 90 days. Indications: Thromboembolism secondary to Atrial Fibrillation  -     metoprolol SR (TOPROL XL) 50 MG TABLET SR 24 HR; Take 1 Tablet by mouth every evening. Indications: High Blood Pressure  -     REFERRAL TO CARDIOLOGY    Mild episode of recurrent major depressive disorder (HCC)  Stable.  Continue 300 mg of Wellbutrin in addition to 50 mg of Zoloft.  May consider tapering off at some point.  However with recent health issues we will continue on dose.  She is amicable to plan    Morbid obesity with BMI of 45.0-49.9, adult (HCC)  Reviewed lifestyle modifications    Primary hypertension  Controlled.  Continue 50 mg of metoprolol.  Holding hydrochlorothiazide while on Lasix    Stage 3a chronic kidney disease  -Monitoring.  Avoid nephrotoxins.  -     Comp Metabolic Panel; Future  -     CBC WITH DIFFERENTIAL; Future    Vitamin D deficiency  -Prior history.  Will check with the lab.  Further treatment if needed pending results  -     VITAMIN D,25 HYDROXY (DEFICIENCY); Future    Hypokalemia  -Was hypokalemic on admission.  Starting on Lasix.  Obtain labs today to ensure potassium levels still within normal limits.  -     Comp Metabolic Panel; Future  -     Basic Metabolic Panel; Future    Impaired fasting glucose  -She is unsure if she has diagnosis of diabetes.  Will check A1c.  Continue 5 mg of glipizide.  Further treatment if needed pending results  -     MICROALBUMIN CREAT RATIO URINE; Future  -     HEMOGLOBIN A1C; Future  -     Comp Metabolic Panel; Future    Dyslipidemia  -Overdue for  labs.  Will check lipid panel.  Continue 20 mg of Lipitor.  Will adjust medication if needed pending results  -     Lipid Profile; Future    Lower extremity edema  -New since hospital mission.  Echo normal ejection fraction.  Does have 3+ pitting edema to the shins bilaterally.  Start on 20 mg of Lasix daily in addition to potassium.  If after 3 days not noting any improvement of lower extremity edema increase Lasix to 20 mg twice daily.  Will check metabolic panel in 1 week.  Will follow-up in 7 to 10 days  -     furosemide (LASIX) 20 MG Tab; Take 1 Tablet by mouth every day.  -     potassium chloride ER (K-TAB) 20 MEQ Tab CR tablet; Take 1 Tablet by mouth every day.      Secondary to the complexity of this patient's illnesses and their interactions.  All problems listed were discussed during the office visit, medications were evaluated and complexities were discussed as well as plan for the future.     Return in about 1 week (around 12/10/2024) for Lower extremity edema, Lab Review.

## 2024-12-03 NOTE — CASE COMMUNICATION
noted  ----- Message -----  From: Khadijah Holloway R.N.  Sent: 12/2/2024   4:19 PM PST  To: Asuncion Lynn R.N.; Haley Grier R.N.; *      SOC [id number]  DX-A-FIB, DM, HTN, 11/24/24-S/P Laparoscopic transverse loop colostomy  Disciplines -SN, PT, OT  Cert period 12/2/24-1/30/25  Comments-stoma beefy red, oval, retracted, red, painful, appliance changed, no sting skin prep, paste ring, 70mm- 2 3/4, soft convex     house cluttered, dirty, adv ised to move clutter away from hallway to prevent falls, states she will have  move clutter/boxes from hallway to living room

## 2024-12-04 ENCOUNTER — HOME CARE VISIT (OUTPATIENT)
Dept: HOME HEALTH SERVICES | Facility: HOME HEALTHCARE | Age: 74
End: 2024-12-04
Payer: MEDICARE

## 2024-12-04 VITALS
HEART RATE: 80 BPM | RESPIRATION RATE: 16 BRPM | DIASTOLIC BLOOD PRESSURE: 64 MMHG | TEMPERATURE: 98.9 F | OXYGEN SATURATION: 93 % | SYSTOLIC BLOOD PRESSURE: 102 MMHG

## 2024-12-04 LAB
ANION GAP SERPL CALC-SCNC: 12 MMOL/L (ref 7–16)
BUN SERPL-MCNC: 12 MG/DL (ref 8–22)
CALCIUM SERPL-MCNC: 8.5 MG/DL (ref 8.5–10.5)
CHLORIDE SERPL-SCNC: 99 MMOL/L (ref 96–112)
CO2 SERPL-SCNC: 26 MMOL/L (ref 20–33)
CREAT SERPL-MCNC: 0.85 MG/DL (ref 0.5–1.4)
GFR SERPLBLD CREATININE-BSD FMLA CKD-EPI: 72 ML/MIN/1.73 M 2
GLUCOSE SERPL-MCNC: 90 MG/DL (ref 65–99)
POTASSIUM SERPL-SCNC: 4.2 MMOL/L (ref 3.6–5.5)
SODIUM SERPL-SCNC: 137 MMOL/L (ref 135–145)

## 2024-12-04 PROCEDURE — G0151 HHCP-SERV OF PT,EA 15 MIN: HCPCS

## 2024-12-04 PROCEDURE — 665999 HH PPS REVENUE DEBIT

## 2024-12-04 PROCEDURE — 665998 HH PPS REVENUE CREDIT

## 2024-12-04 ASSESSMENT — ACTIVITIES OF DAILY LIVING (ADL)
AMBULATION ASSISTANCE ON FLAT SURFACES: 1
AMBULATION_DISTANCE/DURATION_TOLERATED: 40 FEET

## 2024-12-04 ASSESSMENT — ENCOUNTER SYMPTOMS
PAIN LOCATION - RELIEVING FACTORS: REST
PAIN SEVERITY GOAL: 0/10
PAIN LOCATION: ABDOMEN
PAIN: PAIN DOES NOT LIMIT
HIGHEST PAIN SEVERITY IN PAST 24 HOURS: 1/10
POOR JUDGMENT: 1
PAIN LOCATION - PAIN DURATION: DAILY
SUBJECTIVE PAIN PROGRESSION: RAPIDLY IMPROVING
PAIN LOCATION - PAIN SEVERITY: 1/10
PAIN LOCATION - PAIN FREQUENCY: WITH ACTIVITY
PERSON REPORTING PAIN: PATIENT
PAIN LOCATION - EXACERBATING FACTORS: MOVEMENT
LOWEST PAIN SEVERITY IN PAST 24 HOURS: 0/10
PAIN LOCATION - PAIN QUALITY: TENDER
PAIN: 1

## 2024-12-04 NOTE — Clinical Note
Physical therapy evaluation performed 12//2024 and I will follow 1 time a week for 4 weeks per patient request.  Further insurance authorization may be required.  Thank you

## 2024-12-05 ENCOUNTER — HOME CARE VISIT (OUTPATIENT)
Dept: HOME HEALTH SERVICES | Facility: HOME HEALTHCARE | Age: 74
End: 2024-12-05
Payer: MEDICARE

## 2024-12-05 VITALS
SYSTOLIC BLOOD PRESSURE: 100 MMHG | TEMPERATURE: 98.9 F | HEART RATE: 77 BPM | OXYGEN SATURATION: 94 % | DIASTOLIC BLOOD PRESSURE: 56 MMHG | RESPIRATION RATE: 18 BRPM

## 2024-12-05 VITALS
SYSTOLIC BLOOD PRESSURE: 100 MMHG | OXYGEN SATURATION: 94 % | HEART RATE: 76 BPM | DIASTOLIC BLOOD PRESSURE: 56 MMHG | RESPIRATION RATE: 18 BRPM | TEMPERATURE: 98.9 F

## 2024-12-05 PROCEDURE — G0299 HHS/HOSPICE OF RN EA 15 MIN: HCPCS

## 2024-12-05 PROCEDURE — G0152 HHCP-SERV OF OT,EA 15 MIN: HCPCS

## 2024-12-05 PROCEDURE — 665999 HH PPS REVENUE DEBIT

## 2024-12-05 PROCEDURE — 665998 HH PPS REVENUE CREDIT

## 2024-12-05 PROCEDURE — A4419 OST PCH FOR BAR W FLANGE/FLT: HCPCS

## 2024-12-05 ASSESSMENT — ENCOUNTER SYMPTOMS
NAUSEA: NO
MUSCLE WEAKNESS: 1
LAST BOWEL MOVEMENT: 67179
STOOL FREQUENCY: MORE THAN TWICE DAILY
DENIES PAIN: 1
VOMITING: NO
DENIES PAIN: 1

## 2024-12-05 ASSESSMENT — ACTIVITIES OF DAILY LIVING (ADL)
BATHING_WITHIN_DEFINED_LIMITS: 1
GROOMING_CURRENT_FUNCTION: INDEPENDENT
DRESSING_UB_CURRENT_FUNCTION: INDEPENDENT
TRANSPORTATION ASSESSED: 1
CURRENT_FUNCTION: CONTACT GUARD ASSIST
GROOMING_WITHIN_DEFINED_LIMITS: 1
TELEPHONE USE ASSESSED: 1
PHYSICAL TRANSFERS ASSESSED: 1
BATHING_CURRENT_FUNCTION: SUPERVISION
GROOMING ASSESSED: 1
DRESSING_LB_CURRENT_FUNCTION: MINIMUM ASSIST
BATHING ASSESSED: 1
SHOPPING ASSESSED: 1
AMBULATION ASSISTANCE: INDEPENDENT
TOILETING: INDEPENDENT
PREPARING MEALS: INDEPENDENT
FEEDING ASSESSED: 1
FEEDING_WITHIN_DEFINED_LIMITS: 1
TOILETING: 1
HOUSEKEEPING ASSESSED: 1
TRANSPORTATION: INDEPENDENT
ORAL_CARE_ASSESSED: 1
LIGHT HOUSEKEEPING: NEEDS ASSISTANCE
FEEDING: INDEPENDENT
USING THE TELPHONE: INDEPENDENT
ORAL_CARE_CURRENT_FUNCTION: INDEPENDENT
SHOPPING: DEPENDENT
AMBULATION ASSISTANCE: 1

## 2024-12-05 NOTE — HOME HEALTH
PHYSICAL THERAPY EVALUATION AND PLAN OF CARE     ·       Patient: Guerda Lunsford     ·       Home Health Admission due to: Recent hospitalization 11/19 through 11/29/2024 for bowel obstruction with status post colostomy who presents with decreased functional mobility, decreased activity tolerance, decreased balance, decreased functional lower extremity strength, very cluttered, somewhat dirty house with narrow walkways and increased risk for falls      ·       Living Situation/PLOF: Patient lives in a single-story home with 2 steps plus threshold to enter front door without a railing along with her spouse and 2 small dogs.  Patient's spouse is her primary caregiver but he does work part-time.  Patient has a daughter that lives nearby but is not typically assisting patient.  Patient has 4WW, SPC, shower stool, grab bars at shower/1 toilet, hand-held shower.  Patient reports that prior to hospitalization she was independent in functional mobility did not use a device in her home and used an SPC in community for limited distances.  She also states she was independent in all ADL's goals.     ·       Past Medical History: Obesity, DM 2, depression, HTN, CKD stage III, sinus tachycardia, diverticulitis, A-fib, colitis, colovaginal fistula      ·       Skilled Therapeutic Need: Decreased activity tolerance, LE weakness, Balance control, Generalized deconditioning, Gait training, Transfer training, Caregiver training, Fall prevention, Knowledge of condition and Poor safety awareness     Recommend skilled HHPT to address deficits and improve function-report sent to MD     ·       Frequency:   1 time a week for 4 weeks per patient request,  Effective 12/4/2024    Does the patient get SOB with  exertion?  No shortness of breath noted and physical therapy evaluation    How often (if at all) does pain interfere with patient's movements?  Patient reports pain does not limit function or sleep

## 2024-12-05 NOTE — Clinical Note
"noted  ----- Message -----  From: Radha Mott R.N.  Sent: 12/5/2024  11:14 PM PST  To: Cynthia Smith, PT; Asuncion Lynn R.N.; *      FYI  First WOCN visit since coming home 11/29. When SN arrived, pt sitting in recliner with colostomy appliance leaking from under skin barrier at distal end. Appliance had been on 3 days. Had pt remove appliance with Medical Adhesive Remover spray, push/pull method. Thick liquid medium brown effluent. Stoma recessed. Complete mucocutaneous separation. Undermined, especially in direction of 3 o'clock. Firm induration felt over 3 o'clock and very painful to pt with any pressure over the area. Skin opening 2.5 x 4.5 cm. Peristomal skin with slight rash, looks like irritation and not candida. Will need to watch. Immediate peristomal skin margin red. Crusted with stoma powder and Cavilon no-sting barrier. Was able to speak with Dr Hou's MA who was very helpful. She was able to forward photo I sent to her to Dr Hou. I was then able  to speak with Dr Hou. Surgery had not gone as planned due to distension of bowel. Dr Hou knew pt wound need revision and hopefully it can be done over the weekend (today is Thursday). Plan is to get her admitted, hopefully to [facility], and surgery scheduled. Open phone conversation, so pt heard plan. Colostomy site cleansed. Adapt paste ring placed directly on skin around skin opening. Placed [place] 2-pc convex skin barrier with 2 3/4\" flange over paste ring. Pouch attached. Barrier strips and belt for extra security. Pt comfortable. New skin barrier cut. All supplies lined up on her bathroom counter should an emergency appliance need to be placed. All supplies organized. Will plan for Saturday visit for appliance change if pt has not been admitted. Pt calm and appreciative. Verbalizes understanding of plan.   Thank you, Radha Mott, RN, MSN, WOCN  "

## 2024-12-05 NOTE — CASE COMMUNICATION
noted  ----- Message -----  From: Cynthia Smith, PT  Sent: 12/4/2024   4:29 PM PST  To: Asuncion Lynn R.N.; Carolyn Varela, OT; *      Physical therapy evaluation performed 12//2024 and I will follow 1 time a week for 4 weeks per patient request.  Further insurance authorization may be required.  Thank you

## 2024-12-05 NOTE — Clinical Note
"FYI  First WOCN visit since coming home 11/29. When SN arrived, pt sitting in recliner with colostomy appliance leaking from under skin barrier at distal end. Appliance had been on 3 days. Had pt remove appliance with Medical Adhesive Remover spray, push/pull method. Thick liquid medium brown effluent. Stoma recessed. Complete mucocutaneous separation. Undermined, especially in direction of 3 o'clock. Firm induration felt over 3 o'clock and very painful to pt with any pressure over the area. Skin opening 2.5 x 4.5 cm. Peristomal skin with slight rash, looks like irritation and not candida. Will need to watch. Immediate peristomal skin margin red. Crusted with stoma powder and Cavilon no-sting barrier. Was able to speak with Dr Hou's MA who was very helpful. She was able to forward photo I sent to her to Dr Hou. I was then able  to speak with Dr Hou. Surgery had not gone as planned due to distension of bowel. Dr Hou knew pt wound need revision and hopefully it can be done over the weekend (today is Thursday). Plan is to get her admitted, hopefully to [facility], and surgery scheduled. Open phone conversation, so pt heard plan. Colostomy site cleansed. Adapt paste ring placed directly on skin around skin opening. Placed [place] 2-pc convex skin barrier with 2 3/4\" flange over paste ring. Pouch attached. Barrier strips and belt for extra security. Pt comfortable. New skin barrier cut. All supplies lined up on her bathroom counter should an emergency appliance need to be placed. All supplies organized. Will plan for Saturday visit for appliance change if pt has not been admitted. Pt calm and appreciative. Verbalizes understanding of plan.   Thank you, Radha Mott, RN, MSN, WOCN  "

## 2024-12-06 ENCOUNTER — APPOINTMENT (OUTPATIENT)
Dept: RADIOLOGY | Facility: MEDICAL CENTER | Age: 74
DRG: 330 | End: 2024-12-06
Attending: EMERGENCY MEDICINE
Payer: MEDICARE

## 2024-12-06 ENCOUNTER — HOSPITAL ENCOUNTER (INPATIENT)
Facility: MEDICAL CENTER | Age: 74
LOS: 4 days | DRG: 330 | End: 2024-12-10
Attending: EMERGENCY MEDICINE | Admitting: HOSPITALIST
Payer: MEDICARE

## 2024-12-06 DIAGNOSIS — R10.84 GENERALIZED ABDOMINAL PAIN: ICD-10-CM

## 2024-12-06 DIAGNOSIS — Z93.3 COLOSTOMY IN PLACE (HCC): ICD-10-CM

## 2024-12-06 DIAGNOSIS — K94.03 COLOSTOMY DYSFUNCTION (HCC): ICD-10-CM

## 2024-12-06 DIAGNOSIS — K91.89 OTHER POSTOPERATIVE COMPLICATION INVOLVING DIGESTIVE SYSTEM: ICD-10-CM

## 2024-12-06 DIAGNOSIS — K52.9 COLITIS: ICD-10-CM

## 2024-12-06 PROBLEM — E87.20 LACTIC ACIDOSIS: Status: ACTIVE | Noted: 2024-12-06

## 2024-12-06 PROBLEM — E11.9 TYPE 2 DIABETES MELLITUS WITHOUT COMPLICATION, WITHOUT LONG-TERM CURRENT USE OF INSULIN (HCC): Status: ACTIVE | Noted: 2024-12-06

## 2024-12-06 LAB
ALBUMIN SERPL BCP-MCNC: 3.4 G/DL (ref 3.2–4.9)
ALBUMIN/GLOB SERPL: 1.1 G/DL
ALP SERPL-CCNC: 109 U/L (ref 30–99)
ALT SERPL-CCNC: 19 U/L (ref 2–50)
ANION GAP SERPL CALC-SCNC: 11 MMOL/L (ref 7–16)
AST SERPL-CCNC: 16 U/L (ref 12–45)
BASOPHILS # BLD AUTO: 0.7 % (ref 0–1.8)
BASOPHILS # BLD: 0.06 K/UL (ref 0–0.12)
BILIRUB SERPL-MCNC: 0.2 MG/DL (ref 0.1–1.5)
BUN SERPL-MCNC: 16 MG/DL (ref 8–22)
CALCIUM ALBUM COR SERPL-MCNC: 9.2 MG/DL (ref 8.5–10.5)
CALCIUM SERPL-MCNC: 8.7 MG/DL (ref 8.4–10.2)
CHLORIDE SERPL-SCNC: 104 MMOL/L (ref 96–112)
CO2 SERPL-SCNC: 27 MMOL/L (ref 20–33)
CREAT SERPL-MCNC: 1.02 MG/DL (ref 0.5–1.4)
EKG IMPRESSION: NORMAL
EOSINOPHIL # BLD AUTO: 0.49 K/UL (ref 0–0.51)
EOSINOPHIL NFR BLD: 5.4 % (ref 0–6.9)
ERYTHROCYTE [DISTWIDTH] IN BLOOD BY AUTOMATED COUNT: 45.4 FL (ref 35.9–50)
GFR SERPLBLD CREATININE-BSD FMLA CKD-EPI: 58 ML/MIN/1.73 M 2
GLOBULIN SER CALC-MCNC: 3 G/DL (ref 1.9–3.5)
GLUCOSE BLD STRIP.AUTO-MCNC: 117 MG/DL (ref 65–99)
GLUCOSE SERPL-MCNC: 146 MG/DL (ref 65–99)
HCT VFR BLD AUTO: 37.1 % (ref 37–47)
HGB BLD-MCNC: 12.2 G/DL (ref 12–16)
IMM GRANULOCYTES # BLD AUTO: 0.07 K/UL (ref 0–0.11)
IMM GRANULOCYTES NFR BLD AUTO: 0.8 % (ref 0–0.9)
LACTATE SERPL-SCNC: 1.8 MMOL/L (ref 0.5–2)
LACTATE SERPL-SCNC: 2.2 MMOL/L (ref 0.5–2)
LIPASE SERPL-CCNC: 45 U/L (ref 11–82)
LYMPHOCYTES # BLD AUTO: 1.5 K/UL (ref 1–4.8)
LYMPHOCYTES NFR BLD: 16.6 % (ref 22–41)
MCH RBC QN AUTO: 28.8 PG (ref 27–33)
MCHC RBC AUTO-ENTMCNC: 32.9 G/DL (ref 32.2–35.5)
MCV RBC AUTO: 87.5 FL (ref 81.4–97.8)
MONOCYTES # BLD AUTO: 0.59 K/UL (ref 0–0.85)
MONOCYTES NFR BLD AUTO: 6.5 % (ref 0–13.4)
NEUTROPHILS # BLD AUTO: 6.33 K/UL (ref 1.82–7.42)
NEUTROPHILS NFR BLD: 70 % (ref 44–72)
NRBC # BLD AUTO: 0 K/UL
NRBC BLD-RTO: 0 /100 WBC (ref 0–0.2)
PLATELET # BLD AUTO: 368 K/UL (ref 164–446)
PMV BLD AUTO: 9.2 FL (ref 9–12.9)
POTASSIUM SERPL-SCNC: 3.9 MMOL/L (ref 3.6–5.5)
PROT SERPL-MCNC: 6.4 G/DL (ref 6–8.2)
RBC # BLD AUTO: 4.24 M/UL (ref 4.2–5.4)
SODIUM SERPL-SCNC: 142 MMOL/L (ref 135–145)
WBC # BLD AUTO: 9 K/UL (ref 4.8–10.8)

## 2024-12-06 PROCEDURE — 36415 COLL VENOUS BLD VENIPUNCTURE: CPT

## 2024-12-06 PROCEDURE — 700117 HCHG RX CONTRAST REV CODE 255: Performed by: HOSPITALIST

## 2024-12-06 PROCEDURE — 99285 EMERGENCY DEPT VISIT HI MDM: CPT

## 2024-12-06 PROCEDURE — 700105 HCHG RX REV CODE 258: Performed by: SURGERY

## 2024-12-06 PROCEDURE — 85025 COMPLETE CBC W/AUTO DIFF WBC: CPT

## 2024-12-06 PROCEDURE — 74177 CT ABD & PELVIS W/CONTRAST: CPT

## 2024-12-06 PROCEDURE — 83690 ASSAY OF LIPASE: CPT

## 2024-12-06 PROCEDURE — 94760 N-INVAS EAR/PLS OXIMETRY 1: CPT

## 2024-12-06 PROCEDURE — 665998 HH PPS REVENUE CREDIT

## 2024-12-06 PROCEDURE — 93005 ELECTROCARDIOGRAM TRACING: CPT | Mod: TC | Performed by: EMERGENCY MEDICINE

## 2024-12-06 PROCEDURE — 80053 COMPREHEN METABOLIC PANEL: CPT

## 2024-12-06 PROCEDURE — 700102 HCHG RX REV CODE 250 W/ 637 OVERRIDE(OP): Performed by: HOSPITALIST

## 2024-12-06 PROCEDURE — 83605 ASSAY OF LACTIC ACID: CPT

## 2024-12-06 PROCEDURE — 99497 ADVNCD CARE PLAN 30 MIN: CPT | Performed by: HOSPITALIST

## 2024-12-06 PROCEDURE — A9270 NON-COVERED ITEM OR SERVICE: HCPCS | Performed by: HOSPITALIST

## 2024-12-06 PROCEDURE — 700111 HCHG RX REV CODE 636 W/ 250 OVERRIDE (IP): Mod: JZ | Performed by: SURGERY

## 2024-12-06 PROCEDURE — 99223 1ST HOSP IP/OBS HIGH 75: CPT | Mod: 25,AI | Performed by: HOSPITALIST

## 2024-12-06 PROCEDURE — 82962 GLUCOSE BLOOD TEST: CPT

## 2024-12-06 PROCEDURE — 770001 HCHG ROOM/CARE - MED/SURG/GYN PRIV*

## 2024-12-06 PROCEDURE — 665999 HH PPS REVENUE DEBIT

## 2024-12-06 RX ORDER — OXYCODONE HYDROCHLORIDE 10 MG/1
10 TABLET ORAL
Status: DISCONTINUED | OUTPATIENT
Start: 2024-12-06 | End: 2024-12-10 | Stop reason: HOSPADM

## 2024-12-06 RX ORDER — SODIUM CHLORIDE, SODIUM LACTATE, POTASSIUM CHLORIDE, CALCIUM CHLORIDE 600; 310; 30; 20 MG/100ML; MG/100ML; MG/100ML; MG/100ML
INJECTION, SOLUTION INTRAVENOUS CONTINUOUS
Status: DISCONTINUED | OUTPATIENT
Start: 2024-12-06 | End: 2024-12-10 | Stop reason: HOSPADM

## 2024-12-06 RX ORDER — INSULIN LISPRO 100 [IU]/ML
1-6 INJECTION, SOLUTION INTRAVENOUS; SUBCUTANEOUS
Status: DISCONTINUED | OUTPATIENT
Start: 2024-12-06 | End: 2024-12-10 | Stop reason: HOSPADM

## 2024-12-06 RX ORDER — BUPROPION HYDROCHLORIDE 150 MG/1
300 TABLET, EXTENDED RELEASE ORAL EVERY MORNING
Status: DISCONTINUED | OUTPATIENT
Start: 2024-12-07 | End: 2024-12-10 | Stop reason: HOSPADM

## 2024-12-06 RX ORDER — ONDANSETRON 2 MG/ML
4 INJECTION INTRAMUSCULAR; INTRAVENOUS EVERY 4 HOURS PRN
Status: DISCONTINUED | OUTPATIENT
Start: 2024-12-06 | End: 2024-12-10 | Stop reason: HOSPADM

## 2024-12-06 RX ORDER — METOPROLOL SUCCINATE 25 MG/1
50 TABLET, EXTENDED RELEASE ORAL EVERY EVENING
Status: DISCONTINUED | OUTPATIENT
Start: 2024-12-06 | End: 2024-12-10 | Stop reason: HOSPADM

## 2024-12-06 RX ORDER — ACETAMINOPHEN 325 MG/1
650 TABLET ORAL EVERY 6 HOURS PRN
Status: DISCONTINUED | OUTPATIENT
Start: 2024-12-06 | End: 2024-12-10 | Stop reason: HOSPADM

## 2024-12-06 RX ORDER — OXYCODONE HYDROCHLORIDE 5 MG/1
5 TABLET ORAL
Status: DISCONTINUED | OUTPATIENT
Start: 2024-12-06 | End: 2024-12-10 | Stop reason: HOSPADM

## 2024-12-06 RX ORDER — DEXTROSE MONOHYDRATE 25 G/50ML
25 INJECTION, SOLUTION INTRAVENOUS
Status: DISCONTINUED | OUTPATIENT
Start: 2024-12-06 | End: 2024-12-10 | Stop reason: HOSPADM

## 2024-12-06 RX ORDER — ALBUTEROL SULFATE 90 UG/1
2 INHALANT RESPIRATORY (INHALATION) EVERY 6 HOURS PRN
Status: DISCONTINUED | OUTPATIENT
Start: 2024-12-06 | End: 2024-12-10 | Stop reason: HOSPADM

## 2024-12-06 RX ORDER — ONDANSETRON 4 MG/1
4 TABLET, ORALLY DISINTEGRATING ORAL EVERY 4 HOURS PRN
Status: DISCONTINUED | OUTPATIENT
Start: 2024-12-06 | End: 2024-12-10 | Stop reason: HOSPADM

## 2024-12-06 RX ORDER — FUROSEMIDE 20 MG/1
20 TABLET ORAL DAILY
Status: DISCONTINUED | OUTPATIENT
Start: 2024-12-07 | End: 2024-12-10 | Stop reason: HOSPADM

## 2024-12-06 RX ORDER — SODIUM CHLORIDE 9 MG/ML
30 INJECTION, SOLUTION INTRAVENOUS
Status: DISCONTINUED | OUTPATIENT
Start: 2024-12-06 | End: 2024-12-06

## 2024-12-06 RX ORDER — POLYETHYLENE GLYCOL 3350 17 G/17G
1 POWDER, FOR SOLUTION ORAL
Status: DISCONTINUED | OUTPATIENT
Start: 2024-12-06 | End: 2024-12-10 | Stop reason: HOSPADM

## 2024-12-06 RX ORDER — ATORVASTATIN CALCIUM 20 MG/1
20 TABLET, FILM COATED ORAL EVERY EVENING
Status: DISCONTINUED | OUTPATIENT
Start: 2024-12-06 | End: 2024-12-10 | Stop reason: HOSPADM

## 2024-12-06 RX ORDER — HYDROMORPHONE HYDROCHLORIDE 1 MG/ML
0.5 INJECTION, SOLUTION INTRAMUSCULAR; INTRAVENOUS; SUBCUTANEOUS
Status: DISCONTINUED | OUTPATIENT
Start: 2024-12-06 | End: 2024-12-10 | Stop reason: HOSPADM

## 2024-12-06 RX ADMIN — SODIUM CHLORIDE, POTASSIUM CHLORIDE, SODIUM LACTATE AND CALCIUM CHLORIDE: 600; 310; 30; 20 INJECTION, SOLUTION INTRAVENOUS at 17:45

## 2024-12-06 RX ADMIN — METOPROLOL SUCCINATE 50 MG: 25 TABLET, EXTENDED RELEASE ORAL at 19:42

## 2024-12-06 RX ADMIN — IOHEXOL 100 ML: 350 INJECTION, SOLUTION INTRAVENOUS at 17:43

## 2024-12-06 RX ADMIN — ATORVASTATIN CALCIUM 20 MG: 20 TABLET, FILM COATED ORAL at 19:42

## 2024-12-06 RX ADMIN — PIPERACILLIN AND TAZOBACTAM 4.5 G: 4; .5 INJECTION, POWDER, FOR SOLUTION INTRAVENOUS at 21:25

## 2024-12-06 RX ADMIN — OMEPRAZOLE 40 MG: 20 CAPSULE, DELAYED RELEASE ORAL at 19:42

## 2024-12-06 RX ADMIN — PIPERACILLIN AND TAZOBACTAM 4.5 G: 4; .5 INJECTION, POWDER, FOR SOLUTION INTRAVENOUS at 19:50

## 2024-12-06 ASSESSMENT — COGNITIVE AND FUNCTIONAL STATUS - GENERAL
TURNING FROM BACK TO SIDE WHILE IN FLAT BAD: A LITTLE
DAILY ACTIVITIY SCORE: 23
SUGGESTED CMS G CODE MODIFIER DAILY ACTIVITY: CI
STANDING UP FROM CHAIR USING ARMS: A LITTLE
MOBILITY SCORE: 19
WALKING IN HOSPITAL ROOM: A LITTLE
MOVING TO AND FROM BED TO CHAIR: A LITTLE
SUGGESTED CMS G CODE MODIFIER MOBILITY: CK
DRESSING REGULAR LOWER BODY CLOTHING: A LITTLE
CLIMB 3 TO 5 STEPS WITH RAILING: A LITTLE

## 2024-12-06 ASSESSMENT — LIFESTYLE VARIABLES
HOW MANY TIMES IN THE PAST YEAR HAVE YOU HAD 5 OR MORE DRINKS IN A DAY: 0
CONSUMPTION TOTAL: NEGATIVE
AVERAGE NUMBER OF DAYS PER WEEK YOU HAVE A DRINK CONTAINING ALCOHOL: 0
ALCOHOL_USE: NO
HAVE PEOPLE ANNOYED YOU BY CRITICIZING YOUR DRINKING: NO
EVER HAD A DRINK FIRST THING IN THE MORNING TO STEADY YOUR NERVES TO GET RID OF A HANGOVER: NO
EVER FELT BAD OR GUILTY ABOUT YOUR DRINKING: NO
TOTAL SCORE: 0
TOTAL SCORE: 0
HAVE YOU EVER FELT YOU SHOULD CUT DOWN ON YOUR DRINKING: NO
TOTAL SCORE: 0
ON A TYPICAL DAY WHEN YOU DRINK ALCOHOL HOW MANY DRINKS DO YOU HAVE: 0

## 2024-12-06 ASSESSMENT — PAIN DESCRIPTION - PAIN TYPE: TYPE: ACUTE PAIN

## 2024-12-06 ASSESSMENT — ENCOUNTER SYMPTOMS
EYE REDNESS: 0
CHILLS: 0
SHORTNESS OF BREATH: 0
STRIDOR: 0
COUGH: 0
VOMITING: 0
ABDOMINAL PAIN: 1
FEVER: 0
EYE DISCHARGE: 0
MYALGIAS: 0
FLANK PAIN: 0
FOCAL WEAKNESS: 0
BRUISES/BLEEDS EASILY: 0
NERVOUS/ANXIOUS: 0

## 2024-12-06 ASSESSMENT — PATIENT HEALTH QUESTIONNAIRE - PHQ9
SUM OF ALL RESPONSES TO PHQ9 QUESTIONS 1 AND 2: 0
1. LITTLE INTEREST OR PLEASURE IN DOING THINGS: NOT AT ALL
2. FEELING DOWN, DEPRESSED, IRRITABLE, OR HOPELESS: NOT AT ALL

## 2024-12-06 ASSESSMENT — FIBROSIS 4 INDEX
FIB4 SCORE: 0.73
FIB4 SCORE: 1.45

## 2024-12-06 NOTE — ED PROVIDER NOTES
"ED Provider Note    CHIEF COMPLAINT  Chief Complaint   Patient presents with    Abdominal Pain     Had colostomy placed for \"blockage\", states she is supposed to have surgery for revision tomorrow. States a \"home health nurse came out and said that it doesn't look right\". Pt. Reports this area is tender. States she was instructed to come here to ER for \"pre op check in\".        EXTERNAL RECORDS REVIEWED  History of diabetes, colitis, diverticular disease, history of appendectomy and hysterectomy, had been seen on 11/19 for epigastric pain, was diagnosed with a ileus and distal colonic obstruction underwent laparoscopic transverse loop colostomy.  Is known to Dr. Hou of [facility].    HPI/ROS  LIMITATION TO HISTORY   Select: : None  OUTSIDE HISTORIAN(S):  none    Guerda Lunsford is a 73 y.o. female who presents to the emergency room for evaluation of worsening abdominal discomfort.  Patient states that she has had prior blockage and had a colostomy from just before Thanksgiving.  She has been home and was aware that there was something wrong with his operative site he needs a revision.  She is scheduled to go for surgical revision with Dr. Hou tomorrow.  She reports that she has been having some tenderness to touch just below and lateral to the incision site which she describes as a hard circular lesion.  She has not had fevers, no chills, she continues to have good output from her ostomy site.  She is not having urinary symptoms, no chest pain or shortness of breath.    She is currently not requesting any pain medications.    PAST MEDICAL HISTORY   has a past medical history of Allergy, Anemia (1997), Anxiety, Arthritis, Asthma, Bronchitis, Cataract, Chest pain (02/01/2016), Cholesterol serum elevated, Dental disorder (10/2020), Depression, Detached retina, left, Diverticulitis, Diverticulitis (03/28/2016), Gynecological disorder (1982), Headache, Headache(784.0), Heart murmur, Hemorrhoids " (), High cholesterol, Hyperlipidemia, Hypertension, Hyponatremia (2016), Indigestion, Infectious disease, Macular hole of right eye, Pain, Pneumonia, Shortness of breath (2014), SIRS (systemic inflammatory response syndrome) (HCC) (2016), and Urinary tract infection, site not specified.    SURGICAL HISTORY   has a past surgical history that includes laminotomy (); breast biopsy (); other abdominal surgery; appendectomy; gyn surgery; abdominal hysterectomy total (); other orthopedic surgery; other; other (Right); other (Right); total knee arthroplasty (Left, 2021); total knee arthroplasty (Right, 2021); sigmoidoscopy,diagnostic (2024); lap,diagnostic abdomen (N/A, 2024); colostomy (Left, 2024); colon resection; arthroplasty; lumpectomy; and hemorrhoidectomy.    FAMILY HISTORY  Family History   Problem Relation Age of Onset    Diabetes Mother     Heart Disease Mother 60        MI then CABG    Heart Disease Father         CHF    No Known Problems Sister     Blood Disease Brother     Abdominal aortic aneurysm Brother     Alcohol/Drug Maternal Aunt     Heart Disease Maternal Grandmother 65        MI,  with it    Alcohol/Drug Maternal Grandmother     Alcohol/Drug Maternal Grandfather     Alcohol/Drug Paternal Grandmother        SOCIAL HISTORY  Social History     Tobacco Use    Smoking status: Never    Smokeless tobacco: Never   Vaping Use    Vaping status: Never Used   Substance and Sexual Activity    Alcohol use: Never    Drug use: Never    Sexual activity: Not Currently     Partners: Male     Birth control/protection: Surgical       CURRENT MEDICATIONS  Home Medications       Reviewed by Courtney Corona R.N. (Registered Nurse) on 24 at 1452  Med List Status: Not Addressed     Medication Last Dose Status   acetaminophen (TYLENOL) 500 MG Tab  Active   albuterol 108 (90 Base) MCG/ACT Aero Soln inhalation aerosol  Active   apixaban (ELIQUIS) 5mg Tab  " Active   atorvastatin (LIPITOR) 20 MG Tab  Active   buPROPion (WELLBUTRIN XL) 300 MG XL tablet  Active   Cholecalciferol (VITAMIN D3 MAXIMUM STRENGTH PO)  Active   furosemide (LASIX) 20 MG Tab  Active   glipiZIDE (GLUCOTROL) 5 MG Tab  Active   hydrochlorothiazide (HYDRODIURIL) 25 MG Tab  Active   metoprolol SR (TOPROL XL) 50 MG TABLET SR 24 HR  Active   pantoprazole (PROTONIX) 40 MG Tablet Delayed Response  Active   potassium chloride ER (K-TAB) 20 MEQ Tab CR tablet  Active   sertraline (ZOLOFT) 50 MG Tab  Active                  Audit from Redirected Encounters    **Home medications have not yet been reviewed for this encounter**       ALLERGIES  Allergies   Allergen Reactions    Azithromycin Unspecified     Pt states she feels a burning and hot sensation \"I can feel it in my veins, all the way through my body down to the bottom of my feet.\"    Cymbalta [Duloxetine Hcl] Unspecified     Make blood pressure go up    Lisinopril Cough     Cough      Ertugliflozin-Metformin Hcl     Demerol Rash     Rash at injection site, N/V.    Montelukast Anxiety     Gets nightmares - will never take it again     PHYSICAL EXAM  VITAL SIGNS: /64   Pulse 95   Temp 36.8 °C (98.3 °F) (Temporal)   Resp 18   Ht 1.651 m (5' 5\")   Wt 124 kg (272 lb 14.9 oz)   SpO2 92%   BMI 45.42 kg/m²    Genl: F sitting in chair comfortably, speaking clearly, appears in very mild distress   Head: NC/AT   ENT: Mucous membranes moist, posterior pharynx clear, uvula midline, nares patent bilaterally   Pulmonary: Lungs are clear to auscultation bilaterally  Chest: No TTP  CV:  RRR, no murmur appreciated, pulses 2+ in both upper and lower extremities,  Abdomen: soft, ostomy site appears clean dry, no active bleeding.  There is tenderness at the 3:00 to 6 o'clock position with hard circular lesion in the subcutaneous tissue.  Regional guarding to this only, remainder the abdomen is not peritoneal, no gross distention.  : no CVA or suprapubic " tenderness   Musculoskeletal: Pain free ROM of the neck. Moving upper and lower extremities in spontaneous and coordinated fashion  Neuro: A&Ox4 (person, place, time, situation), speech fluent, gait steady, no focal deficits appreciated  Skin: No rash or lesions.  No pallor or jaundice.  No cyanosis.  Warm and dry.     EKG/LABS  Results for orders placed or performed during the hospital encounter of 24   EKG (Now)   Result Value Ref Range    Report       [facility] Emergency Dept.    Test Date:  2024  Pt Name:    KARYNA BARBER             Department: [facility]  MRN:        [id number]                      [facility]  Gender:     Female                       Technician: [id number]  :        [id number]                   Requested By:KAIN JUNIOR  Order #:    [id number]                    Reading MD: Rodolfo Montoya MD    Measurements  Intervals                                Axis  Rate:       76                           P:          -19  OH:         181                          QRS:        15  QRSD:       98                           T:          59  QT:         401  QTc:        451    Interpretive Statements  Sinus rhythm, Rate of 76, normal intervals and axis, no acute ST segment  elevations or depressions.  Improved morphology to prior dated 2024    Electronically Signed On 2024 16:44:00 PST by Rodolfo Montoya MD     I have independently interpreted this EKG    Labs Reviewed   CBC WITH DIFFERENTIAL - Abnormal; Notable for the following components:       Result Value    Lymphocytes 16.60 (*)     All other components within normal limits   COMP METABOLIC PANEL - Abnormal; Notable for the following components:    Glucose 146 (*)     Alkaline Phosphatase 109 (*)     All other components within normal limits   LACTIC ACID - Abnormal; Notable for the following components:    Lactic Acid 2.2 (*)     All other components within normal limits   ESTIMATED GFR - Abnormal; Notable for  the following components:    GFR (CKD-EPI) 58 (*)     All other components within normal limits   LIPASE     RADIOLOGY/PROCEDURES   I have independently interpreted the diagnostic imaging associated with this visit and am waiting the final reading from the radiologist.   My preliminary interpretation is as follows: Chronic wall thickening with pericolonic fat stranding.  Possible colitis with no evidence of free air    Radiologist interpretation:  CT-ABDOMEN-PELVIS WITH   Final Result      1. There is circumferential colonic wall thickening and pericolonic fat stranding involving the ascending colon, transverse colon, the colon involving the colostomy, and the descending colon. There was surgical changes in the sigmoid colon with normal    configuration of the rectum. Findings compatible with colitis.   2. No free air.   3. No obstruction.   4. Polyarticular osteoarthritis.        COURSE & MEDICAL DECISION MAKING    ASSESSMENT, COURSE AND PLAN  Care Narrative: Patient presents the emergency room apparently being told that she had a surgical revision with pain and difficulty she needed to come to the emergency department prior to being admitted for surgery tomorrow.  She is afebrile, has elevated heart rate with some pain and discomfort on the inferior lateral margin of the ostomy site that does have an area that could be a possible fluid collection, unlikely to be abscess, could be herniated bowel.  This time she is n.p.o. after midnight, lab work obtained for rule out of other infectious etiology and assessment of lactate for any possible intermittent ischemic changes and CT will be obtained for further differentiation of this nodular change.    Lab work shows no leukocytosis, no concerning anemia, slight elevation in glucose with normal electrolytes and no RADHA.  Lactate slightly elevated 2.2 in the absence of fever or true tachycardia I do not believe that this is related to ischemic process that she is not having  escalating pain out of proportion.  I have spoken to the patient's surgeon Dr. Hou about the patient and he is come to the bedside and evaluated her.  He would like her admitted to the hospitalist, plans for surgical intervention tomorrow at 8 AM.    Preoperative EKG shows no acute ectopy, ischemic changes or other concerning etiology at this time    CT abdomen pelvis currently pending, when this was eventually resulted it shows changes consistent with possible colonic inflammatory changes.    Patient will be admitted to the hospitalist in guarded condition    FINAL DIAGNOSIS  1. Generalized abdominal pain    2. Other postoperative complication involving digestive system    3. Colitis       Electronically signed by: Jan Reynolds M.D., 12/6/2024 3:34 PM

## 2024-12-06 NOTE — CASE COMMUNICATION
"noted  ----- Message -----  From: Radha Mott R.N.  Sent: 12/5/2024  11:14 PM PST  To: Cynthia Smith, PT; Asuncion Lynn R.N.; *      FYI  First WOCN visit since coming home 11/29. When SN arrived, pt sitting in recliner with colostomy appliance leaking from under skin barrier at distal end. Appliance had been on 3 days. Had pt remove appliance with Medical Adhesive Remover spray, push/pull method. Thick liquid medium brown effluent . Stoma recessed. Complete mucocutaneous separation. Undermined, especially in direction of 3 o'clock. Firm induration felt over 3 o'clock and very painful to pt with any pressure over the area. Skin opening 2.5 x 4.5 cm. Peristomal skin with slight rash, looks like irritation and not candida. Will need to watch. Immediate peristomal skin margin red. Crusted with stoma powder and Cavilon no-sting barrier. Was able to speak with Dr Gerald adam's MA who was very helpful. She was able to forward photo I sent to her to Dr Hou. I was then able  to speak with Dr Hou. Surgery had not gone as planned due to distension of bowel. Dr Hou knew pt wound need revision and hopefully it can be done over the weekend (today is Thursday). Plan is to get her admitted, hopefully to [facility], and surgery scheduled. Open phone conversation, so pt heard plan. Colostomy si te cleansed. Adapt paste ring placed directly on skin around skin opening. Placed [place] 2-pc convex skin barrier with 2 3/4\" flange over paste ring. Pouch attached. Barrier strips and belt for extra security. Pt comfortable. New skin barrier cut. All supplies lined up on her bathroom counter should an emergency appliance need to be placed. All supplies organized. Will plan for Saturday visit for appliance change if pt has not been a dmitted. Pt calm and appreciative. Verbalizes understanding of plan.   Thank you, Radha Mott, RN, MSN, WOCN  "

## 2024-12-06 NOTE — CASE COMMUNICATION
noted  ----- Message -----  From: Carolyn Varela OT  Sent: 12/5/2024   4:26 PM PST  To: Asuncion Lynn R.N.; Haley Grier R.N.; *      OT completed initial evaluation 12/5/24.  OT will see client 1W3 for UE HEP, LB dressing, DME needs, transfers, and safety.

## 2024-12-06 NOTE — ED TRIAGE NOTES
"Chief Complaint   Patient presents with    Abdominal Pain     Had colostomy placed for \"blockage\", states she is supposed to have surgery for revision tomorrow. States a \"home health nurse came out and said that it doesn't look right\". Pt. Reports this area is tender. States she was instructed to come here to ER for \"pre op check in\".      Physical Exam  Pulmonary:      Effort: Pulmonary effort is normal.   Skin:     General: Skin is warm and dry.   Neurological:      Mental Status: She is alert.       /64   Pulse 95   Temp 36.8 °C (98.3 °F) (Temporal)   Resp 18   Ht 1.651 m (5' 5\")   Wt 124 kg (272 lb 14.9 oz)   SpO2 92%   BMI 45.42 kg/m²     "

## 2024-12-06 NOTE — CASE COMMUNICATION
OT completed initial evaluation 12/5/24.  OT will see client 1W3 for UE HEP, LB dressing, DME needs, transfers, and safety.

## 2024-12-07 ENCOUNTER — HOME CARE VISIT (OUTPATIENT)
Dept: HOME HEALTH SERVICES | Facility: HOME HEALTHCARE | Age: 74
End: 2024-12-07
Payer: MEDICARE

## 2024-12-07 ENCOUNTER — ANESTHESIA (OUTPATIENT)
Dept: SURGERY | Facility: MEDICAL CENTER | Age: 74
DRG: 330 | End: 2024-12-07
Payer: MEDICARE

## 2024-12-07 ENCOUNTER — ANESTHESIA EVENT (OUTPATIENT)
Dept: SURGERY | Facility: MEDICAL CENTER | Age: 74
DRG: 330 | End: 2024-12-07
Payer: MEDICARE

## 2024-12-07 PROBLEM — Z71.89 ADVANCE CARE PLANNING: Status: ACTIVE | Noted: 2024-12-07

## 2024-12-07 PROBLEM — E83.42 HYPOMAGNESEMIA: Status: ACTIVE | Noted: 2024-12-07

## 2024-12-07 LAB
ALBUMIN SERPL BCP-MCNC: 2.7 G/DL (ref 3.2–4.9)
ALBUMIN/GLOB SERPL: 1 G/DL
ALP SERPL-CCNC: 89 U/L (ref 30–99)
ALT SERPL-CCNC: 15 U/L (ref 2–50)
ANION GAP SERPL CALC-SCNC: 11 MMOL/L (ref 7–16)
AST SERPL-CCNC: 11 U/L (ref 12–45)
BILIRUB SERPL-MCNC: 1.8 MG/DL (ref 0.1–1.5)
BUN SERPL-MCNC: 14 MG/DL (ref 8–22)
CALCIUM ALBUM COR SERPL-MCNC: 9.1 MG/DL (ref 8.5–10.5)
CALCIUM SERPL-MCNC: 8.1 MG/DL (ref 8.4–10.2)
CHLORIDE SERPL-SCNC: 103 MMOL/L (ref 96–112)
CO2 SERPL-SCNC: 25 MMOL/L (ref 20–33)
CREAT SERPL-MCNC: 0.94 MG/DL (ref 0.5–1.4)
ERYTHROCYTE [DISTWIDTH] IN BLOOD BY AUTOMATED COUNT: 45.6 FL (ref 35.9–50)
GFR SERPLBLD CREATININE-BSD FMLA CKD-EPI: 64 ML/MIN/1.73 M 2
GLOBULIN SER CALC-MCNC: 2.7 G/DL (ref 1.9–3.5)
GLUCOSE BLD STRIP.AUTO-MCNC: 109 MG/DL (ref 65–99)
GLUCOSE BLD STRIP.AUTO-MCNC: 170 MG/DL (ref 65–99)
GLUCOSE BLD STRIP.AUTO-MCNC: 197 MG/DL (ref 65–99)
GLUCOSE SERPL-MCNC: 111 MG/DL (ref 65–99)
HCT VFR BLD AUTO: 32.3 % (ref 37–47)
HCT VFR BLD AUTO: 34.9 % (ref 37–47)
HGB BLD-MCNC: 10.4 G/DL (ref 12–16)
HGB BLD-MCNC: 11.6 G/DL (ref 12–16)
LACTATE SERPL-SCNC: 1.4 MMOL/L (ref 0.5–2)
MAGNESIUM SERPL-MCNC: 1.7 MG/DL (ref 1.5–2.5)
MCH RBC QN AUTO: 28.6 PG (ref 27–33)
MCHC RBC AUTO-ENTMCNC: 32.2 G/DL (ref 32.2–35.5)
MCV RBC AUTO: 88.7 FL (ref 81.4–97.8)
PLATELET # BLD AUTO: 325 K/UL (ref 164–446)
PMV BLD AUTO: 9.3 FL (ref 9–12.9)
POTASSIUM SERPL-SCNC: 3.7 MMOL/L (ref 3.6–5.5)
PROT SERPL-MCNC: 5.4 G/DL (ref 6–8.2)
RBC # BLD AUTO: 3.64 M/UL (ref 4.2–5.4)
SODIUM SERPL-SCNC: 139 MMOL/L (ref 135–145)
WBC # BLD AUTO: 8.5 K/UL (ref 4.8–10.8)

## 2024-12-07 PROCEDURE — 700105 HCHG RX REV CODE 258: Performed by: SURGERY

## 2024-12-07 PROCEDURE — A9270 NON-COVERED ITEM OR SERVICE: HCPCS | Performed by: SURGERY

## 2024-12-07 PROCEDURE — 770001 HCHG ROOM/CARE - MED/SURG/GYN PRIV*

## 2024-12-07 PROCEDURE — A9270 NON-COVERED ITEM OR SERVICE: HCPCS | Performed by: HOSPITALIST

## 2024-12-07 PROCEDURE — 160042 HCHG SURGERY MINUTES - EA ADDL 1 MIN LEVEL 5: Performed by: SURGERY

## 2024-12-07 PROCEDURE — 160031 HCHG SURGERY MINUTES - 1ST 30 MINS LEVEL 5: Performed by: SURGERY

## 2024-12-07 PROCEDURE — 700111 HCHG RX REV CODE 636 W/ 250 OVERRIDE (IP): Mod: JZ | Performed by: SURGERY

## 2024-12-07 PROCEDURE — 160035 HCHG PACU - 1ST 60 MINS PHASE I: Performed by: SURGERY

## 2024-12-07 PROCEDURE — 700111 HCHG RX REV CODE 636 W/ 250 OVERRIDE (IP): Performed by: STUDENT IN AN ORGANIZED HEALTH CARE EDUCATION/TRAINING PROGRAM

## 2024-12-07 PROCEDURE — 0D1E0Z4 BYPASS LARGE INTESTINE TO CUTANEOUS, OPEN APPROACH: ICD-10-PCS | Performed by: SURGERY

## 2024-12-07 PROCEDURE — 85018 HEMOGLOBIN: CPT

## 2024-12-07 PROCEDURE — 700111 HCHG RX REV CODE 636 W/ 250 OVERRIDE (IP): Performed by: INTERNAL MEDICINE

## 2024-12-07 PROCEDURE — 700105 HCHG RX REV CODE 258: Performed by: STUDENT IN AN ORGANIZED HEALTH CARE EDUCATION/TRAINING PROGRAM

## 2024-12-07 PROCEDURE — 160009 HCHG ANES TIME/MIN: Performed by: SURGERY

## 2024-12-07 PROCEDURE — 700101 HCHG RX REV CODE 250: Performed by: STUDENT IN AN ORGANIZED HEALTH CARE EDUCATION/TRAINING PROGRAM

## 2024-12-07 PROCEDURE — 700101 HCHG RX REV CODE 250: Performed by: SURGERY

## 2024-12-07 PROCEDURE — 80053 COMPREHEN METABOLIC PANEL: CPT

## 2024-12-07 PROCEDURE — 665998 HH PPS REVENUE CREDIT

## 2024-12-07 PROCEDURE — 700102 HCHG RX REV CODE 250 W/ 637 OVERRIDE(OP): Performed by: SURGERY

## 2024-12-07 PROCEDURE — 160002 HCHG RECOVERY MINUTES (STAT): Performed by: SURGERY

## 2024-12-07 PROCEDURE — 83735 ASSAY OF MAGNESIUM: CPT

## 2024-12-07 PROCEDURE — 99233 SBSQ HOSP IP/OBS HIGH 50: CPT | Mod: 25 | Performed by: INTERNAL MEDICINE

## 2024-12-07 PROCEDURE — 82962 GLUCOSE BLOOD TEST: CPT | Mod: 91

## 2024-12-07 PROCEDURE — 36415 COLL VENOUS BLD VENIPUNCTURE: CPT

## 2024-12-07 PROCEDURE — 700102 HCHG RX REV CODE 250 W/ 637 OVERRIDE(OP): Performed by: HOSPITALIST

## 2024-12-07 PROCEDURE — 94760 N-INVAS EAR/PLS OXIMETRY 1: CPT

## 2024-12-07 PROCEDURE — 160048 HCHG OR STATISTICAL LEVEL 1-5: Performed by: SURGERY

## 2024-12-07 PROCEDURE — 700102 HCHG RX REV CODE 250 W/ 637 OVERRIDE(OP): Mod: JZ | Performed by: INTERNAL MEDICINE

## 2024-12-07 PROCEDURE — 99497 ADVNCD CARE PLAN 30 MIN: CPT | Performed by: INTERNAL MEDICINE

## 2024-12-07 PROCEDURE — 83605 ASSAY OF LACTIC ACID: CPT

## 2024-12-07 PROCEDURE — 665999 HH PPS REVENUE DEBIT

## 2024-12-07 PROCEDURE — A9270 NON-COVERED ITEM OR SERVICE: HCPCS | Mod: JZ | Performed by: INTERNAL MEDICINE

## 2024-12-07 PROCEDURE — 83036 HEMOGLOBIN GLYCOSYLATED A1C: CPT

## 2024-12-07 PROCEDURE — 85027 COMPLETE CBC AUTOMATED: CPT

## 2024-12-07 PROCEDURE — 85014 HEMATOCRIT: CPT

## 2024-12-07 RX ORDER — SODIUM CHLORIDE, SODIUM LACTATE, POTASSIUM CHLORIDE, CALCIUM CHLORIDE 600; 310; 30; 20 MG/100ML; MG/100ML; MG/100ML; MG/100ML
INJECTION, SOLUTION INTRAVENOUS CONTINUOUS
Status: DISCONTINUED | OUTPATIENT
Start: 2024-12-07 | End: 2024-12-07 | Stop reason: HOSPADM

## 2024-12-07 RX ORDER — HYDROMORPHONE HYDROCHLORIDE 1 MG/ML
0.4 INJECTION, SOLUTION INTRAMUSCULAR; INTRAVENOUS; SUBCUTANEOUS
Status: DISCONTINUED | OUTPATIENT
Start: 2024-12-07 | End: 2024-12-07 | Stop reason: HOSPADM

## 2024-12-07 RX ORDER — DEXAMETHASONE SODIUM PHOSPHATE 4 MG/ML
INJECTION, SOLUTION INTRA-ARTICULAR; INTRALESIONAL; INTRAMUSCULAR; INTRAVENOUS; SOFT TISSUE PRN
Status: DISCONTINUED | OUTPATIENT
Start: 2024-12-07 | End: 2024-12-07 | Stop reason: SURG

## 2024-12-07 RX ORDER — CARBOXYMETHYLCELLULOSE SODIUM 5 MG/ML
1 SOLUTION/ DROPS OPHTHALMIC PRN
Status: DISCONTINUED | OUTPATIENT
Start: 2024-12-07 | End: 2024-12-10 | Stop reason: HOSPADM

## 2024-12-07 RX ORDER — OXYCODONE HCL 5 MG/5 ML
10 SOLUTION, ORAL ORAL
Status: DISCONTINUED | OUTPATIENT
Start: 2024-12-07 | End: 2024-12-07 | Stop reason: HOSPADM

## 2024-12-07 RX ORDER — POTASSIUM CHLORIDE 1500 MG/1
40 TABLET, EXTENDED RELEASE ORAL ONCE
Status: COMPLETED | OUTPATIENT
Start: 2024-12-07 | End: 2024-12-07

## 2024-12-07 RX ORDER — ROCURONIUM BROMIDE 10 MG/ML
INJECTION, SOLUTION INTRAVENOUS PRN
Status: DISCONTINUED | OUTPATIENT
Start: 2024-12-07 | End: 2024-12-07 | Stop reason: SURG

## 2024-12-07 RX ORDER — HYDROMORPHONE HYDROCHLORIDE 1 MG/ML
0.1 INJECTION, SOLUTION INTRAMUSCULAR; INTRAVENOUS; SUBCUTANEOUS
Status: DISCONTINUED | OUTPATIENT
Start: 2024-12-07 | End: 2024-12-07 | Stop reason: HOSPADM

## 2024-12-07 RX ORDER — EPHEDRINE SULFATE 50 MG/ML
INJECTION, SOLUTION INTRAVENOUS PRN
Status: DISCONTINUED | OUTPATIENT
Start: 2024-12-07 | End: 2024-12-07 | Stop reason: SURG

## 2024-12-07 RX ORDER — SODIUM CHLORIDE, SODIUM LACTATE, POTASSIUM CHLORIDE, CALCIUM CHLORIDE 600; 310; 30; 20 MG/100ML; MG/100ML; MG/100ML; MG/100ML
INJECTION, SOLUTION INTRAVENOUS CONTINUOUS
Status: DISCONTINUED | OUTPATIENT
Start: 2024-12-07 | End: 2024-12-07

## 2024-12-07 RX ORDER — KETOROLAC TROMETHAMINE 5 MG/ML
1 SOLUTION OPHTHALMIC 4 TIMES DAILY
Status: COMPLETED | OUTPATIENT
Start: 2024-12-07 | End: 2024-12-08

## 2024-12-07 RX ORDER — CEFOTETAN DISODIUM 2 G/20ML
INJECTION, POWDER, FOR SOLUTION INTRAMUSCULAR; INTRAVENOUS PRN
Status: DISCONTINUED | OUTPATIENT
Start: 2024-12-07 | End: 2024-12-07 | Stop reason: SURG

## 2024-12-07 RX ORDER — HYDROMORPHONE HYDROCHLORIDE 2 MG/ML
INJECTION, SOLUTION INTRAMUSCULAR; INTRAVENOUS; SUBCUTANEOUS PRN
Status: DISCONTINUED | OUTPATIENT
Start: 2024-12-07 | End: 2024-12-07 | Stop reason: SURG

## 2024-12-07 RX ORDER — DIPHENHYDRAMINE HYDROCHLORIDE 50 MG/ML
12.5 INJECTION INTRAMUSCULAR; INTRAVENOUS
Status: DISCONTINUED | OUTPATIENT
Start: 2024-12-07 | End: 2024-12-07 | Stop reason: HOSPADM

## 2024-12-07 RX ORDER — MAGNESIUM SULFATE HEPTAHYDRATE 40 MG/ML
2 INJECTION, SOLUTION INTRAVENOUS ONCE
Status: COMPLETED | OUTPATIENT
Start: 2024-12-07 | End: 2024-12-07

## 2024-12-07 RX ORDER — EPHEDRINE SULFATE 50 MG/ML
5 INJECTION, SOLUTION INTRAVENOUS
Status: DISCONTINUED | OUTPATIENT
Start: 2024-12-07 | End: 2024-12-07 | Stop reason: HOSPADM

## 2024-12-07 RX ORDER — HYDROMORPHONE HYDROCHLORIDE 1 MG/ML
0.2 INJECTION, SOLUTION INTRAMUSCULAR; INTRAVENOUS; SUBCUTANEOUS
Status: DISCONTINUED | OUTPATIENT
Start: 2024-12-07 | End: 2024-12-07 | Stop reason: HOSPADM

## 2024-12-07 RX ORDER — SUCCINYLCHOLINE CHLORIDE 20 MG/ML
INJECTION INTRAMUSCULAR; INTRAVENOUS PRN
Status: DISCONTINUED | OUTPATIENT
Start: 2024-12-07 | End: 2024-12-07 | Stop reason: SURG

## 2024-12-07 RX ORDER — LIDOCAINE HYDROCHLORIDE 20 MG/ML
INJECTION, SOLUTION EPIDURAL; INFILTRATION; INTRACAUDAL; PERINEURAL PRN
Status: DISCONTINUED | OUTPATIENT
Start: 2024-12-07 | End: 2024-12-07 | Stop reason: SURG

## 2024-12-07 RX ORDER — OXYCODONE HCL 5 MG/5 ML
5 SOLUTION, ORAL ORAL
Status: DISCONTINUED | OUTPATIENT
Start: 2024-12-07 | End: 2024-12-07 | Stop reason: HOSPADM

## 2024-12-07 RX ORDER — LABETALOL HYDROCHLORIDE 5 MG/ML
5 INJECTION, SOLUTION INTRAVENOUS
Status: DISCONTINUED | OUTPATIENT
Start: 2024-12-07 | End: 2024-12-07 | Stop reason: HOSPADM

## 2024-12-07 RX ORDER — HYDRALAZINE HYDROCHLORIDE 20 MG/ML
5 INJECTION INTRAMUSCULAR; INTRAVENOUS
Status: DISCONTINUED | OUTPATIENT
Start: 2024-12-07 | End: 2024-12-07 | Stop reason: HOSPADM

## 2024-12-07 RX ORDER — ONDANSETRON 2 MG/ML
4 INJECTION INTRAMUSCULAR; INTRAVENOUS
Status: DISCONTINUED | OUTPATIENT
Start: 2024-12-07 | End: 2024-12-07 | Stop reason: HOSPADM

## 2024-12-07 RX ORDER — SODIUM CHLORIDE, SODIUM LACTATE, POTASSIUM CHLORIDE, CALCIUM CHLORIDE 600; 310; 30; 20 MG/100ML; MG/100ML; MG/100ML; MG/100ML
INJECTION, SOLUTION INTRAVENOUS
Status: DISCONTINUED | OUTPATIENT
Start: 2024-12-07 | End: 2024-12-07 | Stop reason: SURG

## 2024-12-07 RX ORDER — ALBUTEROL SULFATE 5 MG/ML
2.5 SOLUTION RESPIRATORY (INHALATION)
Status: DISCONTINUED | OUTPATIENT
Start: 2024-12-07 | End: 2024-12-07 | Stop reason: HOSPADM

## 2024-12-07 RX ORDER — HALOPERIDOL 5 MG/ML
1 INJECTION INTRAMUSCULAR
Status: DISCONTINUED | OUTPATIENT
Start: 2024-12-07 | End: 2024-12-07 | Stop reason: HOSPADM

## 2024-12-07 RX ADMIN — SODIUM CHLORIDE, POTASSIUM CHLORIDE, SODIUM LACTATE AND CALCIUM CHLORIDE: 600; 310; 30; 20 INJECTION, SOLUTION INTRAVENOUS at 08:01

## 2024-12-07 RX ADMIN — PIPERACILLIN AND TAZOBACTAM 4.5 G: 4; .5 INJECTION, POWDER, FOR SOLUTION INTRAVENOUS at 20:21

## 2024-12-07 RX ADMIN — SUCCINYLCHOLINE CHLORIDE 100 MG: 20 INJECTION, SOLUTION INTRAMUSCULAR; INTRAVENOUS at 08:11

## 2024-12-07 RX ADMIN — MAGNESIUM SULFATE HEPTAHYDRATE 2 G: 2 INJECTION, SOLUTION INTRAVENOUS at 05:56

## 2024-12-07 RX ADMIN — EPHEDRINE SULFATE 5 MG: 50 INJECTION, SOLUTION INTRAVENOUS at 08:21

## 2024-12-07 RX ADMIN — LIDOCAINE HYDROCHLORIDE 100 MG: 20 INJECTION, SOLUTION EPIDURAL; INFILTRATION; INTRACAUDAL; PERINEURAL at 08:11

## 2024-12-07 RX ADMIN — ROCURONIUM BROMIDE 50 MG: 50 INJECTION, SOLUTION INTRAVENOUS at 08:22

## 2024-12-07 RX ADMIN — KETOROLAC TROMETHAMINE 1 DROP: 0.5 SOLUTION OPHTHALMIC at 20:26

## 2024-12-07 RX ADMIN — OMEPRAZOLE 40 MG: 20 CAPSULE, DELAYED RELEASE ORAL at 17:04

## 2024-12-07 RX ADMIN — HYDROMORPHONE HYDROCHLORIDE 1 MG: 2 INJECTION INTRAMUSCULAR; INTRAVENOUS; SUBCUTANEOUS at 10:12

## 2024-12-07 RX ADMIN — OXYCODONE HYDROCHLORIDE 10 MG: 10 TABLET ORAL at 19:24

## 2024-12-07 RX ADMIN — HYDROMORPHONE HYDROCHLORIDE 0.5 MG: 2 INJECTION INTRAMUSCULAR; INTRAVENOUS; SUBCUTANEOUS at 08:50

## 2024-12-07 RX ADMIN — CEFOTETAN DISODIUM 3 G: 2 INJECTION, POWDER, FOR SOLUTION INTRAMUSCULAR; INTRAVENOUS at 08:25

## 2024-12-07 RX ADMIN — BUPROPION HYDROCHLORIDE 300 MG: 150 TABLET, FILM COATED, EXTENDED RELEASE ORAL at 06:00

## 2024-12-07 RX ADMIN — ACETAMINOPHEN 650 MG: 325 TABLET ORAL at 13:48

## 2024-12-07 RX ADMIN — SODIUM CHLORIDE, POTASSIUM CHLORIDE, SODIUM LACTATE AND CALCIUM CHLORIDE: 600; 310; 30; 20 INJECTION, SOLUTION INTRAVENOUS at 04:47

## 2024-12-07 RX ADMIN — DEXAMETHASONE SODIUM PHOSPHATE 8 MG: 4 INJECTION INTRA-ARTICULAR; INTRALESIONAL; INTRAMUSCULAR; INTRAVENOUS; SOFT TISSUE at 08:35

## 2024-12-07 RX ADMIN — OXYCODONE HYDROCHLORIDE 10 MG: 10 TABLET ORAL at 14:42

## 2024-12-07 RX ADMIN — ATORVASTATIN CALCIUM 20 MG: 20 TABLET, FILM COATED ORAL at 17:04

## 2024-12-07 RX ADMIN — FENTANYL CITRATE 100 MCG: 50 INJECTION, SOLUTION INTRAMUSCULAR; INTRAVENOUS at 08:08

## 2024-12-07 RX ADMIN — INSULIN LISPRO 1 UNITS: 100 INJECTION, SOLUTION INTRAVENOUS; SUBCUTANEOUS at 17:16

## 2024-12-07 RX ADMIN — CARBOXYMETHYLCELLULOSE SODIUM 1 DROP: 0.5 SOLUTION/ DROPS OPHTHALMIC at 15:50

## 2024-12-07 RX ADMIN — POTASSIUM CHLORIDE 40 MEQ: 1500 TABLET, EXTENDED RELEASE ORAL at 05:47

## 2024-12-07 RX ADMIN — SERTRALINE 50 MG: 50 TABLET, FILM COATED ORAL at 06:00

## 2024-12-07 RX ADMIN — SUGAMMADEX 200 MG: 100 INJECTION, SOLUTION INTRAVENOUS at 10:35

## 2024-12-07 RX ADMIN — PIPERACILLIN AND TAZOBACTAM 4.5 G: 4; .5 INJECTION, POWDER, FOR SOLUTION INTRAVENOUS at 05:14

## 2024-12-07 RX ADMIN — INSULIN LISPRO 1 UNITS: 100 INJECTION, SOLUTION INTRAVENOUS; SUBCUTANEOUS at 20:23

## 2024-12-07 RX ADMIN — ROCURONIUM BROMIDE 30 MG: 50 INJECTION, SOLUTION INTRAVENOUS at 09:24

## 2024-12-07 RX ADMIN — HYDROMORPHONE HYDROCHLORIDE 0.5 MG: 2 INJECTION INTRAMUSCULAR; INTRAVENOUS; SUBCUTANEOUS at 10:27

## 2024-12-07 RX ADMIN — KETOROLAC TROMETHAMINE 1 DROP: 0.5 SOLUTION OPHTHALMIC at 17:04

## 2024-12-07 RX ADMIN — PIPERACILLIN AND TAZOBACTAM 4.5 G: 4; .5 INJECTION, POWDER, FOR SOLUTION INTRAVENOUS at 13:52

## 2024-12-07 RX ADMIN — PROPOFOL 200 MG: 10 INJECTION, EMULSION INTRAVENOUS at 08:11

## 2024-12-07 ASSESSMENT — PAIN DESCRIPTION - PAIN TYPE
TYPE: ACUTE PAIN
TYPE: SURGICAL PAIN
TYPE: ACUTE PAIN;SURGICAL PAIN
TYPE: SURGICAL PAIN

## 2024-12-07 ASSESSMENT — LIFESTYLE VARIABLES: SUBSTANCE_ABUSE: 0

## 2024-12-07 ASSESSMENT — ENCOUNTER SYMPTOMS
NERVOUS/ANXIOUS: 0
DOUBLE VISION: 0
FALLS: 0
DIZZINESS: 0
VOMITING: 0
SHORTNESS OF BREATH: 0
ABDOMINAL PAIN: 1
BLURRED VISION: 0
COUGH: 0
HEADACHES: 0
PALPITATIONS: 0
CHILLS: 0
HEARTBURN: 0
BACK PAIN: 0
FEVER: 0

## 2024-12-07 ASSESSMENT — SOCIAL DETERMINANTS OF HEALTH (SDOH)
WITHIN THE PAST 12 MONTHS, YOU WORRIED THAT YOUR FOOD WOULD RUN OUT BEFORE YOU GOT THE MONEY TO BUY MORE: NEVER TRUE
IN THE PAST 12 MONTHS, HAS THE ELECTRIC, GAS, OIL, OR WATER COMPANY THREATENED TO SHUT OFF SERVICE IN YOUR HOME?: NO
WITHIN THE PAST 12 MONTHS, THE FOOD YOU BOUGHT JUST DIDN'T LAST AND YOU DIDN'T HAVE MONEY TO GET MORE: NEVER TRUE

## 2024-12-07 NOTE — PROGRESS NOTES
1140: Received report    1442: pt complaining of 10/10 eye pain; gave medication per MAR, R eye looks red and puffy, pt unable to open eye due to pain; used NS syringe to try and clean out something and applied warm compress to eye as pt may have scratched some portion of her eye.    1506: called pharmacy to see what they recommend for her R eye pain, pt has hx of cataract surgery 3 years ago in that eye, they reccommended lubricant eye drop and NSAID eye drop.

## 2024-12-07 NOTE — ASSESSMENT & PLAN NOTE
General surgery consulted, plan for ostomy revision tomorrow with Dr Hou    Clear liquid diet for now, multimodal pain control.    12/7: continue zosyn as per surgery. Patient to undergo surgery today, we appreciate further recommendations.    12/8: continue antibiotics as per surgery. We appreciate further recommendations.    12/9: We will transition to Augmentin.  Continue antibiotics as per general surgery.  Advance diet as tolerated as per general surgery.  Home health with wound care has been ordered as per general surgery recommendations.

## 2024-12-07 NOTE — DIETARY
NUTRITION SERVICES: BMI - Pt with BMI >40 (=Body mass index is 45.97 kg/m².), weight measured via bed scale. Class III obesity. Edema: 2+ generalized to BLEs documented in flowsheets. Weight loss counseling not appropriate in acute care setting.     RECOMMEND - If appropriate at DC please refer to outpatient nutrition services for weight management.     RD will screen weekly per department policy; available PRN.

## 2024-12-07 NOTE — OP REPORT
OPERATIVE REPORT      PreOp Diagnosis: Colostomy disruption with cellulitis    PostOp Diagnosis: Same      Procedure(s):  COLOSTOMY REVISION - Wound Class: Clean Contaminated    Surgeon(s):  George Hou D.O.    Anesthesiologist/Type of Anesthesia:  Anesthesiologist: Angelica Hardy D.O./General    Surgical Staff:  Circulator: Padmini Mckeon R.N.  Scrub Person: Compa Castillo    Specimens removed if any:  * No specimens in log *    Estimated Blood Loss: 50 cc    Findings: Circumferential separation of colostomy from the skin with cellulitic reaction around the area.  No abscess or necrosis.    Complications: None noted    Indication: 73-year-old female status post urgent decompressive skin level transverse colostomy for large bowel obstruction was had significant peristomal complications, retraction and skin separation.  Patient here today for colostomy revision.    Operative report: Patient was taken to the operating placed supine on the operating table.  She was placed under general anesthesia intubated by the anesthesiologist.  The abdomen was prepped with chlorhexidine and the peristomal area was then prepped with Betadine.  We draped out in standard fashion.  We made examination and noted there to be circumferential separation of the mucosa of the colostomy from the surrounding skin causing irritation of the surrounding skin and subcutaneous tissues.  There was not obvious abscess.  We were able to circumferentially excise the ostomy skin and the surrounding cyst tissue is surprisingly healthy appearing.  We then dissected down along the wall of the colon down to the fascia.  0 PDS was then used to close the opening.  It then took a fair amount of time to mobilize this due to the recent surgery and inflammatory reaction but ultimately were able to get a good circumferential mobilization of the colostomy.  Once we did this we were able to carefully pulled the transverse colon up through our existing  opening.  We did open our fascial incision approximately 1 cm superiorly inferiorly and laterally.  The Bovie was used to perform this and hemostasis was performed during this dissection.  Once were able to pull the colon up sufficiently, we are able to mobilize the omentum from the area in the midpoint of our loop.  We are then able to carefully get a right angle clamp around the bowel, making very small window in the mesentery to do so.  There was no significant bleeding during this process.  We passed a red rubber catheter around the large bowel and then secured to itself with a Vicryl stitch.  We are able to use this to gently hold the loop of colon in place.  A layer of fascial level sutures were then placed into the colon wall at the tenia coli using 2 sutures at 6 points around the wound.  We then irrigated the wound and I performed some local hemostasis with the Bovie.  We then chose an area about 2.5 cm below the prior ostomy opening.  And placed another layer of reinforcing sutures from this level to the dermis.  The PDS suture was then removed and we trimmed the edges.  The Bovie was used for hemostasis at this layer as well.  We confirmed that the red rubber catheter.  A loop underneath the bowel at just below the skin level, well above the fascia.  We then matured the colostomy to the skin using interrupted 3-0 Vicryl sutures.  The area around the colostomy was then cleaned and dried.  A colostomy appliance was applied.  The patient was awakened from anesthesia and we stabilized the abdominal wall around the area and observed there to be no significant retraction as she woke up coughed.  The patient was extubated without incident and taken to the postanesthesia care unit in stable condition.  The sponge, needle and instrument counts were correct at the end of the case.        12/7/2024 10:48 AM George Hou D.O.

## 2024-12-07 NOTE — PROGRESS NOTES
"    73 year old female with likely benign sigmoid stricture status post transverse colostomy 2 weeks ago. She has significant retraction and came to ER after conversation with wound care nurse. She feels well except for some peristomal soreness. No leaks today.    /64   Pulse 95   Temp 36.8 °C (98.3 °F) (Temporal)   Resp 18   Ht 1.651 m (5' 5\")   Wt 124 kg (272 lb 14.9 oz)   SpO2 92%   BMI 45.42 kg/m²     NAD  CTAB  RRR, pulses palpable  Abdomen very obese but soft  Soft tenderness around stoma  Ostomy with gas and stool.  Incisions CDI with glue    Wound care pictures reviewed: significant retraction, edge disruption and undermining at skin edges.    Recent Labs     12/06/24  1604   WBC 9.0   RBC 4.24   HEMOGLOBIN 12.2   HEMATOCRIT 37.1   MCV 87.5   MCH 28.8   RDW 45.4   PLATELETCT 368   MPV 9.2   NEUTSPOLYS 70.00   LYMPHOCYTES 16.60*   MONOCYTES 6.50   EOSINOPHILS 5.40   BASOPHILS 0.70     Recent Labs     12/06/24  1604   SODIUM 142   POTASSIUM 3.9   CHLORIDE 104   CO2 27   GLUCOSE 146*   BUN 16   CREATININE 1.02     A/P  73 year old female with colostomy disruption and peristomal complication after transverse blowhole colostomy  Plan for conversion to formal loop colostomy tomorrow.  May require laparotomy  Discussed with patient.  Clears tonight.  Zosyn  IVF  NPO after MN  "

## 2024-12-07 NOTE — PROGRESS NOTES
"Hospital Medicine Daily Progress Note    Date of Service  12/7/2024    Chief Complaint  Guerda Lunsford is a 73 y.o. female admitted 12/6/2024 with abdominal pain.    Hospital Course  As per chart review:  \"73 y.o. female with a past medical history of morbid obesity with a BMI of 45, diabetes, hypertension who presented 12/6/2024 with abdominal pain and concern for blockage at colostomy site.  Patient reports that she has not been feeling well since Thanksgiving.  She has been having intermittent episodes of diffuse abdominal pain and local pain around the ostomy site.  The pain now is mostly around the ostomy site.  She denies having fevers or chills.  She does not have vomiting.\"      Interval Problem Update  12/7: Patient seen at bedside this morning.  Still complaining of abdominal pain.  Patient n.p.o. she will most likely undergo surgery today.  We appreciate further recommendations.  Continue to monitor closely.    I have discussed this patient's plan of care and discharge plan at IDT rounds today with Case Management, Nursing, Nursing leadership, and other members of the IDT team.    Consultants/Specialty  general surgery    Code Status  Full Code    Disposition  The patient is not medically cleared for discharge to home or a post-acute facility.        Review of Systems  Review of Systems   Constitutional:  Positive for malaise/fatigue. Negative for chills and fever.   HENT:  Negative for hearing loss and nosebleeds.    Eyes:  Negative for blurred vision and double vision.   Respiratory:  Negative for cough and shortness of breath.    Cardiovascular:  Negative for chest pain and palpitations.   Gastrointestinal:  Positive for abdominal pain. Negative for heartburn and vomiting.   Genitourinary:  Negative for dysuria and urgency.   Musculoskeletal:  Negative for back pain and falls.   Skin:  Negative for itching and rash.   Neurological:  Negative for dizziness and headaches. "   Psychiatric/Behavioral:  Negative for substance abuse. The patient is not nervous/anxious.    All other systems reviewed and are negative.       Physical Exam  Temp:  [36.3 °C (97.3 °F)-36.8 °C (98.3 °F)] 36.5 °C (97.7 °F)  Pulse:  [70-95] 76  Resp:  [12-19] 12  BP: (103-127)/(49-64) 103/49  SpO2:  [91 %-97 %] 96 %    Physical Exam  Vitals and nursing note reviewed.   Constitutional:       General: She is not in acute distress.     Appearance: She is obese. She is ill-appearing.   HENT:      Head: Normocephalic and atraumatic.      Right Ear: External ear normal.      Left Ear: External ear normal.      Mouth/Throat:      Pharynx: No oropharyngeal exudate or posterior oropharyngeal erythema.   Eyes:      General:         Right eye: No discharge.         Left eye: No discharge.   Cardiovascular:      Rate and Rhythm: Normal rate and regular rhythm.      Pulses: Normal pulses.      Heart sounds: No murmur heard.     No gallop.   Pulmonary:      Effort: No respiratory distress.   Abdominal:      Tenderness: There is abdominal tenderness.      Comments: Ostomy in place   Musculoskeletal:         General: No swelling or tenderness. Normal range of motion.      Cervical back: Normal range of motion and neck supple. No tenderness.   Skin:     General: Skin is warm and dry.   Neurological:      General: No focal deficit present.      Mental Status: She is alert and oriented to person, place, and time. Mental status is at baseline.      Motor: No weakness.   Psychiatric:         Behavior: Behavior normal.         Fluids    Intake/Output Summary (Last 24 hours) at 12/7/2024 1111  Last data filed at 12/7/2024 1051  Gross per 24 hour   Intake 2060 ml   Output 935 ml   Net 1125 ml        Laboratory  Recent Labs     12/06/24  1604 12/07/24  0230   WBC 9.0 8.5   RBC 4.24 3.64*   HEMOGLOBIN 12.2 10.4*   HEMATOCRIT 37.1 32.3*   MCV 87.5 88.7   MCH 28.8 28.6   MCHC 32.9 32.2   RDW 45.4 45.6   PLATELETCT 368 325   MPV 9.2 9.3      Recent Labs     12/06/24  1604 12/07/24  0230   SODIUM 142 139   POTASSIUM 3.9 3.7   CHLORIDE 104 103   CO2 27 25   GLUCOSE 146* 111*   BUN 16 14   CREATININE 1.02 0.94   CALCIUM 8.7 8.1*                   Imaging  CT-ABDOMEN-PELVIS WITH   Final Result      1. There is circumferential colonic wall thickening and pericolonic fat stranding involving the ascending colon, transverse colon, the colon involving the colostomy, and the descending colon. There was surgical changes in the sigmoid colon with normal    configuration of the rectum. Findings compatible with colitis.   2. No free air.   3. No obstruction.   4. Polyarticular osteoarthritis.           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    12/7: continue zosyn as per surgery. Patient to undergo surgery today, we appreciate further recommendations.    Hypomagnesemia  Assessment & Plan  Replace as needed  monitor    Lactic acidosis- (present on admission)  Assessment & Plan  improved    Type 2 diabetes mellitus without complication, without long-term current use of insulin (HCC)- (present on admission)  Assessment & Plan  With no significant hyperglycemia  Last glycated hemoglobin was 6.6%  I will start short acting insulin for now  I will order Accu-Checks, hypoglycemia protocol  Adjust according to blood sugars trend     12/7: Pending new A1c    Morbid obesity with BMI of 45.0-49.9, adult (HCC)- (present on admission)  Assessment & Plan  At higher risk for atelectasis/hypoxia.  I will order continuous pulse oximetry  Emphasized incentive spirometry       Dehydration- (present on admission)  Assessment & Plan  Likely secondary to reduced oral intake   Encourage oral intake as tolerated, antiemetics as needed.  Intravenous hydration until adequate oral intake is achieved.      Stage 3a chronic kidney disease- (present on admission)  Assessment & Plan  Avoid/minimize nephrotoxins as much as possible, renally dose medications, monitor inputs and outputs     Primary hypertension- (present on admission)  Assessment & Plan  I will start metoprolol with hold parameters    Anemia- (present on admission)  Assessment & Plan  12/7: Dilutional? No signs of overt bleeding, however hemoglobin this morning is 10.4.  Will repeat hemoglobin.  Patient to undergo surgery today, we appreciate further recommendations.  Monitor closely.    Mild episode of recurrent major depressive disorder (HCC)- (present on admission)  Assessment & Plan  12/7: Continue sertraline and bupropion    Advance care planning  Assessment & Plan  12/7: I discussed with patient for at least 16 minutes further goals of care.  This included CODE STATUS which includes full code and DNR/DNI.  The patient would like to be full code.  We also discussed further treatment goals.  For now the patient like to have full treatment options.  This includes invasive or noninvasive procedures as needed.  In the event that the patient cannot make decisions for herself she would like her stepdaughter Caroline ([phone]) to make decisions for her.         VTE prophylaxis: SCDs    I have performed a physical exam and reviewed and updated ROS and Plan today (12/7/2024). In review of yesterday's note (12/6/2024), there are no changes except as documented above.      I spend at least 51 minutes providing care for this patient.  This included face-to-face interview, physical examination.  Review of lab work including CBC, CMP, magnesium, lipase.  Discussing with general surgery.  Discussing with multidisciplinary team including case management, nursing staff and pharmacy.  Creating plan of care, reviewing orders.    Moreover,  I discussed with patient for at least 16 minutes further goals of care.  This included CODE STATUS which includes full code and  DNR/DNI.  The patient would like to be full code.  We also discussed further treatment goals.  For now the patient like to have full treatment options.  This includes invasive or noninvasive procedures as needed.  In the event that the patient cannot make decisions for herself she would like her stepdaughter Caroline ([phone]) to make decisions for her.

## 2024-12-07 NOTE — CARE PLAN
The patient is Stable - Low risk of patient condition declining or worsening    Shift Goals  Clinical Goals: Monitor colostomy. Maintain on NPO post midnight as ordered. Pt will remain free from falls or injury throughout the shift.  Patient Goals: rest and go to procedure tomorrow  Family Goals: n/a    Progress made toward(s) clinical / shift goals:  Colostomy in place. Pt denies pain. Pt maintained on strict NPO post midnight as ordered. Pt seen resting and sleeping comfortable in between rounds; even and unlabored breathing noted. Pt is free from falls or injury throughout the shift. Pt awaiting to go to procedure this am. Hourly rounding in progress.     Patient is not progressing towards the following goals: n/a

## 2024-12-07 NOTE — WOUND TEAM
Wound team consulted for patient new revised colostomy.  This RN did a quick assessment and patient is in a 2 1/4 appliance. Nestor is present with ostomy belt.  Wound team will round on Patient Monday for more in depth assessment and additional education.

## 2024-12-07 NOTE — PROGRESS NOTES
Bedside report received from NORY Youssef. Assumed care of patient. Patient waiting for surgery. RN gave report to PACU and pt leaving for surgery soon. Call light within reach. Bed locked in lowest position. Plan of care on going, hourly rounding in progress.

## 2024-12-07 NOTE — PROGRESS NOTES
1200: Pt returned after the surgery, Pt resting comfortably in bed with no signs of distress noted. Breathing even and unlabored. Patient reports no complains of pain, Patient reports that she wants to sleep with no light on, no further needs at this time. Call light within reach. Bed locked in lowest position. Plan of care on going.

## 2024-12-07 NOTE — ANESTHESIA TIME REPORT
Anesthesia Start and Stop Event Times       Date Time Event    12/7/2024 0758 Ready for Procedure     0801 Anesthesia Start     1047 Anesthesia Stop          Responsible Staff  12/07/24      Name Role Begin End    Angelica Hardy D.O. Anesth 0801 1047          Overtime Reason:  no overtime (within assigned shift)    Comments:

## 2024-12-07 NOTE — ANESTHESIA PROCEDURE NOTES
Airway    Date/Time: 12/7/2024 8:15 AM    Performed by: Angelica Hardy D.O.  Authorized by: Angelica Hardy D.O.    Location:  OR  Urgency:  Elective  Indications for Airway Management:  Anesthesia      Spontaneous Ventilation: absent    Sedation Level:  Deep  Preoxygenated: Yes    Patient Position:  Sniffing  Mask Difficulty Assessment:  2 - vent by mask + OA or adjuvant +/- NMBA  Final Airway Type:  Endotracheal airway  Final Endotracheal Airway:  ETT  Cuffed: Yes    Technique Used for Successful ETT Placement:  Video laryngoscopy    Insertion Site:  Oral  Blade Type:  Jose  Laryngoscope Blade/Videolaryngoscope Blade Size:  4  ETT Size (mm):  7.0  Measured from:  Teeth  ETT to Teeth (cm):  21  Placement Verified by: auscultation and capnometry    Cormack-Lehane Classification:  Grade I - full view of glottis  Number of Attempts at Approach:  2   1st attempt grade III view, 2nd attempt with glidescope grade I view

## 2024-12-07 NOTE — H&P
"Hospital Medicine History & Physical Note    Date of Service  12/6/2024    Primary Care Physician  Dannielle Lackey P.A.-C.     Consultants  General Surgery, Dr. Hou      Code Status  Full Code    Chief Complaint  Chief Complaint   Patient presents with    Abdominal Pain     Had colostomy placed for \"blockage\", states she is supposed to have surgery for revision tomorrow. States a \"home health nurse came out and said that it doesn't look right\". Pt. Reports this area is tender. States she was instructed to come here to ER for \"pre op check in\".      History of Presenting Illness  Guerda Lunsford is a 73 y.o. female with a past medical history of morbid obesity with a BMI of 45, diabetes, hypertension who presented 12/6/2024 with abdominal pain and concern for blockage at colostomy site.  Patient reports that she has not been feeling well since Thanksgiving.  She has been having intermittent episodes of diffuse abdominal pain and local pain around the ostomy site.  The pain now is mostly around the ostomy site.  She denies having fevers or chills.  She does not have vomiting.       I discussed the plan of care with emergency physician, general surgery, Dr. Hou, and the patient    Review of Systems  Review of Systems   Constitutional:  Positive for malaise/fatigue. Negative for chills and fever.   Eyes:  Negative for discharge and redness.   Respiratory:  Negative for cough, shortness of breath and stridor.    Cardiovascular:  Negative for chest pain and leg swelling.   Gastrointestinal:  Positive for abdominal pain. Negative for vomiting.   Genitourinary:  Negative for flank pain.   Musculoskeletal:  Negative for myalgias.   Skin: Negative.    Neurological:  Negative for focal weakness.   Endo/Heme/Allergies:  Does not bruise/bleed easily.   Psychiatric/Behavioral:  The patient is not nervous/anxious.      Past Medical History   has a past medical history of Allergy, Anemia (1997), Anxiety, Arthritis, " "Asthma, Bronchitis, Cataract, Chest pain (02/01/2016), Cholesterol serum elevated, Dental disorder (10/2020), Depression, Detached retina, left, Diverticulitis, Diverticulitis (03/28/2016), Gynecological disorder (1982), Headache, Headache(784.0), Heart murmur, Hemorrhoids (1997), High cholesterol, Hyperlipidemia, Hypertension, Hyponatremia (03/29/2016), Indigestion, Infectious disease, Macular hole of right eye, Pain, Pneumonia, Shortness of breath (02/28/2014), SIRS (systemic inflammatory response syndrome) (HCC) (03/29/2016), and Urinary tract infection, site not specified.    Surgical History   has a past surgical history that includes laminotomy (1982); breast biopsy (2009); other abdominal surgery; appendectomy; gyn surgery; abdominal hysterectomy total (1982); other orthopedic surgery; other; other (Right); other (Right); pr total knee arthroplasty (Left, 05/18/2021); pr total knee arthroplasty (Right, 11/16/2021); pr sigmoidoscopy,diagnostic (11/21/2024); pr lap,diagnostic abdomen (N/A, 11/24/2024); pr colostomy (Left, 11/24/2024); colon resection; arthroplasty; lumpectomy; and hemorrhoidectomy.     Family History  family history includes Abdominal aortic aneurysm in her brother; Alcohol/Drug in her maternal aunt, maternal grandfather, maternal grandmother, and paternal grandmother; Blood Disease in her brother; Diabetes in her mother; Heart Disease in her father; Heart Disease (age of onset: 60) in her mother; Heart Disease (age of onset: 65) in her maternal grandmother; No Known Problems in her sister.      Social History   reports that she has never smoked. She has never used smokeless tobacco. She reports that she does not drink alcohol and does not use drugs.    Allergies  Allergies   Allergen Reactions    Azithromycin Unspecified     Pt states she feels a burning and hot sensation \"I can feel it in my veins, all the way through my body down to the bottom of my feet.\"    Cymbalta [Duloxetine Hcl] " Unspecified     Make blood pressure go up    Lisinopril Cough     Cough      Demerol Rash     Rash at injection site, N/V.    Ertugliflozin-Metformin Hcl Unspecified          Montelukast Anxiety     Gets nightmares - will never take it again     Medications  Prior to Admission Medications   Prescriptions Last Dose Informant Patient Reported? Taking?   Cholecalciferol (VITAMIN D3 MAXIMUM STRENGTH PO) 11/6/2024 Patient Yes No   Sig: Take 5,000 Capsules by mouth every Wednesday. Indications: supplement   acetaminophen (TYLENOL) 500 MG Tab 12/6/2024 at  7:30 AM Patient Yes Yes   Sig: Take 500-1,000 mg by mouth every 6 hours as needed for Mild Pain. Indications: Pain   albuterol 108 (90 Base) MCG/ACT Aero Soln inhalation aerosol Unknown Patient No No   Sig: Inhale 2 Puffs every 6 hours as needed for Shortness of Breath.   apixaban (ELIQUIS) 5mg Tab 12/6/2024 at  7:30 AM Patient No Yes   Sig: Take 1 Tablet by mouth 2 times a day for 90 days. Indications: Thromboembolism secondary to Atrial Fibrillation   atorvastatin (LIPITOR) 20 MG Tab 12/5/2024 Evening Patient Yes Yes   Sig: Take 20 mg by mouth every evening. Indications: High Amount of Fats in the Blood   buPROPion (WELLBUTRIN XL) 300 MG XL tablet 12/6/2024 at  7:30 AM Patient Yes Yes   Sig: Take 300 mg by mouth every morning. Indications: Depression   furosemide (LASIX) 20 MG Tab 12/6/2024 at  7:30 AM Patient No Yes   Sig: Take 1 Tablet by mouth every day.   glipiZIDE (GLUCOTROL) 5 MG Tab 12/6/2024 at  7:30 AM Patient Yes Yes   Sig: Take 5 mg by mouth every day. Pt reports that she takes this medication QDAY, not BID  Indications: Type 2 Diabetes   metoprolol SR (TOPROL XL) 50 MG TABLET SR 24 HR 12/5/2024 Evening Patient No Yes   Sig: Take 1 Tablet by mouth every evening. Indications: High Blood Pressure   pantoprazole (PROTONIX) 40 MG Tablet Delayed Response 12/5/2024 Evening Patient Yes Yes   Sig: Take 40 mg by mouth every evening. Indications: Heartburn   potassium  chloride ER (K-TAB) 20 MEQ Tab CR tablet 12/6/2024 at  7:30 AM Patient No Yes   Sig: Take 1 Tablet by mouth every day.   sertraline (ZOLOFT) 50 MG Tab 12/6/2024 at  7:30 AM Patient Yes Yes   Sig: Take 50 mg by mouth every day. Indications: Major Depressive Disorder      Facility-Administered Medications: None     Physical Exam  Temp:  [36.3 °C (97.4 °F)-36.8 °C (98.3 °F)] 36.3 °C (97.4 °F)  Pulse:  [71-95] 77  Resp:  [16-18] 16  BP: (117-127)/(55-64) 117/55  SpO2:  [92 %-94 %] 94 %  Blood Pressure : 127/64   Temperature: 36.8 °C (98.3 °F)   Pulse: 95   Respiration: 18   Pulse Oximetry: 92 %     Physical Exam  Constitutional:       General: She is not in acute distress.  HENT:      Head: Normocephalic and atraumatic.      Right Ear: External ear normal.      Left Ear: External ear normal.      Nose: No congestion or rhinorrhea.      Mouth/Throat:      Mouth: Mucous membranes are dry.      Pharynx: No oropharyngeal exudate or posterior oropharyngeal erythema.   Eyes:      General: No scleral icterus.        Right eye: No discharge.         Left eye: No discharge.      Conjunctiva/sclera: Conjunctivae normal.      Pupils: Pupils are equal, round, and reactive to light.   Cardiovascular:      Rate and Rhythm: Normal rate and regular rhythm.      Heart sounds:      No friction rub. No gallop.   Pulmonary:      Effort: Pulmonary effort is normal.   Abdominal:      General: Abdomen is flat.      Tenderness: There is abdominal tenderness. There is no guarding or rebound.      Comments: Protuberant abdomen.  Tenderness around the ostomy site   Musculoskeletal:         General: No swelling.      Cervical back: Neck supple. No rigidity. No muscular tenderness.      Right lower leg: No edema.      Left lower leg: No edema.   Skin:     General: Skin is dry.      Capillary Refill: Capillary refill takes 2 to 3 seconds.      Coloration: Skin is not jaundiced or pale.      Findings: No bruising or erythema.   Neurological:       "Mental Status: She is alert and oriented to person, place, and time.   Psychiatric:         Mood and Affect: Mood normal.         Judgment: Judgment normal.       Laboratory:  Recent Labs     12/06/24  1604   WBC 9.0   RBC 4.24   HEMOGLOBIN 12.2   HEMATOCRIT 37.1   MCV 87.5   MCH 28.8   MCHC 32.9   RDW 45.4   PLATELETCT 368   MPV 9.2     Recent Labs     12/06/24  1604   SODIUM 142   POTASSIUM 3.9   CHLORIDE 104   CO2 27   GLUCOSE 146*   BUN 16   CREATININE 1.02   CALCIUM 8.7     Recent Labs     12/06/24  1604   ALTSGPT 19   ASTSGOT 16   ALKPHOSPHAT 109*   TBILIRUBIN 0.2   LIPASE 45   GLUCOSE 146*         No results for input(s): \"NTPROBNP\" in the last 72 hours.      No results for input(s): \"TROPONINT\" in the last 72 hours.    Imaging:  CT-ABDOMEN-PELVIS WITH   Final Result      1. There is circumferential colonic wall thickening and pericolonic fat stranding involving the ascending colon, transverse colon, the colon involving the colostomy, and the descending colon. There was surgical changes in the sigmoid colon with normal    configuration of the rectum. Findings compatible with colitis.   2. No free air.   3. No obstruction.   4. Polyarticular osteoarthritis.        I personally reviewed patient EKG shows sinus rhythm with a rate of 67.  There is ST elevation in leads III and aVF.  There is 1 mm ST depression in leads I and aVL.  These changes are seen on prior EKG.  QTc is 451.     Assessment/Plan:  Justification for Admission Status  I anticipate this patient will require at least two midnights for appropriate medical management, necessitating inpatient admission because the patient has colostomy dysfunction.  Will require a modified diet, general surgery consultation and likely operative correction    Patient will need a Med/Surg bed on MEDICAL service.      * Colostomy dysfunction (HCC)- (present on admission)  Assessment & Plan  General surgery consulted, plan for ostomy revision tomorrow with Dr Potts  "   Clear liquid diet for now, multimodal pain control.  N.p.o. at midnight    Preoperative examination- (present on admission)  Assessment & Plan  Medical Assessment Risk:  High    Patient age is 73 years.  She has morbid obesity with a BMI of 45, she will be at risk for postprocedural respiratory failure    Surgical Risk:   Intermediate    Cardiovascular:   No known history of ischemic heart disease or heart failure   I will order a Pre-op EKG    Pulmonary:  At higher risk for atelectasis/hypoxia.  I will order continuous pulse oximetry  Emphasized incentive spirometry        Renal:   I will start intravenous fluids  I will order follow-up BUN and creatinine     Neurologic:   Avoid fentanyl (short-acting)  Acetaminophen PO TID PRN  Try to minimize using opioid Pain medications.    If patient develops delirium or agitation consider quetiapine 12.5 mg PO x1 PRN agitation (can repeat x1 in 2 hours PRN agitation).      Hematologic:  Plan on pharmacologic DVT prophylaxis post operative day #1. Hold for decreasing hemoglobin. Notify provider for hemoglobin less than 8.    Lactic acidosis- (present on admission)  Assessment & Plan  Likely secondary to reduced intravascular volume secondary to reduced oral intake.  I will start intravenous fluids.  Anticipate lactic acid levels to improve with rehydration.  I will order follow-up lactic acid.      Type 2 diabetes mellitus without complication, without long-term current use of insulin (HCC)- (present on admission)  Assessment & Plan  With no significant hyperglycemia  Last glycated hemoglobin was 6.6%  I will start short acting insulin for now  I will order Accu-Checks, hypoglycemia protocol  Adjust according to blood sugars trend     Morbid obesity with BMI of 45.0-49.9, adult (HCC)- (present on admission)  Assessment & Plan  At higher risk for atelectasis/hypoxia.  I will order continuous pulse oximetry  Emphasized incentive spirometry       Dehydration- (present on  admission)  Assessment & Plan  Likely secondary to reduced oral intake   Encourage oral intake as tolerated, antiemetics as needed.  Intravenous hydration until adequate oral intake is achieved.     Stage 3a chronic kidney disease- (present on admission)  Assessment & Plan  Avoid/minimize nephrotoxins as much as possible, renally dose medications, monitor inputs and outputs     Primary hypertension- (present on admission)  Assessment & Plan  I will start metoprolol with hold parameters    ACP (advance care planning)- (present on admission)  Assessment & Plan  I had a discussion with the patient regarding goals of care, diagnoses, prognosis, and CODE STATUS. We discussed her prognosis and comorbidities.  The patient has advanced age of 73 years.  She has a number of chronic medical problems including diabetes, chronic kidney disease, and morbid obesity with a BMI of 45.  She is presenting with colostomy dysfunction.  However, the patient enjoys a relatively good cognitive and functional capacity.  At this point the patient wants to maintain full code.  She is open to all forms of invasive or noninvasive diagnostic and therapeutic interventions.  She understand that she is a high risk surgical candidate and is agreeable to proceed with the procedure.       Mild episode of recurrent major depressive disorder (HCC)- (present on admission)  Assessment & Plan  I will start sertraline and bupropion      VTE prophylaxis: SCDs/TEDs, plan for surgical intervention    I had a discussion with the patient regarding goals of care, diagnoses, prognosis, and CODE STATUS. We discussed her prognosis and comorbidities.  The patient has advanced age of 73 years.  She has a number of chronic medical problems including diabetes, chronic kidney disease, and morbid obesity with a BMI of 45.  She is presenting with colostomy dysfunction.  However, the patient enjoys a relatively good cognitive and functional capacity.  At this point the  patient wants to maintain full code.  She is open to all forms of invasive or noninvasive diagnostic and therapeutic interventions.  She understand that she is a high risk surgical candidate and is agreeable to proceed with the procedure.  I spent 17 minutes on advanced care planning.

## 2024-12-07 NOTE — ASSESSMENT & PLAN NOTE
I had a discussion with the patient regarding goals of care, diagnoses, prognosis, and CODE STATUS. We discussed her prognosis and comorbidities.  The patient has advanced age of 73 years.  She has a number of chronic medical problems including diabetes, chronic kidney disease, and morbid obesity with a BMI of 45.  She is presenting with colostomy dysfunction.  However, the patient enjoys a relatively good cognitive and functional capacity.  At this point the patient wants to maintain full code.  She is open to all forms of invasive or noninvasive diagnostic and therapeutic interventions.  She understand that she is a high risk surgical candidate and is agreeable to proceed with the procedure.

## 2024-12-07 NOTE — ANESTHESIA POSTPROCEDURE EVALUATION
Patient: Guerda Lunsford    Procedure Summary       Date: 12/07/24 Room / Location:  OR  / SURGERY [facility]    Anesthesia Start: 0801 Anesthesia Stop: 1047    Procedure: COLOSTOMY REVISION (Abdomen) Diagnosis: (COLOSTOMY COMPLICATION)    Surgeons: George Hou D.O. Responsible Provider: Angelica Hardy D.O.    Anesthesia Type: general ASA Status: 3            Final Anesthesia Type: general  Last vitals  BP   Blood Pressure : 107/52    Temp   36.6 °C (97.9 °F)    Pulse   72   Resp   18    SpO2   92 %      Anesthesia Post Evaluation    There were no known notable events for this encounter.     Nurse Pain Score: 0 (NPRS)

## 2024-12-07 NOTE — PROGRESS NOTES
4 Eyes Skin Assessment Completed by NANI RN and Nishant RN.    Head WDL  Ears WDL  Nose WDL  Mouth WDL  Neck WDL  Breast/Chest WDL  Shoulder Blades WDL  Spine Redness and Blanching  (R) Arm/Elbow/Hand Bruising  (L) Arm/Elbow/Hand Bruising  Abdomen Scar and Scab on x3 lap sites, Colostomy LUQ, Redness, balnching, moisture fissure on pannus  Groin Redness and Blanching  Scrotum/Coccyx/Buttocks Redness and Blanching  (R) Leg Swelling, Scar on knee  (L) Leg Swelling, Scar on knee  (R) Heel/Foot/Toe Swelling, Dry and Calloused R big toe  (L) Heel/Foot/Toe Swelling          Devices In Places Pulse Ox      Interventions In Place Pillows, Barrier Cream, and Heels Loaded W/Pillows    Possible Skin Injury No    Pictures Uploaded Into Epic N/A  Wound Consult Placed N/A  RN Wound Prevention Protocol Ordered Yes

## 2024-12-07 NOTE — Clinical Note
noted  ----- Message -----  From: Radha Mott R.N.  Sent: 12/7/2024   6:15 PM PST  To: Cynthia Smith, PT; Asuncion Lynn R.N.; *      ACTION REQUIRED  Order to transfer without discharge sent to Dannielle Lackey for review and signature.  Pt transferred to [facility] to have colostomy revision with Dr George Hou, surgeon.

## 2024-12-07 NOTE — Clinical Note
ACTION REQUIRED  Order to transfer without discharge sent to Dannielle Lackey for review and signature.  Pt transferred to [facility] to have colostomy revision with Dr George Hou, surgeon.

## 2024-12-07 NOTE — ANESTHESIA PREPROCEDURE EVALUATION
Case: [id number] Date/Time: 12/07/24 0758    Procedures:       LAPAROTOMY, EXPLORATORY      CREATION, COLOSTOMY    Location: [facility] OR 01 / SURGERY [facility]    Surgeons: George Hou D.O.            Relevant Problems   ANESTHESIA (within normal limits)      PULMONARY (within normal limits)      NEURO (within normal limits)      CARDIAC   (positive) Heart murmur   (positive) Hemorrhoids   (positive) New onset atrial fibrillation (HCC)   (positive) Primary hypertension      GI (within normal limits)         (positive) Stage 3a chronic kidney disease      ENDO   (positive) Type 2 diabetes mellitus without complication, without long-term current use of insulin (HCC)      OB (within normal limits)      Other   (positive) Colostomy dysfunction (HCC)   (positive) Morbid obesity with BMI of 45.0-49.9, adult (HCC)       Physical Exam    Airway   Mallampati: II  TM distance: >3 FB  Neck ROM: full       Cardiovascular - normal exam  Rhythm: regular  Rate: normal  (-) murmur     Dental - normal exam      Very poor dentition     Pulmonary - normal exam  Breath sounds clear to auscultation     Abdominal   (+) obese     Neurological - normal exam                   Anesthesia Plan    ASA 3   ASA physical status 3 criteria: morbid obesity - BMI greater than or equal to 40    Plan - general       Airway plan will be ETT          Induction: intravenous    Postoperative Plan: Postoperative administration of opioids is intended.    Pertinent diagnostic labs and testing reviewed    Informed Consent:    Anesthetic plan and risks discussed with patient.    Use of blood products discussed with: patient whom consented to blood products.

## 2024-12-07 NOTE — PROGRESS NOTES
Received report from day shift nurse, Yovana RN. Assumed pt care at 1915.  Pt is A&Ox4, resting comfortably in bed. Pt on room air; no signs of SOB/respiratory distress. Labs noted, VSS. Pt denies pain at this moment. Needs attended well. Fall precautions in place. Bed at lowest position. Call light and personal belongings within reach. Encouraged to call for assistance. Plan of care on going, no further concerns as of present.   Hourly rounding in progress.    0545 Clarified with Dr. Helms regardig order of oral potassium this morning as pt is strict NPO post midnight; MD with order that pt may have sips with meds.

## 2024-12-07 NOTE — ASSESSMENT & PLAN NOTE
12/8: I discussed with patient for at least 16 minutes further goals of care.  The patient was interested in filling out a POLST form.  We filled out a POLST form together at bedside.  The patient would like to have CPR attempted, the patient would like to have full treatment options including intubation, mechanical ventilation and transferred to the ICU as needed.  The patient would like to have artificial nutrition/feeding tube trial no longer than 2 weeks.  The patient has decisional capacity.  The POLST form was signed by the patient.  I have given the POLST form to nursing to upload to the system.

## 2024-12-07 NOTE — ED NOTES
Pharmacy Medication Reconciliation      ~Medication reconciliation updated and complete per patient at bedside   ~Allergies have been verified and updated   ~No oral ABX within the last 30 days  ~Patient home pharmacy :  [facility]      ~Anticoagulants (rivaroxaban, apixaban, edoxaban, dabigatran, warfarin, enoxaparin) taken in the last 14 days? Yes  ~Anticoagulant: Eliquis, Last dose: 12/6/2024

## 2024-12-07 NOTE — ASSESSMENT & PLAN NOTE
With no significant hyperglycemia  Last glycated hemoglobin was 6.6%  I will start short acting insulin for now  I will order Accu-Checks, hypoglycemia protocol  Adjust according to blood sugars trend     12/7: Pending new A1c    12/9: A1c is 7.2. Continue ISS

## 2024-12-07 NOTE — ASSESSMENT & PLAN NOTE
Medical Assessment Risk:  High    Patient age is 73 years.  She has morbid obesity with a BMI of 45, she will be at risk for postprocedural respiratory failure    Surgical Risk:   Intermediate    Cardiovascular:   No known history of ischemic heart disease or heart failure   I will order a Pre-op EKG    Pulmonary:  At higher risk for atelectasis/hypoxia.  I will order continuous pulse oximetry  Emphasized incentive spirometry        Renal:   I will start intravenous fluids  I will order follow-up BUN and creatinine     Neurologic:   Avoid fentanyl (short-acting)  Acetaminophen PO TID PRN  Try to minimize using opioid Pain medications.    If patient develops delirium or agitation consider quetiapine 12.5 mg PO x1 PRN agitation (can repeat x1 in 2 hours PRN agitation).      Hematologic:  Plan on pharmacologic DVT prophylaxis post operative day #1. Hold for decreasing hemoglobin. Notify provider for hemoglobin less than 8.

## 2024-12-07 NOTE — PROGRESS NOTES
General Surgery progress note    No acute changes overnight    CT reviewed  Mild colon wall thickening may be colitis versus persistent reactive process from prior obstruction    Plan to proceed with revision of colostomy today  Questions answered  Consent ordered

## 2024-12-07 NOTE — ASSESSMENT & PLAN NOTE
12/7: Dilutional? No signs of overt bleeding, however hemoglobin this morning is 10.4.  Will repeat hemoglobin.  Patient to undergo surgery today, we appreciate further recommendations.  Monitor closely.    12/8: Hemoglobin seems to be stable.  As per general surgery we can restart the patient's Eliquis later today.    12/9: Only a slight decrease in hemoglobin today compared from yesterday, no signs of overt bleeding.  Eliquis has been restarted yesterday.  Will monitor closely for any signs of bleeding and repeat CBC tomorrow.

## 2024-12-08 PROBLEM — I48.91 ATRIAL FIBRILLATION (HCC): Status: ACTIVE | Noted: 2024-12-08

## 2024-12-08 LAB
ALBUMIN SERPL BCP-MCNC: 2.9 G/DL (ref 3.2–4.9)
ALBUMIN/GLOB SERPL: 1.1 G/DL
ALP SERPL-CCNC: 86 U/L (ref 30–99)
ALT SERPL-CCNC: 13 U/L (ref 2–50)
ANION GAP SERPL CALC-SCNC: 9 MMOL/L (ref 7–16)
APPEARANCE UR: CLEAR
AST SERPL-CCNC: 10 U/L (ref 12–45)
BACTERIA #/AREA URNS HPF: ABNORMAL /HPF
BILIRUB SERPL-MCNC: 0.4 MG/DL (ref 0.1–1.5)
BILIRUB UR QL STRIP.AUTO: NEGATIVE
BUN SERPL-MCNC: 14 MG/DL (ref 8–22)
CALCIUM ALBUM COR SERPL-MCNC: 9.1 MG/DL (ref 8.5–10.5)
CALCIUM SERPL-MCNC: 8.2 MG/DL (ref 8.4–10.2)
CASTS URNS QL MICRO: ABNORMAL /LPF (ref 0–2)
CHLORIDE SERPL-SCNC: 102 MMOL/L (ref 96–112)
CO2 SERPL-SCNC: 26 MMOL/L (ref 20–33)
COLOR UR: YELLOW
CREAT SERPL-MCNC: 0.94 MG/DL (ref 0.5–1.4)
EPITHELIAL CELLS 1715: ABNORMAL /HPF (ref 0–5)
ERYTHROCYTE [DISTWIDTH] IN BLOOD BY AUTOMATED COUNT: 45.1 FL (ref 35.9–50)
EST. AVERAGE GLUCOSE BLD GHB EST-MCNC: 160 MG/DL
GFR SERPLBLD CREATININE-BSD FMLA CKD-EPI: 64 ML/MIN/1.73 M 2
GLOBULIN SER CALC-MCNC: 2.6 G/DL (ref 1.9–3.5)
GLUCOSE BLD STRIP.AUTO-MCNC: 118 MG/DL (ref 65–99)
GLUCOSE BLD STRIP.AUTO-MCNC: 121 MG/DL (ref 65–99)
GLUCOSE BLD STRIP.AUTO-MCNC: 142 MG/DL (ref 65–99)
GLUCOSE BLD STRIP.AUTO-MCNC: 145 MG/DL (ref 65–99)
GLUCOSE SERPL-MCNC: 149 MG/DL (ref 65–99)
GLUCOSE UR STRIP.AUTO-MCNC: NEGATIVE MG/DL
HBA1C MFR BLD: 7.2 % (ref 4–5.6)
HCT VFR BLD AUTO: 30.9 % (ref 37–47)
HGB BLD-MCNC: 10.1 G/DL (ref 12–16)
KETONES UR STRIP.AUTO-MCNC: NEGATIVE MG/DL
LEUKOCYTE ESTERASE UR QL STRIP.AUTO: ABNORMAL
MAGNESIUM SERPL-MCNC: 2.1 MG/DL (ref 1.5–2.5)
MCH RBC QN AUTO: 28.7 PG (ref 27–33)
MCHC RBC AUTO-ENTMCNC: 32.7 G/DL (ref 32.2–35.5)
MCV RBC AUTO: 87.8 FL (ref 81.4–97.8)
MICRO URNS: ABNORMAL
NITRITE UR QL STRIP.AUTO: NEGATIVE
PH UR STRIP.AUTO: 5.5 [PH] (ref 5–8)
PLATELET # BLD AUTO: 348 K/UL (ref 164–446)
PMV BLD AUTO: 9.4 FL (ref 9–12.9)
POTASSIUM SERPL-SCNC: 4.7 MMOL/L (ref 3.6–5.5)
PROT SERPL-MCNC: 5.5 G/DL (ref 6–8.2)
PROT UR QL STRIP: NEGATIVE MG/DL
RBC # BLD AUTO: 3.52 M/UL (ref 4.2–5.4)
RBC # URNS HPF: ABNORMAL /HPF
RBC UR QL AUTO: NEGATIVE
SODIUM SERPL-SCNC: 137 MMOL/L (ref 135–145)
SP GR UR STRIP.AUTO: 1.02
URIC ACID CRYSTAL  1766: PRESENT /HPF
WBC # BLD AUTO: 9.8 K/UL (ref 4.8–10.8)
WBC #/AREA URNS HPF: ABNORMAL /HPF

## 2024-12-08 PROCEDURE — 82962 GLUCOSE BLOOD TEST: CPT | Mod: 91

## 2024-12-08 PROCEDURE — 665998 HH PPS REVENUE CREDIT

## 2024-12-08 PROCEDURE — 700102 HCHG RX REV CODE 250 W/ 637 OVERRIDE(OP): Performed by: INTERNAL MEDICINE

## 2024-12-08 PROCEDURE — 80053 COMPREHEN METABOLIC PANEL: CPT

## 2024-12-08 PROCEDURE — 83735 ASSAY OF MAGNESIUM: CPT

## 2024-12-08 PROCEDURE — A9270 NON-COVERED ITEM OR SERVICE: HCPCS | Performed by: INTERNAL MEDICINE

## 2024-12-08 PROCEDURE — 665999 HH PPS REVENUE DEBIT

## 2024-12-08 PROCEDURE — 99497 ADVNCD CARE PLAN 30 MIN: CPT | Performed by: INTERNAL MEDICINE

## 2024-12-08 PROCEDURE — 94760 N-INVAS EAR/PLS OXIMETRY 1: CPT

## 2024-12-08 PROCEDURE — 81001 URINALYSIS AUTO W/SCOPE: CPT

## 2024-12-08 PROCEDURE — 51798 US URINE CAPACITY MEASURE: CPT

## 2024-12-08 PROCEDURE — 85027 COMPLETE CBC AUTOMATED: CPT

## 2024-12-08 PROCEDURE — 700105 HCHG RX REV CODE 258: Performed by: SURGERY

## 2024-12-08 PROCEDURE — 700111 HCHG RX REV CODE 636 W/ 250 OVERRIDE (IP): Mod: JZ | Performed by: SURGERY

## 2024-12-08 PROCEDURE — 700102 HCHG RX REV CODE 250 W/ 637 OVERRIDE(OP): Performed by: HOSPITALIST

## 2024-12-08 PROCEDURE — A9270 NON-COVERED ITEM OR SERVICE: HCPCS | Performed by: HOSPITALIST

## 2024-12-08 PROCEDURE — 770001 HCHG ROOM/CARE - MED/SURG/GYN PRIV*

## 2024-12-08 PROCEDURE — 99233 SBSQ HOSP IP/OBS HIGH 50: CPT | Mod: 25 | Performed by: INTERNAL MEDICINE

## 2024-12-08 PROCEDURE — 36415 COLL VENOUS BLD VENIPUNCTURE: CPT

## 2024-12-08 PROCEDURE — 97163 PT EVAL HIGH COMPLEX 45 MIN: CPT

## 2024-12-08 RX ADMIN — OXYCODONE HYDROCHLORIDE 10 MG: 10 TABLET ORAL at 08:17

## 2024-12-08 RX ADMIN — PIPERACILLIN AND TAZOBACTAM 4.5 G: 4; .5 INJECTION, POWDER, FOR SOLUTION INTRAVENOUS at 14:04

## 2024-12-08 RX ADMIN — PIPERACILLIN AND TAZOBACTAM 4.5 G: 4; .5 INJECTION, POWDER, FOR SOLUTION INTRAVENOUS at 20:29

## 2024-12-08 RX ADMIN — CARBOXYMETHYLCELLULOSE SODIUM 1 DROP: 0.5 SOLUTION/ DROPS OPHTHALMIC at 14:02

## 2024-12-08 RX ADMIN — KETOROLAC TROMETHAMINE 1 DROP: 0.5 SOLUTION OPHTHALMIC at 14:02

## 2024-12-08 RX ADMIN — KETOROLAC TROMETHAMINE 1 DROP: 0.5 SOLUTION OPHTHALMIC at 08:18

## 2024-12-08 RX ADMIN — BUPROPION HYDROCHLORIDE 300 MG: 150 TABLET, FILM COATED, EXTENDED RELEASE ORAL at 05:34

## 2024-12-08 RX ADMIN — ATORVASTATIN CALCIUM 20 MG: 20 TABLET, FILM COATED ORAL at 17:29

## 2024-12-08 RX ADMIN — OMEPRAZOLE 40 MG: 20 CAPSULE, DELAYED RELEASE ORAL at 17:29

## 2024-12-08 RX ADMIN — PIPERACILLIN AND TAZOBACTAM 4.5 G: 4; .5 INJECTION, POWDER, FOR SOLUTION INTRAVENOUS at 05:36

## 2024-12-08 RX ADMIN — OXYCODONE HYDROCHLORIDE 10 MG: 10 TABLET ORAL at 19:22

## 2024-12-08 RX ADMIN — APIXABAN 5 MG: 5 TABLET, FILM COATED ORAL at 17:29

## 2024-12-08 RX ADMIN — SERTRALINE 50 MG: 50 TABLET, FILM COATED ORAL at 05:34

## 2024-12-08 ASSESSMENT — ENCOUNTER SYMPTOMS
PALPITATIONS: 0
COUGH: 0
FEVER: 0
ABDOMINAL PAIN: 1
VOMITING: 0
CHILLS: 0
NERVOUS/ANXIOUS: 0
DOUBLE VISION: 0
HEARTBURN: 0
DIZZINESS: 0
BACK PAIN: 0
BLURRED VISION: 0
SHORTNESS OF BREATH: 0
HEADACHES: 0
FALLS: 0

## 2024-12-08 ASSESSMENT — PAIN DESCRIPTION - PAIN TYPE
TYPE: SURGICAL PAIN
TYPE: ACUTE PAIN;SURGICAL PAIN
TYPE: SURGICAL PAIN
TYPE: ACUTE PAIN;SURGICAL PAIN

## 2024-12-08 ASSESSMENT — COGNITIVE AND FUNCTIONAL STATUS - GENERAL
WALKING IN HOSPITAL ROOM: A LITTLE
SUGGESTED CMS G CODE MODIFIER MOBILITY: CJ
CLIMB 3 TO 5 STEPS WITH RAILING: A LITTLE
TURNING FROM BACK TO SIDE WHILE IN FLAT BAD: A LITTLE
MOBILITY SCORE: 20
MOVING TO AND FROM BED TO CHAIR: A LITTLE

## 2024-12-08 ASSESSMENT — GAIT ASSESSMENTS
ASSISTIVE DEVICE: FRONT WHEEL WALKER
DEVIATION: BRADYKINETIC
DISTANCE (FEET): 75
GAIT LEVEL OF ASSIST: STANDBY ASSIST

## 2024-12-08 ASSESSMENT — LIFESTYLE VARIABLES: SUBSTANCE_ABUSE: 0

## 2024-12-08 ASSESSMENT — FIBROSIS 4 INDEX: FIB4 SCORE: 0.58

## 2024-12-08 NOTE — CARE PLAN
The patient is Stable - Low risk of patient condition declining or worsening    Shift Goals  Clinical Goals: Pt's pain will be controlled to tolerable level at 4/10 or less. Pt will be able to void post op and ambulate safely within the shift. Monitor colostomy.  Patient Goals: pain control, rest and sleep  Family Goals: n/a    Progress made toward(s) clinical / shift goals:  Pt required 1 prn pain medication within the shift. Pt states relief post interventions. Pt able to void post op and ambulate safely within the shift. Colostomy monitored throughout the shift. Pt seen resting and sleeping comfortably in between rounds; even and unlabored breathing noted. Hourly rounding in progress.     Patient is not progressing towards the following goals: n/a

## 2024-12-08 NOTE — THERAPY
Physical Therapy   Initial Evaluation     Patient Name: Guerda Lunsford  Age:  73 y.o., Sex:  female  Medical Record #: [id number]  Today's Date: 12/8/2024          Assessment  Pt is a 74 yo F with a past medical history of morbid obesity with a BMI of 45, diabetes, hypertension who presented 12/6/2024 with abdominal pain and concern for blockage at colostomy site. Pt is currently s/p colostomy revision on 12/7. Pt demonstrated supine to sit with HOB elevated, although she explained she has a bed at home that has this feature. She was able to ambulate household distances in the hallway with FWW, negotiate stairs with SBA, and transfer on/off toilet with use of grab bar. She reports she is near her PLOF . Anticipate that the patient will have no further physical therapy needs after discharge from the hospital. Will complete PT orders, please reorder if status changes.     Plan    Physical Therapy Initial Treatment Plan   Duration: Evaluation only    DC Equipment Recommendations: Front-Wheel Walker  Discharge Recommendations: Anticipate that the patient will have no further physical therapy needs after discharge from the hospital          Objective       12/08/24 1308   Initial Contact Note    Initial Contact Note Order Received and Verified, Evaluation Only - Patient Does Not Require Further Acute Physical Therapy at this Time.  However, May Benefit from Post Acute Therapy for Higher Level Functional Deficits.   Prior Living Situation   Prior Services   (was receiving PT/OT)   Housing / Facility 1 Story House   Steps Into Home 2   Rail   (notes there is something she can hang onto, but they are not handrails)   Bathroom Set up Shower Chair   Equipment Owned Single Point Cane;4-Wheel Walker   Comments reports she has a tempurpedic mattress that allows her to raise the HOB as needed   Prior Level of Functional Mobility   Bed Mobility Independent   Transfer Status Independent   Ambulation Independent   Ambulation  Distance community   Stairs Independent   Cognition    Cognition / Consciousness WDL   Level of Consciousness Alert   Active ROM Lower Body    Active ROM Lower Body  WDL   Strength Lower Body   Lower Body Strength  WDL   Balance Assessment   Sitting Balance (Static) Fair +   Sitting Balance (Dynamic) Fair +   Standing Balance (Static) Fair +   Standing Balance (Dynamic) Fair   Weight Shift Sitting Good   Weight Shift Standing Good   Bed Mobility    Supine to Sit Supervised   Sit to Supine Supervised   Scooting Supervised  (utilized bed rails)   Rolling Supervised   Gait Analysis   Gait Level Of Assist Standby Assist   Assistive Device Front Wheel Walker   Distance (Feet) 75   # of Times Distance was Traveled 2   Deviation Bradykinetic   # of Stairs Climbed 3  (with BHR use)   Level of Assist with Stairs Standby Assist   Functional Mobility   Sit to Stand Standby Assist   Toilet Transfers Standby Assist  (utilized grab bars)   Transfer Method Stand Step   Mobility supine <> sit, ambulate in hallway, transferred to toilet, back to bed   6 Clicks Assessment - How much HELP from from another person do you currently need... (If the patient hasn't done an activity recently, how much help from another person do you think he/she would need if he/she tried?)   Turning from your back to your side while in a flat bed without using bedrails? 3   Moving from lying on your back to sitting on the side of a flat bed without using bedrails? 4   Moving to and from a bed to a chair (including a wheelchair)? 3   Standing up from a chair using your arms (e.g., wheelchair, or bedside chair)? 4   Walking in hospital room? 3   Climbing 3-5 steps with a railing? 3   6 clicks Mobility Score 20   Education Group   Education Provided Role of Physical Therapist;Stair Training   Role of Physical Therapist Patient Response Patient;Acceptance;Explanation;Verbal Demonstration   Stair Training Patient Response Patient;Acceptance;Explanation;Action  Demonstration   Physical Therapy Initial Treatment Plan    Duration Evaluation only   Anticipated Discharge Equipment and Recommendations   DC Equipment Recommendations Front-Wheel Walker   Discharge Recommendations Anticipate that the patient will have no further physical therapy needs after discharge from the hospital   Interdisciplinary Plan of Care Collaboration   IDT Collaboration with  Nursing   Patient Position at End of Therapy In Bed;Call Light within Reach;Phone within Reach;Tray Table within Reach   Session Information   Date / Session Number  12/8, eval only

## 2024-12-08 NOTE — PROGRESS NOTES
4 Eyes Skin Assessment Completed by NORY CARDOZA and NORY Medina.    Head WDL  Eyes R eyes redness and mild swelling  Ears WDL  Nose WDL  Mouth WDL  Neck WDL  Breast/Chest WDL  Shoulder Blades WDL  Spine Redness and Blanching  (R) Arm/Elbow/Hand Bruising  (L) Arm/Elbow/Hand Bruising  Abdomen Scar and Scab x3 lap sites, moisture fissure on R side pannus, Colostomy at LUQ  Groin Redness and Blanching  Scrotum/Coccyx/Buttocks Redness and Blanching  (R) Leg Scar and Swelling  (L) Leg Scar and Swelling  (R) Heel/Foot/Toe Swelling, Dry and Calloused R big toe  (L) Heel/Foot/Toe Swelling          Devices In Places Blood Pressure Cuff, Pulse Ox, and Nasal Cannula      Interventions In Place NC W/Ear Foams, Pillows, Barrier Cream, and Heels Loaded W/Pillows    Possible Skin Injury No    Pictures Uploaded Into Epic N/A  Wound Consult Placed N/A  RN Wound Prevention Protocol Ordered Yes

## 2024-12-08 NOTE — ASSESSMENT & PLAN NOTE
2/8: Continue metoprolol and we will restart eliquis later today as per surgery.    12/9: Continue metoprolol and Eliquis for now.

## 2024-12-08 NOTE — PROGRESS NOTES
0700 Received report from Night shift nurse  0817 Performed assessment  Pt is A&Ox4, complains of 7/10 pain; medication given per MAR, Updated on POC: mobilize as tolerated, manage pain, monitor output of colostomy  Call light within reach, hourly rounding in place, bed in lowest position.

## 2024-12-08 NOTE — PROGRESS NOTES
Received bedside report from day shift nurse, Estephanie CUETO. Assumed pt care at 1915.  Pt is A&Ox4, resting comfortably in bed. Pt on room air; no signs of SOB/respiratory distress. Labs noted, VSS. Pt complains of pain at 8/10; prn pain medication given at this moment. Needs attended well. Colostomy in place. Fall precautions in place. Bed at lowest position. Call light and personal belongings within reach. Bed alarm on. Encouraged to call for assistance. Plan of care on going, no further concerns as of present.   Hourly rounding in progress.

## 2024-12-08 NOTE — PROGRESS NOTES
"Hospital Medicine Daily Progress Note    Date of Service  12/8/2024    Chief Complaint  Guerda Lunsford is a 73 y.o. female admitted 12/6/2024 with abdominal pain.    Hospital Course  As per chart review:  \"73 y.o. female with a past medical history of morbid obesity with a BMI of 45, diabetes, hypertension who presented 12/6/2024 with abdominal pain and concern for blockage at colostomy site.  Patient reports that she has not been feeling well since Thanksgiving.  She has been having intermittent episodes of diffuse abdominal pain and local pain around the ostomy site.  The pain now is mostly around the ostomy site.  She denies having fevers or chills.  She does not have vomiting.\"      Interval Problem Update  12/7: Patient seen at bedside this morning.  Still complaining of abdominal pain.  Patient n.p.o. she will most likely undergo surgery today.  We appreciate further recommendations.  Continue to monitor closely.    12/8: Patient seen at bedside this morning.  The patient complaining of some abdominal pain, most likely soreness due to recent surgery.  We appreciate further recommendations by general surgery.  Continue with full liquid diet for now.  Advance diet as per surgery.  I discussed case with general surgery, we can restart the patient's Eliquis later today.  Continue to monitor closely.  Pending PT/OT evaluation.    I have discussed this patient's plan of care and discharge plan at IDT rounds today with Case Management, Nursing, Nursing leadership, and other members of the IDT team.    Consultants/Specialty  general surgery    Code Status  Full Code    Disposition  The patient is not medically cleared for discharge to home or a post-acute facility.        Review of Systems  Review of Systems   Constitutional:  Positive for malaise/fatigue. Negative for chills and fever.   HENT:  Negative for hearing loss and nosebleeds.    Eyes:  Negative for blurred vision and double vision.   Respiratory:  " Negative for cough and shortness of breath.    Cardiovascular:  Negative for chest pain and palpitations.   Gastrointestinal:  Positive for abdominal pain. Negative for heartburn and vomiting.   Genitourinary:  Negative for dysuria and urgency.   Musculoskeletal:  Negative for back pain and falls.   Skin:  Negative for itching and rash.   Neurological:  Negative for dizziness and headaches.   Psychiatric/Behavioral:  Negative for substance abuse. The patient is not nervous/anxious.    All other systems reviewed and are negative.       Physical Exam  Temp:  [36 °C (96.8 °F)-36.9 °C (98.4 °F)] 36.9 °C (98.4 °F)  Pulse:  [73-83] 73  Resp:  [15-18] 18  BP: ()/(47-94) 128/94  SpO2:  [87 %-97 %] 91 %    Physical Exam  Vitals and nursing note reviewed.   Constitutional:       General: She is not in acute distress.     Appearance: She is obese. She is ill-appearing.   HENT:      Head: Normocephalic and atraumatic.      Right Ear: External ear normal.      Left Ear: External ear normal.      Mouth/Throat:      Pharynx: No oropharyngeal exudate or posterior oropharyngeal erythema.   Eyes:      General:         Right eye: No discharge.         Left eye: No discharge.   Cardiovascular:      Rate and Rhythm: Normal rate and regular rhythm.      Pulses: Normal pulses.      Heart sounds: No murmur heard.     No gallop.   Pulmonary:      Effort: No respiratory distress.   Abdominal:      Tenderness: There is abdominal tenderness.      Comments: Ostomy in place   Musculoskeletal:         General: No swelling or tenderness. Normal range of motion.      Cervical back: Normal range of motion and neck supple. No tenderness.   Skin:     General: Skin is warm and dry.   Neurological:      General: No focal deficit present.      Mental Status: She is alert and oriented to person, place, and time. Mental status is at baseline.      Motor: No weakness.   Psychiatric:         Behavior: Behavior normal.         Fluids    Intake/Output  Summary (Last 24 hours) at 12/8/2024 1129  Last data filed at 12/8/2024 0817  Gross per 24 hour   Intake 784.57 ml   Output 950 ml   Net -165.43 ml        Laboratory  Recent Labs     12/06/24  1604 12/07/24  0230 12/07/24  1229 12/08/24  0156   WBC 9.0 8.5  --  9.8   RBC 4.24 3.64*  --  3.52*   HEMOGLOBIN 12.2 10.4* 11.6* 10.1*   HEMATOCRIT 37.1 32.3* 34.9* 30.9*   MCV 87.5 88.7  --  87.8   MCH 28.8 28.6  --  28.7   MCHC 32.9 32.2  --  32.7   RDW 45.4 45.6  --  45.1   PLATELETCT 368 325  --  348   MPV 9.2 9.3  --  9.4     Recent Labs     12/06/24  1604 12/07/24  0230 12/08/24  0156   SODIUM 142 139 137   POTASSIUM 3.9 3.7 4.7   CHLORIDE 104 103 102   CO2 27 25 26   GLUCOSE 146* 111* 149*   BUN 16 14 14   CREATININE 1.02 0.94 0.94   CALCIUM 8.7 8.1* 8.2*                   Imaging  CT-ABDOMEN-PELVIS WITH   Final Result      1. There is circumferential colonic wall thickening and pericolonic fat stranding involving the ascending colon, transverse colon, the colon involving the colostomy, and the descending colon. There was surgical changes in the sigmoid colon with normal    configuration of the rectum. Findings compatible with colitis.   2. No free air.   3. No obstruction.   4. Polyarticular osteoarthritis.           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    12/7: continue zosyn as per surgery. Patient to undergo surgery today, we appreciate further recommendations.    12/8: continue antibiotics as per surgery. We appreciate further recommendations.    Atrial fibrillation (HCC)  Assessment & Plan  2/8: Continue metoprolol and we will restart eliquis later today as per surgery.    Hypomagnesemia  Assessment & Plan  Replace as needed  monitor    Lactic acidosis- (present on admission)  Assessment & Plan  improved    Type 2 diabetes  mellitus without complication, without long-term current use of insulin (HCC)- (present on admission)  Assessment & Plan  With no significant hyperglycemia  Last glycated hemoglobin was 6.6%  I will start short acting insulin for now  I will order Accu-Checks, hypoglycemia protocol  Adjust according to blood sugars trend     12/7: Pending new A1c    12/8: A1c is 7.2. Continue ISS    Morbid obesity with BMI of 45.0-49.9, adult (HCC)- (present on admission)  Assessment & Plan  At higher risk for atelectasis/hypoxia.  I will order continuous pulse oximetry  Emphasized incentive spirometry       Dehydration- (present on admission)  Assessment & Plan  Likely secondary to reduced oral intake   Encourage oral intake as tolerated, antiemetics as needed.  Intravenous hydration until adequate oral intake is achieved.     Stage 3a chronic kidney disease- (present on admission)  Assessment & Plan  Avoid/minimize nephrotoxins as much as possible, renally dose medications, monitor inputs and outputs     Primary hypertension- (present on admission)  Assessment & Plan  I will start metoprolol with hold parameters    Anemia- (present on admission)  Assessment & Plan  12/7: Dilutional? No signs of overt bleeding, however hemoglobin this morning is 10.4.  Will repeat hemoglobin.  Patient to undergo surgery today, we appreciate further recommendations.  Monitor closely.    12/8: Hemoglobin seems to be stable.  As per general surgery we can restart the patient's Eliquis later today.    Mild episode of recurrent major depressive disorder (HCC)- (present on admission)  Assessment & Plan  12/8: Continue sertraline and bupropion    Advance care planning  Assessment & Plan  12/8: I discussed with patient for at least 16 minutes further goals of care.  The patient was interested in filling out a POLST form.  We filled out a POLST form together at bedside.  The patient would like to have CPR attempted, the patient would like to have full  treatment options including intubation, mechanical ventilation and transferred to the ICU as needed.  The patient would like to have artificial nutrition/feeding tube trial no longer than 2 weeks.  The patient has decisional capacity.  The POLST form was signed by the patient.  I have given the POLST form to nursing to upload to the system.         VTE prophylaxis: Eliquis    I have performed a physical exam and reviewed and updated ROS and Plan today (12/8/2024). In review of yesterday's note (12/7/2024), there are no changes except as documented above.      I spend at least 52 minutes providing care for this patient.  This included face-to-face interview, physical examination.  Review of lab work including CBC, CMP, and magnesium.  Discussing with general surgery plan of care.  Discussing with multidisciplinary team including case management, nursing staff and pharmacy.  Creating plan of care, reviewing orders.    Moreover, I discussed with patient for at least 16 minutes further goals of care.  The patient was interested in filling out a POLST form.  We filled out a POLST form together at bedside.  The patient would like to have CPR attempted, the patient would like to have full treatment options including intubation, mechanical ventilation and transferred to the ICU as needed.  The patient would like to have artificial nutrition/feeding tube trial no longer than 2 weeks.  The patient has decisional capacity.  The POLST form was signed by the patient.  I have given the POLST form to nursing to upload to the system.

## 2024-12-08 NOTE — CASE COMMUNICATION
noted  ----- Message -----  From: Radha Mott R.N.  Sent: 12/7/2024   6:15 PM PST  To: Cynthia Smith, PT; Asuncion Lynn R.N.; *      ACTION REQUIRED  Order to transfer without discharge sent to Dannielle Lackey for review and signature.  Pt transferred to [facility] to have colostomy revision with Dr George Huo, surgeon.

## 2024-12-09 LAB
ALBUMIN SERPL BCP-MCNC: 2.7 G/DL (ref 3.2–4.9)
ALBUMIN/GLOB SERPL: 1 G/DL
ALP SERPL-CCNC: 79 U/L (ref 30–99)
ALT SERPL-CCNC: 11 U/L (ref 2–50)
ANION GAP SERPL CALC-SCNC: 9 MMOL/L (ref 7–16)
AST SERPL-CCNC: 9 U/L (ref 12–45)
BILIRUB SERPL-MCNC: 0.4 MG/DL (ref 0.1–1.5)
BUN SERPL-MCNC: 10 MG/DL (ref 8–22)
CALCIUM ALBUM COR SERPL-MCNC: 9.2 MG/DL (ref 8.5–10.5)
CALCIUM SERPL-MCNC: 8.2 MG/DL (ref 8.4–10.2)
CHLORIDE SERPL-SCNC: 102 MMOL/L (ref 96–112)
CO2 SERPL-SCNC: 27 MMOL/L (ref 20–33)
CREAT SERPL-MCNC: 0.95 MG/DL (ref 0.5–1.4)
ERYTHROCYTE [DISTWIDTH] IN BLOOD BY AUTOMATED COUNT: 46.4 FL (ref 35.9–50)
GFR SERPLBLD CREATININE-BSD FMLA CKD-EPI: 63 ML/MIN/1.73 M 2
GLOBULIN SER CALC-MCNC: 2.7 G/DL (ref 1.9–3.5)
GLUCOSE BLD STRIP.AUTO-MCNC: 104 MG/DL (ref 65–99)
GLUCOSE BLD STRIP.AUTO-MCNC: 110 MG/DL (ref 65–99)
GLUCOSE BLD STRIP.AUTO-MCNC: 117 MG/DL (ref 65–99)
GLUCOSE BLD STRIP.AUTO-MCNC: 124 MG/DL (ref 65–99)
GLUCOSE SERPL-MCNC: 119 MG/DL (ref 65–99)
HCT VFR BLD AUTO: 30.2 % (ref 37–47)
HGB BLD-MCNC: 9.7 G/DL (ref 12–16)
MAGNESIUM SERPL-MCNC: 2 MG/DL (ref 1.5–2.5)
MCH RBC QN AUTO: 28.9 PG (ref 27–33)
MCHC RBC AUTO-ENTMCNC: 32.1 G/DL (ref 32.2–35.5)
MCV RBC AUTO: 89.9 FL (ref 81.4–97.8)
PLATELET # BLD AUTO: 307 K/UL (ref 164–446)
PMV BLD AUTO: 9.4 FL (ref 9–12.9)
POTASSIUM SERPL-SCNC: 4.1 MMOL/L (ref 3.6–5.5)
PROT SERPL-MCNC: 5.4 G/DL (ref 6–8.2)
RBC # BLD AUTO: 3.36 M/UL (ref 4.2–5.4)
SODIUM SERPL-SCNC: 138 MMOL/L (ref 135–145)
WBC # BLD AUTO: 8.2 K/UL (ref 4.8–10.8)

## 2024-12-09 PROCEDURE — 36415 COLL VENOUS BLD VENIPUNCTURE: CPT

## 2024-12-09 PROCEDURE — 80053 COMPREHEN METABOLIC PANEL: CPT

## 2024-12-09 PROCEDURE — 700105 HCHG RX REV CODE 258: Performed by: SURGERY

## 2024-12-09 PROCEDURE — 665999 HH PPS REVENUE DEBIT

## 2024-12-09 PROCEDURE — 94760 N-INVAS EAR/PLS OXIMETRY 1: CPT

## 2024-12-09 PROCEDURE — 83735 ASSAY OF MAGNESIUM: CPT

## 2024-12-09 PROCEDURE — 700111 HCHG RX REV CODE 636 W/ 250 OVERRIDE (IP): Mod: JZ | Performed by: SURGERY

## 2024-12-09 PROCEDURE — A9270 NON-COVERED ITEM OR SERVICE: HCPCS | Performed by: HOSPITALIST

## 2024-12-09 PROCEDURE — 85027 COMPLETE CBC AUTOMATED: CPT

## 2024-12-09 PROCEDURE — 770001 HCHG ROOM/CARE - MED/SURG/GYN PRIV*

## 2024-12-09 PROCEDURE — 82962 GLUCOSE BLOOD TEST: CPT | Mod: 91

## 2024-12-09 PROCEDURE — 97165 OT EVAL LOW COMPLEX 30 MIN: CPT

## 2024-12-09 PROCEDURE — 665998 HH PPS REVENUE CREDIT

## 2024-12-09 PROCEDURE — 97535 SELF CARE MNGMENT TRAINING: CPT

## 2024-12-09 PROCEDURE — A9270 NON-COVERED ITEM OR SERVICE: HCPCS | Performed by: INTERNAL MEDICINE

## 2024-12-09 PROCEDURE — 700102 HCHG RX REV CODE 250 W/ 637 OVERRIDE(OP): Performed by: INTERNAL MEDICINE

## 2024-12-09 PROCEDURE — 700102 HCHG RX REV CODE 250 W/ 637 OVERRIDE(OP): Performed by: HOSPITALIST

## 2024-12-09 PROCEDURE — 99233 SBSQ HOSP IP/OBS HIGH 50: CPT | Performed by: INTERNAL MEDICINE

## 2024-12-09 RX ADMIN — OMEPRAZOLE 40 MG: 20 CAPSULE, DELAYED RELEASE ORAL at 17:43

## 2024-12-09 RX ADMIN — BUPROPION HYDROCHLORIDE 300 MG: 150 TABLET, FILM COATED, EXTENDED RELEASE ORAL at 05:24

## 2024-12-09 RX ADMIN — PIPERACILLIN AND TAZOBACTAM 4.5 G: 4; .5 INJECTION, POWDER, FOR SOLUTION INTRAVENOUS at 05:27

## 2024-12-09 RX ADMIN — SERTRALINE 50 MG: 50 TABLET, FILM COATED ORAL at 05:24

## 2024-12-09 RX ADMIN — APIXABAN 5 MG: 5 TABLET, FILM COATED ORAL at 17:50

## 2024-12-09 RX ADMIN — FUROSEMIDE 20 MG: 20 TABLET ORAL at 05:24

## 2024-12-09 RX ADMIN — AMOXICILLIN AND CLAVULANATE POTASSIUM 1 TABLET: 875; 125 TABLET, FILM COATED ORAL at 17:50

## 2024-12-09 RX ADMIN — OXYCODONE HYDROCHLORIDE 10 MG: 10 TABLET ORAL at 09:28

## 2024-12-09 RX ADMIN — ATORVASTATIN CALCIUM 20 MG: 20 TABLET, FILM COATED ORAL at 17:43

## 2024-12-09 RX ADMIN — METOPROLOL SUCCINATE 50 MG: 25 TABLET, EXTENDED RELEASE ORAL at 17:44

## 2024-12-09 RX ADMIN — OXYCODONE 5 MG: 5 TABLET ORAL at 20:01

## 2024-12-09 RX ADMIN — APIXABAN 5 MG: 5 TABLET, FILM COATED ORAL at 05:24

## 2024-12-09 ASSESSMENT — ENCOUNTER SYMPTOMS
FALLS: 0
PALPITATIONS: 0
VOMITING: 0
NERVOUS/ANXIOUS: 0
CHILLS: 0
ABDOMINAL PAIN: 1
DOUBLE VISION: 0
BACK PAIN: 0
BLURRED VISION: 0
DIZZINESS: 0
HEADACHES: 0
SHORTNESS OF BREATH: 0
HEARTBURN: 0
FEVER: 0
COUGH: 0

## 2024-12-09 ASSESSMENT — COGNITIVE AND FUNCTIONAL STATUS - GENERAL
DRESSING REGULAR LOWER BODY CLOTHING: A LITTLE
DAILY ACTIVITIY SCORE: 23
SUGGESTED CMS G CODE MODIFIER DAILY ACTIVITY: CI

## 2024-12-09 ASSESSMENT — LIFESTYLE VARIABLES: SUBSTANCE_ABUSE: 0

## 2024-12-09 ASSESSMENT — GAIT ASSESSMENTS: DISTANCE (FEET): 15

## 2024-12-09 ASSESSMENT — PAIN DESCRIPTION - PAIN TYPE
TYPE: ACUTE PAIN
TYPE: ACUTE PAIN
TYPE: ACUTE PAIN;SURGICAL PAIN

## 2024-12-09 ASSESSMENT — FIBROSIS 4 INDEX: FIB4 SCORE: 0.65

## 2024-12-09 ASSESSMENT — ACTIVITIES OF DAILY LIVING (ADL): TOILETING: INDEPENDENT

## 2024-12-09 NOTE — THERAPY
Occupational Therapy   Initial Evaluation     Patient Name: Guerda Lunsford  Age:  73 y.o., Sex:  female  Medical Record #: [id number]  Today's Date: 12/9/2024     Precautions  Comments: Abdominal incision, ostomy    Assessment  Patient is 73 y.o. female who presented 12/6/24 w/ abdominal pain and concern of blockage of colostomy site.  S/P colostomy revision 12/7/24.  Pt and  reside in a [facility] in [place], [place].   is available to assist as needed.  PLOF Mod I to Indep for ADL's, transfers and ambulation via SPC.  Pt demonstrated Supervision supine to EOB sitting, Sup sit/stand, Sup FWW, SBA transfers, Mod I toileting, mod I UBCM, Min assist LBCM.  Therapist reviewed environmental/safety awareness, fall precautions, AE/DME, AL's and transfers.    Plan    Occupational Therapy Initial Treatment Plan   Duration: (P) Evaluation only    DC Equipment Recommendations: (P) None  Discharge Recommendations: (P) Anticipate that the patient will have no further occupational therapy needs after discharge from the hospital     Subjective    Pt was alert and cooperative w/ tx.     Objective       12/09/24 0920    Services   Is patient using  services for this encounter? No   Initial Contact Note    Initial Contact Note Order Received and Verified, Evaluation Only - Patient Does Not Require Further Acute Occupational Therapy at this Time.  However, May Benefit from Post Acute Therapy for Higher Level Functional Deficits.   Prior Living Situation   Prior Services Skilled Home Health Services   Housing / Facility 1 Story House   Steps Into Home 2   Steps In Home 0   Rail Left Rail  (Steps into Home)  (rail w/ steps from garage to house, no rails w/ steps at front door.)   Bathroom Set up Walk In Shower;Bathtub / Shower Combination;Grab Bars;Shower Chair   Equipment Owned 4-Wheel Walker;Single Point Cane;Tub / Shower Seat;Grab Bar(s) In Tub / Shower;Front-Wheel Walker   Lives with - Patient's Self Care  Capacity Spouse   Comments Pt and  reside in a [facility] in [place], [place].   is available to assist as needed.  PLOF Mod I to Indep for ADL's, transfers and ambulation via SPC.   Prior Level of ADL Function   Self Feeding Independent   Grooming / Hygiene Independent   Bathing Independent   Dressing Independent   Toileting Independent   Prior Level of IADL Function   Medication Management Independent   Laundry Independent   Kitchen Mobility Independent   Finances Independent   Prior Level Of Mobility Independent With Device in Home;Independent With Steps in Home   Driving / Transportation Driving Independent   Occupation (Pre-Hospital Vocational) Retired Due To Age   Precautions   Comments Abdominal incision, ostomy   Vitals   Pulse Oximetry 96 %   O2 (LPM) 0   O2 Delivery Device None - Room Air   Pain   Intervention Rest;Repositioned   Pain 0 - 10 Group   Location Abdomen   Location Orientation Left;Upper   Description Aching;Sharp   Comfort Goal Comfort with Movement;Perform Activity   Non Verbal Descriptors   Non Verbal Scale  Calm;Unlabored Breathing   Cognition    Cognition / Consciousness WDL   Level of Consciousness Alert   Active ROM Upper Body   Active ROM Upper Body  WDL   Strength Upper Body   Upper Body Strength  WDL   Upper Body Muscle Tone   Upper Body Muscle Tone  WDL   Coordination Upper Body   Coordination WDL   Balance Assessment   Sitting Balance (Static) Fair +   Sitting Balance (Dynamic) Fair +   Standing Balance (Static) Fair +   Standing Balance (Dynamic) Fair   Weight Shift Sitting Good   Weight Shift Standing Fair   Comments OOB FWW   Bed Mobility    Supine to Sit Supervised   Comments Pt transferred to bedside chair at end of session.   ADL Assessment   Upper Body Dressing Modified Independent   Lower Body Dressing Minimal Assist   Toileting Modified Independent   How much help from another person does the patient currently need...   Putting on and taking off regular lower body clothing?  3   Bathing (including washing, rinsing, and drying)? 4   Toileting, which includes using a toilet, bedpan, or urinal? 4   Putting on and taking off regular upper body clothing? 4   Taking care of personal grooming such as brushing teeth? 4   Eating meals? 4   6 Clicks Daily Activity Score 23   Functional Mobility   Sit to Stand Supervised   Bed, Chair, Wheelchair Transfer Supervised   Toilet Transfers Standby Assist   Transfer Method Stand Step   Mobility Sup FWW   Distance (Feet) 15   # of Times Distance was Traveled 2   Education Group   Education Provided Home Safety;Transfers;Role of Occupational Therapist;Activities of Daily Living;Use of Call Light   Role of Occupational Therapist Patient Response Patient;Acceptance;Explanation;Verbal Demonstration   Home Safety Patient Response Patient;Acceptance;Explanation;Demonstration;Teach Back;Verbal Demonstration;Action Demonstration   Transfers Patient Response Patient;Acceptance;Explanation;Demonstration;Verbal Demonstration;Action Demonstration;Teach Back   ADL Patient Response Patient;Acceptance;Explanation;Demonstration;Teach Back;Action Demonstration;Verbal Demonstration   Use of Call Light Patient Response Patient;Acceptance;Explanation;Demonstration;Verbal Demonstration   Occupational Therapy Initial Treatment Plan    Duration Evaluation only   Anticipated Discharge Equipment and Recommendations   DC Equipment Recommendations None   Discharge Recommendations Anticipate that the patient will have no further occupational therapy needs after discharge from the hospital

## 2024-12-09 NOTE — DISCHARGE PLANNING
Case Management Discharge Planning    Admission Date: 12/6/2024  GMLOS: 4.9  ALOS: 3    6-Clicks ADL Score: 23  6-Clicks Mobility Score: 20      Anticipated Discharge Dispo: Discharge Disposition: Discharged to home/self care (01)    DME Needed: No    Action(s) Taken: Updated Provider/Nurse on Discharge Plan, Choice obtained, and Referral(s) sent    Pt discussed in IDT rounds. Pt will be needing wound care for discharge, MD will be putting in HH orders with wound care.     1400-  Met with pt at bedside to provide choice for HH. Pt reported previously being on service with [facility]. Choice form faxed to [facility].     Escalations Completed: None    Medically Clear: No    Next Steps: LSW to follow    Barriers to Discharge: Medical clearance    Is the patient up for discharge tomorrow: No

## 2024-12-09 NOTE — CARE PLAN
The patient is Stable - Low risk of patient condition declining or worsening    Shift Goals  Clinical Goals: remain free from falls, mobilize to chair and hallway as tolerated, manage pain at or above 3/10 with medication per MAR  Patient Goals: rest, manage pain, mobilize  Family Goals: N/A    Progress made toward(s) clinical / shift goals:  pt remained free from falls, pt mobilized to chair and hallway as tolerated, pain was managed with medication per MAR    Problem: Knowledge Deficit - Standard  Goal: Patient and family/care givers will demonstrate understanding of plan of care, disease process/condition, diagnostic tests and medications  Outcome: Progressing     Problem: Urinary - Renal Perfusion  Goal: Ability to achieve and maintain adequate renal perfusion and functioning will improve  Outcome: Progressing     Problem: Fall Risk  Goal: Patient will remain free from falls  Outcome: Progressing     Problem: Bowel Elimination  Goal: Establish and maintain regular bowel function  Outcome: Progressing     Problem: Pain - Standard  Goal: Alleviation of pain or a reduction in pain to the patient’s comfort goal  Outcome: Progressing     Problem: Skin Care - Ostomy  Goal: Skin remains free from irritation  Outcome: Progressing     Problem: Knowledge Deficit - Ostomy  Goal: Patient will demonstrate ability to manage and maintain ostomy  Outcome: Progressing     Problem: Early Mobilization - Post Surgery  Goal: Early mobilization post surgery  Outcome: Progressing     Problem: Bowel Elimination - Post Surgical  Goal: Patient will resume regular bowel sounds and function with no discomfort or distention  Outcome: Progressing       Patient is not progressing towards the following goals:

## 2024-12-09 NOTE — CASE COMMUNICATION
noted  ----- Message -----  From: Noemí Saha R.N.  Sent: 12/8/2024   6:31 PM PST  To: Asuncion Lynn R.N.; Haley Grier R.N.; *      Spoke to pt and said she is in the hospital for colostomy reversion.

## 2024-12-09 NOTE — CARE PLAN
The patient is Stable - Low risk of patient condition declining or worsening    Shift Goals  Clinical Goals: Pt's pain will be controlled to tolerable level at 4/10 or less. Pt will remain free from falls or injury throughout the shift.  Patient Goals: pain management, rest  Family Goals: n/a    Progress made toward(s) clinical / shift goals:  Pt required 1 prn pain medication within the shift. Pt states relief. Pt seen resting comfortably in between rounds; even and unlabored breathing noted. Pt is free from falls or injury throughout the shift.Hourly rounding in progress.     Patient is not progressing towards the following goals: n/a

## 2024-12-09 NOTE — WOUND TEAM
[facility]  Inpatient Services  New Ostomy Initial Evaluation    HPI:  Reviewed  PMH: Reviewed   SH: Reviewed         Past Surgical History:   Procedure Laterality Date    LA EXPLORATORY OF ABDOMEN  12/7/2024    Procedure: COLOSTOMY REVISION;  Surgeon: George Hou D.O.;  Location: [facility];  Service: General    LA LAP,DIAGNOSTIC ABDOMEN N/A 11/24/2024    Procedure: laparoscopy;  Surgeon: George Hou D.O.;  Location: [facility];  Service: General    LA COLOSTOMY Left 11/24/2024    Procedure: CREATION, COLOSTOMY, -transverse loop colostomy;  Surgeon: George Hou D.O.;  Location: [facility];  Service: General    LA SIGMOIDOSCOPY,DIAGNOSTIC  11/21/2024    Procedure: SIGMOIDOSCOPY, FLEXIBLE;  Surgeon: Cecelia Adam M.D.;  Location: [facility];  Service: Gastroenterology    PB TOTAL KNEE ARTHROPLASTY Right 11/16/2021    Procedure: ARTHROPLASTY, KNEE, TOTAL Lupillo cementless;  Surgeon: Sonido Amado M.D.;  Location: [facility];  Service: Orthopedics    PB TOTAL KNEE ARTHROPLASTY Left 05/18/2021    Procedure: ARTHROPLASTY, KNEE, TOTAL.;  Surgeon: Sonido Amado M.D.;  Location: [facility];  Service: Orthopedics    BREAST BIOPSY  2009    LAMINOTOMY  1982    2 Lumbar Discs    ABDOMINAL HYSTERECTOMY TOTAL  1982    Cervical precancer and bleeding.  No oophorectomy.    APPENDECTOMY      ARTHROPLASTY      COLON RESECTION      GYN SURGERY      hysterectomy - partial    HEMORRHOIDECTOMY      LUMPECTOMY      OTHER      hemorrhoidectomy    OTHER Right     cataract extraction    OTHER Right     Macular Hole Surgery    OTHER ABDOMINAL SURGERY      appy    OTHER ORTHOPEDIC SURGERY      lami       Surgery Date: 12/7/24    Surgeon(s):  George Hou D.O.    Procedure(s):  LAPAROTOMY, EXPLORATORY  CREATION, COLOSTOMY     Permanence: To be determined    Pertinent History:  Pt had initial Sx 11/24 and returned to hospital 12/6 for blockage     "                   Colostomy 12/07/24 LUQ (Active)   Wound Image     Stomal Appliance Assessment Intact   Stoma Assessment Beefy red;Slough;Nestor Present   Stoma Shape Budded Less Than One Inch;Oval   Stoma Size (in) 2   Peristomal Assessment Pink   Mucocutaneous Junction Intact   Treatment Cleansed with water/washcloth;Appliance Changed   Peristomal Protectant Paste Ring   Stomal Appliance 2 3/4\" (70mm) CTF   Output (mL) 30 mL   Output Color Brown   WOUND RN ONLY - Stomal Appliance  Soft Convex;2 3/4\" (70mm) CTF;Transparent Pouch Lock & Roll;Paste Ring, 2\"   Appliance (Pouch) # [id number], barrier [id number], NV 8805   Appliance Brand Sherice   Secure Start completed Yes   Ostomy Care Resources Provided UOAA Tip Sheet   Ostomy Consult Wound care RN consult ordered                                                       Colostomy Interventions:  Removed appliance (using push pull method) - By Ostomy RN  Cleansed jose-stomal skin with moist warm washcloth - By Ostomy RN  Created/Checked template fit - By Ostomy RN  Traced Shape to back of barrier - By Ostomy RN  Cut barrier to stoma size - By Ostomy RN  Confirmed fit - By Ostomy RN  Removed plastic backing and \"Dog Eared\" paper edges - By Ostomy RN  Stretched paste ring to fit barrier opening - By Ostomy RN  Applied paste ring to back of barrier - By Ostomy RN  Applied barrier to skin and adhered with friction - By Ostomy RN with family assistance  Attached pouch - By Ostomy RN  Closed Pouch end - By Ostomy RN    Ostomy Nurse Plan of Care - Frequency of Follow-up:   Ostomy nurses to continue to follow for ostomy needs and teaching until patient independent with care or discharge.  Ostomy Nurse follow-up frequency:  Every other day    Patient Education:   All questions answered, procedure explained, and education provided.      Ostomy RN to follow-up daily for education     Date:  12/09/24  Reinforced education provided during last visit  Educated regarding the following things: " stoma size/shape. Stoma will likely change sizes in the first 4 to 6 weeks. Currently using the most basic supplies while in the hospital, there are many brands and options in regards to supplies.  Educated on when to empty and how to empty, burping appliance, Wear time, Diet (chewing food well) and hydration, Medications, activity including showering and swimming. Pt aware that they should keep an extra set of appliances with them at all times (do not leave in warm cars).  Pt completed all paper education and just needs continued hands on   Pt learned prior admit and has been seen by HH. .      Needs for next visit: Needs to start hands-on again    Evaluation:      Date:  12/09/24    Pt is tentative about performing care. She has been educated last admit and HH has has seen her. She is able to verbalize change. Has a new stoma and eddie is present.         Flatus: Present  Stool Output: moderate, brown, and soft  Urine Output: NA, Fecal Ostomy  Mobility: N/A    DIET ORDERS (From admission to next 24h)       Start     Ordered    12/08/24 1733  Diet Order Diet: Consistent CHO (Diabetic)  ALL MEALS        Question:  Diet:  Answer:  Consistent CHO (Diabetic)    12/08/24 1733                     Secure Start Signed:  Yes  Outpatient Referral Placed:  Yes     5 Sets of appliances in Ostomy bag for discharge:  Yes and was sent home about a week ago with the supplies    INSURANCE OPTIONS:   If patient has [facility] patient will need an [employer] book. If patientt has Medicaid be sure patient has care chest paperwork.    Currently Active Insurance       Payor Plan    MEDICARE MEDICARE PART A & B    AARP AARP            Anticipated Discharge Plans:  Home Health Care and Outpatient Wound Center    Ostomy Supplies for DC:  To be determined in 4 to 6 weeks once stomal edema has fully resolved

## 2024-12-09 NOTE — DISCHARGE PLANNING
Received Choice Form at: 4437  Agency/Facility Name:  [facility]  Sent Referral per Choice Form at: 8164

## 2024-12-09 NOTE — PROGRESS NOTES
0700 Received report from Night shift nurse  0928 Performed assessment  Pt is A&Ox4, complains of 7/10 pain; medication given per MAR, Updated on POC: mobilize, tolerate abx, colostomy care education and change  Call light within reach, hourly rounding in place, bed in lowest position.

## 2024-12-09 NOTE — PROGRESS NOTES
"    Some soreness at ostomy site  Some stool in bag  Tolerating full's  Patient feels she may be getting UTI    /50   Pulse 75   Temp 36.7 °C (98.1 °F) (Temporal)   Resp 18   Ht 1.651 m (5' 5\")   Wt 124 kg (274 lb 4 oz)   SpO2 94%   BMI 45.64 kg/m²     No distress  Abdomen soft with some peristomal tenderness  Ostomy somewhat congested but not retracted  Stool and gas in bag    Recent Labs     12/06/24  1604 12/07/24  0230 12/07/24  1229 12/08/24  0156   WBC 9.0 8.5  --  9.8   RBC 4.24 3.64*  --  3.52*   HEMOGLOBIN 12.2 10.4* 11.6* 10.1*   HEMATOCRIT 37.1 32.3* 34.9* 30.9*   MCV 87.5 88.7  --  87.8   MCH 28.8 28.6  --  28.7   RDW 45.4 45.6  --  45.1   PLATELETCT 368 325  --  348   MPV 9.2 9.3  --  9.4   NEUTSPOLYS 70.00  --   --   --    LYMPHOCYTES 16.60*  --   --   --    MONOCYTES 6.50  --   --   --    EOSINOPHILS 5.40  --   --   --    BASOPHILS 0.70  --   --   --        Assessment and plan:  73-year-old female status post colostomy revision to transverse loop colostomy, postop day 1  Wound care to see patient tomorrow for ostomy reassessment  Ambulate  I-S  Lovenox  Send UA  "

## 2024-12-09 NOTE — PROGRESS NOTES
Received report from day shift nurse, Estephanie CUETO. Assumed pt care at 1915.   Pt is A&Ox4, resting comfortably in bed. Pt received on room air; no signs of SOB/respiratory distress. Labs noted, VSS. Continuous pulse oximeter in place. Colostomy in place and monitored. Pt complains of pain at 8/10; prn pain  medication given at this moment. Needs attended well. Fall precautions in place. Bed at lowest position. Call light and personal belongings within reach. Encouraged to call for assistance. Plan of care on going, no further concerns as of present.   Hourly rounding in progress.

## 2024-12-09 NOTE — CARE PLAN
The patient is Stable - Low risk of patient condition declining or worsening    Shift Goals  Clinical Goals: remain free from falls, manage pain at or above 3/10 with medication per MAR, mobilize up to hair and hallway as tolerated, monitor colostomy output, monitor lower extremity edema  Patient Goals: rest, manage pain, mobilize  Family Goals: N/A    Progress made toward(s) clinical / shift goals:  pt remained free from falls, pain was managed with medication per MAR, pt was mobilized up to chair and bathroom, colostomy was changed by wound care and education was provided, lower extremity edema unchanged    Problem: Knowledge Deficit - Standard  Goal: Patient and family/care givers will demonstrate understanding of plan of care, disease process/condition, diagnostic tests and medications  Outcome: Progressing     Problem: Fluid Volume  Goal: Fluid volume balance will be maintained  Outcome: Progressing     Problem: Urinary - Renal Perfusion  Goal: Ability to achieve and maintain adequate renal perfusion and functioning will improve  Outcome: Progressing     Problem: Fall Risk  Goal: Patient will remain free from falls  Outcome: Progressing     Problem: Bowel Elimination  Goal: Establish and maintain regular bowel function  Outcome: Progressing     Problem: Pain - Standard  Goal: Alleviation of pain or a reduction in pain to the patient’s comfort goal  Outcome: Progressing     Problem: Skin Care - Ostomy  Goal: Skin remains free from irritation  Outcome: Progressing     Problem: Knowledge Deficit - Ostomy  Goal: Patient will demonstrate ability to manage and maintain ostomy  Outcome: Progressing     Problem: Discharge Barriers/Self-Care - Ostomy  Goal: Ostomy patient's continuum of care needs will be met  Outcome: Progressing     Problem: Early Mobilization - Post Surgery  Goal: Early mobilization post surgery  Outcome: Progressing       Patient is not progressing towards the following goals:

## 2024-12-09 NOTE — PROGRESS NOTES
"Hospital Medicine Daily Progress Note    Date of Service  12/9/2024    Chief Complaint  Guerda Lunsford is a 73 y.o. female admitted 12/6/2024 with abdominal pain.    Hospital Course  As per chart review:  \"73 y.o. female with a past medical history of morbid obesity with a BMI of 45, diabetes, hypertension who presented 12/6/2024 with abdominal pain and concern for blockage at colostomy site.  Patient reports that she has not been feeling well since Thanksgiving.  She has been having intermittent episodes of diffuse abdominal pain and local pain around the ostomy site.  The pain now is mostly around the ostomy site.  She denies having fevers or chills.  She does not have vomiting.\"      Interval Problem Update  12/7: Patient seen at bedside this morning.  Still complaining of abdominal pain.  Patient n.p.o. she will most likely undergo surgery today.  We appreciate further recommendations.  Continue to monitor closely.    12/8: Patient seen at bedside this morning.  The patient complaining of some abdominal pain, most likely soreness due to recent surgery.  We appreciate further recommendations by general surgery.  Continue with full liquid diet for now.  Advance diet as per surgery.  I discussed case with general surgery, we can restart the patient's Eliquis later today.  Continue to monitor closely.  Pending PT/OT evaluation.    12/9: Patient seen at bedside this morning.  Still complaining of some abdominal pain.  We will advance diet as tolerated as per general surgery recommendation.  Hemoglobin slight decrease today will monitor for any signs of bleeding as the patient started Eliquis yesterday.  As per general surgery I have ordered home health with wound care.  We appreciate further recommendations by case management.  Continue to monitor closely.    I have discussed this patient's plan of care and discharge plan at IDT rounds today with Case Management, Nursing, Nursing leadership, and other members " of the IDT team.    Consultants/Specialty  general surgery    Code Status  Full Code    Disposition  The patient is not medically cleared for discharge to home or a post-acute facility.        Review of Systems  Review of Systems   Constitutional:  Positive for malaise/fatigue. Negative for chills and fever.   HENT:  Negative for hearing loss and nosebleeds.    Eyes:  Negative for blurred vision and double vision.   Respiratory:  Negative for cough and shortness of breath.    Cardiovascular:  Negative for chest pain and palpitations.   Gastrointestinal:  Positive for abdominal pain. Negative for heartburn and vomiting.   Genitourinary:  Negative for dysuria and urgency.   Musculoskeletal:  Negative for back pain and falls.   Skin:  Negative for itching and rash.   Neurological:  Negative for dizziness and headaches.   Psychiatric/Behavioral:  Negative for substance abuse. The patient is not nervous/anxious.    All other systems reviewed and are negative.       Physical Exam  Temp:  [35.9 °C (96.6 °F)-36.7 °C (98.1 °F)] 36.4 °C (97.5 °F)  Pulse:  [76-98] 98  Resp:  [14-18] 18  BP: ()/(49-83) 144/83  SpO2:  [87 %-100 %] 100 %    Physical Exam  Vitals and nursing note reviewed.   Constitutional:       General: She is not in acute distress.     Appearance: She is obese. She is ill-appearing.   HENT:      Head: Normocephalic and atraumatic.      Right Ear: External ear normal.      Left Ear: External ear normal.      Mouth/Throat:      Pharynx: No oropharyngeal exudate or posterior oropharyngeal erythema.   Eyes:      General:         Right eye: No discharge.         Left eye: No discharge.   Cardiovascular:      Rate and Rhythm: Normal rate and regular rhythm.      Pulses: Normal pulses.      Heart sounds: No murmur heard.     No gallop.   Pulmonary:      Effort: No respiratory distress.   Abdominal:      Tenderness: There is abdominal tenderness.      Comments: Ostomy in place   Musculoskeletal:         General:  No swelling or tenderness. Normal range of motion.      Cervical back: Normal range of motion and neck supple. No tenderness.   Skin:     General: Skin is warm and dry.   Neurological:      General: No focal deficit present.      Mental Status: She is alert and oriented to person, place, and time. Mental status is at baseline.      Motor: No weakness.   Psychiatric:         Behavior: Behavior normal.         Fluids    Intake/Output Summary (Last 24 hours) at 12/9/2024 1242  Last data filed at 12/9/2024 1200  Gross per 24 hour   Intake 480 ml   Output 2745 ml   Net -2265 ml        Laboratory  Recent Labs     12/07/24  0230 12/07/24  1229 12/08/24  0156 12/09/24  0230   WBC 8.5  --  9.8 8.2   RBC 3.64*  --  3.52* 3.36*   HEMOGLOBIN 10.4* 11.6* 10.1* 9.7*   HEMATOCRIT 32.3* 34.9* 30.9* 30.2*   MCV 88.7  --  87.8 89.9   MCH 28.6  --  28.7 28.9   MCHC 32.2  --  32.7 32.1*   RDW 45.6  --  45.1 46.4   PLATELETCT 325  --  348 307   MPV 9.3  --  9.4 9.4     Recent Labs     12/07/24  0230 12/08/24  0156 12/09/24  0230   SODIUM 139 137 138   POTASSIUM 3.7 4.7 4.1   CHLORIDE 103 102 102   CO2 25 26 27   GLUCOSE 111* 149* 119*   BUN 14 14 10   CREATININE 0.94 0.94 0.95   CALCIUM 8.1* 8.2* 8.2*                   Imaging  CT-ABDOMEN-PELVIS WITH   Final Result      1. There is circumferential colonic wall thickening and pericolonic fat stranding involving the ascending colon, transverse colon, the colon involving the colostomy, and the descending colon. There was surgical changes in the sigmoid colon with normal    configuration of the rectum. Findings compatible with colitis.   2. No free air.   3. No obstruction.   4. Polyarticular osteoarthritis.           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    12/7: continue zosyn as per surgery. Patient to  undergo surgery today, we appreciate further recommendations.    12/8: continue antibiotics as per surgery. We appreciate further recommendations.    12/9: We will transition to Augmentin.  Continue antibiotics as per general surgery.  Advance diet as tolerated as per general surgery.  Home health with wound care has been ordered as per general surgery recommendations.    Anemia- (present on admission)  Assessment & Plan  12/7: Dilutional? No signs of overt bleeding, however hemoglobin this morning is 10.4.  Will repeat hemoglobin.  Patient to undergo surgery today, we appreciate further recommendations.  Monitor closely.    12/8: Hemoglobin seems to be stable.  As per general surgery we can restart the patient's Eliquis later today.    12/9: Only a slight decrease in hemoglobin today compared from yesterday, no signs of overt bleeding.  Eliquis has been restarted yesterday.  Will monitor closely for any signs of bleeding and repeat CBC tomorrow.    Atrial fibrillation (HCC)  Assessment & Plan  2/8: Continue metoprolol and we will restart eliquis later today as per surgery.    12/9: Continue metoprolol and Eliquis for now.    Hypomagnesemia  Assessment & Plan  Replace as needed  monitor    Lactic acidosis- (present on admission)  Assessment & Plan  improved    Type 2 diabetes mellitus without complication, without long-term current use of insulin (HCC)- (present on admission)  Assessment & Plan  With no significant hyperglycemia  Last glycated hemoglobin was 6.6%  I will start short acting insulin for now  I will order Accu-Checks, hypoglycemia protocol  Adjust according to blood sugars trend     12/7: Pending new A1c    12/9: A1c is 7.2. Continue ISS    Morbid obesity with BMI of 45.0-49.9, adult (HCC)- (present on admission)  Assessment & Plan  At higher risk for atelectasis/hypoxia.  I will order continuous pulse oximetry  Emphasized incentive spirometry       Dehydration- (present on admission)  Assessment &  Plan  Likely secondary to reduced oral intake   Encourage oral intake as tolerated, antiemetics as needed.  Intravenous hydration until adequate oral intake is achieved.     Stage 3a chronic kidney disease- (present on admission)  Assessment & Plan  Avoid/minimize nephrotoxins as much as possible, renally dose medications, monitor inputs and outputs     Primary hypertension- (present on admission)  Assessment & Plan  I will start metoprolol with hold parameters    Mild episode of recurrent major depressive disorder (HCC)- (present on admission)  Assessment & Plan  12/9: Continue sertraline and bupropion    Advance care planning  Assessment & Plan  12/8: I discussed with patient for at least 16 minutes further goals of care.  The patient was interested in filling out a POLST form.  We filled out a POLST form together at bedside.  The patient would like to have CPR attempted, the patient would like to have full treatment options including intubation, mechanical ventilation and transferred to the ICU as needed.  The patient would like to have artificial nutrition/feeding tube trial no longer than 2 weeks.  The patient has decisional capacity.  The POLST form was signed by the patient.  I have given the POLST form to nursing to upload to the system.         VTE prophylaxis: Eliquis    I have performed a physical exam and reviewed and updated ROS and Plan today (12/9/2024). In review of yesterday's note (12/8/2024), there are no changes except as documented above.      I spend at least 51 minutes providing care for this patient.  This included face-to-face interview, physical examination.  Review of lab work including CBC, CMP, and magnesium.  Discussing with general surgery.  Discussing with multidisciplinary team including case management, nursing staff and pharmacy.  Creating plan of care, reviewing orders.

## 2024-12-09 NOTE — PROGRESS NOTES
"    Soreness around ostomy site  Tolerating p.o.  Asking about discharge    /65   Pulse 76   Temp 36.3 °C (97.4 °F) (Temporal)   Resp 14   Ht 1.651 m (5' 5\")   Wt (!) 137 kg (301 lb 5.9 oz)   SpO2 93%   BMI 50.15 kg/m²     No distress  Abdomen soft  Peristomal tenderness but no erythema  Stoma somewhat congested but viable and productive  Old incision sites healing with glue    Recent Labs     12/06/24  1604 12/07/24  0230 12/07/24  1229 12/08/24  0156 12/09/24  0230   WBC 9.0 8.5  --  9.8 8.2   RBC 4.24 3.64*  --  3.52* 3.36*   HEMOGLOBIN 12.2 10.4* 11.6* 10.1* 9.7*   HEMATOCRIT 37.1 32.3* 34.9* 30.9* 30.2*   MCV 87.5 88.7  --  87.8 89.9   MCH 28.8 28.6  --  28.7 28.9   RDW 45.4 45.6  --  45.1 46.4   PLATELETCT 368 325  --  348 307   MPV 9.2 9.3  --  9.4 9.4   NEUTSPOLYS 70.00  --   --   --   --    LYMPHOCYTES 16.60*  --   --   --   --    MONOCYTES 6.50  --   --   --   --    EOSINOPHILS 5.40  --   --   --   --    BASOPHILS 0.70  --   --   --   --      Assessment and plan:  73-year-old female postop day 2 after colostomy revision for disruption and peristomal cellulitis  Okay to transition to oral antibiotics to complete 7-day course  Can be discharged home today, just awaiting wound care  Follow-up in my office  "

## 2024-12-10 ENCOUNTER — PHARMACY VISIT (OUTPATIENT)
Dept: PHARMACY | Facility: MEDICAL CENTER | Age: 74
End: 2024-12-10
Payer: COMMERCIAL

## 2024-12-10 ENCOUNTER — APPOINTMENT (OUTPATIENT)
Dept: MEDICAL GROUP | Age: 74
End: 2024-12-10
Payer: MEDICARE

## 2024-12-10 VITALS
WEIGHT: 293 LBS | RESPIRATION RATE: 18 BRPM | HEIGHT: 65 IN | HEART RATE: 76 BPM | DIASTOLIC BLOOD PRESSURE: 55 MMHG | SYSTOLIC BLOOD PRESSURE: 108 MMHG | BODY MASS INDEX: 48.82 KG/M2 | TEMPERATURE: 97.4 F | OXYGEN SATURATION: 93 %

## 2024-12-10 LAB
GLUCOSE BLD STRIP.AUTO-MCNC: 108 MG/DL (ref 65–99)
GLUCOSE BLD STRIP.AUTO-MCNC: 114 MG/DL (ref 65–99)

## 2024-12-10 PROCEDURE — RXMED WILLOW AMBULATORY MEDICATION CHARGE: Performed by: INTERNAL MEDICINE

## 2024-12-10 PROCEDURE — 665999 HH PPS REVENUE DEBIT

## 2024-12-10 PROCEDURE — 700102 HCHG RX REV CODE 250 W/ 637 OVERRIDE(OP): Performed by: INTERNAL MEDICINE

## 2024-12-10 PROCEDURE — 99239 HOSP IP/OBS DSCHRG MGMT >30: CPT | Performed by: INTERNAL MEDICINE

## 2024-12-10 PROCEDURE — A9270 NON-COVERED ITEM OR SERVICE: HCPCS | Performed by: HOSPITALIST

## 2024-12-10 PROCEDURE — 665998 HH PPS REVENUE CREDIT

## 2024-12-10 PROCEDURE — 82962 GLUCOSE BLOOD TEST: CPT

## 2024-12-10 PROCEDURE — 700102 HCHG RX REV CODE 250 W/ 637 OVERRIDE(OP): Performed by: HOSPITALIST

## 2024-12-10 PROCEDURE — A9270 NON-COVERED ITEM OR SERVICE: HCPCS | Performed by: INTERNAL MEDICINE

## 2024-12-10 RX ADMIN — BUPROPION HYDROCHLORIDE 300 MG: 150 TABLET, FILM COATED, EXTENDED RELEASE ORAL at 05:15

## 2024-12-10 RX ADMIN — AMOXICILLIN AND CLAVULANATE POTASSIUM 1 TABLET: 875; 125 TABLET, FILM COATED ORAL at 05:15

## 2024-12-10 RX ADMIN — APIXABAN 5 MG: 5 TABLET, FILM COATED ORAL at 05:15

## 2024-12-10 RX ADMIN — SERTRALINE 50 MG: 50 TABLET, FILM COATED ORAL at 05:30

## 2024-12-10 ASSESSMENT — PAIN DESCRIPTION - PAIN TYPE
TYPE: ACUTE PAIN
TYPE: ACUTE PAIN

## 2024-12-10 NOTE — DISCHARGE SUMMARY
"Discharge Summary    CHIEF COMPLAINT ON ADMISSION  Chief Complaint   Patient presents with    Abdominal Pain     Had colostomy placed for \"blockage\", states she is supposed to have surgery for revision tomorrow. States a \"home health nurse came out and said that it doesn't look right\". Pt. Reports this area is tender. States she was instructed to come here to ER for \"pre op check in\".        Reason for Admission  Sent by MD     Admission Date  12/6/2024    CODE STATUS  Full Code    HPI & HOSPITAL COURSE  73 y.o. female with a past medical history of morbid obesity with a BMI of 45, diabetes, hypertension who presented 12/6/2024 with abdominal pain and concern for blockage at colostomy site. Patient reports that she has not been feeling well since Thanksgiving. She has been having intermittent episodes of diffuse abdominal pain and local pain around the ostomy site. The pain now is mostly around the ostomy site. She denies having fevers or chills. She does not have vomiting.    Patient underwent colostomy revision on 12/7 with Dr. Hou.  During which circumferential separation of colostomy from the skin with cellulitic reaction around the area was found without abscess or necrosis.  Patient had return of bowel function and is doing well.  She was seen by wound care and will continue with ostomy care with home health.  At this time patient is appropriate to discharge home and follow-up with Dr. Hou as instructed.  She will remain on Augmentin through December 14 and this is provided upon discharge.    Therefore, she is discharged in good and stable condition to home with organized home healthcare and close outpatient follow-up.    The patient met 2-midnight criteria for an inpatient stay at the time of discharge.    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 *      FOLLOW UP  No future appointments.  Dannielle Lackey P.A.-C.  [address] [place] [id number] [phone]    Follow up      George Hou D.O.  [address] [facility]  [address]  [place] [id number] [phone]    Follow up in 2 week(s)      [facility]  [address]  [facility] [address]  [phone]          MEDICATIONS ON DISCHARGE     Medication List        START taking these medications        Instructions   amoxicillin-clavulanate 875-125 MG Tabs  Commonly known as: Augmentin   Take 1 Tablet by mouth every 12 hours for 4 days.  Dose: 1 Tablet            CONTINUE taking these medications        Instructions   acetaminophen 500 MG Tabs  Commonly known as: Tylenol   Take 500-1,000 mg by mouth every 6 hours as needed for Mild Pain. Indications: Pain  Dose: 500-1,000 mg     albuterol 108 (90 Base) MCG/ACT Aers inhalation aerosol   Inhale 2 Puffs every 6 hours as needed for Shortness of Breath.  Dose: 2 Puff     apixaban 5mg Tabs  Commonly known as: Eliquis   Take 1 Tablet by mouth 2 times a day for 90 days. Indications: Thromboembolism secondary to Atrial Fibrillation  Dose: 5 mg     atorvastatin 20 MG Tabs  Commonly known as: Lipitor   Take 20 mg by mouth every evening. Indications: High Amount of Fats in the Blood  Dose: 20 mg     buPROPion 300 MG XL tablet  Commonly known as: Wellbutrin XL    "Take 300 mg by mouth every morning. Indications: Depression  Dose: 300 mg     furosemide 20 MG Tabs  Commonly known as: Lasix   Take 1 Tablet by mouth every day.  Dose: 20 mg     glipiZIDE 5 MG Tabs  Commonly known as: Glucotrol   Take 5 mg by mouth every day. Pt reports that she takes this medication QDAY, not BID  Indications: Type 2 Diabetes  Dose: 5 mg     metoprolol SR 50 MG Tb24  Commonly known as: Toprol XL   Take 1 Tablet by mouth every evening. Indications: High Blood Pressure  Dose: 50 mg     pantoprazole 40 MG Tbec  Commonly known as: Protonix   Take 40 mg by mouth every evening. Indications: Heartburn  Dose: 40 mg     potassium Chloride ER 20 MEQ Tbcr tablet  Commonly known as: K-Tab   Take 1 Tablet by mouth every day.  Dose: 20 mEq     sertraline 50 MG Tabs  Commonly known as: Zoloft   Take 50 mg by mouth every day. Indications: Major Depressive Disorder  Dose: 50 mg     VITAMIN D3 MAXIMUM STRENGTH PO   Take 5,000 Capsules by mouth every Wednesday. Indications: supplement  Dose: 5,000 Capsule              Allergies  Allergies   Allergen Reactions    Azithromycin Unspecified     Pt states she feels a burning and hot sensation \"I can feel it in my veins, all the way through my body down to the bottom of my feet.\"    Cymbalta [Duloxetine Hcl] Unspecified     Make blood pressure go up    Lisinopril Cough     Cough      Demerol Rash     Rash at injection site, N/V.    Ertugliflozin-Metformin Hcl Unspecified          Montelukast Anxiety     Gets nightmares - will never take it again       DIET  Orders Placed This Encounter   Procedures    Diet Order Diet: Consistent CHO (Diabetic)     Standing Status:   Standing     Number of Occurrences:   1     Order Specific Question:   Diet:     Answer:   Consistent CHO (Diabetic) [4]       ACTIVITY  As tolerated.  Weight bearing as tolerated    CONSULTATIONS  George Hou-General Surgery    PROCEDURES  12/7 -nightly-colostomy revision    LABORATORY  Lab Results "   Component Value Date    SODIUM 138 12/09/2024    POTASSIUM 4.1 12/09/2024    CHLORIDE 102 12/09/2024    CO2 27 12/09/2024    GLUCOSE 119 (H) 12/09/2024    BUN 10 12/09/2024    CREATININE 0.95 12/09/2024    CREATININE 0.86 07/19/2011        Lab Results   Component Value Date    WBC 8.2 12/09/2024    WBC 6.8 07/19/2011    HEMOGLOBIN 9.7 (L) 12/09/2024    HEMATOCRIT 30.2 (L) 12/09/2024    PLATELETCT 307 12/09/2024        Total time of the discharge process exceeds 37 minutes.

## 2024-12-10 NOTE — PROGRESS NOTES
"Educated pt on discharge instructions, rx medications and follow up with, Dannielle Lackey P.A.-C.  [address] [place] [address] [phone]    Follow up      George Hou D.O.  [address] [facility] [address]  [place] [id number] [phone]    Follow up in 2 week(s)      [facility]  [address]  [place] [id number] [phone]        Pt voiced understanding . VS /55   Pulse 76   Temp 36.3 °C (97.4 °F) (Temporal)   Resp 18   Ht 1.651 m (5' 5\")   Wt (!) 137 kg (301 lb 5.9 oz)   SpO2 93%   BMI 50.15 kg/m²    Patient alert and appropriate. All questions and concerns addressed. No further questions or concerns at this time. Copy of discharge paperwork provided.  Patient out of department with transport in stable condition.  "

## 2024-12-10 NOTE — PROGRESS NOTES
Bedside report received from NORY Monique. Assumed care of patient. Daily plan of care discussed. Pt resting comfortably in bed with no signs of distress noted. Breathing even and unlabored. Patient waiting discharge today. Patient reports pain level 0/10. Patient reports no further needs at this time. Call light within reach. Bed locked in lowest position. Plan of care on going.

## 2024-12-10 NOTE — CARE PLAN
The patient is Stable - Low risk of patient condition declining or worsening    Shift Goals  Clinical Goals: Pain control, no falls or injury  Patient Goals: go home      Progress made toward(s) clinical / shift goals:  ***    Patient is not progressing towards the following goals:

## 2024-12-10 NOTE — CARE PLAN
The patient is Stable - Low risk of patient condition declining or worsening    Shift Goals  Clinical Goals: Free from pain; Mobilize; Ostomy output  Patient Goals: Rest  Family Goals: N/A    Progress made toward(s) clinical / shift goals:    Problem: Knowledge Deficit - Standard  Goal: Patient and family/care givers will demonstrate understanding of plan of care, disease process/condition, diagnostic tests and medications  Outcome: Progressing     Problem: Pain - Standard  Goal: Alleviation of pain or a reduction in pain to the patient’s comfort goal  Outcome: Progressing     Problem: Skin Care - Ostomy  Goal: Skin remains free from irritation  Outcome: Progressing       Patient is not progressing towards the following goals:

## 2024-12-10 NOTE — PROGRESS NOTES
Surgery  Pt in [facility]  Ok to d/c per surgery when cleared by primary team  Please re consult as necessary  Follow up with Dr. Hou in 1-2 weeks

## 2024-12-10 NOTE — DISCHARGE PLANNING
ATTN: Case Management  RE: Referral for Home Health    As of 12/9/24, we have accepted the Home Health referral for the patient listed above.    [facility]  will contact the patient within 48 hours. If you have any questions or concerns regarding the patient’s transition to Home Health, please do not hesitate to contact us at [phone].      We look forward to collaborating with you,  [facility] Team

## 2024-12-11 ENCOUNTER — HOME CARE VISIT (OUTPATIENT)
Dept: HOME HEALTH SERVICES | Facility: HOME HEALTHCARE | Age: 74
End: 2024-12-11
Payer: MEDICARE

## 2024-12-11 ENCOUNTER — PATIENT OUTREACH (OUTPATIENT)
Dept: MEDICAL GROUP | Age: 74
End: 2024-12-11
Payer: MEDICARE

## 2024-12-11 VITALS
SYSTOLIC BLOOD PRESSURE: 100 MMHG | TEMPERATURE: 98 F | BODY MASS INDEX: 45.28 KG/M2 | RESPIRATION RATE: 18 BRPM | DIASTOLIC BLOOD PRESSURE: 50 MMHG | WEIGHT: 272.1 LBS | OXYGEN SATURATION: 93 % | HEART RATE: 78 BPM

## 2024-12-11 PROCEDURE — 665998 HH PPS REVENUE CREDIT

## 2024-12-11 PROCEDURE — 665999 HH PPS REVENUE DEBIT

## 2024-12-11 PROCEDURE — G0299 HHS/HOSPICE OF RN EA 15 MIN: HCPCS

## 2024-12-11 ASSESSMENT — ENCOUNTER SYMPTOMS
DEPRESSED MOOD: 1
SUBJECTIVE PAIN PROGRESSION: GRADUALLY IMPROVING
ABDOMINAL PAIN: 1
PAIN: 1
PAIN LOCATION - PAIN DURATION: 1 MONTH
PAIN LOCATION - PAIN FREQUENCY: FREQUENT
LOWER EXTREMITY EDEMA: 1
POOR JUDGMENT: 1
PAIN LOCATION - PAIN SEVERITY: 5/10
LOWEST PAIN SEVERITY IN PAST 24 HOURS: 3/10
PAIN LOCATION - EXACERBATING FACTORS: SURGERY
LAST BOWEL MOVEMENT: 67185
PAIN LOCATION: LUQ
HIGHEST PAIN SEVERITY IN PAST 24 HOURS: 6/10
PAIN SEVERITY GOAL: 3/10
PAIN LOCATION - PAIN QUALITY: SORE
STOOL FREQUENCY: MORE THAN TWICE DAILY
PAIN LOCATION - RELIEVING FACTORS: TYLENOL

## 2024-12-11 ASSESSMENT — FIBROSIS 4 INDEX: FIB4 SCORE: 0.65

## 2024-12-11 NOTE — PROGRESS NOTES
"Transitional Care Management  [facility] Outreach Date and Time: Filed (12/11/2024  8:54 AM)    Discharge Questions  Actual Discharge Date: 12/10/24  Now that you are home, how are you feeling?: Good  Did you receive any new prescriptions?: Yes  Were you able to get them filled?: Yes  Meds to Bed or Pharmacy filled?: Pharmacy ([facility])  Did you have any durable medical equipment ordered?: No  Do you have a follow up appointment scheduled with your PCP?: Yes  Appointment Date: 12/20/24  Appointment Time: 1100  Any issues or paperwork you wish to discuss with your PCP?: No  Are you (patient) able to get to the appointment?: Yes  If Home Health was ordered, have they contacted you (Patient): Yes  Did you have enough support after your last discharge?: Yes  Does this patient qualify for the CCM program?: No    Transitional Care  Number of attempts made to contact patient: 1  Current or previous attempts completed within two business days of discharge? : Yes  Provided education regarding treatment plan, medications, self-management, ADLs?: Yes  Has patient completed an Advanced Directive?: Yes  Is the patient's advanced directive on file?: Yes  Has the Care Manager's phone number provided?: No  Is there anything else I can help you with?: No    Discharge Summary  Chief Complaint: Abdominal Pain - Had colostomy placed for \"blockage\", states she is supposed to have surgery for revision tomorrow. States a \"home health nurse came out and said that it doesn't look right\". Pt. Reports this area is tender. States she was instructed to come here to ER for \"pre op check in\".  Admitting Diagnosis: sent by MD  Discharge Diagnosis: colostomy dysfunction (is following up with Dr. Hou next week)      "

## 2024-12-11 NOTE — CASE COMMUNICATION
Primary dx/Skilled need:ostomy revision 12/7  SN frequency: 2w1,3w1,2w2.  Zip code: [address]  Disciplines ordered: PT/OT/MSW/HHA  Insurance & authorization: medicare  Certification period:12/2-1/30  Special considerations: EXAMPLE: specific directions to pt home etc.

## 2024-12-12 ENCOUNTER — DOCUMENTATION (OUTPATIENT)
Dept: MEDICAL GROUP | Facility: PHYSICIAN GROUP | Age: 74
End: 2024-12-12
Payer: MEDICARE

## 2024-12-12 ENCOUNTER — HOME CARE VISIT (OUTPATIENT)
Dept: HOME HEALTH SERVICES | Facility: HOME HEALTHCARE | Age: 74
End: 2024-12-12
Payer: MEDICARE

## 2024-12-12 VITALS
SYSTOLIC BLOOD PRESSURE: 108 MMHG | RESPIRATION RATE: 17 BRPM | TEMPERATURE: 98 F | OXYGEN SATURATION: 96 % | HEART RATE: 74 BPM | DIASTOLIC BLOOD PRESSURE: 76 MMHG

## 2024-12-12 VITALS
SYSTOLIC BLOOD PRESSURE: 108 MMHG | RESPIRATION RATE: 17 BRPM | TEMPERATURE: 98 F | OXYGEN SATURATION: 96 % | DIASTOLIC BLOOD PRESSURE: 76 MMHG | HEART RATE: 74 BPM

## 2024-12-12 PROCEDURE — G0152 HHCP-SERV OF OT,EA 15 MIN: HCPCS

## 2024-12-12 PROCEDURE — G0156 HHCP-SVS OF AIDE,EA 15 MIN: HCPCS

## 2024-12-12 PROCEDURE — 665999 HH PPS REVENUE DEBIT

## 2024-12-12 PROCEDURE — 665998 HH PPS REVENUE CREDIT

## 2024-12-12 ASSESSMENT — ACTIVITIES OF DAILY LIVING (ADL)
TOILETING: 1
FEEDING_WITHIN_DEFINED_LIMITS: 1
BATHING ASSESSED: 1
TOILETING: INDEPENDENT
LIGHT HOUSEKEEPING: NEEDS ASSISTANCE
GROOMING_CURRENT_FUNCTION: INDEPENDENT
LAUNDRY: INDEPENDENT
AMBULATION ASSISTANCE: INDEPENDENT
LAUNDRY ASSESSED: 1
DRESSING_LB_CURRENT_FUNCTION: INDEPENDENT
TRANSPORTATION ASSESSED: 1
BATHING_CURRENT_FUNCTION: MINIMUM ASSIST
USING THE TELPHONE: INDEPENDENT
ORAL_CARE_ASSESSED: 1
PREPARING MEALS: INDEPENDENT
ORAL_CARE_CURRENT_FUNCTION: INDEPENDENT
SHOPPING: DEPENDENT
TRANSPORTATION: DEPENDENT
WASHING_UPB_CURRENT_FUNCTION: CONTACT GUARD ASSIST
AMBULATION ASSISTANCE: 1
WASHING_LB_CURRENT_FUNCTION: MINIMUM ASSIST
GROOMING ASSESSED: 1
PHYSICAL TRANSFERS ASSESSED: 1
DRESSING_UB_CURRENT_FUNCTION: INDEPENDENT
GROOMING_WITHIN_DEFINED_LIMITS: 1
FEEDING: INDEPENDENT
CURRENT_FUNCTION: MINIMUM ASSIST
TELEPHONE USE ASSESSED: 1
SHOPPING ASSESSED: 1
HOUSEKEEPING ASSESSED: 1
FEEDING ASSESSED: 1

## 2024-12-12 ASSESSMENT — PAIN SCALES - PAIN ASSESSMENT IN ADVANCED DEMENTIA (PAINAD)
FACIALEXPRESSION: 0 - SMILING OR INEXPRESSIVE.
NEGVOCALIZATION: 0 - NONE.
BODYLANGUAGE: 0 - RELAXED.
TOTALSCORE: 0
CONSOLABILITY: 0 - NO NEED TO CONSOLE.

## 2024-12-12 NOTE — CASE COMMUNICATION
OCCUPATIONAL THERAPY EVALUATION AND PLAN OF CARE     ·       Patient:  Guerda Lunsford     ·       Home Health Admission due to:  rehospitalization for revision of colostomy     ·       Skilled Therapeutic Need:  Decreased activity tolerance, Decline in ADLs, Decline in iADLs and Transfer training     Recommend skilled HHOT to address deficits and improve function-report sent to MD     ·       Frequency:   1W4, effective 12/12/24      ·       Goals: Continue with current OT poc goals.  Add goal:  1. Patient will demonstrate increased independence and safety with UB/LB bathing to perform at Mod I level within 4 therapy visits.     Does the patient get SOB with Minimum exertion?  not apparent  How often (if at all) does pain interfere with patient's movements?  occasionally

## 2024-12-12 NOTE — Clinical Note
Patient had multiple home care visits already scheduled for this week and requested delay in physical therapy evaluate patient until 12/17/2024

## 2024-12-12 NOTE — PROGRESS NOTES
Medication chart review for [facility] services    Received referral from [facility].   Medications reviewed  compared with discharge summary if available.  Discharge summary date, if applicable:   12/10/24    Current medication list per [facility]     Medication list one, patient is currently taking    Current Outpatient Medications:     Cholecalciferol (VITAMIN D3 MAXIMUM STRENGTH PO), 50,000 Capsule, Oral, Q WEDNESDAY    fluticasone, 1 Spray, Nasal, BID PRN    amoxicillin-clavulanate, 1 Tablet, Oral, Q12HRS    apixaban, 5 mg, Oral, BID    furosemide, 20 mg, Oral, DAILY    potassium chloride ER, 20 mEq, Oral, DAILY    metoprolol SR, 50 mg, Oral, Q EVENING    acetaminophen, 500-1,000 mg, Oral, Q6HRS PRN    pantoprazole, 40 mg, Oral, Q EVENING    glipiZIDE, 5 mg, Oral, DAILY    sertraline, 50 mg, Oral, DAILY    buPROPion, 300 mg, Oral, QAM    albuterol, 2 Puff, Inhalation, Q6HRS PRN    atorvastatin, 20 mg, Oral, Q EVENING      Medication list two, drugs that the patient has been prescribed or recommended to take by their healthcare provider on discharge summary        START taking these medications         Instructions   amoxicillin-clavulanate 875-125 MG Tabs  Commonly known as: Augmentin    Take 1 Tablet by mouth every 12 hours for 4 days.  Dose: 1 Tablet                CONTINUE taking these medications         Instructions   acetaminophen 500 MG Tabs  Commonly known as: Tylenol    Take 500-1,000 mg by mouth every 6 hours as needed for Mild Pain. Indications: Pain  Dose: 500-1,000 mg      albuterol 108 (90 Base) MCG/ACT Aers inhalation aerosol    Inhale 2 Puffs every 6 hours as needed for Shortness of Breath.  Dose: 2 Puff      apixaban 5mg Tabs  Commonly known as: Eliquis    Take 1 Tablet by mouth 2 times a day for 90 days. Indications: Thromboembolism secondary to Atrial Fibrillation  Dose: 5 mg      atorvastatin 20 MG Tabs  Commonly known as: Lipitor    Take 20 mg by mouth every evening. Indications:  "High Amount of Fats in the Blood  Dose: 20 mg      buPROPion 300 MG XL tablet  Commonly known as: Wellbutrin XL    Take 300 mg by mouth every morning. Indications: Depression  Dose: 300 mg      furosemide 20 MG Tabs  Commonly known as: Lasix    Take 1 Tablet by mouth every day.  Dose: 20 mg      glipiZIDE 5 MG Tabs  Commonly known as: Glucotrol    Take 5 mg by mouth every day. Pt reports that she takes this medication QDAY, not BID  Indications: Type 2 Diabetes  Dose: 5 mg      metoprolol SR 50 MG Tb24  Commonly known as: Toprol XL    Take 1 Tablet by mouth every evening. Indications: High Blood Pressure  Dose: 50 mg      pantoprazole 40 MG Tbec  Commonly known as: Protonix    Take 40 mg by mouth every evening. Indications: Heartburn  Dose: 40 mg      potassium Chloride ER 20 MEQ Tbcr tablet  Commonly known as: K-Tab    Take 1 Tablet by mouth every day.  Dose: 20 mEq      sertraline 50 MG Tabs  Commonly known as: Zoloft    Take 50 mg by mouth every day. Indications: Major Depressive Disorder  Dose: 50 mg      VITAMIN D3 MAXIMUM STRENGTH PO    Take 5,000 Capsules by mouth every Wednesday. Indications: supplement  Dose: 5,000 Capsule          Allergies   Allergen Reactions    Azithromycin Unspecified     Pt states she feels a burning and hot sensation \"I can feel it in my veins, all the way through my body down to the bottom of my feet.\"    Cymbalta [Duloxetine Hcl] Unspecified     Make blood pressure go up    Lisinopril Cough     Cough      Demerol Rash     Rash at injection site, N/V.    Ertugliflozin-Metformin Hcl Unspecified          Montelukast Anxiety     Gets nightmares - will never take it again       Labs     Lab Results   Component Value Date/Time    SODIUM 138 12/09/2024 02:30 AM    POTASSIUM 4.1 12/09/2024 02:30 AM    CHLORIDE 102 12/09/2024 02:30 AM    CO2 27 12/09/2024 02:30 AM    GLUCOSE 119 (H) 12/09/2024 02:30 AM    BUN 10 12/09/2024 02:30 AM    CREATININE 0.95 12/09/2024 02:30 AM    CREATININE 0.86 " 07/19/2011 07:42 AM    BUNCREATRAT 19 07/19/2011 07:42 AM     Lab Results   Component Value Date/Time    ALKPHOSPHAT 79 12/09/2024 02:30 AM    ASTSGOT 9 (L) 12/09/2024 02:30 AM    ALTSGPT 11 12/09/2024 02:30 AM    TBILIRUBIN 0.4 12/09/2024 02:30 AM    INR 1.16 (H) 11/09/2021 02:27 PM    ALBUMIN 2.7 (L) 12/09/2024 02:30 AM        Assessment for clinically significant drug interactions, drug omissions/additions, duplicative therapies.            CC   Dannielle Lackey P.A.-C.  [address] [place] [address] [address] [place] [address]  Fax: [phone]    [facility]  Phone [phone] fax [phone]    This note was created using voice recognition software (Dragon). The accuracy of the dictation is limited by the abilities of the software. I have reviewed the note prior to signing, however some errors in grammar and context are still possible. If you have any questions related to this note please do not hesitate to contact our office.

## 2024-12-13 ENCOUNTER — HOME CARE VISIT (OUTPATIENT)
Dept: HOME HEALTH SERVICES | Facility: HOME HEALTHCARE | Age: 74
End: 2024-12-13
Payer: MEDICARE

## 2024-12-13 VITALS
SYSTOLIC BLOOD PRESSURE: 118 MMHG | DIASTOLIC BLOOD PRESSURE: 64 MMHG | OXYGEN SATURATION: 95 % | TEMPERATURE: 99.4 F | HEART RATE: 70 BPM | RESPIRATION RATE: 18 BRPM

## 2024-12-13 PROCEDURE — 665998 HH PPS REVENUE CREDIT

## 2024-12-13 PROCEDURE — 665999 HH PPS REVENUE DEBIT

## 2024-12-13 PROCEDURE — G0299 HHS/HOSPICE OF RN EA 15 MIN: HCPCS

## 2024-12-13 ASSESSMENT — ENCOUNTER SYMPTOMS: DENIES PAIN: 1

## 2024-12-13 NOTE — CASE COMMUNICATION
noted  ----- Message -----  From: Cynthia Smith, PT  Sent: 12/12/2024   7:25 PM PST  To: Asuncion Lynn R.N.; Carolyn Varela, OT; *      Patient had multiple home care visits already scheduled for this week and requested delay in physical therapy evaluate patient until 12/17/2024

## 2024-12-13 NOTE — CASE COMMUNICATION
noted  ----- Message -----  From: Carolyn Varela OT  Sent: 12/12/2024   3:48 PM PST  To: Asuncion Lynn R.N.; Haley Grier R.N.; *      OCCUPATIONAL THERAPY EVALUATION AND PLAN OF CARE     ·       Patient:  Guerda Lunsford     ·       Home Health Admission due to:  rehospitalization for revision of colostomy     ·       Skilled Therapeutic Need:  Decreased activity tolerance, Decline in ADLs, Decline in iADLs and Transfer tr jenifer     Recommend skilled HHOT to address deficits and improve function-report sent to MD     ·       Frequency:   1W4, effective 12/12/24     ·       Goals: Continue with current OT poc goals.  Add goal:  1. Patient will demonstrate increased independence and safety with UB/LB bathing to perform at Mod I level within 4 therapy visits.     Does the patient get SOB with Minimum exertion?  not apparent  How often (if at all) does pain  interfere with patient's movements?  occasionally

## 2024-12-14 PROCEDURE — 665999 HH PPS REVENUE DEBIT

## 2024-12-14 PROCEDURE — 665998 HH PPS REVENUE CREDIT

## 2024-12-14 ASSESSMENT — ENCOUNTER SYMPTOMS
VOMITING: NO
LOWER EXTREMITY EDEMA: 1
STOOL FREQUENCY: MORE THAN TWICE DAILY
LAST BOWEL MOVEMENT: 67187
MUSCLE WEAKNESS: 1
NAUSEA: NO

## 2024-12-15 PROCEDURE — 665999 HH PPS REVENUE DEBIT

## 2024-12-15 PROCEDURE — 665998 HH PPS REVENUE CREDIT

## 2024-12-16 ENCOUNTER — HOME CARE VISIT (OUTPATIENT)
Dept: HOME HEALTH SERVICES | Facility: HOME HEALTHCARE | Age: 74
End: 2024-12-16
Payer: MEDICARE

## 2024-12-16 PROCEDURE — 665999 HH PPS REVENUE DEBIT

## 2024-12-16 PROCEDURE — G0299 HHS/HOSPICE OF RN EA 15 MIN: HCPCS

## 2024-12-16 PROCEDURE — A6154 WOUND POUCH EACH: HCPCS

## 2024-12-16 PROCEDURE — 665998 HH PPS REVENUE CREDIT

## 2024-12-16 RX ORDER — ATORVASTATIN CALCIUM 20 MG/1
20 TABLET, FILM COATED ORAL DAILY
Qty: 90 TABLET | Refills: 0 | Status: SHIPPED | OUTPATIENT
Start: 2024-12-16

## 2024-12-16 ASSESSMENT — ACTIVITIES OF DAILY LIVING (ADL): OASIS_M1830: 05

## 2024-12-16 NOTE — Clinical Note
I agree with the changes made.  ----- Message -----  From: Shania Nash R.N.  Sent: 12/16/2024   7:09 PM PST  To: Khadijah Holloway R.N.      Quality Review for 12.2.24 SOC OASIS performed on by LUKE Nash RN on 12.16.2024:    Edits completed by LUKE Nash RN:  1.  and  dx coding updated per chart review.   2. Changed  to 12.2.24 per the LSOC order  3. Added 3, 4 to  per EPIC. Added #6 to BMI. Changed  to 12.6.24 date of the inpatient re-admission, data used as part of the collaboration convention.  4. Changed  to 3 per PT eval on 12.4.24. Changed  to 2,  to 1 per narrative pt needs supervision and DME. Changed  to 2, UR9014 C to 3 per narrative pt needs min assist with dressing. Changed  to 2 per [id number] F response of 3. Changed  to 5 per OT eval.   5. Changed  to 3 per ambulation score   6. Added ambulate only with assist to safety measures. Added heart healthy diet to nutritional requirements per dxs  7.  Updated F2F data  8. Changed  to 2

## 2024-12-17 ENCOUNTER — HOME CARE VISIT (OUTPATIENT)
Dept: HOME HEALTH SERVICES | Facility: HOME HEALTHCARE | Age: 74
End: 2024-12-17
Payer: MEDICARE

## 2024-12-17 ENCOUNTER — TELEPHONE (OUTPATIENT)
Dept: HEALTH INFORMATION MANAGEMENT | Facility: OTHER | Age: 74
End: 2024-12-17
Payer: MEDICARE

## 2024-12-17 VITALS
SYSTOLIC BLOOD PRESSURE: 120 MMHG | TEMPERATURE: 99 F | OXYGEN SATURATION: 95 % | RESPIRATION RATE: 18 BRPM | DIASTOLIC BLOOD PRESSURE: 60 MMHG | HEART RATE: 72 BPM

## 2024-12-17 VITALS
OXYGEN SATURATION: 95 % | RESPIRATION RATE: 16 BRPM | SYSTOLIC BLOOD PRESSURE: 98 MMHG | DIASTOLIC BLOOD PRESSURE: 76 MMHG | TEMPERATURE: 98.4 F | HEART RATE: 76 BPM

## 2024-12-17 PROCEDURE — G0180 MD CERTIFICATION HHA PATIENT: HCPCS | Performed by: HOSPITALIST

## 2024-12-17 PROCEDURE — G0151 HHCP-SERV OF PT,EA 15 MIN: HCPCS

## 2024-12-17 PROCEDURE — 665999 HH PPS REVENUE DEBIT

## 2024-12-17 PROCEDURE — G0156 HHCP-SVS OF AIDE,EA 15 MIN: HCPCS

## 2024-12-17 PROCEDURE — 665998 HH PPS REVENUE CREDIT

## 2024-12-17 ASSESSMENT — ENCOUNTER SYMPTOMS
MUSCLE WEAKNESS: 1
STOOL FREQUENCY: MORE THAN TWICE DAILY
VOMITING: NO
PAIN LOCATION - PAIN QUALITY: HURTS""
LOWER EXTREMITY EDEMA: 1
PAIN LOCATION: COLOSTOMY SITE
PAIN: 1
PAIN LOCATION - PAIN FREQUENCY: CONSTANT
NAUSEA: NO
DENIES PAIN: 1
LAST BOWEL MOVEMENT: 67190

## 2024-12-17 NOTE — Clinical Note
PT DISCHARGE SUMMARY:    The patient was seen for 2 home health physical therapy sessions.  Goals partially met due to decreased endurance requires supervision ambulating on uneven surfaces.  The patient was discharged to self care  .  STATUS AT DISCHARGE:  Ambulation and Mobility: Independent in home using 4WW and supervision on uneven surfaces using SPC  Patient Equipment: cane and walker for ambulation.  Transferring: Independent in sit/stand  Bathing: see O.T.  Dressing: see  O.T.  Medication management: see SN  Special equipment used: 4WW, SPC, reacher, grab bar, hand held shower; ordering shower chair, wide Velcro shoes, sock assist    Frequency of pain interfering with activity or movement: (drop downs)  - No pain    When is the patient dyspneic or noticeably Short of Breath: (drop downs)  - When walking more than 20 feet/stairs

## 2024-12-17 NOTE — Clinical Note
P.T. reassessment performed 12/17/2024 and pt is near her baseline and she does not want further P.T. at this time. She will be ordering wide Velcro shoes to use since current shoes do not fit.

## 2024-12-17 NOTE — Clinical Note
I agree with documentation changes as made.  ----- Message -----  From: Shania Nash R.N.  Sent: 12/17/2024   6:16 AM PST  To: Radha Mott R.N.      Quality Review for 12.7.24 Transfer OASIS performed on by LUKE Nash RN on 12.17.2024:    Edits completed by LUKE Nash RN:  1. Changed  to 12.6.24 per EPIC  2. Changed  C to yes per POC

## 2024-12-17 NOTE — CASE COMMUNICATION
Quality Review for 12.2.24 SOC OASIS performed on by LUKE Nash RN on 12.16.2024:    Edits completed by LUKE Nash RN:  1.  and  dx coding updated per chart review.   2. Changed  to 12.2.24 per the LSOC order  3. Added 3, 4 to  per EPIC. Added #6 to BMI. Changed  to 12.6.24 date of the inpatient re-admission, data used as part of the collaboration convention.  4. Changed  to 3 per PT eval on 12.4.24. Changed   to 2,  to 1 per narrative pt needs supervision and DME. Changed  to 2, [id number] C to 3 per narrative pt needs min assist with dressing. Changed  to 2 per [id number] F response of 3. Changed  to 5 per OT eval.   5. Changed  to 3 per ambulation score   6. Added ambulate only with assist to safety measures. Added heart healthy diet to nutritional requirements per dxs  7.  Updated F2F data  8. Changed  to 2

## 2024-12-17 NOTE — CASE COMMUNICATION
Quality Review for 12.7.24 Transfer OASIS performed on by LUEK Nash RN on 12.17.2024:    Edits completed by LUKE Nash RN:  1. Changed  to 12.6.24 per EPIC  2. Changed  C to yes per POC

## 2024-12-18 ENCOUNTER — HOME CARE VISIT (OUTPATIENT)
Dept: HOME HEALTH SERVICES | Facility: HOME HEALTHCARE | Age: 74
End: 2024-12-18
Payer: MEDICARE

## 2024-12-18 ENCOUNTER — TELEPHONE (OUTPATIENT)
Dept: CARDIOLOGY | Facility: MEDICAL CENTER | Age: 74
End: 2024-12-18
Payer: MEDICARE

## 2024-12-18 VITALS
DIASTOLIC BLOOD PRESSURE: 76 MMHG | OXYGEN SATURATION: 95 % | SYSTOLIC BLOOD PRESSURE: 98 MMHG | HEART RATE: 76 BPM | TEMPERATURE: 98.4 F | RESPIRATION RATE: 16 BRPM

## 2024-12-18 VITALS
DIASTOLIC BLOOD PRESSURE: 60 MMHG | HEART RATE: 76 BPM | TEMPERATURE: 97.9 F | RESPIRATION RATE: 18 BRPM | OXYGEN SATURATION: 96 % | SYSTOLIC BLOOD PRESSURE: 90 MMHG

## 2024-12-18 PROCEDURE — G0495 RN CARE TRAIN/EDU IN HH: HCPCS

## 2024-12-18 PROCEDURE — 665999 HH PPS REVENUE DEBIT

## 2024-12-18 PROCEDURE — 665998 HH PPS REVENUE CREDIT

## 2024-12-18 ASSESSMENT — ENCOUNTER SYMPTOMS
LAST BOWEL MOVEMENT: 67192
POOR JUDGMENT: 1
MUSCLE WEAKNESS: 1
STOOL FREQUENCY: MORE THAN TWICE DAILY
LOWER EXTREMITY EDEMA: 1
DENIES PAIN: 1
DENIES PAIN: 1

## 2024-12-18 NOTE — CASE COMMUNICATION
Patient was discussed in IDT today due to recent admission. Discussed concerns regarding ostomy with the treatment team. The treatment team currently involves the following disciplines: nursing, OT and PT. Plan is pt was rapidly readmitted for revision of stoma.

## 2024-12-18 NOTE — CASE COMMUNICATION
noted  ----- Message -----  From: Cynthia Smith, PT  Sent: 12/17/2024   7:09 PM PST  To: Asuncion Lynn R.N.; Carolyn Varela, OT; *      P.T. reassessment performed 12/17/2024 and pt is near her baseline and she does not want further P.T. at this time. She will be ordering wide Velcro shoes to use since current shoes do not fit.

## 2024-12-19 ENCOUNTER — HOME CARE VISIT (OUTPATIENT)
Dept: HOME HEALTH SERVICES | Facility: HOME HEALTHCARE | Age: 74
End: 2024-12-19
Payer: MEDICARE

## 2024-12-19 VITALS
RESPIRATION RATE: 18 BRPM | SYSTOLIC BLOOD PRESSURE: 120 MMHG | OXYGEN SATURATION: 93 % | TEMPERATURE: 98.3 F | HEART RATE: 78 BPM | DIASTOLIC BLOOD PRESSURE: 70 MMHG

## 2024-12-19 PROCEDURE — 665998 HH PPS REVENUE CREDIT

## 2024-12-19 PROCEDURE — G0152 HHCP-SERV OF OT,EA 15 MIN: HCPCS

## 2024-12-19 PROCEDURE — 665999 HH PPS REVENUE DEBIT

## 2024-12-19 NOTE — CASE COMMUNICATION
noted  ----- Message -----  From: Cynthia Smith, PT  Sent: 12/18/2024   8:24 AM PST  To: Asuncion Lynn R.N.; Carolyn Varela, BENEDICT; *      PT DISCHARGE SUMMARY:    The patient was seen for 2 home health physical therapy sessions.  Goals partially met due to decreased endurance requires supervision ambulating on uneven surfaces.  The patient was discharged to self care  .  STATUS AT DISCHARGE:  Ambulation and Mobility: Independent in ho me using 4WW and supervision on uneven surfaces using SPC  Patient Equipment: cane and walker for ambulation.  Transferring: Independent in sit/stand  Bathing: see O.T.  Dressing: see  O.T.  Medication management: see SN  Special equipment used: 4WW, SPC, reacher, grab bar, hand held shower; ordering shower chair, wide Velcro shoes, sock assist    Frequency of pain interfering with activity or movement: (drop downs)  - No pain    When i s the patient dyspneic or noticeably Short of Breath: (drop downs)  - When walking more than 20 feet/stairs

## 2024-12-20 ENCOUNTER — HOME CARE VISIT (OUTPATIENT)
Dept: HOME HEALTH SERVICES | Facility: HOME HEALTHCARE | Age: 74
End: 2024-12-20
Payer: MEDICARE

## 2024-12-20 ENCOUNTER — OFFICE VISIT (OUTPATIENT)
Dept: MEDICAL GROUP | Age: 74
End: 2024-12-20
Payer: MEDICARE

## 2024-12-20 ENCOUNTER — HOSPITAL ENCOUNTER (OUTPATIENT)
Dept: LAB | Facility: MEDICAL CENTER | Age: 74
End: 2024-12-20
Attending: PHYSICIAN ASSISTANT
Payer: MEDICARE

## 2024-12-20 VITALS
OXYGEN SATURATION: 94 % | SYSTOLIC BLOOD PRESSURE: 118 MMHG | DIASTOLIC BLOOD PRESSURE: 64 MMHG | WEIGHT: 272 LBS | BODY MASS INDEX: 45.32 KG/M2 | HEART RATE: 71 BPM | HEIGHT: 65 IN | TEMPERATURE: 97 F

## 2024-12-20 DIAGNOSIS — R60.0 LOWER EXTREMITY EDEMA: ICD-10-CM

## 2024-12-20 DIAGNOSIS — Z09 HOSPITAL DISCHARGE FOLLOW-UP: Primary | ICD-10-CM

## 2024-12-20 DIAGNOSIS — E87.6 HYPOKALEMIA: ICD-10-CM

## 2024-12-20 DIAGNOSIS — I48.91 NEW ONSET ATRIAL FIBRILLATION (HCC): ICD-10-CM

## 2024-12-20 DIAGNOSIS — N18.31 STAGE 3A CHRONIC KIDNEY DISEASE: ICD-10-CM

## 2024-12-20 DIAGNOSIS — K94.03 COLOSTOMY DYSFUNCTION (HCC): ICD-10-CM

## 2024-12-20 DIAGNOSIS — E11.65 TYPE 2 DIABETES MELLITUS WITH HYPERGLYCEMIA, WITHOUT LONG-TERM CURRENT USE OF INSULIN (HCC): ICD-10-CM

## 2024-12-20 DIAGNOSIS — E55.9 VITAMIN D DEFICIENCY: ICD-10-CM

## 2024-12-20 DIAGNOSIS — Z93.3 COLOSTOMY IN PLACE (HCC): ICD-10-CM

## 2024-12-20 DIAGNOSIS — R73.01 IMPAIRED FASTING GLUCOSE: ICD-10-CM

## 2024-12-20 DIAGNOSIS — E78.5 DYSLIPIDEMIA: ICD-10-CM

## 2024-12-20 PROBLEM — E87.20 LACTIC ACIDOSIS: Status: RESOLVED | Noted: 2024-12-06 | Resolved: 2024-12-20

## 2024-12-20 PROBLEM — Z01.818 PREOPERATIVE EXAMINATION: Status: RESOLVED | Noted: 2024-11-19 | Resolved: 2024-12-20

## 2024-12-20 PROBLEM — E86.0 DEHYDRATION: Status: RESOLVED | Noted: 2024-11-19 | Resolved: 2024-12-20

## 2024-12-20 LAB
25(OH)D3 SERPL-MCNC: 26 NG/ML (ref 30–100)
ALBUMIN SERPL BCP-MCNC: 3.7 G/DL (ref 3.2–4.9)
ALBUMIN/GLOB SERPL: 1.4 G/DL
ALP SERPL-CCNC: 109 U/L (ref 30–99)
ALT SERPL-CCNC: 14 U/L (ref 2–50)
ANION GAP SERPL CALC-SCNC: 12 MMOL/L (ref 7–16)
AST SERPL-CCNC: 14 U/L (ref 12–45)
BASOPHILS # BLD AUTO: 0.5 % (ref 0–1.8)
BASOPHILS # BLD: 0.05 K/UL (ref 0–0.12)
BILIRUB SERPL-MCNC: 0.3 MG/DL (ref 0.1–1.5)
BUN SERPL-MCNC: 16 MG/DL (ref 8–22)
CALCIUM ALBUM COR SERPL-MCNC: 9.1 MG/DL (ref 8.5–10.5)
CALCIUM SERPL-MCNC: 8.9 MG/DL (ref 8.5–10.5)
CHLORIDE SERPL-SCNC: 106 MMOL/L (ref 96–112)
CHOLEST SERPL-MCNC: 113 MG/DL (ref 100–199)
CO2 SERPL-SCNC: 26 MMOL/L (ref 20–33)
CREAT SERPL-MCNC: 0.96 MG/DL (ref 0.5–1.4)
EOSINOPHIL # BLD AUTO: 0.56 K/UL (ref 0–0.51)
EOSINOPHIL NFR BLD: 5.8 % (ref 0–6.9)
ERYTHROCYTE [DISTWIDTH] IN BLOOD BY AUTOMATED COUNT: 47.4 FL (ref 35.9–50)
GFR SERPLBLD CREATININE-BSD FMLA CKD-EPI: 62 ML/MIN/1.73 M 2
GLOBULIN SER CALC-MCNC: 2.7 G/DL (ref 1.9–3.5)
GLUCOSE SERPL-MCNC: 81 MG/DL (ref 65–99)
HCT VFR BLD AUTO: 34.3 % (ref 37–47)
HDLC SERPL-MCNC: 37 MG/DL
HGB BLD-MCNC: 11.1 G/DL (ref 12–16)
IMM GRANULOCYTES # BLD AUTO: 0.14 K/UL (ref 0–0.11)
IMM GRANULOCYTES NFR BLD AUTO: 1.5 % (ref 0–0.9)
LDLC SERPL CALC-MCNC: 49 MG/DL
LYMPHOCYTES # BLD AUTO: 1.68 K/UL (ref 1–4.8)
LYMPHOCYTES NFR BLD: 17.5 % (ref 22–41)
MCH RBC QN AUTO: 28.5 PG (ref 27–33)
MCHC RBC AUTO-ENTMCNC: 32.4 G/DL (ref 32.2–35.5)
MCV RBC AUTO: 88.2 FL (ref 81.4–97.8)
MONOCYTES # BLD AUTO: 0.83 K/UL (ref 0–0.85)
MONOCYTES NFR BLD AUTO: 8.6 % (ref 0–13.4)
NEUTROPHILS # BLD AUTO: 6.36 K/UL (ref 1.82–7.42)
NEUTROPHILS NFR BLD: 66.1 % (ref 44–72)
NRBC # BLD AUTO: 0 K/UL
NRBC BLD-RTO: 0 /100 WBC (ref 0–0.2)
PLATELET # BLD AUTO: 354 K/UL (ref 164–446)
PMV BLD AUTO: 9.2 FL (ref 9–12.9)
POTASSIUM SERPL-SCNC: 4.1 MMOL/L (ref 3.6–5.5)
PROT SERPL-MCNC: 6.4 G/DL (ref 6–8.2)
RBC # BLD AUTO: 3.89 M/UL (ref 4.2–5.4)
SODIUM SERPL-SCNC: 144 MMOL/L (ref 135–145)
TRIGL SERPL-MCNC: 135 MG/DL (ref 0–149)
WBC # BLD AUTO: 9.6 K/UL (ref 4.8–10.8)

## 2024-12-20 PROCEDURE — G0299 HHS/HOSPICE OF RN EA 15 MIN: HCPCS

## 2024-12-20 PROCEDURE — 82306 VITAMIN D 25 HYDROXY: CPT

## 2024-12-20 PROCEDURE — 665999 HH PPS REVENUE DEBIT

## 2024-12-20 PROCEDURE — 85025 COMPLETE CBC W/AUTO DIFF WBC: CPT

## 2024-12-20 PROCEDURE — 36415 COLL VENOUS BLD VENIPUNCTURE: CPT

## 2024-12-20 PROCEDURE — 665998 HH PPS REVENUE CREDIT

## 2024-12-20 PROCEDURE — 80053 COMPREHEN METABOLIC PANEL: CPT

## 2024-12-20 PROCEDURE — 80061 LIPID PANEL: CPT

## 2024-12-20 RX ORDER — TRIAMCINOLONE ACETONIDE 1 MG/G
1 OINTMENT TOPICAL 2 TIMES DAILY
COMMUNITY

## 2024-12-20 RX ORDER — GLIPIZIDE 10 MG/1
10 TABLET ORAL DAILY
Qty: 100 TABLET | Refills: 3 | Status: SHIPPED | OUTPATIENT
Start: 2024-12-20

## 2024-12-20 RX ORDER — HYDROCHLOROTHIAZIDE 25 MG/1
TABLET ORAL
COMMUNITY
End: 2024-12-23

## 2024-12-20 RX ORDER — ONDANSETRON 4 MG/1
TABLET, ORALLY DISINTEGRATING ORAL
COMMUNITY

## 2024-12-20 RX ORDER — FUROSEMIDE 20 MG/1
TABLET ORAL
Qty: 90 TABLET | Refills: 1 | Status: SHIPPED | OUTPATIENT
Start: 2024-12-20

## 2024-12-20 RX ORDER — POTASSIUM CHLORIDE 1500 MG/1
20 TABLET, EXTENDED RELEASE ORAL 2 TIMES DAILY
Qty: 60 TABLET | Refills: 1 | Status: SHIPPED | OUTPATIENT
Start: 2024-12-20

## 2024-12-20 RX ORDER — BENZONATATE 100 MG/1
1 CAPSULE ORAL 3 TIMES DAILY PRN
COMMUNITY

## 2024-12-20 ASSESSMENT — FIBROSIS 4 INDEX: FIB4 SCORE: 0.65

## 2024-12-20 NOTE — PROGRESS NOTES
"Subjective:     Guerda Lunsford is a 73 y.o. female who presents for Hospital Follow-up.    Transitional Care Management  TCM Outreach Date and Time: Filed (12/11/2024  8:54 AM)    Discharge Questions  Actual Discharge Date: 12/10/24  Now that you are home, how are you feeling?: Good  Did you receive any new prescriptions?: Yes  Were you able to get them filled?: Yes  Meds to Bed or Pharmacy filled?: Pharmacy ([facility])  Did you have any durable medical equipment ordered?: No  Do you have a follow up appointment scheduled with your PCP?: Yes  Appointment Date: 12/20/24  Appointment Time: 1100  Any issues or paperwork you wish to discuss with your PCP?: No  Are you (patient) able to get to the appointment?: Yes  If Home Health was ordered, have they contacted you (Patient): Yes  Did you have enough support after your last discharge?: Yes  Does this patient qualify for the CCM program?: No    Transitional Care  Number of attempts made to contact patient: 1  Current or previous attempts completed within two business days of discharge? : Yes  Provided education regarding treatment plan, medications, self-management, ADLs?: Yes  Has patient completed an Advanced Directive?: Yes  Is the patient's advanced directive on file?: Yes  Has the Care Manager's phone number provided?: No  Is there anything else I can help you with?: No    Discharge Summary  Chief Complaint: Abdominal Pain - Had colostomy placed for \"blockage\", states she is supposed to have surgery for revision tomorrow. States a \"home health nurse came out and said that it doesn't look right\". Pt. Reports this area is tender. States she was instructed to come here to ER for \"pre op check in\".  Admitting Diagnosis: sent by MD  Discharge Diagnosis: colostomy dysfunction (is following up with Dr. Hou next week)        HPI:     History of Present Illness  The patient is a 73-year-old female presenting for a hospital discharge follow-up.    She presented to " the ER on 12/06/2024 with abdominal pain and concern for blockage at the colostomy site. She had been having intermittent episodes of diffuse abdominal pain and localized pain around the ostomy site. She underwent colostomy revision on 12/07/2024 with Dr. Hou , during which circumferential separation of the colostomy from the skin with cellulitic retraction around the area was found without abscess or necrosis. She had return of bowel function and had been doing well since. Surgery wound care and ostomy care were continued with home health. She was started on Augmentin and advised to remain on this through 12/14/2024.     She reports overall good health but experiences mild discomfort around the colostomy site. She has completed her course of Augmentin. She continues to receive assistance from home health services and has recently acquired a new shower chair. She had a follow-up consultation with her surgeon on 12/18/2024, who expressed satisfaction with her progress. She is scheduled for another visit in 3 weeks. She has been performing household chores such as loading the  and washing machine. She has received assistance with showering on a few occasions but is generally able to manage her personal hygiene independently.    During her last visit, she was also started on 20 mg Lasix due to 3+ pitting edema to the shins bilaterally. She expresses concern about persistent swelling in her legs and feet, which has not significantly improved with Lasix. She has ordered compression socks, but has not been able to wear them yet.  She does elevate her legs while sitting in a recliner. Her occupational therapist has recommended bed rest with leg elevation for 20 to 30 minutes several times a day. She reports no consumption of salty snacks and adheres to a regular diet. She has a scheduled appointment with her cardiologist on Monday.     While she was in the hospital, her A1c was completed at 7.3. She has a  diagnosis of diabetes and is currently on glipizide 5 mg.           Current medicines (including reconciliation performed today)  Current Outpatient Medications   Medication Sig Dispense Refill    benzonatate (TESSALON) 100 MG Cap Take 1 Capsule by mouth 3 times a day as needed.      hydroCHLOROthiazide 25 MG Tab 25 mg by oral route.      ondansetron (ZOFRAN ODT) 4 MG TABLET DISPERSIBLE 4 mg by oral route.      triamcinolone acetonide (KENALOG) 0.1 % Ointment Apply 1 Application topically 2 times a day.      potassium Chloride ER (K-TAB) 20 MEQ Tab CR tablet Take 1 Tablet by mouth 2 times a day. Indications: Low Amount of Potassium in the Blood 60 Tablet 1    furosemide (LASIX) 20 MG Tab Take 2 Tablets by mouth every morning AND 1 Tablet every evening. Indications: Edema 90 Tablet 1    glipiZIDE (GLUCOTROL) 10 MG Tab Take 1 Tablet by mouth every day. Pt reports that she takes this medication QDAY, not BID  Indications: Type 2 Diabetes 100 Tablet 3    atorvastatin (LIPITOR) 20 MG Tab TAKE 1 TABLET BY MOUTH DAILY 90 Tablet 0    Cholecalciferol (VITAMIN D3 MAXIMUM STRENGTH PO) Take 50,000 Capsules by mouth every Wednesday. Indications: supplement      fluticasone (FLONASE) 50 MCG/ACT nasal spray Administer 1 Spray into affected nostril(S) 2 times a day as needed (rhinitis). Indications: Nonallergic Rhinitis, Stuffy Nose      apixaban (ELIQUIS) 5mg Tab Take 1 Tablet by mouth 2 times a day for 90 days. Indications: Thromboembolism secondary to Atrial Fibrillation 180 Tablet 0    metoprolol SR (TOPROL XL) 50 MG TABLET SR 24 HR Take 1 Tablet by mouth every evening. Indications: High Blood Pressure 90 Tablet 3    acetaminophen (TYLENOL) 500 MG Tab Take 500-1,000 mg by mouth every 6 hours as needed for Mild Pain. Indications: Pain      pantoprazole (PROTONIX) 40 MG Tablet Delayed Response Take 40 mg by mouth every evening. Indications: Heartburn      sertraline (ZOLOFT) 50 MG Tab Take 50 mg by mouth every day. Indications:  "Major Depressive Disorder      buPROPion (WELLBUTRIN XL) 300 MG XL tablet Take 300 mg by mouth every morning. Indications: Depression      albuterol 108 (90 Base) MCG/ACT Aero Soln inhalation aerosol Inhale 2 Puffs every 6 hours as needed for Shortness of Breath. 8.5 g 0     No current facility-administered medications for this visit.       Allergies:   Azithromycin, Cymbalta [duloxetine hcl], Lisinopril, Demerol, Ertugliflozin-metformin hcl, and Montelukast    Social History     Tobacco Use    Smoking status: Never    Smokeless tobacco: Never   Vaping Use    Vaping status: Never Used   Substance Use Topics    Alcohol use: Never    Drug use: Never       ROS:  See above    Objective:     Vitals:    12/20/24 1058   BP: 118/64   BP Location: Left arm   Patient Position: Sitting   BP Cuff Size: Large adult   Pulse: 71   Temp: 36.1 °C (97 °F)   TempSrc: Temporal   SpO2: 94%   Weight: 123 kg (272 lb)   Height: 1.651 m (5' 5\")     Body mass index is 45.26 kg/m².    Physical Exam:  General: Alert, pleasant, NAD  HEENT: Normocephalic. Neck supple.  No thyromegaly or masses palpated. No cervical or supraclavicular lymphadenopathy. No carotid bruits   Heart: Regular rate and rhythm.  S1 and S2 normal.  No murmurs appreciated.  Respiratory: Normal respiratory effort.  Clear to auscultation bilaterally.  Abdomen colostomy bag:  Skin: Warm, dry, no rashes.  Extremities: 3+ pitting edema to the shins bilaterally, no redness or warmth psych:  Affect/mood is normal, judgement is good, memory is intact, grooming is appropriate.    Assessment and Plan:     1. Hospital discharge follow-up  Discharge medications reviewed and reconciled    2. Colostomy in place (HCC)  Continues to be managed by general surgery.  Following up again in 3 weeks.  Does have home health coming in to monitor regularly    3. Colostomy dysfunction (HCC)  Resolved, repair completed 12/7    4. Type 2 diabetes mellitus with hyperglycemia, without long-term current " use of insulin (HCC)  -Uncontrolled.  Will increase glipizide to 10 mg.  Repeat labs again in 3 months  - glipiZIDE (GLUCOTROL) 10 MG Tab; Take 1 Tablet by mouth every day. Pt reports that she takes this medication QDAY, not BID  Indications: Type 2 Diabetes  Dispense: 100 Tablet; Refill: 3    5. New onset atrial fibrillation (HCC)  New onset, discovered on previous admission.  She will be following up with cardiology in 3 days.  Continue Eliquis and metoprolol    6. Lower extremity edema  -Minimal improvement since last office visit, however has had readmission to the hospital.  Will increase Lasix to 40 mg in the morning 20 mg at night as it is still fairly significant.  She plans to  her compression stockings later today.  Continue with elevation.  She does have labs ordered.  Will have these completed today.  Plan is to repeat metabolic panel 2 to 3 days prior to next office visit to monitor potassium levels.  Will also be following up with cardiology in 3 days.  - potassium Chloride ER (K-TAB) 20 MEQ Tab CR tablet; Take 1 Tablet by mouth 2 times a day. Indications: Low Amount of Potassium in the Blood  Dispense: 60 Tablet; Refill: 1  - furosemide (LASIX) 20 MG Tab; Take 2 Tablets by mouth every morning AND 1 Tablet every evening. Indications: Edema  Dispense: 90 Tablet; Refill: 1      - Chart and discharge summary were reviewed.   - Hospitalization and results reviewed with patient.   - Medications reviewed including instructions regarding high risk medications, dosing and side effects.  - Recommended Services: Continue with home health  - Advance directive/POLST on file?  Yes    Follow-up:Return in about 3 weeks (around 1/10/2025).    Face-to-face transitional care management services with [facility] (today's visit is within 14 days post discharge & LACE+ score of 28-58) medical decision complexity were provided.

## 2024-12-21 VITALS
OXYGEN SATURATION: 92 % | HEART RATE: 86 BPM | TEMPERATURE: 97.5 F | RESPIRATION RATE: 18 BRPM | DIASTOLIC BLOOD PRESSURE: 50 MMHG | SYSTOLIC BLOOD PRESSURE: 100 MMHG

## 2024-12-21 PROCEDURE — 665999 HH PPS REVENUE DEBIT

## 2024-12-21 PROCEDURE — 665998 HH PPS REVENUE CREDIT

## 2024-12-21 ASSESSMENT — ENCOUNTER SYMPTOMS
MUSCLE WEAKNESS: 1
POOR JUDGMENT: 1
LOWER EXTREMITY EDEMA: 1
STOOL FREQUENCY: MORE THAN TWICE DAILY
LAST BOWEL MOVEMENT: 67194
DENIES PAIN: 1

## 2024-12-22 PROCEDURE — 665999 HH PPS REVENUE DEBIT

## 2024-12-22 PROCEDURE — 665998 HH PPS REVENUE CREDIT

## 2024-12-23 ENCOUNTER — TELEPHONE (OUTPATIENT)
Dept: MEDICAL GROUP | Age: 74
End: 2024-12-23
Payer: MEDICARE

## 2024-12-23 ENCOUNTER — OFFICE VISIT (OUTPATIENT)
Dept: CARDIOLOGY | Facility: MEDICAL CENTER | Age: 74
End: 2024-12-23
Attending: PHYSICIAN ASSISTANT
Payer: MEDICARE

## 2024-12-23 ENCOUNTER — HOME CARE VISIT (OUTPATIENT)
Dept: HOME HEALTH SERVICES | Facility: HOME HEALTHCARE | Age: 74
End: 2024-12-23
Payer: MEDICARE

## 2024-12-23 VITALS
SYSTOLIC BLOOD PRESSURE: 96 MMHG | HEART RATE: 72 BPM | TEMPERATURE: 98.2 F | OXYGEN SATURATION: 95 % | DIASTOLIC BLOOD PRESSURE: 50 MMHG | RESPIRATION RATE: 18 BRPM

## 2024-12-23 VITALS
DIASTOLIC BLOOD PRESSURE: 60 MMHG | BODY MASS INDEX: 44.82 KG/M2 | WEIGHT: 269 LBS | RESPIRATION RATE: 16 BRPM | OXYGEN SATURATION: 95 % | HEIGHT: 65 IN | SYSTOLIC BLOOD PRESSURE: 118 MMHG | HEART RATE: 89 BPM

## 2024-12-23 DIAGNOSIS — I10 HTN (HYPERTENSION), MALIGNANT: ICD-10-CM

## 2024-12-23 DIAGNOSIS — D68.69 HYPERCOAGULABLE STATE DUE TO PAROXYSMAL ATRIAL FIBRILLATION (HCC): ICD-10-CM

## 2024-12-23 DIAGNOSIS — D64.9 ANEMIA, UNSPECIFIED TYPE: ICD-10-CM

## 2024-12-23 DIAGNOSIS — I48.0 PAROXYSMAL ATRIAL FIBRILLATION (HCC): ICD-10-CM

## 2024-12-23 DIAGNOSIS — E11.65 TYPE 2 DIABETES MELLITUS WITH HYPERGLYCEMIA, WITHOUT LONG-TERM CURRENT USE OF INSULIN (HCC): ICD-10-CM

## 2024-12-23 DIAGNOSIS — I48.0 HYPERCOAGULABLE STATE DUE TO PAROXYSMAL ATRIAL FIBRILLATION (HCC): ICD-10-CM

## 2024-12-23 DIAGNOSIS — R06.09 DYSPNEA ON EXERTION: ICD-10-CM

## 2024-12-23 DIAGNOSIS — E78.2 MIXED HYPERLIPIDEMIA: ICD-10-CM

## 2024-12-23 DIAGNOSIS — I48.91 ATRIAL FIBRILLATION, UNSPECIFIED TYPE (HCC): ICD-10-CM

## 2024-12-23 LAB — EKG IMPRESSION: NORMAL

## 2024-12-23 PROCEDURE — 99204 OFFICE O/P NEW MOD 45 MIN: CPT | Performed by: INTERNAL MEDICINE

## 2024-12-23 PROCEDURE — G0299 HHS/HOSPICE OF RN EA 15 MIN: HCPCS

## 2024-12-23 PROCEDURE — 665998 HH PPS REVENUE CREDIT

## 2024-12-23 PROCEDURE — 93005 ELECTROCARDIOGRAM TRACING: CPT | Mod: TC | Performed by: INTERNAL MEDICINE

## 2024-12-23 PROCEDURE — 3074F SYST BP LT 130 MM HG: CPT | Performed by: INTERNAL MEDICINE

## 2024-12-23 PROCEDURE — 99213 OFFICE O/P EST LOW 20 MIN: CPT | Performed by: INTERNAL MEDICINE

## 2024-12-23 PROCEDURE — 3078F DIAST BP <80 MM HG: CPT | Performed by: INTERNAL MEDICINE

## 2024-12-23 PROCEDURE — 665999 HH PPS REVENUE DEBIT

## 2024-12-23 PROCEDURE — G2211 COMPLEX E/M VISIT ADD ON: HCPCS | Performed by: INTERNAL MEDICINE

## 2024-12-23 ASSESSMENT — ENCOUNTER SYMPTOMS
WEIGHT LOSS: 0
BRUISES/BLEEDS EASILY: 0
FALLS: 0
ABDOMINAL PAIN: 0
HEADACHES: 0
HALLUCINATIONS: 0
PAIN LOCATION - PAIN FREQUENCY: INTERMITTENT
BLOOD IN STOOL: 0
POOR JUDGMENT: 1
SHORTNESS OF BREATH: 0
PAIN: 1
LOSS OF CONSCIOUSNESS: 0
SENSORY CHANGE: 0
VOMITING: 0
PAIN LOCATION - PAIN QUALITY: BURNING
CLAUDICATION: 0
SPEECH CHANGE: 0
EYE PAIN: 0
EYE DISCHARGE: 0
NAUSEA: 0
ORTHOPNEA: 0
DIZZINESS: 0
CHILLS: 0
PAIN LOCATION - RELIEVING FACTORS: CHANGED
MYALGIAS: 0
FEVER: 0
PAIN LOCATION - PAIN DURATION: ONGOING
DEPRESSION: 0
COUGH: 0
PND: 0
PALPITATIONS: 0
BLURRED VISION: 0
DOUBLE VISION: 0

## 2024-12-23 ASSESSMENT — FIBROSIS 4 INDEX: FIB4 SCORE: 0.77

## 2024-12-23 NOTE — TELEPHONE ENCOUNTER
Christine Horner is a 64 year old female with a known past medical history of anemia, arthritis, COPD, chronic respiratory failure on home oxygen at 3L NC, pneumonia, and chronic back pain who presented to the ED 2/20 with increased oxygen requirements. Of note, she was discharged from the hospital last week on Levaquin for pneumonia. Home health went to see patient today and found her oxygen saturation to be 82% on 3L.  Her flow was increased to 5L with slow improvement to low 90s. She was instructed to come to the ED for further evaluation. She reports she was tired and weak over the weekend. Workup in the ED with increasing density in right lung on CT chest. She reports cough is becoming productive. The sputum is yellow greenish color. Previously cough was not productive. Hospital medicine consulted for admission. Discussed CT findings with patient. Pulmonary was consulted for the above.    At the time of my exam in the ER, the pt is awake and alert on 3L NC. She is able to speak in full sentences and is without any specific compliants. No family at bedside.    Interval History:  2/21: did not sleep last night as she boarded in the ER, remains on 3L NC, with cough that is nonproductive   2/22: slept well overnight, on home flow 3L, with some cough but not much sputum production  2/23: remains on home flow 3L NC, pt reports some variability in her HR with exertion so has not been able to walk the halls yet, asking about breakfast  2/24: on home flow 3L NC, pt sleeping but awakens easily to voice,  at bedside, resp status stable, plans for liver bx s/t US/CT findings   2/28: remains on home flow of 3L/NC; generalized weakness; post liver biopsy yesterday; denies acute complaints   Phone Number Called: [phone]     Call outcome: Did not leave a detailed message. Requested patient to call back.    Message: Called and left voicemail stating to call us back for her lab results.

## 2024-12-23 NOTE — TELEPHONE ENCOUNTER
----- Message from Physician Assistant Dannielle Lackey P.A.-C. sent at 12/23/2024 12:42 PM PST -----  Please let patient know that her cholesterol levels well-controlled her potassium level is within normal limits.  Continue on current dose of Lasix and potassium as discussed in clinic her vitamin D level is just slightly low.  Would recommend 2000 units vitamin D daily she is showing a little bit of anemia.  It is improving from when she was in the hospital though.  I do want to trend this and repeat her lab again a few days before her next visit.  It does not need to be fasting.

## 2024-12-23 NOTE — PROGRESS NOTES
Chief Complaint   Patient presents with    Hypertension    Atrial Fibrillation       Subjective     Guerda Lunsford is a 73 y.o. female who presents today for cardiac care and management of atrial fibrillation of paroxysmal type, in setting of bowel obstruction status post colostomy.    I have independently interpreted and reviewed echocardiogram's actual images with patient which showed normal left ventricular systolic function. No wall motion abnormality. No evidence of pulmonary hypertension. No significant valvular disease.    I have personally interpreted EKG today with patient, there is no evidence of acute coronary syndrome, no evidence of prior infarct, normal AR and QT interval, no significant conduction disease. Sinus rhythm.    Patient is feeling better these days. Does get winded upon walking up inclines or for distance. No symptoms at rest or with daily living activities.      Past Medical History:   Diagnosis Date    Allergy     Anemia 1997    Bleeding hemorrhoids    Anxiety     Arthritis     Asthma     Bronchitis     Cataract     bilateral    Chest pain 02/01/2016    Cholesterol serum elevated     diet controlled    Dental disorder 10/2020    Filled chipped front upper tooth    Depression     anxiety    Detached retina, left     Diverticulitis     Diverticulitis 03/28/2016    Gynecological disorder 1982    bleeding, cervical pecancer    Headache     Occasionally, can become severe if she does not take Advil. ICD-10 transition    Headache(784.0)     Occasionally, can become severe if she does not take Advil.    Heart murmur     Comes and goes    Hemorrhoids 1997    had surgery    High cholesterol     Hyperlipidemia     Hypertension     non-medicated    Hyponatremia 03/29/2016    Indigestion     Infectious disease     uti    Macular hole of right eye     Pain     Pneumonia     Shortness of breath 02/28/2014    SIRS (systemic inflammatory response syndrome) (HCC) 03/29/2016    Urinary tract  infection, site not specified      Past Surgical History:   Procedure Laterality Date    TN EXPLORATORY OF ABDOMEN  2024    Procedure: COLOSTOMY REVISION;  Surgeon: George Hou D.O.;  Location: [facility];  Service: General    TN LAP,DIAGNOSTIC ABDOMEN N/A 2024    Procedure: laparoscopy;  Surgeon: George Hou D.O.;  Location: [facility];  Service: General    TN COLOSTOMY Left 2024    Procedure: CREATION, COLOSTOMY, -transverse loop colostomy;  Surgeon: George Hou D.O.;  Location: [facility];  Service: General    TN SIGMOIDOSCOPY,DIAGNOSTIC  2024    Procedure: SIGMOIDOSCOPY, FLEXIBLE;  Surgeon: Cecelia Adam M.D.;  Location: [facility];  Service: Gastroenterology    PB TOTAL KNEE ARTHROPLASTY Right 2021    Procedure: ARTHROPLASTY, KNEE, TOTAL Lupillo cementless;  Surgeon: Sonido Amado M.D.;  Location: [facility];  Service: Orthopedics    PB TOTAL KNEE ARTHROPLASTY Left 2021    Procedure: ARTHROPLASTY, KNEE, TOTAL.;  Surgeon: Sonido Amado M.D.;  Location: [facility];  Service: Orthopedics    BREAST BIOPSY  2009    LAMINOTOMY      2 Lumbar Discs    ABDOMINAL HYSTERECTOMY TOTAL      Cervical precancer and bleeding.  No oophorectomy.    APPENDECTOMY      ARTHROPLASTY      COLON RESECTION      GYN SURGERY      hysterectomy - partial    HEMORRHOIDECTOMY      LUMPECTOMY      OTHER      hemorrhoidectomy    OTHER Right     cataract extraction    OTHER Right     Macular Hole Surgery    OTHER ABDOMINAL SURGERY      appy    OTHER ORTHOPEDIC SURGERY      lami     Family History   Problem Relation Age of Onset    Diabetes Mother     Heart Disease Mother 60        MI then CABG    Heart Disease Father         CHF    No Known Problems Sister     Blood Disease Brother     Abdominal aortic aneurysm Brother     Alcohol/Drug Maternal Aunt     Heart Disease Maternal Grandmother 65        MI,  with it     Alcohol/Drug Maternal Grandmother     Alcohol/Drug Maternal Grandfather     Alcohol/Drug Paternal Grandmother      Social History     Socioeconomic History    Marital status:      Spouse name: Not on file    Number of children: Not on file    Years of education: Not on file    Highest education level: Some college, no degree   Occupational History    Not on file   Tobacco Use    Smoking status: Never    Smokeless tobacco: Never   Vaping Use    Vaping status: Never Used   Substance and Sexual Activity    Alcohol use: Never    Drug use: Never    Sexual activity: Not Currently     Partners: Male     Birth control/protection: Surgical   Other Topics Concern    Not on file   Social History Narrative    Not on file     Social Drivers of Health     Financial Resource Strain: Low Risk  (12/2/2024)    Overall Financial Resource Strain (CARDIA)     Difficulty of Paying Living Expenses: Not very hard   Food Insecurity: No Food Insecurity (12/7/2024)    Hunger Vital Sign     Worried About Running Out of Food in the Last Year: Never true     Ran Out of Food in the Last Year: Never true   Transportation Needs: No Transportation Needs (12/7/2024)    PRAPARE - Transportation     Lack of Transportation (Medical): No     Lack of Transportation (Non-Medical): No   Physical Activity: Not on file   Stress: No Stress Concern Present (12/2/2024)    [employer] [place] [employer] - Occupational Stress Questionnaire     Feeling of Stress : Only a little   Social Connections: Feeling Socially Integrated (12/11/2024)    OASIS : Social Isolation     Frequency of experiencing loneliness or isolation: Rarely   Intimate Partner Violence: Not At Risk (12/6/2024)    Humiliation, Afraid, Rape, and Kick questionnaire     Fear of Current or Ex-Partner: No     Emotionally Abused: No     Physically Abused: No     Sexually Abused: No   Housing Stability: Low Risk  (12/7/2024)    Housing Stability Vital Sign     Unable to Pay for  "Housing in the Last Year: No     Number of Times Moved in the Last Year: 0     Homeless in the Last Year: No     Allergies   Allergen Reactions    Azithromycin Unspecified     Pt states she feels a burning and hot sensation \"I can feel it in my veins, all the way through my body down to the bottom of my feet.\"    Cymbalta [Duloxetine Hcl] Unspecified     Make blood pressure go up    Lisinopril Cough     Cough      Demerol Rash     Rash at injection site, N/V.    Ertugliflozin-Metformin Hcl Unspecified          Montelukast Anxiety     Gets nightmares - will never take it again     Outpatient Encounter Medications as of 12/23/2024   Medication Sig Dispense Refill    benzonatate (TESSALON) 100 MG Cap Take 1 Capsule by mouth 3 times a day as needed.      ondansetron (ZOFRAN ODT) 4 MG TABLET DISPERSIBLE 4 mg by oral route.      triamcinolone acetonide (KENALOG) 0.1 % Ointment Apply 1 Application topically 2 times a day.      potassium Chloride ER (K-TAB) 20 MEQ Tab CR tablet Take 1 Tablet by mouth 2 times a day. Indications: Low Amount of Potassium in the Blood 60 Tablet 1    furosemide (LASIX) 20 MG Tab Take 2 Tablets by mouth every morning AND 1 Tablet every evening. Indications: Edema 90 Tablet 1    glipiZIDE (GLUCOTROL) 10 MG Tab Take 1 Tablet by mouth every day. Pt reports that she takes this medication QDAY, not BID  Indications: Type 2 Diabetes 100 Tablet 3    atorvastatin (LIPITOR) 20 MG Tab TAKE 1 TABLET BY MOUTH DAILY 90 Tablet 0    fluticasone (FLONASE) 50 MCG/ACT nasal spray Administer 1 Spray into affected nostril(S) 2 times a day as needed (rhinitis). Indications: Nonallergic Rhinitis, Stuffy Nose      apixaban (ELIQUIS) 5mg Tab Take 1 Tablet by mouth 2 times a day for 90 days. Indications: Thromboembolism secondary to Atrial Fibrillation 180 Tablet 0    metoprolol SR (TOPROL XL) 50 MG TABLET SR 24 HR Take 1 Tablet by mouth every evening. Indications: High Blood Pressure 90 Tablet 3    acetaminophen " (TYLENOL) 500 MG Tab Take 500-1,000 mg by mouth every 6 hours as needed for Mild Pain. Indications: Pain      pantoprazole (PROTONIX) 40 MG Tablet Delayed Response Take 40 mg by mouth every evening. Indications: Heartburn      sertraline (ZOLOFT) 50 MG Tab Take 50 mg by mouth every day. Indications: Major Depressive Disorder      buPROPion (WELLBUTRIN XL) 300 MG XL tablet Take 300 mg by mouth every morning. Indications: Depression      albuterol 108 (90 Base) MCG/ACT Aero Soln inhalation aerosol Inhale 2 Puffs every 6 hours as needed for Shortness of Breath. 8.5 g 0    [DISCONTINUED] hydroCHLOROthiazide 25 MG Tab 25 mg by oral route. (Patient not taking: Reported on 12/23/2024)      [DISCONTINUED] Cholecalciferol (VITAMIN D3 MAXIMUM STRENGTH PO) Take 50,000 Capsules by mouth every Wednesday. Indications: supplement (Patient not taking: Reported on 12/23/2024)       No facility-administered encounter medications on file as of 12/23/2024.     Review of Systems   Constitutional:  Negative for chills, fever, malaise/fatigue and weight loss.   HENT:  Negative for ear discharge, ear pain, hearing loss and nosebleeds.    Eyes:  Negative for blurred vision, double vision, pain and discharge.   Respiratory:  Negative for cough and shortness of breath.    Cardiovascular:  Negative for chest pain, palpitations, orthopnea, claudication, leg swelling and PND.   Gastrointestinal:  Negative for abdominal pain, blood in stool, melena, nausea and vomiting.   Genitourinary:  Negative for dysuria and hematuria.   Musculoskeletal:  Negative for falls, joint pain and myalgias.   Skin:  Negative for itching and rash.   Neurological:  Negative for dizziness, sensory change, speech change, loss of consciousness and headaches.   Endo/Heme/Allergies:  Negative for environmental allergies. Does not bruise/bleed easily.   Psychiatric/Behavioral:  Negative for depression, hallucinations and suicidal ideas.               Objective     /60  "(BP Location: Left arm, Patient Position: Sitting, BP Cuff Size: Adult)   Pulse 89   Resp 16   Ht 1.651 m (5' 5\")   Wt 122 kg (269 lb)   SpO2 95%   BMI 44.76 kg/m²     Physical Exam  Vitals and nursing note reviewed.   Constitutional:       General: She is not in acute distress.     Appearance: She is not diaphoretic.   HENT:      Head: Normocephalic and atraumatic.      Right Ear: External ear normal.      Left Ear: External ear normal.      Nose: No congestion or rhinorrhea.   Eyes:      General:         Right eye: No discharge.         Left eye: No discharge.   Neck:      Thyroid: No thyromegaly.      Vascular: No JVD.   Cardiovascular:      Rate and Rhythm: Normal rate and regular rhythm.      Pulses: Normal pulses.   Pulmonary:      Effort: No respiratory distress.   Abdominal:      General: There is no distension.      Tenderness: There is no abdominal tenderness.   Musculoskeletal:         General: No swelling or tenderness.      Right lower leg: No edema.      Left lower leg: No edema.   Skin:     General: Skin is warm and dry.   Neurological:      Mental Status: She is alert and oriented to person, place, and time.      Cranial Nerves: No cranial nerve deficit.   Psychiatric:         Behavior: Behavior normal.                Assessment & Plan     1. Paroxysmal atrial fibrillation (HCC)  EKG      2. Hypercoagulable state due to paroxysmal atrial fibrillation (HCC)        3. HTN (hypertension), malignant        4. Type 2 diabetes mellitus with hyperglycemia, without long-term current use of insulin (HCC)  Basic Metabolic Panel    HEMOGLOBIN A1C      5. Mixed hyperlipidemia  LIPID PANEL      6. Dyspnea on exertion  proBrain Natriuretic Peptide, NT          Medical Decision Making: Today's Assessment/Status/Plan:   Today, based on physical examination findings, patient is euvolemic. No JVD, lungs are clear to auscultation, no pitting edema in bilateral lower extremities, no ascites.    Dry weight is 269 " lbs. Continue furosemide as instructed.    Currently in sinus rhythm. Continue Toprol 50 mg daily.    Continue anticoagulation with Eliquis 5 mg BID for treatment of stroke risk reduction. Will closely monitor side effect of systemic bleeding.    Continue Atorvastatin 20 mg daily for LDL control.    This visit encounter signifies the visit complexity inherent to evaluation and management associated with medical care services that serve as the continuing focal point for all needed health care services and/or with medical care services that are part of ongoing care related to this patient's single, serious condition, complex cardiac condition.    Grayson Nolasco M.D.

## 2024-12-24 ENCOUNTER — HOME CARE VISIT (OUTPATIENT)
Dept: HOME HEALTH SERVICES | Facility: HOME HEALTHCARE | Age: 74
End: 2024-12-24
Payer: MEDICARE

## 2024-12-24 PROCEDURE — 665998 HH PPS REVENUE CREDIT

## 2024-12-24 PROCEDURE — 665999 HH PPS REVENUE DEBIT

## 2024-12-24 ASSESSMENT — ENCOUNTER SYMPTOMS
SUBJECTIVE PAIN PROGRESSION: UNCHANGED
HIGHEST PAIN SEVERITY IN PAST 24 HOURS: 2/10
SUBJECTIVE PAIN PROGRESSION: WAXING AND WANING
MUSCLE WEAKNESS: 1
PAIN LOCATION - PAIN SEVERITY: 1/10
LOWER EXTREMITY EDEMA: 1
LOWEST PAIN SEVERITY IN PAST 24 HOURS: 4/10
PAIN: 1
PAIN LOCATION - PAIN DURATION: ACUTE
PAIN LOCATION - PAIN FREQUENCY: INTERMITTENT
PAIN SEVERITY GOAL: 2/10
LOWEST PAIN SEVERITY IN PAST 24 HOURS: 0/10
HIGHEST PAIN SEVERITY IN PAST 24 HOURS: 5/10
LAST BOWEL MOVEMENT: 67197
STOOL FREQUENCY: MORE THAN TWICE DAILY
PAIN SEVERITY GOAL: 1/10

## 2024-12-25 PROCEDURE — 665998 HH PPS REVENUE CREDIT

## 2024-12-25 PROCEDURE — 665999 HH PPS REVENUE DEBIT

## 2024-12-26 ENCOUNTER — HOME CARE VISIT (OUTPATIENT)
Dept: HOME HEALTH SERVICES | Facility: HOME HEALTHCARE | Age: 74
End: 2024-12-26
Payer: MEDICARE

## 2024-12-26 VITALS
RESPIRATION RATE: 18 BRPM | DIASTOLIC BLOOD PRESSURE: 50 MMHG | SYSTOLIC BLOOD PRESSURE: 90 MMHG | OXYGEN SATURATION: 95 % | HEART RATE: 70 BPM

## 2024-12-26 PROCEDURE — 665998 HH PPS REVENUE CREDIT

## 2024-12-26 PROCEDURE — 665999 HH PPS REVENUE DEBIT

## 2024-12-26 PROCEDURE — G0299 HHS/HOSPICE OF RN EA 15 MIN: HCPCS

## 2024-12-26 ASSESSMENT — ENCOUNTER SYMPTOMS
HIGHEST PAIN SEVERITY IN PAST 24 HOURS: 5/10
PAIN LOCATION - PAIN DURATION: ONGOING
STOOL FREQUENCY: MORE THAN TWICE DAILY
PAIN: 1
PAIN LOCATION - PAIN FREQUENCY: INTERMITTENT
LOWER EXTREMITY EDEMA: 1
PAIN LOCATION - PAIN QUALITY: BURNING
PAIN LOCATION - PAIN SEVERITY: 0/10
POOR JUDGMENT: 1
LOWEST PAIN SEVERITY IN PAST 24 HOURS: 0/10
LAST BOWEL MOVEMENT: 67200
SUBJECTIVE PAIN PROGRESSION: GRADUALLY IMPROVING
PAIN SEVERITY GOAL: 0/10

## 2024-12-26 NOTE — CASE COMMUNICATION
Patient was discussed in IDT today due to recent resumption to services after hospital admission. Discussed progress toward goals of care with the treatment team. The treatment team currently involves the following disciplines: Home health aide, MSW, nursing, OT and PT. Plan is discharge when able to manage ostomy

## 2024-12-27 ENCOUNTER — HOME CARE VISIT (OUTPATIENT)
Dept: HOME HEALTH SERVICES | Facility: HOME HEALTHCARE | Age: 74
End: 2024-12-27
Payer: MEDICARE

## 2024-12-27 VITALS
SYSTOLIC BLOOD PRESSURE: 100 MMHG | RESPIRATION RATE: 17 BRPM | HEART RATE: 68 BPM | OXYGEN SATURATION: 95 % | DIASTOLIC BLOOD PRESSURE: 56 MMHG | TEMPERATURE: 99 F

## 2024-12-27 PROCEDURE — 665999 HH PPS REVENUE DEBIT

## 2024-12-27 PROCEDURE — 665998 HH PPS REVENUE CREDIT

## 2024-12-27 PROCEDURE — G0152 HHCP-SERV OF OT,EA 15 MIN: HCPCS

## 2024-12-27 ASSESSMENT — ENCOUNTER SYMPTOMS
PAIN LOCATION - PAIN FREQUENCY: INTERMITTENT
PAIN LOCATION - PAIN SEVERITY: 0/10
SUBJECTIVE PAIN PROGRESSION: RAPIDLY IMPROVING
PAIN: 1
LOWEST PAIN SEVERITY IN PAST 24 HOURS: 0/10
PAIN LOCATION - PAIN DURATION: ACUTE
PAIN LOCATION - PAIN QUALITY: BURNING
PAIN SEVERITY GOAL: 3/10
HIGHEST PAIN SEVERITY IN PAST 24 HOURS: 0/10

## 2024-12-27 ASSESSMENT — ACTIVITIES OF DAILY LIVING (ADL): OASIS_M1830: 05

## 2024-12-27 NOTE — CASE COMMUNICATION
Quality Review for [facility] OASIS by BRIGIDA Samson, PT  on  December 27, 2024     Edits completed by BRIGIDA Samson, PT:  1.  and  diagnosis coding updated per chart review.  2.  - changed to 12/10/2024.  3.  - changed to 5 as would DME to safely use shower; [id number],J - changed to 3 per narrative; [id number] - changed to 88.  4.  - changed to 3 as pt requires assist with transfers and gait.  5. Nutritional Requirements - c hecked low cholesterol diet per dx of HLD.  6. Removed F2F information; only needed at SOC.  7.  - changed to 5 per ordered therapy visit sets.  8. Added depression to care plan under Patient-Specific Safety Precautions.

## 2024-12-27 NOTE — CASE COMMUNICATION
noted  ----- Message -----  From: Pinky Garcia C.N.A.  Sent: 12/26/2024   8:46 AM PST  To: Asuncion Lynn R.N.      patient called and nishant did not feel up to it today

## 2024-12-28 ENCOUNTER — HOME CARE VISIT (OUTPATIENT)
Dept: HOME HEALTH SERVICES | Facility: HOME HEALTHCARE | Age: 74
End: 2024-12-28
Payer: MEDICARE

## 2024-12-28 PROCEDURE — G0299 HHS/HOSPICE OF RN EA 15 MIN: HCPCS

## 2024-12-28 PROCEDURE — 665999 HH PPS REVENUE DEBIT

## 2024-12-28 PROCEDURE — 665998 HH PPS REVENUE CREDIT

## 2024-12-28 NOTE — Clinical Note
Minor leaking at 3 to 5 o'clock. Noticed slightly increased redness. Protected skin with no sting barrier wipes and stoma powder. SN changed ostomy appliance. Patient tolerated well.

## 2024-12-29 VITALS
RESPIRATION RATE: 18 BRPM | HEART RATE: 73 BPM | SYSTOLIC BLOOD PRESSURE: 108 MMHG | OXYGEN SATURATION: 95 % | DIASTOLIC BLOOD PRESSURE: 58 MMHG | TEMPERATURE: 98.4 F

## 2024-12-29 PROCEDURE — 665998 HH PPS REVENUE CREDIT

## 2024-12-29 PROCEDURE — 665999 HH PPS REVENUE DEBIT

## 2024-12-29 ASSESSMENT — ENCOUNTER SYMPTOMS
STOOL FREQUENCY: MORE THAN TWICE DAILY
MUSCLE WEAKNESS: 1
MUSCLE WEAKNESS: 1
NAUSEA: NO
LAST BOWEL MOVEMENT: 67202
VOMITING: NO
DENIES PAIN: 1

## 2024-12-29 ASSESSMENT — ACTIVITIES OF DAILY LIVING (ADL)
CURRENT_FUNCTION: STAND BY ASSIST
AMBULATION ASSISTANCE: STAND BY ASSIST

## 2024-12-30 ENCOUNTER — HOME CARE VISIT (OUTPATIENT)
Dept: HOME HEALTH SERVICES | Facility: HOME HEALTHCARE | Age: 74
End: 2024-12-30
Payer: MEDICARE

## 2024-12-30 VITALS
OXYGEN SATURATION: 93 % | HEART RATE: 70 BPM | RESPIRATION RATE: 18 BRPM | DIASTOLIC BLOOD PRESSURE: 60 MMHG | SYSTOLIC BLOOD PRESSURE: 100 MMHG | TEMPERATURE: 97.8 F

## 2024-12-30 PROCEDURE — G0299 HHS/HOSPICE OF RN EA 15 MIN: HCPCS

## 2024-12-30 PROCEDURE — 665998 HH PPS REVENUE CREDIT

## 2024-12-30 PROCEDURE — G0155 HHCP-SVS OF CSW,EA 15 MIN: HCPCS

## 2024-12-30 PROCEDURE — 665999 HH PPS REVENUE DEBIT

## 2024-12-30 ASSESSMENT — ENCOUNTER SYMPTOMS
MUSCLE WEAKNESS: 1
LAST BOWEL MOVEMENT: 67204
PAIN LOCATION - PAIN DURATION: ONGOING
LOWEST PAIN SEVERITY IN PAST 24 HOURS: 0/10
PAIN SEVERITY GOAL: 3/10
PAIN LOCATION - PAIN SEVERITY: 1/10
LOWER EXTREMITY EDEMA: 1
PAIN LOCATION - PAIN FREQUENCY: INTERMITTENT
PAIN LOCATION - PAIN QUALITY: IRRITATED
STOOL FREQUENCY: MORE THAN TWICE DAILY
PAIN: 1
SUBJECTIVE PAIN PROGRESSION: GRADUALLY IMPROVING
PAIN: 1
PERSON REPORTING PAIN: PATIENT
POOR JUDGMENT: 1
HIGHEST PAIN SEVERITY IN PAST 24 HOURS: 5/10

## 2024-12-30 ASSESSMENT — PATIENT HEALTH QUESTIONNAIRE - PHQ9: CLINICAL INTERPRETATION OF PHQ2 SCORE: 0

## 2024-12-30 NOTE — CASE COMMUNICATION
I agreewith changes  ----- Message -----  From: Asuncion Samson PT  Sent: 12/27/2024  11:42 AM PST  To: Asuncion Lynn R.N.      Quality Review for [facility] OASIS by BRIGIDA Samson PT  on  December 27, 2024     Edits completed by BRIGIDA Samson PT:  1.  and  diagnosis coding updated per chart review.  2.  - changed to 12/10/2024.  3.  - changed to 5 as would DME to safely use shower; [id number],F,I,J - changed to 3 per narrative; GG01 [id number] - changed to 88.  4.  - changed to 3 as pt requires assist with transfers and gait.  5. Nutritional Requirements - checked low cholesterol diet per dx of HLD.  6. Removed F2F information; only needed at SOC.  7.  - changed to 5 per ordered therapy visit sets.  8. Added depression to care plan under Patient-Specific Safety Precautions.

## 2024-12-31 ASSESSMENT — ENCOUNTER SYMPTOMS
SHORTNESS OF BREATH: 1
DYSPNEA ACTIVITY LEVEL: AFTER AMBULATING MORE THAN 20 FT

## 2024-12-31 NOTE — CASE COMMUNICATION
Thanks  ----- Message -----  From: Jerrell Luciano R.N.  Sent: 12/29/2024  11:15 PM PST  To: Asuncion Lynn R.N.      Minor leaking at 3 to 5 o'clock. Noticed slightly increased redness. Protected skin with no sting barrier wipes and stoma powder. SN changed ostomy appliance. Patient tolerated well.

## 2025-01-02 ENCOUNTER — HOME CARE VISIT (OUTPATIENT)
Dept: HOME HEALTH SERVICES | Facility: HOME HEALTHCARE | Age: 75
End: 2025-01-02
Payer: MEDICARE

## 2025-01-02 VITALS
SYSTOLIC BLOOD PRESSURE: 100 MMHG | RESPIRATION RATE: 18 BRPM | OXYGEN SATURATION: 97 % | HEART RATE: 60 BPM | TEMPERATURE: 99 F | DIASTOLIC BLOOD PRESSURE: 50 MMHG

## 2025-01-02 PROCEDURE — G0299 HHS/HOSPICE OF RN EA 15 MIN: HCPCS

## 2025-01-02 ASSESSMENT — ENCOUNTER SYMPTOMS
STOOL FREQUENCY: MORE THAN TWICE DAILY
VOMITING: NO
LAST BOWEL MOVEMENT: 67207
LOWER EXTREMITY EDEMA: 1
LIMITED RANGE OF MOTION: 1
NAUSEA: NO
DENIES PAIN: 1
MUSCLE WEAKNESS: 1

## 2025-01-02 NOTE — Clinical Note
Dannielle Lackey, PCP  Pt has meseret't with you 1/9/25  Pt is out of two medications prescribed by hospitalist 12/3/24.   Eliquis 5 mg. Ordered 12/3 for 90 days - until 3/3/25. Container states no refill. Out of medication.  Metoprolol SR. Ordered 12/3. Out of medication. BP has been good - systolic consistently under 120. Was 100/50 today and several other days.   Please advise pt if she should call for refills or new prescription.  Thank you.

## 2025-01-02 NOTE — Clinical Note
Dannielle  I saw her today. She has not followed through with contacting Pharmacy about refills. Asuncion says she is not good at following through. Seemed to be news to her to call Pharmacy and tell them she was to have gotten 90, only got 30, and needs refills. We told her what to do last week. Promised me she would call after I left.   Asuncion is her RN case manager.  ----- Message -----  From: Dannielle Lackey P.A.-C.  Sent: 1/3/2025   4:05 PM PST  To: Radha Mott R.N.      I am a little confused as to why she is run out of medications.  I sent in 90-day supply unless the pharmacy only gave her 30 tablets she should contact the pharmacy.  I still want her on both of those medications  ----- Message -----  From: Radha Mott R.N.  Sent: 1/2/2025   9:44 PM PST  To: Asuncion Lynn R.N.; Dannielle Lackey P.A.-C.; *    Dannielle Lackey, PCP  Pt has meseret't with you 1/9/25  Pt is out of two medications prescribed by hospitalist 12/3/24.   Eliquis 5 mg. Ordered 12/3 for 90 days - until 3/3/25. Container states no refill. Out of medication.  Metoprolol SR. Ordered 12/3. Out of medication. BP has been good - systolic consistently under 120. Was 100/50 today and several other days.   Please advise pt if she should call for refills or new prescription.  Thank you.

## 2025-01-02 NOTE — Clinical Note
I just called the pt and gave her the info to call Pharmacy ([facility]) and see why she ran out. She needs refill. See if she did that. She told me she would contact PCP yesterday when I was there and she didn't. Not good about following through?  ----- Message -----  From: Dannielle Lackey P.A.-C.  Sent: 1/3/2025   4:05 PM PST  To: Radha Mott R.N.      I am a little confused as to why she is run out of medications.  I sent in 90-day supply unless the pharmacy only gave her 30 tablets she should contact the pharmacy.  I still want her on both of those medications  ----- Message -----  From: Radha Mott R.N.  Sent: 1/2/2025   9:44 PM PST  To: Asuncion Lynn R.N.; Dannielle Lackey P.A.-C.; *    Dannielle Lackey, PCP  Pt has meseret't with you 1/9/25  Pt is out of two medications prescribed by hospitalist 12/3/24.   Eliquis 5 mg. Ordered 12/3 for 90 days - until 3/3/25. Container states no refill. Out of medication.  Metoprolol SR. Ordered 12/3. Out of medication. BP has been good - systolic consistently under 120. Was 100/50 today and several other days.   Please advise pt if she should call for refills or new prescription.  Thank you.

## 2025-01-02 NOTE — Clinical Note
"FYI  Colostomy appliance intact for 3 days. Effluent under appliance with peristomal skin irritation but not breakdown. Red rubber catheter supporting stoma but part that should be under stoma and not visible now exposed. Cannot reposition back in place. Stoma 2 x 1 3/4\". Skin barrier with 2 3/4\" flange cut as far as it can be at 2\". Next larger size skin barrier has 4\" flange but does not come convex. If present 2 3/4\" flange continues to have effluent under skin barrier, will have to rethink how to use 4\" flange. Pt able to remove appliance and clean stoma and skin. Have not found a good pouching system that pt can do independently. Continues to require assessment and assistance from   for pouching system. Has FU meseret't with surgeon later this month.   Thank you, Radha Mott, RN, MSN, WOCN  "

## 2025-01-03 ENCOUNTER — HOME CARE VISIT (OUTPATIENT)
Dept: HOME HEALTH SERVICES | Facility: HOME HEALTHCARE | Age: 75
End: 2025-01-03
Payer: MEDICARE

## 2025-01-03 VITALS
TEMPERATURE: 99.4 F | DIASTOLIC BLOOD PRESSURE: 58 MMHG | SYSTOLIC BLOOD PRESSURE: 100 MMHG | OXYGEN SATURATION: 92 % | RESPIRATION RATE: 18 BRPM | HEART RATE: 71 BPM

## 2025-01-03 PROCEDURE — G0152 HHCP-SERV OF OT,EA 15 MIN: HCPCS

## 2025-01-03 SDOH — ECONOMIC STABILITY: HOUSING INSECURITY: HOME SAFETY: CLAMP ON GRAB BAR ADDED TO TUB EDGE FOR IMPROVED SAFETY WITH TRANSFER IN AND OUT OF THE TUB

## 2025-01-03 ASSESSMENT — ENCOUNTER SYMPTOMS
PAIN LOCATION - PAIN FREQUENCY: INTERMITTENT
SUBJECTIVE PAIN PROGRESSION: UNCHANGED
PAIN SEVERITY GOAL: 3/10
LOWEST PAIN SEVERITY IN PAST 24 HOURS: 0/10
PAIN: 1
PAIN LOCATION - PAIN DURATION: ACUTE
HIGHEST PAIN SEVERITY IN PAST 24 HOURS: 4/10
PAIN LOCATION - EXACERBATING FACTORS: POSITIONING
PAIN LOCATION - PAIN SEVERITY: 3/10

## 2025-01-03 NOTE — CASE COMMUNICATION
noted  ----- Message -----  From: Radha Mott R.N.  Sent: 1/2/2025   9:44 PM PST  To: Asuncion Lynn R.N.; Dannielle Lackey P.A.-C.; *      Dannielle Lackey, PCP  Pt has meseret't with you 1/9/25  Pt is out of two medications prescribed by hospitalist 12/3/24.   Eliquis 5 mg. Ordered 12/3 for 90 days - until 3/3/25. Container states no refill. Out of medication.  Metoprolol SR. Ordered 12/3. Out of medication. BP has been good - systolic consis tently under 120. Was 100/50 today and several other days.   Please advise pt if she should call for refills or new prescription.  Thank you.

## 2025-01-03 NOTE — CASE COMMUNICATION
"noted  ----- Message -----  From: Radha Mott R.N.  Sent: 1/2/2025   9:44 PM PST  To: Asuncion Lynn R.N.; Dannielle Lackey P.A.-C.; *      FYI  Colostomy appliance intact for 3 days. Effluent under appliance with peristomal skin irritation but not breakdown. Red rubber catheter supporting stoma but part that should be under stoma and not visible now exposed. Cannot reposition back in place. Stoma 2 x 1 3/4\". Skin barrier with 2 3/4\" flan ge cut as far as it can be at 2\". Next larger size skin barrier has 4\" flange but does not come convex. If present 2 3/4\" flange continues to have effluent under skin barrier, will have to rethink how to use 4\" flange. Pt able to remove appliance and clean stoma and skin. Have not found a good pouching system that pt can do independently. Continues to require assessment and assistance from [facility] for pouching system. Has FU meseret't with stephanie cast later this month.   Thank you, Radha Mott, RN, MSN, WOCN  "

## 2025-01-03 NOTE — Clinical Note
Guerda’s BP has been on the low end during homecare sessions. Here’s a few reading to give you an idea:  1/3: 100/58  1/2: 100/50  12/30: 100/60  12/28: 108/58  12/27: 100/56  12/26: 90/50  12/23 96/50    She is out of her metoprolol and is wondering if she should still be taking it. If so, she needs a refill. Last dose was yesterday. Send her a message or call her with the response if possible as we are going into the weekend.     Thank you,  Court High, OTR/L

## 2025-01-04 ENCOUNTER — HOME CARE VISIT (OUTPATIENT)
Dept: HOME HEALTH SERVICES | Facility: HOME HEALTHCARE | Age: 75
End: 2025-01-04
Payer: MEDICARE

## 2025-01-04 PROCEDURE — G0299 HHS/HOSPICE OF RN EA 15 MIN: HCPCS

## 2025-01-04 ASSESSMENT — ENCOUNTER SYMPTOMS: POOR JUDGMENT: 1

## 2025-01-04 NOTE — CASE COMMUNICATION
noted  ----- Message -----  From: Court High, OT  Sent: 1/3/2025   4:49 PM PST  To: Asuncion Lynn R.N.; Yasmany Duke      Discharge OT services effective 1/3/2025 with goals met.

## 2025-01-04 NOTE — CASE COMMUNICATION
ACTION NEEDED  Pt is out of Eliquis 5 mg 2x daily & metoprolol succ 50mg. Would you please reorder these. Originally ordered by hospitalist upon discharge

## 2025-01-04 NOTE — CASE COMMUNICATION
very poor follow through  ----- Message -----  From: Radha Mott R.N.  Sent: 1/3/2025   5:49 PM PST  To: Asuncion Lynn R.N.  Subject: FW:                                                I just called the pt and gave her the info to call Pharmacy ([facility]) and see why she ran out. She needs refill. See if she did that. She told me she would contact PCP yesterday when I was there and she didn't. Not good about following through?  ----- Mess age -----  From: Dannielle Lackey P.A.-C.  Sent: 1/3/2025   4:05 PM PST  To: Radha Mott R.N.      I am a little confused as to why she is run out of medications.  I sent in 90-day supply unless the pharmacy only gave her 30 tablets she should contact the pharmacy.  I still want her on both of those medications  ----- Message -----  From: Radha Mott R.N.  Sent: 1/2/2025   9:44 PM PST  To: Asuncion Lynn R.N.; Dannielle Lackey P.A.-C.; *     Dannielle Lackey, PCP  Pt has meseret't with you 1/9/25  Pt is out of two medications prescribed by hospitalist 12/3/24.   Eliquis 5 mg. Ordered 12/3 for 90 days - until 3/3/25. Container states no refill. Out of medication.  Metoprolol SR. Ordered 12/3. Out of medication. BP has been good - systolic consistently under 120. Was 100/50 today and several other days.   Please advise pt if she should call for refills or new prescription.  Alfred nk you.

## 2025-01-05 VITALS
SYSTOLIC BLOOD PRESSURE: 90 MMHG | OXYGEN SATURATION: 97 % | TEMPERATURE: 97.9 F | RESPIRATION RATE: 18 BRPM | DIASTOLIC BLOOD PRESSURE: 50 MMHG | HEART RATE: 87 BPM

## 2025-01-05 ASSESSMENT — ENCOUNTER SYMPTOMS
PAIN LOCATION - PAIN FREQUENCY: INTERMITTENT
HIGHEST PAIN SEVERITY IN PAST 24 HOURS: 5/10
PAIN SEVERITY GOAL: 0/10
PAIN LOCATION: AROUND STOMA
MUSCLE WEAKNESS: 1
PAIN: 1
LOWEST PAIN SEVERITY IN PAST 24 HOURS: 0/10
PAIN LOCATION - PAIN SEVERITY: 0/10
PAIN LOCATION - PAIN DURATION: ONGOING
SUBJECTIVE PAIN PROGRESSION: UNCHANGED
LAST BOWEL MOVEMENT: 67209
PAIN LOCATION - PAIN QUALITY: BURNING
LOWER EXTREMITY EDEMA: 1

## 2025-01-06 ENCOUNTER — HOME CARE VISIT (OUTPATIENT)
Dept: HOME HEALTH SERVICES | Facility: HOME HEALTHCARE | Age: 75
End: 2025-01-06
Payer: MEDICARE

## 2025-01-06 VITALS
RESPIRATION RATE: 18 BRPM | SYSTOLIC BLOOD PRESSURE: 110 MMHG | OXYGEN SATURATION: 94 % | TEMPERATURE: 99.4 F | DIASTOLIC BLOOD PRESSURE: 60 MMHG | HEART RATE: 66 BPM

## 2025-01-06 PROCEDURE — A4413 2 PC DRAINABLE OST POUCH: HCPCS

## 2025-01-06 PROCEDURE — G0299 HHS/HOSPICE OF RN EA 15 MIN: HCPCS

## 2025-01-06 ASSESSMENT — ENCOUNTER SYMPTOMS: DENIES PAIN: 1

## 2025-01-07 NOTE — CASE COMMUNICATION
I also sent a message to Dr Nolasco on Saturday requesting refills. Meds were originally ordered by hospitalist  ----- Message -----  From: Radha Mott R.N.  Sent: 1/6/2025   9:44 PM PST  To: Asuncion Lynn R.N.; Dannielle Lackey P.A.-C.  Subject: RE:                                                Dannielle  I saw her today. She has not followed through with contacting Pharmacy about refills. Asuncion says she is not good at following through. See med to be news to her to call Pharmacy and tell them she was to have gotten 90, only got 30, and needs refills. We told her what to do last week. Promised me she would call after I left.   Asuncion is her RN case manager.  ----- Message -----  From: Dannielle Lackey P.A.-C.  Sent: 1/3/2025   4:05 PM PST  To: Radha Mott R.N.      I am a little confused as to why she is run out of medications.  I sent in 90-day supply unless the pharmacy o nly gave her 30 tablets she should contact the pharmacy.  I still want her on both of those medications  ----- Message -----  From: Radha Mott R.N.  Sent: 1/2/2025   9:44 PM PST  To: Asuncion Lynn R.N.; Dannielle Lackey P.A.-C.; *    Dannielle Lackey, PCP  Pt has meseret't with you 1/9/25  Pt is out of two medications prescribed by hospitalist 12/3/24.   Eliquis 5 mg. Ordered 12/3 for 90 days - until 3/3/25. Container states no refill. Out of  medication.  Metoprolol SR. Ordered 12/3. Out of medication. BP has been good - systolic consistently under 120. Was 100/50 today and several other days.   Please advise pt if she should call for refills or new prescription.  Thank you.

## 2025-01-08 ENCOUNTER — HOME CARE VISIT (OUTPATIENT)
Dept: HOME HEALTH SERVICES | Facility: HOME HEALTHCARE | Age: 75
End: 2025-01-08
Payer: MEDICARE

## 2025-01-08 VITALS
HEART RATE: 80 BPM | TEMPERATURE: 99.8 F | DIASTOLIC BLOOD PRESSURE: 56 MMHG | OXYGEN SATURATION: 95 % | RESPIRATION RATE: 18 BRPM | SYSTOLIC BLOOD PRESSURE: 110 MMHG

## 2025-01-08 PROCEDURE — G0299 HHS/HOSPICE OF RN EA 15 MIN: HCPCS

## 2025-01-08 ASSESSMENT — ENCOUNTER SYMPTOMS
VOMITING: NO
LOWER EXTREMITY EDEMA: 1
NAUSEA: NO
STOOL FREQUENCY: MORE THAN TWICE DAILY
NAUSEA: NO
LAST BOWEL MOVEMENT: 67211
LAST BOWEL MOVEMENT: 67213
DENIES PAIN: 1
MUSCLE WEAKNESS: 1
VOMITING: NO
LOWER EXTREMITY EDEMA: 1
MUSCLE WEAKNESS: 1
STOOL FREQUENCY: MORE THAN TWICE DAILY

## 2025-01-09 ENCOUNTER — OFFICE VISIT (OUTPATIENT)
Dept: MEDICAL GROUP | Age: 75
End: 2025-01-09
Payer: MEDICARE

## 2025-01-09 VITALS
HEART RATE: 94 BPM | SYSTOLIC BLOOD PRESSURE: 122 MMHG | TEMPERATURE: 97 F | HEIGHT: 65 IN | DIASTOLIC BLOOD PRESSURE: 60 MMHG | OXYGEN SATURATION: 96 % | BODY MASS INDEX: 46.48 KG/M2 | WEIGHT: 279 LBS

## 2025-01-09 DIAGNOSIS — E11.9 TYPE 2 DIABETES MELLITUS WITHOUT COMPLICATION, WITHOUT LONG-TERM CURRENT USE OF INSULIN (HCC): ICD-10-CM

## 2025-01-09 DIAGNOSIS — D64.9 ANEMIA, UNSPECIFIED TYPE: ICD-10-CM

## 2025-01-09 DIAGNOSIS — I48.91 ATRIAL FIBRILLATION, UNSPECIFIED TYPE (HCC): ICD-10-CM

## 2025-01-09 DIAGNOSIS — R60.0 BILATERAL LOWER EXTREMITY EDEMA: ICD-10-CM

## 2025-01-09 DIAGNOSIS — Z93.3 COLOSTOMY IN PLACE (HCC): ICD-10-CM

## 2025-01-09 PROBLEM — E83.42 HYPOMAGNESEMIA: Status: RESOLVED | Noted: 2024-12-07 | Resolved: 2025-01-09

## 2025-01-09 PROCEDURE — 99214 OFFICE O/P EST MOD 30 MIN: CPT | Performed by: PHYSICIAN ASSISTANT

## 2025-01-09 PROCEDURE — 3078F DIAST BP <80 MM HG: CPT | Performed by: PHYSICIAN ASSISTANT

## 2025-01-09 PROCEDURE — RXMED WILLOW AMBULATORY MEDICATION CHARGE: Performed by: PHYSICIAN ASSISTANT

## 2025-01-09 PROCEDURE — 3074F SYST BP LT 130 MM HG: CPT | Performed by: PHYSICIAN ASSISTANT

## 2025-01-09 RX ORDER — FUROSEMIDE 40 MG/1
40 TABLET ORAL 2 TIMES DAILY
Qty: 180 TABLET | Refills: 1 | Status: SHIPPED | OUTPATIENT
Start: 2025-01-09

## 2025-01-09 ASSESSMENT — PATIENT HEALTH QUESTIONNAIRE - PHQ9
4. FEELING TIRED OR HAVING LITTLE ENERGY: NOT AT ALL
1. LITTLE INTEREST OR PLEASURE IN DOING THINGS: NOT AT ALL
3. TROUBLE FALLING OR STAYING ASLEEP OR SLEEPING TOO MUCH: NOT AT ALL
7. TROUBLE CONCENTRATING ON THINGS, SUCH AS READING THE NEWSPAPER OR WATCHING TELEVISION: NOT AT ALL
SUM OF ALL RESPONSES TO PHQ9 QUESTIONS 1 AND 2: 0
6. FEELING BAD ABOUT YOURSELF - OR THAT YOU ARE A FAILURE OR HAVE LET YOURSELF OR YOUR FAMILY DOWN: NOT AL ALL
SUM OF ALL RESPONSES TO PHQ QUESTIONS 1-9: 0
8. MOVING OR SPEAKING SO SLOWLY THAT OTHER PEOPLE COULD HAVE NOTICED. OR THE OPPOSITE, BEING SO FIGETY OR RESTLESS THAT YOU HAVE BEEN MOVING AROUND A LOT MORE THAN USUAL: NOT AT ALL
5. POOR APPETITE OR OVEREATING: NOT AT ALL
2. FEELING DOWN, DEPRESSED, IRRITABLE, OR HOPELESS: NOT AT ALL
9. THOUGHTS THAT YOU WOULD BE BETTER OFF DEAD, OR OF HURTING YOURSELF: NOT AT ALL

## 2025-01-09 ASSESSMENT — FIBROSIS 4 INDEX: FIB4 SCORE: 0.78

## 2025-01-09 NOTE — PROGRESS NOTES
cc: Follow-up lower extremity edema    Subjective:     HPI    History of Present Illness  The patient is a 74-year-old female presenting for follow-up on lower extremity edema.    She reports a decrease in swelling in her lower extremities. She has been using compression stockings, which she finds beneficial, although she did not wear them today due to an early morning appointment. She continues to experience significant fluid retention. Her Lasix dosage was increased to 40 mg in the morning and 20 mg at night, which she has been adhering to.    She had a consultation with her cardiologist on 12/23/2024, during which an EKG was performed. The results were satisfactory, leading to no alterations in her treatment plan. Her next appointment with the cardiologist is scheduled for 03/25/2025. She has been without Eliquis for approximately 5 days, having completed a 30-day course at a dosage of 2 tablets daily.  She does have new onset A-fib.  Currently on 50 mg of metoprolol.  She did not know that there was a refill at [facility].  Will resend Eliquis     Last visit her glipizide was increased to 10 mg.  She is tolerating the increased dose of glipizide well.    Supplemental Information  She does have an colostomy in place.  Has an appointment with the surgeon next week on Wednesday.          Latest Reference Range & Units 12/20/24 11:47   WBC 4.8 - 10.8 K/uL 9.6   RBC 4.20 - 5.40 M/uL 3.89 (L)   Hemoglobin 12.0 - 16.0 g/dL 11.1 (L)   Hematocrit 37.0 - 47.0 % 34.3 (L)   MCV 81.4 - 97.8 fL 88.2   MCH 27.0 - 33.0 pg 28.5   MCHC 32.2 - 35.5 g/dL 32.4   RDW 35.9 - 50.0 fL 47.4   Platelet Count 164 - 446 K/uL 354   MPV 9.0 - 12.9 fL 9.2   Neutrophils-Polys 44.00 - 72.00 % 66.10   Neutrophils (Absolute) 1.82 - 7.42 K/uL 6.36   Lymphocytes 22.00 - 41.00 % 17.50 (L)   Lymphs (Absolute) 1.00 - 4.80 K/uL 1.68   Monocytes 0.00 - 13.40 % 8.60   Monos (Absolute) 0.00 - 0.85 K/uL 0.83   Eosinophils 0.00 - 6.90 % 5.80   Eos  (Absolute) 0.00 - 0.51 K/uL 0.56 (H)   Basophils 0.00 - 1.80 % 0.50   Baso (Absolute) 0.00 - 0.12 K/uL 0.05   Immature Granulocytes 0.00 - 0.90 % 1.50 (H)   Immature Granulocytes (abs) 0.00 - 0.11 K/uL 0.14 (H)   Nucleated RBC 0.00 - 0.20 /100 WBC 0.00   NRBC (Absolute) K/uL 0.00   Sodium 135 - 145 mmol/L 144   Potassium 3.6 - 5.5 mmol/L 4.1   Chloride 96 - 112 mmol/L 106   Co2 20 - 33 mmol/L 26   Anion Gap 7.0 - 16.0  12.0   Glucose 65 - 99 mg/dL 81   Bun 8 - 22 mg/dL 16   Creatinine 0.50 - 1.40 mg/dL 0.96   GFR (CKD-EPI) >60 mL/min/1.73 m 2 62   Calcium 8.5 - 10.5 mg/dL 8.9   Correct Calcium 8.5 - 10.5 mg/dL 9.1   AST(SGOT) 12 - 45 U/L 14   ALT(SGPT) 2 - 50 U/L 14   Alkaline Phosphatase 30 - 99 U/L 109 (H)   Total Bilirubin 0.1 - 1.5 mg/dL 0.3   Albumin 3.2 - 4.9 g/dL 3.7   Total Protein 6.0 - 8.2 g/dL 6.4   Globulin 1.9 - 3.5 g/dL 2.7   A-G Ratio g/dL 1.4   Cholesterol,Tot 100 - 199 mg/dL 113   Triglycerides 0 - 149 mg/dL 135   HDL >=40 mg/dL 37 !   LDL <100 mg/dL 49   25-Hydroxy   Vitamin D 25 30 - 100 ng/mL 26 (L)   (L): Data is abnormally low  (H): Data is abnormally high  !: Data is abnormal        Review of systems:  See above.       Current Outpatient Medications:     apixaban (ELIQUIS) 5mg Tab, Take 1 Tablet by mouth 2 times a day for 90 days. Indications: Thromboembolism secondary to Atrial Fibrillation, Disp: 180 Tablet, Rfl: 0    furosemide (LASIX) 40 MG Tab, Take 1 Tablet by mouth 2 times a day. Indications: Edema, Disp: 180 Tablet, Rfl: 1    benzonatate (TESSALON) 100 MG Cap, Take 1 Capsule by mouth 3 times a day as needed for Cough. 1/2/25 States not using.  Indications: Cough, Disp: , Rfl:     ondansetron (ZOFRAN ODT) 4 MG TABLET DISPERSIBLE, 4 mg by oral route., Disp: , Rfl:     potassium Chloride ER (K-TAB) 20 MEQ Tab CR tablet, Take 1 Tablet by mouth 2 times a day. Indications: Low Amount of Potassium in the Blood, Disp: 60 Tablet, Rfl: 1    glipiZIDE (GLUCOTROL) 10 MG Tab, Take 1 Tablet by mouth  "every day. Pt reports that she takes this medication QDAY, not BID  Indications: Type 2 Diabetes, Disp: 100 Tablet, Rfl: 3    atorvastatin (LIPITOR) 20 MG Tab, TAKE 1 TABLET BY MOUTH DAILY, Disp: 90 Tablet, Rfl: 0    fluticasone (FLONASE) 50 MCG/ACT nasal spray, Administer 1 Spray into affected nostril(S) 2 times a day as needed (rhinitis). Indications: Nonallergic Rhinitis, Stuffy Nose, Disp: , Rfl:     metoprolol SR (TOPROL XL) 50 MG TABLET SR 24 HR, Take 1 Tablet by mouth every evening. Indications: High Blood Pressure, Disp: 90 Tablet, Rfl: 3    acetaminophen (TYLENOL) 500 MG Tab, Take 500-1,000 mg by mouth every 6 hours as needed for Mild Pain. Indications: Pain, Disp: , Rfl:     pantoprazole (PROTONIX) 40 MG Tablet Delayed Response, Take 40 mg by mouth every evening. Indications: Heartburn, Disp: , Rfl:     sertraline (ZOLOFT) 50 MG Tab, Take 50 mg by mouth every day. Indications: Major Depressive Disorder, Disp: , Rfl:     buPROPion (WELLBUTRIN XL) 300 MG XL tablet, Take 300 mg by mouth every morning. Indications: Depression, Disp: , Rfl:     albuterol 108 (90 Base) MCG/ACT Aero Soln inhalation aerosol, Inhale 2 Puffs every 6 hours as needed for Shortness of Breath., Disp: 8.5 g, Rfl: 0    Allergies, past medical history, past surgical history, family history, social history reviewed and updated    Objective:     Vitals: /60 (BP Location: Left arm, Patient Position: Sitting, BP Cuff Size: Large adult)   Pulse 94   Temp 36.1 °C (97 °F) (Temporal)   Ht 1.651 m (5' 5\")   Wt (!) 127 kg (279 lb)   SpO2 96%   BMI 46.43 kg/m²   General: Alert, pleasant, NAD  HEENT: Normocephalic. Neck supple.  No thyromegaly or masses palpated. No cervical or supraclavicular lymphadenopathy. No carotid bruits   Heart: Regular rate and rhythm.  S1 and S2 normal.  No murmurs appreciated.  Respiratory: Normal respiratory effort.  Clear to auscultation bilaterally.  Skin: Warm, dry, no rashes.  Extremities: 2+ pitting edema " to the right shin, 1+ pitting edema to the left shin.  Psych:  Affect/mood is normal, judgement is good, memory is intact, grooming is appropriate.    Assessment/Plan:     Guerda was seen today for medication refill and edema.    Diagnoses and all orders for this visit:    Atrial fibrillation, unspecified type (HCC)  -Has just followed up with her cardiologist.  Recommended to continue on 5 mg Eliquis twice daily.  Medication [facility] to the [facility].  Continue 50 mg of metoprolol  -     apixaban (ELIQUIS) 5mg Tab; Take 1 Tablet by mouth 2 times a day for 90 days. Indications: Thromboembolism secondary to Atrial Fibrillation    Type 2 diabetes mellitus without complication, without long-term current use of insulin ([facility])  Overall tolerating increase of glipizide well to 10 mg.  Will due for labs in 2 months.  These have been ordered.  Will continue to monitor      Bilateral lower extremity edema  -Still has moderate amount of edema.  Will increase Lasix to 40 mg twice daily continue potassium twice daily.  Will check metabolic panel in 3 weeks to ensure potassium level still within normal limits.  Continue with compression socks elevation  - Basic Metabolic Panel; Future  - furosemide (LASIX) 40 MG Tab; Take 1 Tablet by mouth 2 times a day. Indications: Edema  Dispense: 180 Tablet; Refill: 1     Anemia, unspecified type  -Significantly improved.  Almost back to normal limits.  Most likely due to recent colostomy/surgery.  Will continue to trend repeat CBC in 4 weeks  - CBC WITH DIFFERENTIAL; Future     Colostomy in place ([facility])  Following up with general surgery next week.  Continues to have home health          Return in about 2 months (around 3/9/2025) for Diabetes, Lab Review.

## 2025-01-10 ENCOUNTER — PHARMACY VISIT (OUTPATIENT)
Dept: PHARMACY | Facility: MEDICAL CENTER | Age: 75
End: 2025-01-10
Payer: COMMERCIAL

## 2025-01-10 ENCOUNTER — HOME CARE VISIT (OUTPATIENT)
Dept: HOME HEALTH SERVICES | Facility: HOME HEALTHCARE | Age: 75
End: 2025-01-10
Payer: MEDICARE

## 2025-01-10 PROCEDURE — G0299 HHS/HOSPICE OF RN EA 15 MIN: HCPCS

## 2025-01-12 VITALS
TEMPERATURE: 97.9 F | SYSTOLIC BLOOD PRESSURE: 90 MMHG | HEART RATE: 64 BPM | RESPIRATION RATE: 18 BRPM | DIASTOLIC BLOOD PRESSURE: 64 MMHG | OXYGEN SATURATION: 95 %

## 2025-01-12 ASSESSMENT — ENCOUNTER SYMPTOMS
LOWER EXTREMITY EDEMA: 1
PAIN LOCATION - PAIN SEVERITY: 2/10
PAIN LOCATION - PAIN QUALITY: IRRITATION
STOOL FREQUENCY: MORE THAN TWICE DAILY
LOWEST PAIN SEVERITY IN PAST 24 HOURS: 0/10
PAIN LOCATION - PAIN DURATION: ONGOING
PAIN: 1
PAIN SEVERITY GOAL: 0/10
POOR JUDGMENT: 1
LAST BOWEL MOVEMENT: 67215
PAIN LOCATION: AROUND STOMA
PAIN LOCATION - EXACERBATING FACTORS: LEAKAGE
HIGHEST PAIN SEVERITY IN PAST 24 HOURS: 3/10
SUBJECTIVE PAIN PROGRESSION: UNCHANGED
PAIN LOCATION - PAIN FREQUENCY: INTERMITTENT

## 2025-01-13 ENCOUNTER — HOME CARE VISIT (OUTPATIENT)
Dept: HOME HEALTH SERVICES | Facility: HOME HEALTHCARE | Age: 75
End: 2025-01-13
Payer: MEDICARE

## 2025-01-13 VITALS
SYSTOLIC BLOOD PRESSURE: 130 MMHG | TEMPERATURE: 98.2 F | RESPIRATION RATE: 18 BRPM | DIASTOLIC BLOOD PRESSURE: 58 MMHG | HEART RATE: 77 BPM | OXYGEN SATURATION: 98 %

## 2025-01-13 PROCEDURE — G0299 HHS/HOSPICE OF RN EA 15 MIN: HCPCS

## 2025-01-13 ASSESSMENT — ENCOUNTER SYMPTOMS
DENIES PAIN: 1
MUSCLE WEAKNESS: 1
LOWER EXTREMITY EDEMA: 1
POOR JUDGMENT: 1
STOOL FREQUENCY: MORE THAN TWICE DAILY
LAST BOWEL MOVEMENT: 67218

## 2025-01-14 ENCOUNTER — HOME CARE VISIT (OUTPATIENT)
Dept: HOME HEALTH SERVICES | Facility: HOME HEALTHCARE | Age: 75
End: 2025-01-14
Payer: MEDICARE

## 2025-01-16 ENCOUNTER — HOME CARE VISIT (OUTPATIENT)
Dept: HOME HEALTH SERVICES | Facility: HOME HEALTHCARE | Age: 75
End: 2025-01-16
Payer: MEDICARE

## 2025-01-16 VITALS
RESPIRATION RATE: 18 BRPM | TEMPERATURE: 99.5 F | HEART RATE: 69 BPM | DIASTOLIC BLOOD PRESSURE: 56 MMHG | SYSTOLIC BLOOD PRESSURE: 118 MMHG | OXYGEN SATURATION: 94 %

## 2025-01-16 PROCEDURE — G0299 HHS/HOSPICE OF RN EA 15 MIN: HCPCS

## 2025-01-16 ASSESSMENT — ENCOUNTER SYMPTOMS
DENIES PAIN: 1
LAST BOWEL MOVEMENT: 67221
NAUSEA: NO
LOWER EXTREMITY EDEMA: 1
MUSCLE WEAKNESS: 1
LIMITED RANGE OF MOTION: 1
STOOL FREQUENCY: MORE THAN TWICE DAILY
VOMITING: NO

## 2025-01-19 ENCOUNTER — APPOINTMENT (OUTPATIENT)
Dept: RADIOLOGY | Facility: MEDICAL CENTER | Age: 75
End: 2025-01-19
Attending: EMERGENCY MEDICINE
Payer: MEDICARE

## 2025-01-19 ENCOUNTER — HOSPITAL ENCOUNTER (OUTPATIENT)
Facility: MEDICAL CENTER | Age: 75
End: 2025-01-21
Attending: EMERGENCY MEDICINE | Admitting: HOSPITALIST
Payer: MEDICARE

## 2025-01-19 ENCOUNTER — APPOINTMENT (OUTPATIENT)
Dept: CARDIOLOGY | Facility: MEDICAL CENTER | Age: 75
End: 2025-01-19
Attending: HOSPITALIST
Payer: MEDICARE

## 2025-01-19 DIAGNOSIS — R07.89 OTHER CHEST PAIN: ICD-10-CM

## 2025-01-19 DIAGNOSIS — N82.4 COLOVAGINAL FISTULA: ICD-10-CM

## 2025-01-19 DIAGNOSIS — Z93.3 COLOSTOMY IN PLACE (HCC): ICD-10-CM

## 2025-01-19 PROBLEM — R07.9 CHEST PAIN: Status: ACTIVE | Noted: 2025-01-19

## 2025-01-19 LAB
ALBUMIN SERPL BCP-MCNC: 4 G/DL (ref 3.2–4.9)
ALBUMIN/GLOB SERPL: 1.4 G/DL
ALP SERPL-CCNC: 160 U/L (ref 30–99)
ALT SERPL-CCNC: 27 U/L (ref 2–50)
ANION GAP SERPL CALC-SCNC: 12 MMOL/L (ref 7–16)
AST SERPL-CCNC: 19 U/L (ref 12–45)
BASOPHILS # BLD AUTO: 0.3 % (ref 0–1.8)
BASOPHILS # BLD: 0.04 K/UL (ref 0–0.12)
BILIRUB SERPL-MCNC: 0.3 MG/DL (ref 0.1–1.5)
BUN SERPL-MCNC: 28 MG/DL (ref 8–22)
CALCIUM ALBUM COR SERPL-MCNC: 9.2 MG/DL (ref 8.5–10.5)
CALCIUM SERPL-MCNC: 9.2 MG/DL (ref 8.4–10.2)
CHLORIDE SERPL-SCNC: 101 MMOL/L (ref 96–112)
CO2 SERPL-SCNC: 30 MMOL/L (ref 20–33)
CREAT SERPL-MCNC: 1.29 MG/DL (ref 0.5–1.4)
D DIMER PPP IA.FEU-MCNC: 0.82 UG/ML (FEU) (ref 0–0.5)
EKG IMPRESSION: NORMAL
EOSINOPHIL # BLD AUTO: 0.41 K/UL (ref 0–0.51)
EOSINOPHIL NFR BLD: 3.3 % (ref 0–6.9)
ERYTHROCYTE [DISTWIDTH] IN BLOOD BY AUTOMATED COUNT: 41.4 FL (ref 35.9–50)
GFR SERPLBLD CREATININE-BSD FMLA CKD-EPI: 44 ML/MIN/1.73 M 2
GLOBULIN SER CALC-MCNC: 2.8 G/DL (ref 1.9–3.5)
GLUCOSE BLD STRIP.AUTO-MCNC: 100 MG/DL (ref 65–99)
GLUCOSE BLD STRIP.AUTO-MCNC: 167 MG/DL (ref 65–99)
GLUCOSE SERPL-MCNC: 121 MG/DL (ref 65–99)
HCT VFR BLD AUTO: 35.4 % (ref 37–47)
HGB BLD-MCNC: 11.9 G/DL (ref 12–16)
IMM GRANULOCYTES # BLD AUTO: 0.07 K/UL (ref 0–0.11)
IMM GRANULOCYTES NFR BLD AUTO: 0.6 % (ref 0–0.9)
LV EJECT FRACT  99904: 65
LV EJECT FRACT MOD 4C 99902: 66.89
LYMPHOCYTES # BLD AUTO: 1.11 K/UL (ref 1–4.8)
LYMPHOCYTES NFR BLD: 8.8 % (ref 22–41)
MCH RBC QN AUTO: 29.1 PG (ref 27–33)
MCHC RBC AUTO-ENTMCNC: 33.6 G/DL (ref 32.2–35.5)
MCV RBC AUTO: 86.6 FL (ref 81.4–97.8)
MONOCYTES # BLD AUTO: 0.81 K/UL (ref 0–0.85)
MONOCYTES NFR BLD AUTO: 6.4 % (ref 0–13.4)
NEUTROPHILS # BLD AUTO: 10.12 K/UL (ref 1.82–7.42)
NEUTROPHILS NFR BLD: 80.6 % (ref 44–72)
NRBC # BLD AUTO: 0 K/UL
NRBC BLD-RTO: 0 /100 WBC (ref 0–0.2)
NT-PROBNP SERPL IA-MCNC: 556 PG/ML (ref 0–125)
PLATELET # BLD AUTO: 293 K/UL (ref 164–446)
PMV BLD AUTO: 8.9 FL (ref 9–12.9)
POTASSIUM SERPL-SCNC: 3.8 MMOL/L (ref 3.6–5.5)
PROT SERPL-MCNC: 6.8 G/DL (ref 6–8.2)
RBC # BLD AUTO: 4.09 M/UL (ref 4.2–5.4)
SODIUM SERPL-SCNC: 143 MMOL/L (ref 135–145)
TROPONIN T SERPL-MCNC: 17 NG/L (ref 6–19)
TROPONIN T SERPL-MCNC: 19 NG/L (ref 6–19)
TROPONIN T SERPL-MCNC: 20 NG/L (ref 6–19)
WBC # BLD AUTO: 12.6 K/UL (ref 4.8–10.8)

## 2025-01-19 PROCEDURE — 93306 TTE W/DOPPLER COMPLETE: CPT

## 2025-01-19 PROCEDURE — 36415 COLL VENOUS BLD VENIPUNCTURE: CPT

## 2025-01-19 PROCEDURE — 84484 ASSAY OF TROPONIN QUANT: CPT

## 2025-01-19 PROCEDURE — 80053 COMPREHEN METABOLIC PANEL: CPT

## 2025-01-19 PROCEDURE — 85025 COMPLETE CBC W/AUTO DIFF WBC: CPT

## 2025-01-19 PROCEDURE — 700117 HCHG RX CONTRAST REV CODE 255: Performed by: HOSPITALIST

## 2025-01-19 PROCEDURE — A9270 NON-COVERED ITEM OR SERVICE: HCPCS | Performed by: HOSPITALIST

## 2025-01-19 PROCEDURE — 96372 THER/PROPH/DIAG INJ SC/IM: CPT | Mod: XU

## 2025-01-19 PROCEDURE — 96375 TX/PRO/DX INJ NEW DRUG ADDON: CPT | Mod: XU

## 2025-01-19 PROCEDURE — 93005 ELECTROCARDIOGRAM TRACING: CPT | Mod: TC | Performed by: HOSPITALIST

## 2025-01-19 PROCEDURE — G0378 HOSPITAL OBSERVATION PER HR: HCPCS

## 2025-01-19 PROCEDURE — 83880 ASSAY OF NATRIURETIC PEPTIDE: CPT

## 2025-01-19 PROCEDURE — 82962 GLUCOSE BLOOD TEST: CPT

## 2025-01-19 PROCEDURE — 99497 ADVNCD CARE PLAN 30 MIN: CPT | Performed by: HOSPITALIST

## 2025-01-19 PROCEDURE — 700117 HCHG RX CONTRAST REV CODE 255: Performed by: EMERGENCY MEDICINE

## 2025-01-19 PROCEDURE — 71045 X-RAY EXAM CHEST 1 VIEW: CPT

## 2025-01-19 PROCEDURE — 700102 HCHG RX REV CODE 250 W/ 637 OVERRIDE(OP): Performed by: HOSPITALIST

## 2025-01-19 PROCEDURE — 700111 HCHG RX REV CODE 636 W/ 250 OVERRIDE (IP): Mod: JZ | Performed by: EMERGENCY MEDICINE

## 2025-01-19 PROCEDURE — 700102 HCHG RX REV CODE 250 W/ 637 OVERRIDE(OP): Performed by: EMERGENCY MEDICINE

## 2025-01-19 PROCEDURE — 96374 THER/PROPH/DIAG INJ IV PUSH: CPT | Mod: XU

## 2025-01-19 PROCEDURE — 96376 TX/PRO/DX INJ SAME DRUG ADON: CPT | Mod: XU

## 2025-01-19 PROCEDURE — 93005 ELECTROCARDIOGRAM TRACING: CPT | Mod: TC | Performed by: EMERGENCY MEDICINE

## 2025-01-19 PROCEDURE — 99223 1ST HOSP IP/OBS HIGH 75: CPT | Mod: 25,AI | Performed by: HOSPITALIST

## 2025-01-19 PROCEDURE — 93005 ELECTROCARDIOGRAM TRACING: CPT | Mod: TC

## 2025-01-19 PROCEDURE — 85379 FIBRIN DEGRADATION QUANT: CPT

## 2025-01-19 PROCEDURE — 99285 EMERGENCY DEPT VISIT HI MDM: CPT

## 2025-01-19 PROCEDURE — 93306 TTE W/DOPPLER COMPLETE: CPT | Mod: 26 | Performed by: INTERNAL MEDICINE

## 2025-01-19 PROCEDURE — A9270 NON-COVERED ITEM OR SERVICE: HCPCS | Performed by: EMERGENCY MEDICINE

## 2025-01-19 PROCEDURE — 71275 CT ANGIOGRAPHY CHEST: CPT

## 2025-01-19 RX ORDER — ONDANSETRON 4 MG/1
4 TABLET, ORALLY DISINTEGRATING ORAL EVERY 4 HOURS PRN
Status: DISCONTINUED | OUTPATIENT
Start: 2025-01-19 | End: 2025-01-21 | Stop reason: HOSPADM

## 2025-01-19 RX ORDER — METOPROLOL SUCCINATE 25 MG/1
50 TABLET, EXTENDED RELEASE ORAL EVERY EVENING
Status: DISCONTINUED | OUTPATIENT
Start: 2025-01-19 | End: 2025-01-21 | Stop reason: HOSPADM

## 2025-01-19 RX ORDER — POLYETHYLENE GLYCOL 3350 17 G/17G
1 POWDER, FOR SOLUTION ORAL
Status: DISCONTINUED | OUTPATIENT
Start: 2025-01-19 | End: 2025-01-21 | Stop reason: HOSPADM

## 2025-01-19 RX ORDER — ONDANSETRON 2 MG/ML
4 INJECTION INTRAMUSCULAR; INTRAVENOUS EVERY 4 HOURS PRN
Status: DISCONTINUED | OUTPATIENT
Start: 2025-01-19 | End: 2025-01-21 | Stop reason: HOSPADM

## 2025-01-19 RX ORDER — BUPROPION HYDROCHLORIDE 150 MG/1
150 TABLET, EXTENDED RELEASE ORAL 2 TIMES DAILY
Status: DISCONTINUED | OUTPATIENT
Start: 2025-01-20 | End: 2025-01-21 | Stop reason: HOSPADM

## 2025-01-19 RX ORDER — ASPIRIN 300 MG/1
300 SUPPOSITORY RECTAL ONCE
Status: DISCONTINUED | OUTPATIENT
Start: 2025-01-19 | End: 2025-01-19

## 2025-01-19 RX ORDER — HYDROMORPHONE HYDROCHLORIDE 1 MG/ML
0.25 INJECTION, SOLUTION INTRAMUSCULAR; INTRAVENOUS; SUBCUTANEOUS
Status: DISCONTINUED | OUTPATIENT
Start: 2025-01-19 | End: 2025-01-21 | Stop reason: HOSPADM

## 2025-01-19 RX ORDER — OXYCODONE HYDROCHLORIDE 5 MG/1
5 TABLET ORAL
Status: DISCONTINUED | OUTPATIENT
Start: 2025-01-19 | End: 2025-01-21 | Stop reason: HOSPADM

## 2025-01-19 RX ORDER — FUROSEMIDE 40 MG/1
40 TABLET ORAL 2 TIMES DAILY
Status: DISCONTINUED | OUTPATIENT
Start: 2025-01-19 | End: 2025-01-21 | Stop reason: HOSPADM

## 2025-01-19 RX ORDER — MORPHINE SULFATE 4 MG/ML
2 INJECTION INTRAVENOUS ONCE
Status: COMPLETED | OUTPATIENT
Start: 2025-01-19 | End: 2025-01-19

## 2025-01-19 RX ORDER — ASPIRIN 81 MG/1
324 TABLET, CHEWABLE ORAL ONCE
Status: DISCONTINUED | OUTPATIENT
Start: 2025-01-19 | End: 2025-01-19

## 2025-01-19 RX ORDER — ASPIRIN 81 MG/1
81 TABLET ORAL DAILY
Status: DISCONTINUED | OUTPATIENT
Start: 2025-01-20 | End: 2025-01-21 | Stop reason: HOSPADM

## 2025-01-19 RX ORDER — INSULIN LISPRO 100 [IU]/ML
1-6 INJECTION, SOLUTION INTRAVENOUS; SUBCUTANEOUS
Status: DISCONTINUED | OUTPATIENT
Start: 2025-01-19 | End: 2025-01-21 | Stop reason: HOSPADM

## 2025-01-19 RX ORDER — ASPIRIN 81 MG/1
324 TABLET, CHEWABLE ORAL ONCE
Status: COMPLETED | OUTPATIENT
Start: 2025-01-19 | End: 2025-01-19

## 2025-01-19 RX ORDER — ONDANSETRON 2 MG/ML
4 INJECTION INTRAMUSCULAR; INTRAVENOUS ONCE
Status: COMPLETED | OUTPATIENT
Start: 2025-01-19 | End: 2025-01-19

## 2025-01-19 RX ORDER — ASPIRIN 325 MG
325 TABLET ORAL ONCE
Status: DISCONTINUED | OUTPATIENT
Start: 2025-01-19 | End: 2025-01-19

## 2025-01-19 RX ORDER — OXYCODONE HYDROCHLORIDE 5 MG/1
2.5 TABLET ORAL
Status: DISCONTINUED | OUTPATIENT
Start: 2025-01-19 | End: 2025-01-21 | Stop reason: HOSPADM

## 2025-01-19 RX ORDER — ACETAMINOPHEN 325 MG/1
650 TABLET ORAL EVERY 6 HOURS PRN
Status: DISCONTINUED | OUTPATIENT
Start: 2025-01-19 | End: 2025-01-21 | Stop reason: HOSPADM

## 2025-01-19 RX ORDER — ATORVASTATIN CALCIUM 20 MG/1
20 TABLET, FILM COATED ORAL EVERY EVENING
Status: DISCONTINUED | OUTPATIENT
Start: 2025-01-19 | End: 2025-01-21 | Stop reason: HOSPADM

## 2025-01-19 RX ORDER — PANTOPRAZOLE SODIUM 40 MG/1
40 TABLET, DELAYED RELEASE ORAL EVERY EVENING
Status: DISCONTINUED | OUTPATIENT
Start: 2025-01-19 | End: 2025-01-19

## 2025-01-19 RX ORDER — DEXTROSE MONOHYDRATE 25 G/50ML
25 INJECTION, SOLUTION INTRAVENOUS
Status: DISCONTINUED | OUTPATIENT
Start: 2025-01-19 | End: 2025-01-21 | Stop reason: HOSPADM

## 2025-01-19 RX ADMIN — ASPIRIN 324 MG: 81 TABLET, CHEWABLE ORAL at 13:30

## 2025-01-19 RX ADMIN — ONDANSETRON 4 MG: 2 INJECTION INTRAMUSCULAR; INTRAVENOUS at 12:58

## 2025-01-19 RX ADMIN — APIXABAN 5 MG: 5 TABLET, FILM COATED ORAL at 17:04

## 2025-01-19 RX ADMIN — INSULIN LISPRO 1 UNITS: 100 INJECTION, SOLUTION INTRAVENOUS; SUBCUTANEOUS at 21:30

## 2025-01-19 RX ADMIN — OMEPRAZOLE 20 MG: 20 CAPSULE, DELAYED RELEASE ORAL at 17:04

## 2025-01-19 RX ADMIN — MORPHINE SULFATE 2 MG: 4 INJECTION, SOLUTION INTRAMUSCULAR; INTRAVENOUS at 12:58

## 2025-01-19 RX ADMIN — OXYCODONE 5 MG: 5 TABLET ORAL at 21:19

## 2025-01-19 RX ADMIN — FUROSEMIDE 40 MG: 40 TABLET ORAL at 17:04

## 2025-01-19 RX ADMIN — IOHEXOL 80 ML: 350 INJECTION, SOLUTION INTRAVENOUS at 12:47

## 2025-01-19 RX ADMIN — HUMAN ALBUMIN MICROSPHERES AND PERFLUTREN 3 ML: 10; .22 INJECTION, SOLUTION INTRAVENOUS at 15:35

## 2025-01-19 RX ADMIN — ATORVASTATIN CALCIUM 20 MG: 20 TABLET, FILM COATED ORAL at 17:04

## 2025-01-19 RX ADMIN — MORPHINE SULFATE 2 MG: 4 INJECTION, SOLUTION INTRAMUSCULAR; INTRAVENOUS at 13:46

## 2025-01-19 RX ADMIN — OXYCODONE 5 MG: 5 TABLET ORAL at 17:05

## 2025-01-19 ASSESSMENT — LIFESTYLE VARIABLES
TOTAL SCORE: 0
TOTAL SCORE: 0
EVER FELT BAD OR GUILTY ABOUT YOUR DRINKING: NO
TOTAL SCORE: 0
AVERAGE NUMBER OF DAYS PER WEEK YOU HAVE A DRINK CONTAINING ALCOHOL: 0
HAVE PEOPLE ANNOYED YOU BY CRITICIZING YOUR DRINKING: NO
HOW MANY TIMES IN THE PAST YEAR HAVE YOU HAD 5 OR MORE DRINKS IN A DAY: 0
HAVE YOU EVER FELT YOU SHOULD CUT DOWN ON YOUR DRINKING: NO
ALCOHOL_USE: NO
ON A TYPICAL DAY WHEN YOU DRINK ALCOHOL HOW MANY DRINKS DO YOU HAVE: 0
CONSUMPTION TOTAL: NEGATIVE
EVER HAD A DRINK FIRST THING IN THE MORNING TO STEADY YOUR NERVES TO GET RID OF A HANGOVER: NO

## 2025-01-19 ASSESSMENT — SOCIAL DETERMINANTS OF HEALTH (SDOH)
IN THE PAST 12 MONTHS, HAS THE ELECTRIC, GAS, OIL, OR WATER COMPANY THREATENED TO SHUT OFF SERVICE IN YOUR HOME?: NO
WITHIN THE PAST 12 MONTHS, THE FOOD YOU BOUGHT JUST DIDN'T LAST AND YOU DIDN'T HAVE MONEY TO GET MORE: NEVER TRUE
WITHIN THE PAST 12 MONTHS, YOU WORRIED THAT YOUR FOOD WOULD RUN OUT BEFORE YOU GOT THE MONEY TO BUY MORE: NEVER TRUE
WITHIN THE LAST YEAR, HAVE TO BEEN RAPED OR FORCED TO HAVE ANY KIND OF SEXUAL ACTIVITY BY YOUR PARTNER OR EX-PARTNER?: NO
WITHIN THE LAST YEAR, HAVE YOU BEEN KICKED, HIT, SLAPPED, OR OTHERWISE PHYSICALLY HURT BY YOUR PARTNER OR EX-PARTNER?: NO
WITHIN THE LAST YEAR, HAVE YOU BEEN HUMILIATED OR EMOTIONALLY ABUSED IN OTHER WAYS BY YOUR PARTNER OR EX-PARTNER?: NO
WITHIN THE LAST YEAR, HAVE YOU BEEN AFRAID OF YOUR PARTNER OR EX-PARTNER?: NO

## 2025-01-19 ASSESSMENT — CHA2DS2 SCORE
CHA2DS2 VASC SCORE: 5
PRIOR STROKE OR TIA OR THROMBOEMBOLISM: NO
AGE 75 OR GREATER: NO
DIABETES: YES
SEX: FEMALE
AGE 65 TO 74: NO
VASCULAR DISEASE: YES
HYPERTENSION: YES
CHF OR LEFT VENTRICULAR DYSFUNCTION: YES

## 2025-01-19 ASSESSMENT — PAIN DESCRIPTION - PAIN TYPE
TYPE: ACUTE PAIN
TYPE: ACUTE PAIN

## 2025-01-19 ASSESSMENT — ENCOUNTER SYMPTOMS
SHORTNESS OF BREATH: 1
CHILLS: 0
BRUISES/BLEEDS EASILY: 0
COUGH: 0
EYE DISCHARGE: 0
STRIDOR: 0
MYALGIAS: 0
FOCAL WEAKNESS: 0
FEVER: 0
NERVOUS/ANXIOUS: 1
VOMITING: 0
ABDOMINAL PAIN: 0
FLANK PAIN: 0
EYE REDNESS: 0

## 2025-01-19 ASSESSMENT — COGNITIVE AND FUNCTIONAL STATUS - GENERAL
MOBILITY SCORE: 24
DAILY ACTIVITIY SCORE: 24
SUGGESTED CMS G CODE MODIFIER DAILY ACTIVITY: CH
SUGGESTED CMS G CODE MODIFIER MOBILITY: CH

## 2025-01-19 ASSESSMENT — HEART SCORE
ECG: NON-SPECIFIC REPOLARIZATION DISTURBANCE
AGE: 65+
RISK FACTORS: >2 RISK FACTORS OR HX OF ATHEROSCLEROTIC DISEASE
TROPONIN: 1-3 TIMES NORMAL LIMIT
HISTORY: MODERATELY SUSPICIOUS
HEART SCORE: 7

## 2025-01-19 ASSESSMENT — PATIENT HEALTH QUESTIONNAIRE - PHQ9
2. FEELING DOWN, DEPRESSED, IRRITABLE, OR HOPELESS: NOT AT ALL
1. LITTLE INTEREST OR PLEASURE IN DOING THINGS: NOT AT ALL
SUM OF ALL RESPONSES TO PHQ9 QUESTIONS 1 AND 2: 0

## 2025-01-19 ASSESSMENT — FIBROSIS 4 INDEX
FIB4 SCORE: 0.92
FIB4 SCORE: 0.78

## 2025-01-19 NOTE — ED TRIAGE NOTES
"Chief Complaint   Patient presents with    Chest Pain     Central chest since this AM described as \"sharp pain\", worse when taking a deep breath in. States pain travels through into her back as well.      Physical Exam  Pulmonary:      Effort: Tachypnea present.   Skin:     General: Skin is warm and dry.   Neurological:      Mental Status: She is alert.       /59   Pulse 78   Temp 36.5 °C (97.7 °F) (Temporal)   Resp (!) 24   Ht 1.651 m (5' 5\")   Wt (!) 128 kg (282 lb 6.6 oz)   SpO2 92%   BMI 47.00 kg/m²     "

## 2025-01-19 NOTE — ED PROVIDER NOTES
"ED Provider Note    CHIEF COMPLAINT  Chief Complaint   Patient presents with    Chest Pain     Central chest since this AM described as \"sharp pain\", worse when taking a deep breath in. States pain travels through into her back as well.        EXTERNAL RECORDS REVIEWED  Reviewed based on labs and previous workup for comparison.    HPI/ROS  LIMITATION TO HISTORY   Select: : None  OUTSIDE HISTORIAN(S):  None.    Guerda Lunsford is a 74 y.o. female who presents to the emergency department for evaluation of chest pain.  The patient states she had abrupt onset of central chest pain that radiates towards her back and towards her neck.  It is severe 10 out of 10.  She states that she has associated shortness of breath\and nausea but no vomiting.  Patient denies any cough or fever.  She is on chronic anticoagulation.  Denies any history of DVT or PE.  Denies any history of heart disease.  History is somewhat limited because of her discomfort.    PAST MEDICAL HISTORY   has a past medical history of Allergy, Anemia (1997), Anxiety, Arthritis, Asthma, Bronchitis, Cataract, Chest pain (02/01/2016), Cholesterol serum elevated, Dental disorder (10/2020), Depression, Detached retina, left, Diverticulitis, Diverticulitis (03/28/2016), Gynecological disorder (1982), Headache, Headache(784.0), Heart murmur, Hemorrhoids (1997), High cholesterol, Hyperlipidemia, Hypertension, Hyponatremia (03/29/2016), Indigestion, Infectious disease, Macular hole of right eye, Pain, Pneumonia, Shortness of breath (02/28/2014), SIRS (systemic inflammatory response syndrome) (HCC) (03/29/2016), and Urinary tract infection, site not specified.    SURGICAL HISTORY   has a past surgical history that includes laminotomy (1982); breast biopsy (2009); other abdominal surgery; appendectomy; gyn surgery; abdominal hysterectomy total (1982); other orthopedic surgery; other; other (Right); other (Right); total knee arthroplasty (Left, 05/18/2021); total " knee arthroplasty (Right, 2021); sigmoidoscopy,diagnostic (2024); lap,diagnostic abdomen (N/A, 2024); colostomy (Left, 2024); colon resection; arthroplasty; lumpectomy; hemorrhoidectomy; and exploratory of abdomen (2024).    FAMILY HISTORY  Family History   Problem Relation Age of Onset    Diabetes Mother     Heart Disease Mother 60        MI then CABG    Heart Disease Father         CHF    No Known Problems Sister     Blood Disease Brother     Abdominal aortic aneurysm Brother     Alcohol/Drug Maternal Aunt     Heart Disease Maternal Grandmother 65        MI,  with it    Alcohol/Drug Maternal Grandmother     Alcohol/Drug Maternal Grandfather     Alcohol/Drug Paternal Grandmother        SOCIAL HISTORY  Social History     Tobacco Use    Smoking status: Never    Smokeless tobacco: Never   Vaping Use    Vaping status: Never Used   Substance and Sexual Activity    Alcohol use: Never    Drug use: Never    Sexual activity: Not Currently     Partners: Male     Birth control/protection: Surgical       CURRENT MEDICATIONS  Home Medications       Reviewed by Courtney Corona R.N. (Registered Nurse) on 25 at 1206  Med List Status: Not Addressed     Medication Last Dose Status   acetaminophen (TYLENOL) 500 MG Tab  Active   albuterol 108 (90 Base) MCG/ACT Aero Soln inhalation aerosol  Active   apixaban (ELIQUIS) 5mg Tab  Active   atorvastatin (LIPITOR) 20 MG Tab  Active   benzonatate (TESSALON) 100 MG Cap  Active   buPROPion (WELLBUTRIN XL) 300 MG XL tablet  Active   fluticasone (FLONASE) 50 MCG/ACT nasal spray  Active   furosemide (LASIX) 40 MG Tab  Active   glipiZIDE (GLUCOTROL) 10 MG Tab  Active   metoprolol SR (TOPROL XL) 50 MG TABLET SR 24 HR  Active   ondansetron (ZOFRAN ODT) 4 MG TABLET DISPERSIBLE  Active   pantoprazole (PROTONIX) 40 MG Tablet Delayed Response  Active   potassium Chloride ER (K-TAB) 20 MEQ Tab CR tablet  Active   sertraline (ZOLOFT) 50 MG Tab  Active               "      ALLERGIES  Allergies   Allergen Reactions    Azithromycin Unspecified     Pt states she feels a burning and hot sensation \"I can feel it in my veins, all the way through my body down to the bottom of my feet.\"    Cymbalta [Duloxetine Hcl] Unspecified     Make blood pressure go up    Lisinopril Cough     Cough      Demerol Rash     Rash at injection site, N/V.    Ertugliflozin-Metformin Hcl Unspecified          Montelukast Anxiety     Gets nightmares - will never take it again       PHYSICAL EXAM  VITAL SIGNS: /59   Pulse 78   Temp 36.5 °C (97.7 °F) (Temporal)   Resp (!) 24   Ht 1.651 m (5' 5\")   Wt (!) 128 kg (282 lb 6.6 oz)   SpO2 92%   BMI 47.00 kg/m²    Constitutional: Awake alert anxious in moderate distress and pain  HENT: Normocephalic, Atraumatic,   Eyes: PERRL, EOMI, Conjunctiva normal, No discharge.   Neck: Normal range of motion  Cardiovascular: Normal heart rate, Normal rhythm, No murmurs, No rubs, No gallops.   Thorax & Lungs: Normal breath sounds, No respiratory distress, No wheezing,  Abdomen: Soft, No tenderness  Skin: Warm, Dry, No erythema, No rash.   Back: No tenderness, No CVA tenderness.   Musculoskeletal: Good range of motion in all major joints.  No asymmetric edema, good pulses in all extremities  Neurologic: Alert, No focal deficits noted.   Psychiatric: Affect normal      EKG/LABS    Results for orders placed or performed during the hospital encounter of 25   EKG    Collection Time: 25 12:24 PM   Result Value Ref Range    Report       [facility]    Test Date:  2025  Pt Name:    KARYNA BARBER             Department: [facility]  MRN:        [id number]                      Room:  Gender:     Female                       Technician: [id number]  :        1950                   Requested By:ER TRIAGE PROTOCOL  Order #:    [id number]                    Vargas MD: KHADIJAH DEL RIO. AMD    Measurements  Intervals               "                  Axis  Rate:       79                           P:          50  LA:         186                          QRS:        3  QRSD:       108                          T:          53  QT:         398  QTc:        457    Interpretive Statements  Sinus rhythm  Atrial premature complex  Low voltage, precordial leads  RSR' in V1 or V2, right VCD or RVH  Compared to ECG 12/23/2024 15:01:40  RSR' in V1 or V2 now present  Left-axis deviation no longer present  Electronically Signed On 01- 12:24:28 PST by KHADIJAH DEL RIO. AMD     CBC WITH DIFFERENTIAL    Collection Time: 01/19/25 12:31 PM   Result Value Ref Range    WBC 12.6 (H) 4.8 - 10.8 K/uL    RBC 4.09 (L) 4.20 - 5.40 M/uL    Hemoglobin 11.9 (L) 12.0 - 16.0 g/dL    Hematocrit 35.4 (L) 37.0 - 47.0 %    MCV 86.6 81.4 - 97.8 fL    MCH 29.1 27.0 - 33.0 pg    MCHC 33.6 32.2 - 35.5 g/dL    RDW 41.4 35.9 - 50.0 fL    Platelet Count 293 164 - 446 K/uL    MPV 8.9 (L) 9.0 - 12.9 fL    Neutrophils-Polys 80.60 (H) 44.00 - 72.00 %    Lymphocytes 8.80 (L) 22.00 - 41.00 %    Monocytes 6.40 0.00 - 13.40 %    Eosinophils 3.30 0.00 - 6.90 %    Basophils 0.30 0.00 - 1.80 %    Immature Granulocytes 0.60 0.00 - 0.90 %    Nucleated RBC 0.00 0.00 - 0.20 /100 WBC    Neutrophils (Absolute) 10.12 (H) 1.82 - 7.42 K/uL    Lymphs (Absolute) 1.11 1.00 - 4.80 K/uL    Monos (Absolute) 0.81 0.00 - 0.85 K/uL    Eos (Absolute) 0.41 0.00 - 0.51 K/uL    Baso (Absolute) 0.04 0.00 - 0.12 K/uL    Immature Granulocytes (abs) 0.07 0.00 - 0.11 K/uL    NRBC (Absolute) 0.00 K/uL   COMP METABOLIC PANEL    Collection Time: 01/19/25 12:31 PM   Result Value Ref Range    Sodium 143 135 - 145 mmol/L    Potassium 3.8 3.6 - 5.5 mmol/L    Chloride 101 96 - 112 mmol/L    Co2 30 20 - 33 mmol/L    Anion Gap 12.0 7.0 - 16.0    Glucose 121 (H) 65 - 99 mg/dL    Bun 28 (H) 8 - 22 mg/dL    Creatinine 1.29 0.50 - 1.40 mg/dL    Calcium 9.2 8.4 - 10.2 mg/dL    Correct Calcium 9.2 8.5 - 10.5 mg/dL    AST(SGOT) 19 12 -  45 U/L    ALT(SGPT) 27 2 - 50 U/L    Alkaline Phosphatase 160 (H) 30 - 99 U/L    Total Bilirubin 0.3 0.1 - 1.5 mg/dL    Albumin 4.0 3.2 - 4.9 g/dL    Total Protein 6.8 6.0 - 8.2 g/dL    Globulin 2.8 1.9 - 3.5 g/dL    A-G Ratio 1.4 g/dL   TROPONIN    Collection Time: 25 12:31 PM   Result Value Ref Range    Troponin T 20 (H) 6 - 19 ng/L   proBrain Natriuretic Peptide, NT    Collection Time: 25 12:31 PM   Result Value Ref Range    NT-proBNP 556 (H) 0 - 125 pg/mL   ESTIMATED GFR    Collection Time: 25 12:31 PM   Result Value Ref Range    GFR (CKD-EPI) 44 (A) >60 mL/min/1.73 m 2   EKG (NOW)    Collection Time: 25  1:10 PM   Result Value Ref Range    Report       [facility] Emergency Dept.    Test Date:  2025  Pt Name:    KARYNA BARBER             Department: [facility]  MRN:        [id number]                      Room:       [place] [address]  Gender:     Female                       Technician: [id number]  :        1950                   Requested By:KHADIJAH DEL RIO  Order #:    [id number]                    Reading MD:    Measurements  Intervals                                Axis  Rate:       71                           P:          18  MA:         188                          QRS:        1  QRSD:       104                          T:          25  QT:         410  QTc:        446    Interpretive Statements  Sinus rhythm  Low voltage, precordial leads  Posterior infarct, acute (LCx)  ST depression V1-V3, suggest recording posterior leads  Compared to ECG 2025 11:59:00  Myocardial infarct finding now present  ST (T wave) deviation now present  Atrial premature complex(es) no longer present  Right ventricular hypertro phy no longer present     EKG    Collection Time: 25  2:11 PM   Result Value Ref Range    Report       [facility] Emergency Dept.    Test Date:  2025  Pt Name:    KARYNA BARBER             Department: [facility]  MRN:         [id number]                      [facility]  Gender:     Female                       Technician: SONNY  :        1950                   Requested By:WING COVINGTON  Order #:    [id number]                    Reading MD: KHADIJAH DEL RIO. ELISSA    Measurements  Intervals                                Axis  Rate:       79                           P:          11  DC:         201                          QRS:        -8  QRSD:       99                           T:          33  QT:         392  QTc:        450    Interpretive Statements  Sinus rhythm  Low voltage, precordial leads  RSR' in V1 or V2, right VCD or RVH  Compared to ECG 2025 13:10:15  Right ventricular hypertrophy now present  RSR' in V1 or V2 now present  Myocardial infarct finding no longer present  ST (T wave) deviation no longer present  Electronically Signed  On 2025 14:11:26 PST by KHADIJAH DEL RIO. AMD        I have independently interpreted this EKG    RADIOLOGY/PROCEDURES   I have independently interpreted the diagnostic imaging associated with this visit and am waiting the final reading from the radiologist.   My preliminary interpretation is as follows:     Radiologist interpretation:  DX-CHEST-PORTABLE (1 VIEW)   Final Result      No acute cardiopulmonary abnormality.      CT-CTA COMPLETE THORACOABDOMINAL AORTA   Final Result      1.  No aortic aneurysm or dissection.   2.  Mild scattered atherosclerosis.   3.  Enlarged pulmonary arteries may suggest pulmonary arterial hypertension. No evidence of pulmonary embolism.   4.  Some fat density in the left ventricular cysts distal septum and apex could be related to a prior myocardial infarction.   5.  Postsurgical changes of the colon. Left-sided colostomy. No bowel obstruction or definite acute inflammation. Colonic diverticulosis.   6.  Cholelithiasis.            EC-ECHOCARDIOGRAM COMPLETE W/O CONT    (Results Pending)       COURSE & MEDICAL DECISION MAKING    ASSESSMENT,  COURSE AND PLAN  Care Narrative:     74-year-old female presents to the ED with acute chest pain.  She had anterior chest pain described as severe that radiated towards her back.  With the abrupt crescendo severe pain at this pattern she is made a code aorta.    Labs and EKG were obtained.  CT aortogram negative for dissection.    EKG is abnormal but unchanged from baseline.  No signs of a STEMI.    She is given aspirin and morphine her pain is improved but not yet resolved.  First troponin is 20 with a negative second troponin here in the ED.    The patient will be hospitalized for further workup and treatment.  Spoke with the hospitalist Dr. Lopez care transferred at that time.        CHEST PAIN:     Heart score is 7.            DISPOSITION AND DISCUSSIONS  I have discussed management of the patient with the following physicians and LOUIE's: [place] hospitalist        FINAL DIAGNOSIS  1. Other chest pain         Electronically signed by: Derek Burch M.D., 1/19/2025 12:32 PM

## 2025-01-19 NOTE — ED NOTES
Medication history reviewed with pt. Med rec is complete.  Allergies reviewed, per pt    Pt reports that she is taking FUROSEMIDE 40MG 1 tablet BID, took a dose today at 0800.Pt reports that she has not taken her HYDROCHLOROTHIAZIDE 25MG in the last 30 days or longer.  Pt reprots that she is only taking his POTASSIUM 20MEQ QDAY, not BID.    Patient has not had any outpatient antibiotics in the last 30 days.    Pt takes ELIQUIS 5MG, last dose was taken on 1/19/2025 at 0800.

## 2025-01-19 NOTE — H&P
"Hospital Medicine History & Physical Note    Date of Service  1/19/2025    Primary Care Physician  Dannielle Lackey P.A.-C.    Consultants  None      Code Status  Full Code    Chief Complaint  Chief Complaint   Patient presents with    Chest Pain     Central chest since this AM described as \"sharp pain\", worse when taking a deep breath in. States pain travels through into her back as well.      History of Presenting Illness  Guerda Lunsford is a 74 y.o. female with a past medical history of atrial fibrillation, diabetes and hyperlipidemia who presented 1/19/2025 with chest pain.  Pain started suddenly.  The pain is located retrosternally and is described as being severe rated 10/10.  The patient also reports having associated shortness of breath but no diaphoresis nausea or vomiting.  Patient does not have cough fever or chills.  She reports compliance with her blood thinners.     I discussed the plan of care with emergency physician, and the patient    Review of Systems  Review of Systems   Constitutional:  Negative for chills and fever.   Eyes:  Negative for discharge and redness.   Respiratory:  Positive for shortness of breath. Negative for cough and stridor.    Cardiovascular:  Positive for chest pain. Negative for leg swelling.   Gastrointestinal:  Negative for abdominal pain and vomiting.   Genitourinary:  Negative for flank pain.   Musculoskeletal:  Negative for myalgias.   Skin: Negative.    Neurological:  Negative for focal weakness.   Endo/Heme/Allergies:  Does not bruise/bleed easily.   Psychiatric/Behavioral:  The patient is nervous/anxious.      Past Medical History   has a past medical history of Allergy, Anemia (1997), Anxiety, Arthritis, Asthma, Bronchitis, Cataract, Chest pain (02/01/2016), Cholesterol serum elevated, Dental disorder (10/2020), Depression, Detached retina, left, Diverticulitis, Diverticulitis (03/28/2016), Gynecological disorder (1982), Headache, Headache(784.0), Heart " "murmur, Hemorrhoids (1997), High cholesterol, Hyperlipidemia, Hypertension, Hyponatremia (03/29/2016), Indigestion, Infectious disease, Macular hole of right eye, Pain, Pneumonia, Shortness of breath (02/28/2014), SIRS (systemic inflammatory response syndrome) (HCC) (03/29/2016), and Urinary tract infection, site not specified.    Surgical History   has a past surgical history that includes laminotomy (1982); breast biopsy (2009); other abdominal surgery; appendectomy; gyn surgery; abdominal hysterectomy total (1982); other orthopedic surgery; other; other (Right); other (Right); pr total knee arthroplasty (Left, 05/18/2021); pr total knee arthroplasty (Right, 11/16/2021); pr sigmoidoscopy,diagnostic (11/21/2024); pr lap,diagnostic abdomen (N/A, 11/24/2024); pr colostomy (Left, 11/24/2024); colon resection; arthroplasty; lumpectomy; hemorrhoidectomy; and pr exploratory of abdomen (12/7/2024).     Family History  family history includes Abdominal aortic aneurysm in her brother; Alcohol/Drug in her maternal aunt, maternal grandfather, maternal grandmother, and paternal grandmother; Blood Disease in her brother; Diabetes in her mother; Heart Disease in her father; Heart Disease (age of onset: 60) in her mother; Heart Disease (age of onset: 65) in her maternal grandmother; No Known Problems in her sister.      Social History   reports that she has never smoked. She has never used smokeless tobacco. She reports that she does not drink alcohol and does not use drugs.    Allergies  Allergies   Allergen Reactions    Azithromycin Unspecified     Pt states she feels a burning and hot sensation \"I can feel it in my veins, all the way through my body down to the bottom of my feet.\"    Cymbalta [Duloxetine Hcl] Unspecified     Make blood pressure go up    Lisinopril Cough     Cough      Demerol Rash     Rash at injection site, N/V.    Ertugliflozin-Metformin Hcl Diarrhea          Montelukast Anxiety     Gets nightmares - will " never take it again     Medications  Prior to Admission Medications   Prescriptions Last Dose Informant Patient Reported? Taking?   acetaminophen (TYLENOL) 500 MG Tab 1/19/2025 at 10:00 AM Patient Yes Yes   Sig: Take 1,000 mg by mouth every 6 hours as needed for Mild Pain. Indications: Pain   albuterol 108 (90 Base) MCG/ACT Aero Soln inhalation aerosol Not Taking Patient No No   Sig: Inhale 2 Puffs every 6 hours as needed for Shortness of Breath.   Patient not taking: Reported on 1/19/2025   apixaban (ELIQUIS) 5mg Tab 1/19/2025 at  8:00 AM Patient No Yes   Sig: Take 1 Tablet by mouth 2 times a day for 90 days. Indications: Thromboembolism secondary to Atrial Fibrillation   atorvastatin (LIPITOR) 20 MG Tab 1/18/2025 at  6:00 PM Patient No Yes   Sig: TAKE 1 TABLET BY MOUTH DAILY   buPROPion (WELLBUTRIN XL) 300 MG XL tablet 1/19/2025 at  8:00 AM Patient Yes Yes   Sig: Take 300 mg by mouth every morning. Indications: Depression   furosemide (LASIX) 40 MG Tab 1/19/2025 at  8:00 AM Patient No Yes   Sig: Take 1 Tablet by mouth 2 times a day. Indications: Edema   glipiZIDE (GLUCOTROL) 10 MG Tab 1/19/2025 at  8:00 AM Patient No Yes   Sig: Take 1 Tablet by mouth every day. Pt reports that she takes this medication QDAY, not BID  Indications: Type 2 Diabetes   metoprolol SR (TOPROL XL) 50 MG TABLET SR 24 HR 1/18/2025 at  6:00 PM Patient No Yes   Sig: Take 1 Tablet by mouth every evening. Indications: High Blood Pressure   pantoprazole (PROTONIX) 40 MG Tablet Delayed Response 1/18/2025 at  6:00 PM Patient Yes Yes   Sig: Take 40 mg by mouth every evening. Indications: Heartburn   potassium Chloride ER (K-TAB) 20 MEQ Tab CR tablet 1/19/2025 at  8:00 AM Patient No Yes   Sig: Take 1 Tablet by mouth 2 times a day. Indications: Low Amount of Potassium in the Blood   Patient taking differently: Take 20 mEq by mouth every day. Indications: Low Amount of Potassium in the Blood   sertraline (ZOLOFT) 50 MG Tab 1/19/2025 at  8:00 AM  Patient Yes Yes   Sig: Take 50 mg by mouth every day. Indications: Major Depressive Disorder      Facility-Administered Medications: None     Physical Exam  Temp:  [36.5 °C (97.7 °F)-37.1 °C (98.8 °F)] 37.1 °C (98.8 °F)  Pulse:  [72-79] 75  Resp:  [15-26] 18  BP: ()/(45-60) 97/45  SpO2:  [92 %-96 %] 93 %  Blood Pressure : 123/56   Temperature: 36.5 °C (97.7 °F)   Pulse: 74   Respiration: (!) 26   Pulse Oximetry: 95 %     Physical Exam  Constitutional:       General: She is not in acute distress.  HENT:      Head: Normocephalic and atraumatic.      Right Ear: External ear normal.      Left Ear: External ear normal.      Nose: No congestion or rhinorrhea.      Mouth/Throat:      Mouth: Mucous membranes are dry.      Pharynx: No oropharyngeal exudate or posterior oropharyngeal erythema.   Eyes:      General: No scleral icterus.        Right eye: No discharge.         Left eye: No discharge.      Conjunctiva/sclera: Conjunctivae normal.      Pupils: Pupils are equal, round, and reactive to light.   Cardiovascular:      Rate and Rhythm: Normal rate and regular rhythm.      Heart sounds:      No friction rub. No gallop.   Pulmonary:      Effort: Pulmonary effort is normal.      Comments: Saturating well on room air.  Is able to speak in full sentences  Abdominal:      General: Abdomen is flat. There is no distension.      Tenderness: There is no guarding.      Comments: Colostomy in place   Musculoskeletal:         General: No swelling.      Cervical back: Neck supple. No rigidity. No muscular tenderness.      Right lower leg: Edema (Mild) present.      Left lower leg: Edema (Mild) present.   Skin:     Capillary Refill: Capillary refill takes 2 to 3 seconds.      Coloration: Skin is not jaundiced or pale.      Findings: No bruising or erythema.   Neurological:      Mental Status: She is alert and oriented to person, place, and time.   Psychiatric:         Judgment: Judgment normal.      Comments: Anxious mood        Laboratory:  Recent Labs     01/19/25  1231   WBC 12.6*   RBC 4.09*   HEMOGLOBIN 11.9*   HEMATOCRIT 35.4*   MCV 86.6   MCH 29.1   MCHC 33.6   RDW 41.4   PLATELETCT 293   MPV 8.9*     Recent Labs     01/19/25  1231   SODIUM 143   POTASSIUM 3.8   CHLORIDE 101   CO2 30   GLUCOSE 121*   BUN 28*   CREATININE 1.29   CALCIUM 9.2     Recent Labs     01/19/25  1231   ALTSGPT 27   ASTSGOT 19   ALKPHOSPHAT 160*   TBILIRUBIN 0.3   GLUCOSE 121*         Recent Labs     01/19/25  1231   NTPROBNP 556*         Recent Labs     01/19/25  1231 01/19/25  1347   TROPONINT 20* 19     Imaging:  EC-ECHOCARDIOGRAM COMPLETE W/ CONT   Final Result      DX-CHEST-PORTABLE (1 VIEW)   Final Result      No acute cardiopulmonary abnormality.      CT-CTA COMPLETE THORACOABDOMINAL AORTA   Final Result      1.  No aortic aneurysm or dissection.   2.  Mild scattered atherosclerosis.   3.  Enlarged pulmonary arteries may suggest pulmonary arterial hypertension. No evidence of pulmonary embolism.   4.  Some fat density in the left ventricular cysts distal septum and apex could be related to a prior myocardial infarction.   5.  Postsurgical changes of the colon. Left-sided colostomy. No bowel obstruction or definite acute inflammation. Colonic diverticulosis.   6.  Cholelithiasis.              I personally reviewed patient EKG shows sinus rhythm with a rate of 79.  There is no ST elevation.  There is right bundle branch block with expected abnormal repolarization pattern.  This is seen on prior EKGs.  QTc is 457.     Assessment/Plan:  Justification for Admission Status  I anticipate this patient is appropriate for observation status at this time because patient has chest pain.    Patient will need a Telemetry bed on MEDICAL service.      * Chest pain- (present on admission)  Assessment & Plan  EKG shows sinus rhythm with a rate of 79.  There is no ST elevation.  There is right bundle branch block with expected abnormal repolarization pattern.  This  is seen on prior EKGs.  QTc is 457.   Initial troponin is mildly elevated at 20, then 19,I will trend  I will check a D-dimer  We will check CT angiography to rule out dissection  I ordered Stat EKG and troponin for recurrence of chest pain.   I will place on continuous cardiac monitoring.  Plan for stress test in the morning [ordered] as long as there are no significant EKG changes or significant troponin elevation     Abnormal EKG- (present on admission)  Assessment & Plan  EKG shows sinus rhythm with a rate of 79.  There is no ST elevation.  There is right bundle branch block with expected abnormal repolarization pattern.  This is seen on prior EKGs.  QTc is 457.      I will check a posterior EKG to rule out posterior myocardial infarction.  I will place on continuous cardiac monitoring  I will check and trend troponins  I will check echocardiography       Atrial fibrillation (HCC)- (present on admission)  Assessment & Plan  I will resume home apixaban for stroke prophylaxis.  I will resume home metoprolol with hold parameters for rate control.  I will place on continuous cardiac monitoring.    Type 2 diabetes mellitus without complication, without long-term current use of insulin (HCC)- (present on admission)  Assessment & Plan  With no significant hyperglycemia  Last glycated hemoglobin was 7.2%  I will start short acting insulin for now  I will order Accu-Checks, hypoglycemia protocol  Adjust according to blood sugars trend.     Morbid obesity with BMI of 45.0-49.9, adult (HCC)- (present on admission)  Assessment & Plan  At higher risk for atelectasis/hypoxia.  I will order continuous pulse oximetry  Emphasized incentive spirometry       Stage 3a chronic kidney disease- (present on admission)  Assessment & Plan  Avoid/minimize nephrotoxins as much as possible, renally dose medications, monitor inputs and outputs     Anemia- (present on admission)  Assessment & Plan  No evidence of gross bleeding.  Monitor  hemoglobin. I will order a follow-up CBC.  Transfuse for a hemoglobin of less than or equal to 7.     ACP (advance care planning)- (present on admission)  Assessment & Plan  I had a discussion with the patient regarding goals of care, diagnoses, prognosis, and CODE STATUS. We discussed her prognosis and comorbidities.  The patient has advanced age of 74 years.  She has a number of chronic medical problems including chronic kidney disease, atrial fibrillation, diabetes and elevated BMI of 47.  She is presenting with chest pain.  At this point the patient wants to maintain a full code.  She is open to all forms of invasive or noninvasive diagnostic and therapeutic interventions.         VTE prophylaxis: SCDs/TEDs and therapeutic anticoagulation with apixaban    I had a discussion with the patient regarding goals of care, diagnoses, prognosis, and CODE STATUS. We discussed her prognosis and comorbidities.  The patient has advanced age of 74 years.  She has a number of chronic medical problems including chronic kidney disease, atrial fibrillation, diabetes and elevated BMI of 47.  She is presenting with chest pain.  At this point the patient wants to maintain a full code.  She is open to all forms of invasive or noninvasive diagnostic and therapeutic interventions.  I spent 16 minutes on advanced care planning.

## 2025-01-20 ENCOUNTER — HOME CARE VISIT (OUTPATIENT)
Dept: HOME HEALTH SERVICES | Facility: HOME HEALTHCARE | Age: 75
End: 2025-01-20
Payer: MEDICARE

## 2025-01-20 ENCOUNTER — APPOINTMENT (OUTPATIENT)
Dept: RADIOLOGY | Facility: MEDICAL CENTER | Age: 75
End: 2025-01-20
Attending: HOSPITALIST
Payer: MEDICARE

## 2025-01-20 LAB
ALBUMIN SERPL BCP-MCNC: 3.6 G/DL (ref 3.2–4.9)
ALBUMIN/GLOB SERPL: 1.3 G/DL
ALP SERPL-CCNC: 128 U/L (ref 30–99)
ALT SERPL-CCNC: 25 U/L (ref 2–50)
ANION GAP SERPL CALC-SCNC: 14 MMOL/L (ref 7–16)
AST SERPL-CCNC: 17 U/L (ref 12–45)
BILIRUB SERPL-MCNC: 0.7 MG/DL (ref 0.1–1.5)
BUN SERPL-MCNC: 26 MG/DL (ref 8–22)
CALCIUM ALBUM COR SERPL-MCNC: 9 MG/DL (ref 8.5–10.5)
CALCIUM SERPL-MCNC: 8.7 MG/DL (ref 8.4–10.2)
CHLORIDE SERPL-SCNC: 96 MMOL/L (ref 96–112)
CHOLEST SERPL-MCNC: 111 MG/DL (ref 100–199)
CO2 SERPL-SCNC: 28 MMOL/L (ref 20–33)
CREAT SERPL-MCNC: 1.38 MG/DL (ref 0.5–1.4)
ERYTHROCYTE [DISTWIDTH] IN BLOOD BY AUTOMATED COUNT: 41.7 FL (ref 35.9–50)
GFR SERPLBLD CREATININE-BSD FMLA CKD-EPI: 40 ML/MIN/1.73 M 2
GLOBULIN SER CALC-MCNC: 2.7 G/DL (ref 1.9–3.5)
GLUCOSE BLD STRIP.AUTO-MCNC: 156 MG/DL (ref 65–99)
GLUCOSE BLD STRIP.AUTO-MCNC: 175 MG/DL (ref 65–99)
GLUCOSE SERPL-MCNC: 126 MG/DL (ref 65–99)
HCT VFR BLD AUTO: 31.8 % (ref 37–47)
HDLC SERPL-MCNC: 41 MG/DL
HGB BLD-MCNC: 10.5 G/DL (ref 12–16)
LDLC SERPL CALC-MCNC: 46 MG/DL
MAGNESIUM SERPL-MCNC: 2 MG/DL (ref 1.5–2.5)
MCH RBC QN AUTO: 28.7 PG (ref 27–33)
MCHC RBC AUTO-ENTMCNC: 33 G/DL (ref 32.2–35.5)
MCV RBC AUTO: 86.9 FL (ref 81.4–97.8)
PLATELET # BLD AUTO: 280 K/UL (ref 164–446)
PMV BLD AUTO: 9.5 FL (ref 9–12.9)
POTASSIUM SERPL-SCNC: 3.9 MMOL/L (ref 3.6–5.5)
PROT SERPL-MCNC: 6.3 G/DL (ref 6–8.2)
RBC # BLD AUTO: 3.66 M/UL (ref 4.2–5.4)
SODIUM SERPL-SCNC: 138 MMOL/L (ref 135–145)
TRIGL SERPL-MCNC: 120 MG/DL (ref 0–149)
TROPONIN T SERPL-MCNC: 18 NG/L (ref 6–19)
WBC # BLD AUTO: 13 K/UL (ref 4.8–10.8)

## 2025-01-20 PROCEDURE — 85027 COMPLETE CBC AUTOMATED: CPT

## 2025-01-20 PROCEDURE — 700102 HCHG RX REV CODE 250 W/ 637 OVERRIDE(OP): Performed by: HOSPITALIST

## 2025-01-20 PROCEDURE — 82962 GLUCOSE BLOOD TEST: CPT | Mod: 91

## 2025-01-20 PROCEDURE — G0378 HOSPITAL OBSERVATION PER HR: HCPCS

## 2025-01-20 PROCEDURE — 700111 HCHG RX REV CODE 636 W/ 250 OVERRIDE (IP): Performed by: HOSPITALIST

## 2025-01-20 PROCEDURE — 94760 N-INVAS EAR/PLS OXIMETRY 1: CPT

## 2025-01-20 PROCEDURE — A9270 NON-COVERED ITEM OR SERVICE: HCPCS | Performed by: HOSPITALIST

## 2025-01-20 PROCEDURE — 83735 ASSAY OF MAGNESIUM: CPT

## 2025-01-20 PROCEDURE — 96376 TX/PRO/DX INJ SAME DRUG ADON: CPT

## 2025-01-20 PROCEDURE — 36415 COLL VENOUS BLD VENIPUNCTURE: CPT

## 2025-01-20 PROCEDURE — 80053 COMPREHEN METABOLIC PANEL: CPT

## 2025-01-20 PROCEDURE — 96372 THER/PROPH/DIAG INJ SC/IM: CPT

## 2025-01-20 PROCEDURE — 84484 ASSAY OF TROPONIN QUANT: CPT

## 2025-01-20 PROCEDURE — 99232 SBSQ HOSP IP/OBS MODERATE 35: CPT | Performed by: HOSPITALIST

## 2025-01-20 PROCEDURE — 80061 LIPID PANEL: CPT

## 2025-01-20 RX ORDER — REGADENOSON 0.08 MG/ML
INJECTION, SOLUTION INTRAVENOUS
Status: ACTIVE
Start: 2025-01-20 | End: 2025-01-20

## 2025-01-20 RX ORDER — TRAMADOL HYDROCHLORIDE 50 MG/1
50 TABLET ORAL EVERY 6 HOURS PRN
Status: DISCONTINUED | OUTPATIENT
Start: 2025-01-20 | End: 2025-01-21 | Stop reason: HOSPADM

## 2025-01-20 RX ADMIN — INSULIN LISPRO 1 UNITS: 100 INJECTION, SOLUTION INTRAVENOUS; SUBCUTANEOUS at 17:07

## 2025-01-20 RX ADMIN — OMEPRAZOLE 20 MG: 20 CAPSULE, DELAYED RELEASE ORAL at 17:31

## 2025-01-20 RX ADMIN — INSULIN LISPRO 1 UNITS: 100 INJECTION, SOLUTION INTRAVENOUS; SUBCUTANEOUS at 11:30

## 2025-01-20 RX ADMIN — ASPIRIN 81 MG: 81 TABLET, COATED ORAL at 05:08

## 2025-01-20 RX ADMIN — ACETAMINOPHEN 650 MG: 325 TABLET ORAL at 20:51

## 2025-01-20 RX ADMIN — ACETAMINOPHEN 650 MG: 325 TABLET ORAL at 14:06

## 2025-01-20 RX ADMIN — OXYCODONE 5 MG: 5 TABLET ORAL at 05:09

## 2025-01-20 RX ADMIN — BUPROPION HYDROCHLORIDE 150 MG: 150 TABLET, FILM COATED, EXTENDED RELEASE ORAL at 05:08

## 2025-01-20 RX ADMIN — APIXABAN 5 MG: 5 TABLET, FILM COATED ORAL at 17:31

## 2025-01-20 RX ADMIN — APIXABAN 5 MG: 5 TABLET, FILM COATED ORAL at 05:08

## 2025-01-20 RX ADMIN — ONDANSETRON 4 MG: 2 INJECTION INTRAMUSCULAR; INTRAVENOUS at 14:03

## 2025-01-20 RX ADMIN — ONDANSETRON 4 MG: 2 INJECTION INTRAMUSCULAR; INTRAVENOUS at 09:59

## 2025-01-20 RX ADMIN — SERTRALINE HYDROCHLORIDE 50 MG: 50 TABLET ORAL at 05:08

## 2025-01-20 RX ADMIN — ONDANSETRON 4 MG: 4 TABLET, ORALLY DISINTEGRATING ORAL at 06:10

## 2025-01-20 RX ADMIN — METOPROLOL SUCCINATE 50 MG: 25 TABLET, FILM COATED, EXTENDED RELEASE ORAL at 20:48

## 2025-01-20 RX ADMIN — ATORVASTATIN CALCIUM 20 MG: 20 TABLET, FILM COATED ORAL at 17:31

## 2025-01-20 RX ADMIN — FUROSEMIDE 40 MG: 40 TABLET ORAL at 10:14

## 2025-01-20 RX ADMIN — BUPROPION HYDROCHLORIDE 150 MG: 150 TABLET, FILM COATED, EXTENDED RELEASE ORAL at 17:31

## 2025-01-20 ASSESSMENT — PAIN DESCRIPTION - PAIN TYPE
TYPE: ACUTE PAIN
TYPE: ACUTE PAIN;CHRONIC PAIN
TYPE: ACUTE PAIN

## 2025-01-20 ASSESSMENT — ENCOUNTER SYMPTOMS
ORTHOPNEA: 0
SPUTUM PRODUCTION: 0
PALPITATIONS: 0
COUGH: 0
NAUSEA: 1
HEMOPTYSIS: 0
DIZZINESS: 0
VOMITING: 1
CHILLS: 0

## 2025-01-20 ASSESSMENT — FIBROSIS 4 INDEX: FIB4 SCORE: 0.9

## 2025-01-20 NOTE — DISCHARGE PLANNING
Met with pt who said she lives with her . Pt said she is independent with ADLs and IADLs. But uses either a walker or a cane. Pt said she uses walker at home because she feels unsteady but still able to perform everything that is needed. She drives.     Pt is active with [facility] because of her new colostomy. The [facility] nurse delivers her colostomy supplies to her. Pt also requested for colostomy care assistance so CM notified.     PCP is ALVIN Lackey  Ph[facility]acy is [facility].    Care Transition Team Assessment    Information Source  Orientation Level: Oriented X4  Information Given By: Patient  Who is responsible for making decisions for patient? : Patient    Readmission Evaluation  Is this a readmission?: No    Elopement Risk  Elopement Risk: Not at Risk for Elopement    Interdisciplinary Discharge Planning  Does Admitting Nurse Feel This Could be a Complex Discharge?: No  Primary Care Physician: Dannielle ESQUIVEL  Lives with - Patient's Self Care Capacity: Significant Other  Patient or legal guardian wants to designate a caregiver: No  Support Systems: Spouse / Significant Other  Housing / Facility: [address]  Do You Take your Prescribed Medications Regularly: Yes  Able to Return to Previous ADL's: Yes  Mobility Issues: Yes  Prior Services: Home-Independent  Patient Prefers to be Discharged to:: [facility] with   Assistance Needed: No    Discharge Preparedness  What is your plan after discharge?: Home health care  What are your discharge supports?: Spouse  Prior Functional Level: Ambulatory, Drives Self, Independent with Activities of Daily Living, Independent with Medication Management, Uses Cane, Uses Walker  Difficulity with ADLs: None  Difficulity with IADLs: None    Functional Assesment  Prior Functional Level: Ambulatory, Drives Self, Independent with Activities of Daily Living, Independent with Medication Management, Uses Cane, Uses Walker    Finances  Financial Barriers to  Discharge: No  Prescription Coverage: Yes    Vision / Hearing Impairment  Vision Impairment : No  Hearing Impairment : No    Values / Beliefs / Concerns  Values / Beliefs Concerns : No    Advance Directive  Advance Directive?: POLST    Domestic Abuse  Have you ever been the victim of abuse or violence?: No  Possible Abuse/Neglect Reported to:: Not Applicable    Psychological Assessment  History of Substance Abuse: None    Discharge Risks or Barriers  Discharge risks or barriers?: Post-acute placement / services  Patient risk factors: Cognitive / sensory / physical deficit, Complex medical needs    Anticipated Discharge Information  Discharge Disposition: D/T to home under A care in anticipation of covered skilled care (06)

## 2025-01-20 NOTE — PROGRESS NOTES
"Dr. Escalante notified that pt was actively vomiting during stress test and so was unable to finish procedures. Pt was brought back to floor and medicated with nausea medication. Pt reports she is feeling \"slightly\" better now.   "

## 2025-01-20 NOTE — ASSESSMENT & PLAN NOTE
No evidence of gross bleeding.  Monitor hemoglobin. I will order a follow-up CBC.  Transfuse for a hemoglobin of less than or equal to 7.

## 2025-01-20 NOTE — DIETARY
NUTRITION SERVICES: BMI - Pt with BMI >40 (=Body mass index is 47.69 kg/m².), morbid obesity. Weight loss counseling not appropriate in acute care setting.     RECOMMEND - If appropriate at DC please refer to outpatient nutrition services for weight management.      Pt NPO x1 day. RD to monitor for diet advancement

## 2025-01-20 NOTE — CASE COMMUNICATION
Just PHOEBE:  Pt called at 1:28 this a.m. States she is on her way to the hospital.  Thank you, Raya

## 2025-01-20 NOTE — ASSESSMENT & PLAN NOTE
1/20:  Patient unable to tolerate completion of stress test today due to vomiting  Treated with Zofran  Continue monitoring on telemetry  Finish stress test tomorrow

## 2025-01-20 NOTE — CARE PLAN
The patient is Stable - Low risk of patient condition declining or worsening    Shift Goals  Clinical Goals: Stress test, monitor for chest pain  Patient Goals: stress test    Progress made toward(s) clinical / shift goals:    Problem: Pain - Standard  Goal: Alleviation of pain or a reduction in pain to the patient’s comfort goal  Description: Target End Date:  Prior to discharge or change in level of care    Document on Vitals flowsheet    1.  Document pain using the appropriate pain scale per order or unit policy  2.  Educate and implement non-pharmacologic comfort measures (i.e. relaxation, distraction, massage, cold/heat therapy, etc.)  3.  Pain management medications as ordered  4.  Reassess pain after pain med administration per policy  5.  If opiods administered assess patient's response to pain medication is appropriate per POSS sedation scale  6.  Follow pain management plan developed in collaboration with patient and interdisciplinary team (including palliative care or pain specialists if applicable)  Outcome: Progressing  Note: Pt currently declines pain, aware to inform RN of breakthrough     Problem: Knowledge Deficit - Standard  Goal: Patient and family/care givers will demonstrate understanding of plan of care, disease process/condition, diagnostic tests and medications  Description: Target End Date:  1-3 days or as soon as patient condition allows    Document in Patient Education    1.  Patient and family/caregiver oriented to unit, equipment, visitation policy and means for communicating concern  2.  Complete/review Learning Assessment  3.  Assess knowledge level of disease process/condition, treatment plan, diagnostic tests and medications  4.  Explain disease process/condition, treatment plan, diagnostic tests and medications  Outcome: Progressing     Problem: Skin Integrity  Goal: Skin integrity is maintained or improved  Description: Target End Date:  Prior to discharge or change in level of  care    Document interventions on Skin Risk/Wolf flowsheet groups and corresponding LDA    1.  Assess and monitor skin integrity, appearance and/or temperature  2.  Assess risk factors for impaired skin integrity and/or pressures ulcers  3.  Implement precautions to protect skin integrity in collaboration with interdisciplinary team  4.  Implement pressure ulcer prevention protocol if at risk for skin breakdown  5.  Confirm wound care consult if at risk for skin breakdown  6.  Ensure patient use of pressure relieving devices  (Low air loss bed, waffle overlay, heel protectors, ROHO cushion, etc)  Outcome: Progressing       Patient is not progressing towards the following goals:

## 2025-01-20 NOTE — HOSPITAL COURSE
Guerda Lunsford has past most of atrial fibrillation, diabetes, and hyperlipidemia.  The patient presented the emergency room on 1/19/2025 with chest pain.  CTA of the chest was negative for aneurysm or dissection.  The patient was hospitalized for a nuclear medicine stress test.

## 2025-01-20 NOTE — PROGRESS NOTES
Hospital Medicine Daily Progress Note    Date of Service  1/20/2025    Chief Complaint  Guerda Lunsford is a 74 y.o. female admitted 1/19/2025 with chest pain    Hospital Course  Guerda Lunsford has past most of atrial fibrillation, diabetes, and hyperlipidemia.  The patient presented the emergency room on 1/19/2025 with chest pain.  CTA of the chest was negative for aneurysm or dissection.  The patient was hospitalized for a nuclear medicine stress test.    Interval Problem Update  Patient seen and examined  She became nauseous and vomited during stress test, unable to complete the procedure  Nausea better after Zofran  She is not currently having any chest pain    I have discussed this patient's plan of care and discharge plan at IDT rounds today with Case Management, Nursing, Nursing leadership, and other members of the IDT team.    Consultants/Specialty  None    Code Status  Full Code    Disposition  The patient is not medically cleared for discharge to home or a post-acute facility.  Anticipate discharge to: home with organized home healthcare and close outpatient follow-up    I have placed the appropriate orders for post-discharge needs.    Review of Systems  Review of Systems   Constitutional:  Negative for chills and malaise/fatigue.   Respiratory:  Negative for cough, hemoptysis and sputum production.    Cardiovascular:  Positive for chest pain. Negative for palpitations and orthopnea.   Gastrointestinal:  Positive for nausea and vomiting.   Skin:  Negative for itching and rash.   Neurological:  Negative for dizziness.   All other systems reviewed and are negative.       Physical Exam  Temp:  [37.1 °C (98.8 °F)-37.6 °C (99.7 °F)] 37.2 °C (99 °F)  Pulse:  [] 78  Resp:  [15-20] 20  BP: ()/(45-58) 125/57  SpO2:  [83 %-96 %] 91 %    Physical Exam  Constitutional:       Appearance: Normal appearance.   HENT:      Right Ear: External ear normal.      Left Ear: External ear normal.   Eyes:       Extraocular Movements: Extraocular movements intact.   Cardiovascular:      Rate and Rhythm: Normal rate and regular rhythm.   Pulmonary:      Effort: Pulmonary effort is normal.      Breath sounds: Normal breath sounds.   Abdominal:      General: Abdomen is flat. Bowel sounds are normal.      Palpations: Abdomen is soft.      Comments: Ostomy in place   Musculoskeletal:         General: Normal range of motion.      Cervical back: Normal range of motion and neck supple.   Skin:     General: Skin is warm and dry.   Neurological:      General: No focal deficit present.      Mental Status: She is alert and oriented to person, place, and time.      Cranial Nerves: No cranial nerve deficit.   Psychiatric:         Mood and Affect: Mood normal.         Behavior: Behavior normal.         Fluids  No intake or output data in the 24 hours ending 01/20/25 1232     Laboratory  Recent Labs     01/19/25  1231 01/20/25  0020   WBC 12.6* 13.0*   RBC 4.09* 3.66*   HEMOGLOBIN 11.9* 10.5*   HEMATOCRIT 35.4* 31.8*   MCV 86.6 86.9   MCH 29.1 28.7   MCHC 33.6 33.0   RDW 41.4 41.7   PLATELETCT 293 280   MPV 8.9* 9.5     Recent Labs     01/19/25  1231 01/20/25  0020   SODIUM 143 138   POTASSIUM 3.8 3.9   CHLORIDE 101 96   CO2 30 28   GLUCOSE 121* 126*   BUN 28* 26*   CREATININE 1.29 1.38   CALCIUM 9.2 8.7             Recent Labs     01/20/25  0020   TRIGLYCERIDE 120   HDL 41   LDL 46       Imaging  EC-ECHOCARDIOGRAM COMPLETE W/ CONT   Final Result      DX-CHEST-PORTABLE (1 VIEW)   Final Result      No acute cardiopulmonary abnormality.      CT-CTA COMPLETE THORACOABDOMINAL AORTA   Final Result      1.  No aortic aneurysm or dissection.   2.  Mild scattered atherosclerosis.   3.  Enlarged pulmonary arteries may suggest pulmonary arterial hypertension. No evidence of pulmonary embolism.   4.  Some fat density in the left ventricular cysts distal septum and apex could be related to a prior myocardial infarction.   5.  Postsurgical changes of  the colon. Left-sided colostomy. No bowel obstruction or definite acute inflammation. Colonic diverticulosis.   6.  Cholelithiasis.            NM-CARDIAC STRESS TEST    (Results Pending)        Assessment/Plan  * Chest pain- (present on admission)  Assessment & Plan  1/20:  Patient unable to tolerate completion of stress test today due to vomiting  Treated with Zofran  Continue monitoring on telemetry  Finish stress test tomorrow    Atrial fibrillation (HCC)- (present on admission)  Assessment & Plan  1/20:  Telemetry reviewed, she is in sinus rhythm  Continue metoprolol, continue apixaban    Type 2 diabetes mellitus without complication, without long-term current use of insulin (HCC)- (present on admission)  Assessment & Plan  Insulin sliding scale while inpatient    Morbid obesity with BMI of 45.0-49.9, adult (HCC)- (present on admission)  Assessment & Plan  Body mass index is 47.69 kg/m².    ACP (advance care planning)- (present on admission)  Assessment & Plan  See prior documentation, the patient is full code    Stage 3a chronic kidney disease- (present on admission)  Assessment & Plan  Avoid/minimize nephrotoxins as much as possible, renally dose medications, monitor inputs and outputs     Abnormal EKG- (present on admission)  Assessment & Plan  Patient will be assessed with echocardiogram and stress test    Anemia- (present on admission)  Assessment & Plan  No evidence of gross bleeding.  Monitor hemoglobin. I will order a follow-up CBC.  Transfuse for a hemoglobin of less than or equal to 7.          VTE prophylaxis:    therapeutic anticoagulation with eliquis 5 mg BID      I have performed a physical exam and reviewed and updated ROS and Plan today (1/20/2025). In review of yesterday's note (1/19/2025), there are no changes except as documented above.

## 2025-01-20 NOTE — PROGRESS NOTES
4 Eyes Skin Assessment Completed by NORY Miles and NORY Garcia.    Head WDL  Ears WDL  Nose WDL  Mouth WDL  Neck WDL  Breast/Chest Redness and Shiny L greater than Right  Shoulder Blades WDL  Spine WDL  (R) Arm/Elbow/Hand WDL  (L) Arm/Elbow/Hand WDL  Abdomen L quadrant ostomy, picture in epic   Groin Redness and Rash  Scrotum/Coccyx/Buttocks Redness and Moisture Fissure, image uploaded in epic   (R) Leg WDL  (L) Leg WDL  (R) Heel/Foot/Toe Callus on greater toe   (L) Heel/Foot/Toe WDL          Devices In Places Tele Box and Pulse Ox      Interventions In Place Pillows, Low Air Loss Mattress, and Pressure Redistribution Mattress    Possible Skin Injury Yes    Pictures Uploaded Into Epic Yes  Wound Consult Placed Yes  RN Wound Prevention Protocol Ordered Yes

## 2025-01-20 NOTE — PROGRESS NOTES
Telemetry Summary    Rhythm: SR  Rate: 60's-70's  Ectopy: Rare to occasional PAC's, Rare PVC's  Measurements: (.16/.08/.34)    Normal Values  Rhythm: SR  Rate:   Measurements: 0.12-0.20/0.06-0.10/0.30-0.52

## 2025-01-21 ENCOUNTER — APPOINTMENT (OUTPATIENT)
Dept: RADIOLOGY | Facility: MEDICAL CENTER | Age: 75
End: 2025-01-21
Attending: HOSPITALIST
Payer: MEDICARE

## 2025-01-21 VITALS
OXYGEN SATURATION: 92 % | HEIGHT: 65 IN | WEIGHT: 290.79 LBS | BODY MASS INDEX: 48.45 KG/M2 | DIASTOLIC BLOOD PRESSURE: 57 MMHG | HEART RATE: 67 BPM | SYSTOLIC BLOOD PRESSURE: 135 MMHG | TEMPERATURE: 97.8 F | RESPIRATION RATE: 18 BRPM

## 2025-01-21 PROBLEM — R07.9 CHEST PAIN: Status: RESOLVED | Noted: 2025-01-19 | Resolved: 2025-01-21

## 2025-01-21 LAB
GLUCOSE BLD STRIP.AUTO-MCNC: 136 MG/DL (ref 65–99)
GLUCOSE BLD STRIP.AUTO-MCNC: 144 MG/DL (ref 65–99)
GLUCOSE BLD STRIP.AUTO-MCNC: 158 MG/DL (ref 65–99)

## 2025-01-21 PROCEDURE — 82962 GLUCOSE BLOOD TEST: CPT

## 2025-01-21 PROCEDURE — A9502 TC99M TETROFOSMIN: HCPCS

## 2025-01-21 PROCEDURE — 78452 HT MUSCLE IMAGE SPECT MULT: CPT | Mod: 26 | Performed by: INTERNAL MEDICINE

## 2025-01-21 PROCEDURE — 96372 THER/PROPH/DIAG INJ SC/IM: CPT | Mod: XU

## 2025-01-21 PROCEDURE — 99239 HOSP IP/OBS DSCHRG MGMT >30: CPT | Performed by: HOSPITALIST

## 2025-01-21 PROCEDURE — 96376 TX/PRO/DX INJ SAME DRUG ADON: CPT | Mod: XU

## 2025-01-21 PROCEDURE — 93018 CV STRESS TEST I&R ONLY: CPT | Performed by: INTERNAL MEDICINE

## 2025-01-21 PROCEDURE — A9270 NON-COVERED ITEM OR SERVICE: HCPCS | Performed by: HOSPITALIST

## 2025-01-21 PROCEDURE — 700102 HCHG RX REV CODE 250 W/ 637 OVERRIDE(OP): Performed by: HOSPITALIST

## 2025-01-21 PROCEDURE — 700111 HCHG RX REV CODE 636 W/ 250 OVERRIDE (IP): Mod: JZ | Performed by: HOSPITALIST

## 2025-01-21 PROCEDURE — G0378 HOSPITAL OBSERVATION PER HR: HCPCS

## 2025-01-21 RX ORDER — AMINOPHYLLINE 25 MG/ML
100 INJECTION, SOLUTION INTRAVENOUS
Status: DISCONTINUED | OUTPATIENT
Start: 2025-01-21 | End: 2025-01-21 | Stop reason: HOSPADM

## 2025-01-21 RX ORDER — REGADENOSON 0.08 MG/ML
0.4 INJECTION, SOLUTION INTRAVENOUS ONCE
Status: COMPLETED | OUTPATIENT
Start: 2025-01-21 | End: 2025-01-21

## 2025-01-21 RX ADMIN — APIXABAN 5 MG: 5 TABLET, FILM COATED ORAL at 06:12

## 2025-01-21 RX ADMIN — REGADENOSON 0.4 MG: 0.08 INJECTION, SOLUTION INTRAVENOUS at 08:25

## 2025-01-21 RX ADMIN — INSULIN LISPRO 1 UNITS: 100 INJECTION, SOLUTION INTRAVENOUS; SUBCUTANEOUS at 11:37

## 2025-01-21 RX ADMIN — SERTRALINE HYDROCHLORIDE 50 MG: 50 TABLET ORAL at 06:12

## 2025-01-21 RX ADMIN — ASPIRIN 81 MG: 81 TABLET, COATED ORAL at 06:12

## 2025-01-21 RX ADMIN — ONDANSETRON 4 MG: 2 INJECTION INTRAMUSCULAR; INTRAVENOUS at 07:43

## 2025-01-21 RX ADMIN — BUPROPION HYDROCHLORIDE 150 MG: 150 TABLET, FILM COATED, EXTENDED RELEASE ORAL at 06:12

## 2025-01-21 ASSESSMENT — FIBROSIS 4 INDEX: FIB4 SCORE: 0.9

## 2025-01-21 ASSESSMENT — PAIN DESCRIPTION - PAIN TYPE: TYPE: ACUTE PAIN

## 2025-01-21 NOTE — CARE PLAN
The patient is Stable - Low risk of patient condition declining or worsening    Shift Goals  Clinical Goals: Stress Test  Patient Goals: Comfort    Progress made toward(s) clinical / shift goals:    Problem: Pain - Standard  Goal: Alleviation of pain or a reduction in pain to the patient’s comfort goal  Outcome: Progressing     Problem: Knowledge Deficit - Standard  Goal: Patient and family/care givers will demonstrate understanding of plan of care, disease process/condition, diagnostic tests and medications  Outcome: Progressing     Problem: Skin Integrity  Goal: Skin integrity is maintained or improved  Outcome: Progressing       Patient is not progressing towards the following goals:

## 2025-01-21 NOTE — FACE TO FACE
Face to Face Supporting Documentation - Home Health    The encounter with this patient was in whole or in part the primary reason for home health admission.    Date of encounter:   Patient:                    MRN:                       YOB: 2025  Guerda Lunsford  [id number]  1950     Home health to see patient for:  Skilled Nursing care for assessment, interventions & education, Physical Therapy evaluation and treatment, and Occupational therapy evaluation and treatment    Skilled need for:  Surgical Aftercare ostomy    Skilled nursing interventions to include:  Wound Care    Homebound status evidenced by:  Needs the assistance of another person in order to leave the home. Leaving home requires a considerable and taxing effort. There is a normal inability to leave the home.    Community Physician to provide follow up care: Dannielle Lackey P.A.-C.     Optional Interventions? No      I certify the face to face encounter for this home health care referral meets the CMS requirements and the encounter/clinical assessment with the patient was, in whole, or in part, for the medical condition(s) listed above, which is the primary reason for home health care. Based on my clinical findings: the service(s) are medically necessary, support the need for home health care, and the homebound criteria are met.  I certify that this patient has had a face to face encounter by myself.  Stephen Escalante M.D. - NPI: [id number]

## 2025-01-21 NOTE — PROGRESS NOTES
Telemetry Shift Summary     Rhythm: SR  Rate: 70s-90s  Measurements: 0.20/0.08/0.40  Ectopy (reported by Monitor Tech): frequent PVC, rare Couplet     Normal Values  Rhythm: Sinus  HR:   Measurements: 0.12-0.20/0.06-0.10/0.30-0.52

## 2025-01-21 NOTE — WOUND TEAM
"  [facility]     Inpatient Services     Established Ostomy Management/ troubleshooting      Plan: Bedside RNs to assist pt with appliance changes. Ostomy RN to remain available PRN for any needs.    HPI: Reviewed  PMH: Reviewed   SH: Reviewed     Reason for Ostomy nurse consult:  Established colostomy    Ostomy History: Pt had colostomy revised 12/7/24. Has red mark nestor in place.HH sees her for ostomy care 21x/week    Colostomy 12/07/24 LUQ (Active)   Wound Image     Stomal Appliance Assessment Intact   Stoma Assessment Red;Nestor Present   Stoma Shape Oval   Peristomal Assessment Pink;Intact   Mucocutaneous Junction Intact   Treatment Cleansed with water/washcloth;Appliance Changed   Peristomal Protectant Paste Ring   Stomal Appliance 2 3/4\" (70mm) CTF;Paste Ring, 2\"   Output (mL) 50 mL   Output Color Brown   WOUND RN ONLY - Stomal Appliance  2 Piece;Soft Convex;2 3/4\" (70mm) CTF;Transparent Pouch Lock & Roll;Paste Ring, 2\";Belt, Large   Appliance (Pouch) # [id number], [address] barrier, [address]   Appliance Brand Sherice                 Interventions and Education (if needed): Removed appliance using a push pull method. Cleaned stoma and peristomal skin with moist warm washcloths. Made template and traced to barrier. Barrier cut, checked fit. Paste ring stretched to fit opening and then applied to back of barrier. Applied barrier to skin and adhered with friction. Attached pouch and closed end. Pt reapplied belt        Evaluation: colostomy with output. Pt is able to attach pouch, close end and reapply belt.       Anticipated discharge needs: continue HH  "

## 2025-01-21 NOTE — PROGRESS NOTES
Telemetry Shift Summary     Rhythm: SR  HR: 79-98  Ectopy: fPAC, rPVC, fBig  Measurements: 0.20/0.08/0.32       Normal Values  Rhythm: SR  HR:   Measurements: 0.12-0.20 / 0.04-0.10 / 0.30-0.52

## 2025-01-21 NOTE — PROGRESS NOTES
IV and tele box removed. Pt with copy of discharge paperwork. All belongings with pt. Pt left unit with transport staff via wheelchair.

## 2025-01-21 NOTE — CARE PLAN
The patient is Stable - Low risk of patient condition declining or worsening    Shift Goals  Clinical Goals: complete stress test  Patient Goals: eat    Progress made toward(s) clinical / shift goals:    Problem: Pain - Standard  Goal: Alleviation of pain or a reduction in pain to the patient’s comfort goal  Description: Target End Date:  Prior to discharge or change in level of care    Document on Vitals flowsheet    1.  Document pain using the appropriate pain scale per order or unit policy  2.  Educate and implement non-pharmacologic comfort measures (i.e. relaxation, distraction, massage, cold/heat therapy, etc.)  3.  Pain management medications as ordered  4.  Reassess pain after pain med administration per policy  5.  If opiods administered assess patient's response to pain medication is appropriate per POSS sedation scale  6.  Follow pain management plan developed in collaboration with patient and interdisciplinary team (including palliative care or pain specialists if applicable)  Outcome: Progressing  Note: Pt currently declines pain, aware to inform RN of breakthrough     Problem: Knowledge Deficit - Standard  Goal: Patient and family/care givers will demonstrate understanding of plan of care, disease process/condition, diagnostic tests and medications  Description: Target End Date:  1-3 days or as soon as patient condition allows    Document in Patient Education    1.  Patient and family/caregiver oriented to unit, equipment, visitation policy and means for communicating concern  2.  Complete/review Learning Assessment  3.  Assess knowledge level of disease process/condition, treatment plan, diagnostic tests and medications  4.  Explain disease process/condition, treatment plan, diagnostic tests and medications  Outcome: Progressing     Problem: Skin Integrity  Goal: Skin integrity is maintained or improved  Description: Target End Date:  Prior to discharge or change in level of care    Document  interventions on Skin Risk/Wolf flowsheet groups and corresponding LDA    1.  Assess and monitor skin integrity, appearance and/or temperature  2.  Assess risk factors for impaired skin integrity and/or pressures ulcers  3.  Implement precautions to protect skin integrity in collaboration with interdisciplinary team  4.  Implement pressure ulcer prevention protocol if at risk for skin breakdown  5.  Confirm wound care consult if at risk for skin breakdown  6.  Ensure patient use of pressure relieving devices  (Low air loss bed, waffle overlay, heel protectors, ROHO cushion, etc)  Outcome: Progressing       Patient is not progressing towards the following goals:

## 2025-01-21 NOTE — DISCHARGE SUMMARY
"Discharge Summary    CHIEF COMPLAINT ON ADMISSION  Chief Complaint   Patient presents with    Chest Pain     Central chest since this AM described as \"sharp pain\", worse when taking a deep breath in. States pain travels through into her back as well.        Reason for Admission  Chest Pains     Admission Date  1/19/2025    CODE STATUS  Full Code    HPI & HOSPITAL COURSE  This is a 74 y.o. female here with chest pain     Guerda Lunsford has past most of atrial fibrillation, diabetes, and hyperlipidemia.      Patient recently needed extensive surgical intervention for sigmoid diverticulitis.    The patient presented the emergency room on 1/19/2025 with chest pain.  CTA of the chest was negative for aneurysm or dissection.  The patient was hospitalized for a nuclear medicine stress test.    Patient's chest pain has resolved, troponin level remains flat.  Her echocardiogram does not show any acute abnormalities.  Stress test is negative for ischemia.    Patient can be discharged home today.  She will resume home health for ostomy care    Therefore, she is discharged in good and stable condition to home with organized home healthcare and close outpatient follow-up.    Discharge Date  1/21/2025    FOLLOW UP ITEMS POST DISCHARGE  Follow-up with primary care provider    DISCHARGE DIAGNOSES  Principal Problem (Resolved):    Chest pain (POA: Yes)  Active Problems:    Atrial fibrillation (HCC) (POA: Yes)    Anemia (POA: Yes)      Overview: Bleeding hemorrhoids    Abnormal EKG (POA: Yes)    Stage 3a chronic kidney disease (POA: Yes)    ACP (advance care planning) (POA: Yes)    Morbid obesity with BMI of 45.0-49.9, adult (HCC) (POA: Yes)    Type 2 diabetes mellitus without complication, without long-term current use of insulin (HCC) (POA: Yes)      FOLLOW UP  Future Appointments   Date Time Provider Department Center   1/23/2025 To Be Determined Radha Mott R.N. [facility] None   1/27/2025 To Be Determined [facility] TEAM [facility] " None   1/30/2025 To Be Determined [facility] TEAM [facility] None   3/25/2025  1:30 PM PI[facility] None   3/27/2025  1:40 PM Dannielle Lackey P.A.-C. [facility]     No follow-up provider specified.    MEDICATIONS ON DISCHARGE     Medication List        CONTINUE taking these medications        Instructions   acetaminophen 500 MG Tabs  Commonly known as: Tylenol   Take 1,000 mg by mouth every 6 hours as needed for Mild Pain. Indications: Pain  Dose: 1,000 mg     atorvastatin 20 MG Tabs  Commonly known as: Lipitor   TAKE 1 TABLET BY MOUTH DAILY  Dose: 20 mg     buPROPion 300 MG XL tablet  Commonly known as: Wellbutrin XL   Take 300 mg by mouth every morning. Indications: Depression  Dose: 300 mg     Eliquis 5 MG Tabs  Generic drug: apixaban   Take 1 Tablet by mouth 2 times a day for 90 days. Indications: Thromboembolism secondary to Atrial Fibrillation  Dose: 5 mg     furosemide 40 MG Tabs  Commonly known as: Lasix   Take 1 Tablet by mouth 2 times a day. Indications: Edema  Dose: 40 mg     glipiZIDE 10 MG Tabs  Commonly known as: Glucotrol   Take 1 Tablet by mouth every day. Pt reports that she takes this medication QDAY, not BID  Indications: Type 2 Diabetes  Dose: 10 mg     metoprolol SR 50 MG Tb24  Commonly known as: Toprol XL   Take 1 Tablet by mouth every evening. Indications: High Blood Pressure  Dose: 50 mg     pantoprazole 40 MG Tbec  Commonly known as: Protonix   Take 40 mg by mouth every evening. Indications: Heartburn  Dose: 40 mg     potassium Chloride ER 20 MEQ Tbcr tablet  Commonly known as: K-Tab   Take 1 Tablet by mouth 2 times a day. Indications: Low Amount of Potassium in the Blood  Dose: 20 mEq     sertraline 50 MG Tabs  Commonly known as: Zoloft   Take 50 mg by mouth every day. Indications: Major Depressive Disorder  Dose: 50 mg            ASK your doctor about these medications        Instructions   albuterol 108 (90 Base) MCG/ACT Aers inhalation aerosol   Inhale 2 Puffs every 6 hours  "as needed for Shortness of Breath.  Dose: 2 Puff              Allergies  Allergies   Allergen Reactions    Azithromycin Unspecified     Pt states she feels a burning and hot sensation \"I can feel it in my veins, all the way through my body down to the bottom of my feet.\"    Cymbalta [Duloxetine Hcl] Unspecified     Make blood pressure go up    Lisinopril Cough     Cough      Demerol Rash     Rash at injection site, N/V.    Ertugliflozin-Metformin Hcl Diarrhea          Montelukast Anxiety     Gets nightmares - will never take it again       DIET  Orders Placed This Encounter   Procedures    Diet Order Diet: Consistent CHO (Diabetic)     Standing Status:   Standing     Number of Occurrences:   1     Order Specific Question:   Diet:     Answer:   Consistent CHO (Diabetic) [4]       ACTIVITY  As tolerated.  Weight bearing as tolerated    CONSULTATIONS  None    PROCEDURES  Echocardiogram 1/19/25:  CONCLUSIONS  Compared to the images of the prior study on 11/20/2024 - there is no   significant change.   Technically difficult study.  Normal left ventricular systolic function.  The left ventricular ejection fraction is visually estimated to be 65%.  Normal right ventricular systolic function.  No evidence of valvular abnormality based on Doppler evaluation.        LABORATORY  Lab Results   Component Value Date    SODIUM 138 01/20/2025    POTASSIUM 3.9 01/20/2025    CHLORIDE 96 01/20/2025    CO2 28 01/20/2025    GLUCOSE 126 (H) 01/20/2025    BUN 26 (H) 01/20/2025    CREATININE 1.38 01/20/2025    CREATININE 0.86 07/19/2011        Lab Results   Component Value Date    WBC 13.0 (H) 01/20/2025    WBC 6.8 07/19/2011    HEMOGLOBIN 10.5 (L) 01/20/2025    HEMATOCRIT 31.8 (L) 01/20/2025    PLATELETCT 280 01/20/2025        Total time of the discharge process exceeds 34 minutes.  "

## 2025-01-22 ENCOUNTER — PATIENT OUTREACH (OUTPATIENT)
Dept: MEDICAL GROUP | Age: 75
End: 2025-01-22
Payer: MEDICARE

## 2025-01-22 NOTE — PROGRESS NOTES
Transitional Care Management  TCM Outreach Date and Time: Filed (1/22/2025  9:21 AM)    Discharge Questions  Actual Discharge Date: 01/21/25  Now that you are home, how are you feeling?: Good  Did you receive any new prescriptions?: No  Do you have any questions about your current medications or new medications (Review Med Rec)?: No  Did you have any durable medical equipment ordered?: No  Do you have a follow up appointment scheduled with your PCP?: No  Was an appointment scheduled for the patient?: Yes  Appointment Date: 01/31/25  Appointment Time: 1100  Any issues or paperwork you wish to discuss with your PCP?: No  Are you (patient) able to get to the appointment?: Yes  If Home Health was ordered, have they contacted you (Patient): Yes ([facility] for colostomy care- appts scheduled)  Does this patient qualify for the CCM program?: No (does not have SCP)    Transitional Care  Number of attempts made to contact patient: 1  Current or previous attempts completed within two business days of discharge? : Yes  Provided education regarding treatment plan, medications, self-management, ADLs?: Yes  Has patient completed an Advanced Directive?: No (has POLST on file)  Has the Care Manager's phone number provided?: No  Is there anything else I can help you with?: No    Discharge Summary  Chief Complaint: Chest pain  Admitting Diagnosis: Chest pain (R07.9)  Discharge Diagnosis: Chest pain

## 2025-01-23 ENCOUNTER — HOME CARE VISIT (OUTPATIENT)
Dept: HOME HEALTH SERVICES | Facility: HOME HEALTHCARE | Age: 75
End: 2025-01-23
Payer: MEDICARE

## 2025-01-23 VITALS
RESPIRATION RATE: 18 BRPM | OXYGEN SATURATION: 96 % | DIASTOLIC BLOOD PRESSURE: 54 MMHG | HEART RATE: 68 BPM | TEMPERATURE: 99.5 F | SYSTOLIC BLOOD PRESSURE: 114 MMHG

## 2025-01-23 DIAGNOSIS — Z93.3 COLOSTOMY IN PLACE (HCC): ICD-10-CM

## 2025-01-23 PROCEDURE — A4385 OST SKN BARRIER SLD EXT WEAR: HCPCS

## 2025-01-23 PROCEDURE — A4413 2 PC DRAINABLE OST POUCH: HCPCS

## 2025-01-23 PROCEDURE — G0299 HHS/HOSPICE OF RN EA 15 MIN: HCPCS

## 2025-01-23 ASSESSMENT — ENCOUNTER SYMPTOMS
STOOL FREQUENCY: MORE THAN TWICE DAILY
LOWER EXTREMITY EDEMA: 1
LAST BOWEL MOVEMENT: 67228
VOMITING: NO
MUSCLE WEAKNESS: 1
DESCRIPTION OF MEMORY LOSS: SHORT TERM
DIFFICULTY THINKING: 1
DENIES PAIN: 1
NAUSEA: NO

## 2025-01-27 ENCOUNTER — HOME CARE VISIT (OUTPATIENT)
Dept: HOME HEALTH SERVICES | Facility: HOME HEALTHCARE | Age: 75
End: 2025-01-27
Payer: MEDICARE

## 2025-01-27 VITALS
SYSTOLIC BLOOD PRESSURE: 106 MMHG | DIASTOLIC BLOOD PRESSURE: 56 MMHG | HEART RATE: 68 BPM | RESPIRATION RATE: 18 BRPM | OXYGEN SATURATION: 97 % | TEMPERATURE: 98.5 F

## 2025-01-27 PROCEDURE — G0299 HHS/HOSPICE OF RN EA 15 MIN: HCPCS

## 2025-01-27 ASSESSMENT — ENCOUNTER SYMPTOMS
HYPERTENSION: 1
DENIES PAIN: 1
LOWER EXTREMITY EDEMA: 1
MUSCLE WEAKNESS: 1
DRY SKIN: 1
VOMITING: DENIES
NAUSEA: DENIES

## 2025-01-27 ASSESSMENT — ACTIVITIES OF DAILY LIVING (ADL)
AMBULATION ASSISTANCE: STAND BY ASSIST
CURRENT_FUNCTION: STAND BY ASSIST

## 2025-01-30 ENCOUNTER — HOME CARE VISIT (OUTPATIENT)
Dept: HOME HEALTH SERVICES | Facility: HOME HEALTHCARE | Age: 75
End: 2025-01-30
Payer: MEDICARE

## 2025-01-30 VITALS — OXYGEN SATURATION: 96 % | RESPIRATION RATE: 18 BRPM | TEMPERATURE: 98.4 F | HEART RATE: 68 BPM

## 2025-01-30 PROCEDURE — G0493 RN CARE EA 15 MIN HH/HOSPICE: HCPCS

## 2025-01-30 SDOH — ECONOMIC STABILITY: HOUSING INSECURITY: HOME SAFETY: LIVES WITH HUSBAND IN ONE-STORY CLUTTERED HOME.

## 2025-01-30 ASSESSMENT — ENCOUNTER SYMPTOMS
VOMITING: NO
DENIES PAIN: 1
LOWER EXTREMITY EDEMA: 1
NAUSEA: NO
HYPERTENSION: 1

## 2025-01-30 ASSESSMENT — ACTIVITIES OF DAILY LIVING (ADL)
CURRENT_FUNCTION: INDEPENDENT
AMBULATION ASSISTANCE: INDEPENDENT
PHYSICAL TRANSFERS ASSESSED: 1
AMBULATION ASSISTANCE: 1
TELEPHONE USE ASSESSED: 1
USING THE TELPHONE: INDEPENDENT
OASIS_M1830: 01
PHYSICAL_TRANSFERS_DEVICES: CANE, WALKER

## 2025-01-30 ASSESSMENT — PATIENT HEALTH QUESTIONNAIRE - PHQ9: CLINICAL INTERPRETATION OF PHQ2 SCORE: 0

## 2025-01-31 ENCOUNTER — HOSPITAL ENCOUNTER (OUTPATIENT)
Dept: LAB | Facility: MEDICAL CENTER | Age: 75
End: 2025-01-31
Attending: PHYSICIAN ASSISTANT
Payer: MEDICARE

## 2025-01-31 ENCOUNTER — DOCUMENTATION (OUTPATIENT)
Dept: MEDICAL GROUP | Facility: PHYSICIAN GROUP | Age: 75
End: 2025-01-31

## 2025-01-31 ENCOUNTER — OFFICE VISIT (OUTPATIENT)
Dept: MEDICAL GROUP | Age: 75
End: 2025-01-31
Payer: MEDICARE

## 2025-01-31 VITALS
SYSTOLIC BLOOD PRESSURE: 110 MMHG | HEART RATE: 74 BPM | HEIGHT: 65 IN | DIASTOLIC BLOOD PRESSURE: 62 MMHG | WEIGHT: 286 LBS | BODY MASS INDEX: 47.65 KG/M2 | TEMPERATURE: 97 F | OXYGEN SATURATION: 93 %

## 2025-01-31 DIAGNOSIS — R60.0 LOWER EXTREMITY EDEMA: ICD-10-CM

## 2025-01-31 DIAGNOSIS — D64.9 ANEMIA, UNSPECIFIED TYPE: ICD-10-CM

## 2025-01-31 DIAGNOSIS — R60.0 BILATERAL LOWER EXTREMITY EDEMA: ICD-10-CM

## 2025-01-31 DIAGNOSIS — R07.9 CHEST PAIN, UNSPECIFIED TYPE: ICD-10-CM

## 2025-01-31 DIAGNOSIS — Z09 HOSPITAL DISCHARGE FOLLOW-UP: Primary | ICD-10-CM

## 2025-01-31 DIAGNOSIS — Z12.31 ENCOUNTER FOR SCREENING MAMMOGRAM FOR BREAST CANCER: ICD-10-CM

## 2025-01-31 DIAGNOSIS — Z93.3 COLOSTOMY IN PLACE (HCC): ICD-10-CM

## 2025-01-31 LAB
ANION GAP SERPL CALC-SCNC: 11 MMOL/L (ref 7–16)
BASOPHILS # BLD AUTO: 0.8 % (ref 0–1.8)
BASOPHILS # BLD: 0.06 K/UL (ref 0–0.12)
BUN SERPL-MCNC: 26 MG/DL (ref 8–22)
CALCIUM SERPL-MCNC: 9.5 MG/DL (ref 8.5–10.5)
CHLORIDE SERPL-SCNC: 99 MMOL/L (ref 96–112)
CO2 SERPL-SCNC: 30 MMOL/L (ref 20–33)
CREAT SERPL-MCNC: 1.45 MG/DL (ref 0.5–1.4)
EOSINOPHIL # BLD AUTO: 0.44 K/UL (ref 0–0.51)
EOSINOPHIL NFR BLD: 5.6 % (ref 0–6.9)
ERYTHROCYTE [DISTWIDTH] IN BLOOD BY AUTOMATED COUNT: 43.4 FL (ref 35.9–50)
FASTING STATUS PATIENT QL REPORTED: NORMAL
FERRITIN SERPL-MCNC: 58.9 NG/ML (ref 10–291)
GFR SERPLBLD CREATININE-BSD FMLA CKD-EPI: 38 ML/MIN/1.73 M 2
GLUCOSE SERPL-MCNC: 59 MG/DL (ref 65–99)
HCT VFR BLD AUTO: 34.7 % (ref 37–47)
HGB BLD-MCNC: 11.2 G/DL (ref 12–16)
IMM GRANULOCYTES # BLD AUTO: 0.1 K/UL (ref 0–0.11)
IMM GRANULOCYTES NFR BLD AUTO: 1.3 % (ref 0–0.9)
IRON SATN MFR SERPL: 12 % (ref 15–55)
IRON SERPL-MCNC: 44 UG/DL (ref 40–170)
LYMPHOCYTES # BLD AUTO: 1.25 K/UL (ref 1–4.8)
LYMPHOCYTES NFR BLD: 16 % (ref 22–41)
MCH RBC QN AUTO: 28.3 PG (ref 27–33)
MCHC RBC AUTO-ENTMCNC: 32.3 G/DL (ref 32.2–35.5)
MCV RBC AUTO: 87.6 FL (ref 81.4–97.8)
MONOCYTES # BLD AUTO: 0.82 K/UL (ref 0–0.85)
MONOCYTES NFR BLD AUTO: 10.5 % (ref 0–13.4)
NEUTROPHILS # BLD AUTO: 5.14 K/UL (ref 1.82–7.42)
NEUTROPHILS NFR BLD: 65.8 % (ref 44–72)
NRBC # BLD AUTO: 0 K/UL
NRBC BLD-RTO: 0 /100 WBC (ref 0–0.2)
PLATELET # BLD AUTO: 391 K/UL (ref 164–446)
PMV BLD AUTO: 9 FL (ref 9–12.9)
POTASSIUM SERPL-SCNC: 4.3 MMOL/L (ref 3.6–5.5)
RBC # BLD AUTO: 3.96 M/UL (ref 4.2–5.4)
SODIUM SERPL-SCNC: 140 MMOL/L (ref 135–145)
TIBC SERPL-MCNC: 362 UG/DL (ref 250–450)
UIBC SERPL-MCNC: 318 UG/DL (ref 110–370)
WBC # BLD AUTO: 7.8 K/UL (ref 4.8–10.8)

## 2025-01-31 PROCEDURE — 83540 ASSAY OF IRON: CPT

## 2025-01-31 PROCEDURE — 85025 COMPLETE CBC W/AUTO DIFF WBC: CPT

## 2025-01-31 PROCEDURE — 36415 COLL VENOUS BLD VENIPUNCTURE: CPT

## 2025-01-31 PROCEDURE — 80048 BASIC METABOLIC PNL TOTAL CA: CPT

## 2025-01-31 PROCEDURE — 83550 IRON BINDING TEST: CPT

## 2025-01-31 PROCEDURE — 82728 ASSAY OF FERRITIN: CPT

## 2025-01-31 RX ORDER — HYDROCHLOROTHIAZIDE 25 MG/1
25 TABLET ORAL EVERY MORNING
COMMUNITY
Start: 2025-01-19

## 2025-01-31 ASSESSMENT — FIBROSIS 4 INDEX: FIB4 SCORE: 0.9

## 2025-01-31 NOTE — PROGRESS NOTES
Subjective:     Guerda Lunsford is a 74 y.o. female who presents for Hospital Follow-up.    Transitional Care Management  TCM Outreach Date and Time: Filed (1/22/2025  9:21 AM)    Discharge Questions  Actual Discharge Date: 01/21/25  Now that you are home, how are you feeling?: Good  Did you receive any new prescriptions?: No  Do you have any questions about your current medications or new medications (Review Med Rec)?: No  Did you have any durable medical equipment ordered?: No  Do you have a follow up appointment scheduled with your PCP?: No  Was an appointment scheduled for the patient?: Yes  Appointment Date: 01/31/25  Appointment Time: 1100  Any issues or paperwork you wish to discuss with your PCP?: No  Are you (patient) able to get to the appointment?: Yes  If Home Health was ordered, have they contacted you (Patient): Yes ([facility] for colostomy care- appts scheduled)  Does this patient qualify for the CCM program?: No (does not have SCP)    Transitional Care  Number of attempts made to contact patient: 1  Current or previous attempts completed within two business days of discharge? : Yes  Provided education regarding treatment plan, medications, self-management, ADLs?: Yes  Has patient completed an Advanced Directive?: No (has POLST on file)  Has the Care Manager's phone number provided?: No  Is there anything else I can help you with?: No    Discharge Summary  Chief Complaint: Chest pain  Admitting Diagnosis: Chest pain (R07.9)  Discharge Diagnosis: Chest pain        HPI:     History of Present Illness  The patient is a 74-year-old female presenting for a hospital discharge follow-up.    She initially presented to the ER on 01/19/2025 with concerns of chest pain.  D-dimer was slightly bumped as well as troponin at 20.  A CTA of the chest was negative for aneurysm or dissection or PE. She was hospitalized for a nuclear medicine stress test, which was negative for ischemia. An echocardiogram was  unremarkable. Her chest pain resolved while in the hospital. She reports that her chest pain has not recurred since her discharge from the hospital.  She is followed by cardiology and has an appointment coming up in 2 months.      She is currently on a regimen of Lasix 40 mg twice daily, which has resulted in some improvement in her edema, although she still experiences puffiness.    She has not undergone a mammogram in several years she is amicable to mammogram.    She reports that her ostomy is functioning well and is under the care of a wound center. She receives biweekly visits from a home health nurse and has an appointment scheduled for the upcoming Thursday for debridement of small areas around the outer edge of the ostomy.  She has resumed a regular diet.  She is noted to be anemic on recent lab work done in the hospital no micro or macrocytosis           Latest Reference Range & Units 01/20/25 00:20   WBC 4.8 - 10.8 K/uL 13.0 (H)   RBC 4.20 - 5.40 M/uL 3.66 (L)   Hemoglobin 12.0 - 16.0 g/dL 10.5 (L)   Hematocrit 37.0 - 47.0 % 31.8 (L)   MCV 81.4 - 97.8 fL 86.9   MCH 27.0 - 33.0 pg 28.7   MCHC 32.2 - 35.5 g/dL 33.0   RDW 35.9 - 50.0 fL 41.7   Platelet Count 164 - 446 K/uL 280   MPV 9.0 - 12.9 fL 9.5   Sodium 135 - 145 mmol/L 138   Potassium 3.6 - 5.5 mmol/L 3.9   Chloride 96 - 112 mmol/L 96   Co2 20 - 33 mmol/L 28   Anion Gap 7.0 - 16.0  14.0   Glucose 65 - 99 mg/dL 126 (H)   Bun 8 - 22 mg/dL 26 (H)   Creatinine 0.50 - 1.40 mg/dL 1.38   GFR (CKD-EPI) >60 mL/min/1.73 m 2 40 !   Calcium 8.4 - 10.2 mg/dL 8.7   Correct Calcium 8.5 - 10.5 mg/dL 9.0   AST(SGOT) 12 - 45 U/L 17   ALT(SGPT) 2 - 50 U/L 25   Alkaline Phosphatase 30 - 99 U/L 128 (H)   Total Bilirubin 0.1 - 1.5 mg/dL 0.7   Albumin 3.2 - 4.9 g/dL 3.6   Total Protein 6.0 - 8.2 g/dL 6.3   Globulin 1.9 - 3.5 g/dL 2.7   A-G Ratio g/dL 1.3   Magnesium 1.5 - 2.5 mg/dL 2.0   Cholesterol,Tot 100 - 199 mg/dL 111   Triglycerides 0 - 149 mg/dL 120   HDL >=40 mg/dL  41   LDL <100 mg/dL 46   Troponin T 6 - 19 ng/L 18   (H): Data is abnormally high  (L): Data is abnormally low  !: Data is abnormal    Current medicines (including reconciliation performed today)  Current Outpatient Medications   Medication Sig Dispense Refill    hydroCHLOROthiazide 25 MG Tab Take 25 mg by mouth every morning.      apixaban (ELIQUIS) 5mg Tab Take 1 Tablet by mouth 2 times a day for 90 days. Indications: Thromboembolism secondary to Atrial Fibrillation 180 Tablet 0    furosemide (LASIX) 40 MG Tab Take 1 Tablet by mouth 2 times a day. Indications: Edema 180 Tablet 1    potassium Chloride ER (K-TAB) 20 MEQ Tab CR tablet Take 1 Tablet by mouth 2 times a day. Indications: Low Amount of Potassium in the Blood (Patient taking differently: Take 20 mEq by mouth every day. Indications: Low Amount of Potassium in the Blood) 60 Tablet 1    glipiZIDE (GLUCOTROL) 10 MG Tab Take 1 Tablet by mouth every day. Pt reports that she takes this medication QDAY, not BID  Indications: Type 2 Diabetes 100 Tablet 3    atorvastatin (LIPITOR) 20 MG Tab TAKE 1 TABLET BY MOUTH DAILY 90 Tablet 0    metoprolol SR (TOPROL XL) 50 MG TABLET SR 24 HR Take 1 Tablet by mouth every evening. Indications: High Blood Pressure 90 Tablet 3    acetaminophen (TYLENOL) 500 MG Tab Take 1,000 mg by mouth every 6 hours as needed for Mild Pain. Indications: Pain      pantoprazole (PROTONIX) 40 MG Tablet Delayed Response Take 40 mg by mouth every evening. Indications: Heartburn      sertraline (ZOLOFT) 50 MG Tab Take 50 mg by mouth every day. Indications: Major Depressive Disorder      buPROPion (WELLBUTRIN XL) 300 MG XL tablet Take 300 mg by mouth every morning. Indications: Depression      albuterol 108 (90 Base) MCG/ACT Aero Soln inhalation aerosol Inhale 2 Puffs every 6 hours as needed for Shortness of Breath. (Patient not taking: Reported on 1/19/2025) 8.5 g 0     No current facility-administered medications for this visit.       Allergies:  "  Azithromycin, Cymbalta [duloxetine hcl], Lisinopril, Demerol, Ertugliflozin-metformin hcl, and Montelukast    Social History     Tobacco Use    Smoking status: Never    Smokeless tobacco: Never   Vaping Use    Vaping status: Never Used   Substance Use Topics    Alcohol use: Never    Drug use: Never       ROS:  See above    Objective:     Vitals:    01/31/25 1035   BP: 110/62   BP Location: Left arm   Patient Position: Sitting   BP Cuff Size: Large adult   Pulse: 74   Temp: 36.1 °C (97 °F)   TempSrc: Temporal   SpO2: 93%   Weight: (!) 130 kg (286 lb)   Height: 1.651 m (5' 5\")     Body mass index is 47.59 kg/m².    Physical Exam:  General: Alert, pleasant, NAD  HEENT: Normocephalic. Neck supple.  No thyromegaly or masses palpated. No cervical or supraclavicular lymphadenopathy. No carotid bruits   Heart: Regular rate and rhythm.  S1 and S2 normal.  No murmurs appreciated.  Respiratory: Normal respiratory effort.  Clear to auscultation bilaterally.  Skin: Warm, dry, no rashes.  Extremities: 1+ pitting edema to the right shin, 1+ pitting edema to the left shin.  Psych:  Affect/mood is normal, judgement is good, memory is intact, grooming is appropriate.    Assessment and Plan:     1. Hospital discharge follow-up  Discharge medications reviewed and reconciled.  Hospital note reviewed    2. Chest pain, unspecified type  Had extensive workup in the hospital.  Stress test negative for ischemia.  Echo unremarkable.  CTA of the chest negative.  Has not had recurrence of chest pain.  However if any point chest pain represents follow-up in the ER    3. Anemia, unspecified type  -normocytic anemia.  Most likely attributed to recent ostomy placement.  She is back to normal diet.  Will check iron levels.  Iron slightly low we will plan on starting oral iron once daily  - IRON/TOTAL IRON BIND; Future  - FERRITIN; Future    4. Colostomy in place (HCC)  Has home health coming twice weekly.  Will be following up with wound care, " considering debridement    5. Lower extremity edema  Improving.  For now continue 40 mg of Lasix twice daily in addition to potassium    6. Encounter for screening mammogram for breast cancer  - MA-SCREENING MAMMO BILAT W/TOMOSYNTHESIS W/CAD; Future        - Chart and discharge summary were reviewed.   - Hospitalization and results reviewed with patient.   - Medications reviewed including instructions regarding high risk medications, dosing and side effects.  - Recommended Services: No services needed at this time  - Advance directive/POLST on file?  No     Follow-up:Return in about 2 months (around 3/31/2025) for Diabetes, Lab Review.    Face-to-face transitional care management services with [facility]ERATE (today's visit is within 14 days post discharge & LACE+ score of 28-58) medical decision complexity were provided.

## 2025-01-31 NOTE — PROGRESS NOTES
Medication chart review for [facility] services    Received referral from [facility].   Medications reviewed  compared with discharge summary if available.  Discharge summary date, if applicable:   1/21    Current medication list per [facility]     Medication list one, patient is currently taking    Current Outpatient Medications:     apixaban, 5 mg, Oral, BID    furosemide, 40 mg, Oral, BID    potassium Chloride ER, 20 mEq, Oral, BID (Patient taking differently: 20 mEq, Oral, DAILY, Indications: Hypokalemia)    glipiZIDE, 10 mg, Oral, DAILY    atorvastatin, 20 mg, Oral, DAILY    metoprolol SR, 50 mg, Oral, Q EVENING    acetaminophen, 1,000 mg, Oral, Q6HRS PRN    pantoprazole, 40 mg, Oral, Q EVENING    sertraline, 50 mg, Oral, DAILY    buPROPion, 300 mg, Oral, QAM    albuterol, 2 Puff, Inhalation, Q6HRS PRN (Patient not taking: Reported on 1/19/2025)      Medication list two, drugs that the patient has been prescribed or recommended to take by their healthcare provider on discharge summary    MEDICATIONS ON DISCHARGE      Medication List          CONTINUE taking these medications         Instructions   acetaminophen 500 MG Tabs  Commonly known as: Tylenol    Take 1,000 mg by mouth every 6 hours as needed for Mild Pain. Indications: Pain  Dose: 1,000 mg      atorvastatin 20 MG Tabs  Commonly known as: Lipitor    TAKE 1 TABLET BY MOUTH DAILY  Dose: 20 mg      buPROPion 300 MG XL tablet  Commonly known as: Wellbutrin XL    Take 300 mg by mouth every morning. Indications: Depression  Dose: 300 mg      Eliquis 5 MG Tabs  Generic drug: apixaban    Take 1 Tablet by mouth 2 times a day for 90 days. Indications: Thromboembolism secondary to Atrial Fibrillation  Dose: 5 mg      furosemide 40 MG Tabs  Commonly known as: Lasix    Take 1 Tablet by mouth 2 times a day. Indications: Edema  Dose: 40 mg      glipiZIDE 10 MG Tabs  Commonly known as: Glucotrol    Take 1 Tablet by mouth every day. Pt reports that she takes  "this medication QDAY, not BID  Indications: Type 2 Diabetes  Dose: 10 mg      metoprolol SR 50 MG Tb24  Commonly known as: Toprol XL    Take 1 Tablet by mouth every evening. Indications: High Blood Pressure  Dose: 50 mg      pantoprazole 40 MG Tbec  Commonly known as: Protonix    Take 40 mg by mouth every evening. Indications: Heartburn  Dose: 40 mg      potassium Chloride ER 20 MEQ Tbcr tablet  Commonly known as: K-Tab    Take 1 Tablet by mouth 2 times a day. Indications: Low Amount of Potassium in the Blood  Dose: 20 mEq      sertraline 50 MG Tabs  Commonly known as: Zoloft    Take 50 mg by mouth every day. Indications: Major Depressive Disorder  Dose: 50 mg                ASK your doctor about these medications         Instructions   albuterol 108 (90 Base) MCG/ACT Aers inhalation aerosol    Inhale 2 Puffs every 6 hours as needed for Shortness of Breath.  Dose: 2 Puff          Allergies   Allergen Reactions    Azithromycin Unspecified     Pt states she feels a burning and hot sensation \"I can feel it in my veins, all the way through my body down to the bottom of my feet.\"    Cymbalta [Duloxetine Hcl] Unspecified     Make blood pressure go up    Lisinopril Cough     Cough      Demerol Rash     Rash at injection site, N/V.    Ertugliflozin-Metformin Hcl Diarrhea          Montelukast Anxiety     Gets nightmares - will never take it again       Labs     Lab Results   Component Value Date/Time    SODIUM 138 01/20/2025 12:20 AM    POTASSIUM 3.9 01/20/2025 12:20 AM    CHLORIDE 96 01/20/2025 12:20 AM    CO2 28 01/20/2025 12:20 AM    GLUCOSE 126 (H) 01/20/2025 12:20 AM    BUN 26 (H) 01/20/2025 12:20 AM    CREATININE 1.38 01/20/2025 12:20 AM    CREATININE 0.86 07/19/2011 07:42 AM    BUNCREATRAT 19 07/19/2011 07:42 AM     Lab Results   Component Value Date/Time    ALKPHOSPHAT 128 (H) 01/20/2025 12:20 AM    ASTSGOT 17 01/20/2025 12:20 AM    ALTSGPT 25 01/20/2025 12:20 AM    TBILIRUBIN 0.7 01/20/2025 12:20 AM    INR 1.16 (H) " 11/09/2021 02:27 PM    ALBUMIN 3.6 01/20/2025 12:20 AM        Assessment for clinically significant drug interactions, drug omissions/additions, duplicative therapies.            CC   Dannielle Lackey P.A.-C.  [address] [place] [address] [address] [place] [address]  Fax: [phone]    [facility]  Phone [phone] fax [phone]    This note was created using voice recognition software (Dragon). The accuracy of the dictation is limited by the abilities of the software. I have reviewed the note prior to signing, however some errors in grammar and context are still possible. If you have any questions related to this note please do not hesitate to contact our office.

## 2025-02-03 ENCOUNTER — HOME CARE VISIT (OUTPATIENT)
Dept: HOME HEALTH SERVICES | Facility: HOME HEALTHCARE | Age: 75
End: 2025-02-03
Payer: MEDICARE

## 2025-02-03 VITALS
TEMPERATURE: 98.1 F | HEART RATE: 65 BPM | RESPIRATION RATE: 18 BRPM | SYSTOLIC BLOOD PRESSURE: 98 MMHG | OXYGEN SATURATION: 95 % | DIASTOLIC BLOOD PRESSURE: 58 MMHG

## 2025-02-03 DIAGNOSIS — D50.0 IRON DEFICIENCY ANEMIA DUE TO CHRONIC BLOOD LOSS: ICD-10-CM

## 2025-02-03 DIAGNOSIS — N18.31 STAGE 3A CHRONIC KIDNEY DISEASE: ICD-10-CM

## 2025-02-03 PROCEDURE — G0299 HHS/HOSPICE OF RN EA 15 MIN: HCPCS

## 2025-02-03 RX ORDER — FERROUS SULFATE 325(65) MG
325 TABLET ORAL DAILY
Qty: 30 TABLET | Refills: 1 | Status: SHIPPED | OUTPATIENT
Start: 2025-02-03

## 2025-02-03 ASSESSMENT — ENCOUNTER SYMPTOMS
STOOL FREQUENCY: MORE THAN TWICE DAILY
MUSCLE WEAKNESS: 1
NAUSEA: DENIES
SHORTNESS OF BREATH: 1
DYSPNEA ACTIVITY LEVEL: AFTER AMBULATING MORE THAN 20 FT
DYSPNEA ON EXERTION: 1
HYPERTENSION: 1
DRY SKIN: 1
VOMITING: DENIES
DENIES PAIN: 1
FATIGUES EASILY: 1

## 2025-02-03 ASSESSMENT — ACTIVITIES OF DAILY LIVING (ADL)
CURRENT_FUNCTION: STAND BY ASSIST
AMBULATION ASSISTANCE: STAND BY ASSIST

## 2025-02-03 NOTE — Clinical Note
VS WNL. Denies pain, falls or medication changes. Verbalized that PCP would like her to start an iron supplement and that she will be obtaining it this. Patient struggles with doing the appliance changes independently. Educated patient on sitting infront of her mirror and practicing appliance changes without actually changing the appliance. Patient verbalized she understands and will try. Photo of ostomy take and submitted. Patient will not need a home visit on Thrusday as she will be at the wound clinic. No concerns at this time.

## 2025-02-04 ENCOUNTER — HOME CARE VISIT (OUTPATIENT)
Dept: HOME HEALTH SERVICES | Facility: HOME HEALTHCARE | Age: 75
End: 2025-02-04
Payer: MEDICARE

## 2025-02-04 NOTE — CASE COMMUNICATION
"Quality Review for Recertification OASIS by LA NENA Zhang RN on  February 4, 2025    Edits completed by LA NENA Zhang RN:  1.  and  diagnosis coding updated per chart review.  2. Resolved all \"met\" goals and Replaced Patient-Specific Safety Precaution goals with same goal, but with bleeding and standard precautions added  "

## 2025-02-04 NOTE — CASE COMMUNICATION
noted  ----- Message -----  From: Hortencia Redmond R.N.  Sent: 2/3/2025   3:25 PM PST  To: Asuncion Lynn R.N.      VS WNL. Denies pain, falls or medication changes. Verbalized that PCP would like her to start an iron supplement and that she will be obtaining it this. Patient struggles with doing the appliance changes independently. Educated patient on sitting infront of her mirror and practicing appliance changes without actually changin g the appliance. Patient verbalized she understands and will try. Photo of ostomy take and submitted. Patient will not need a home visit on Thrusday as she will be at the wound clinic. No concerns at this time.

## 2025-02-04 NOTE — CASE COMMUNICATION
"I agree with documentation changes as made.  ----- Message -----  From: Mag Zhang R.N.  Sent: 2/4/2025   1:14 PM PST  To: Radha Mott R.N.      Quality Review for Recertification OASIS by LA NENA Zhang RN on  February 4, 2025    Edits completed by LA NENA Zhang RN:  1.  and  diagnosis coding updated per chart review.  2. Resolved all \"met\" goals and Replaced Patient-Specific Safety Precaution goals with same go al, but with bleeding and standard precautions added  "

## 2025-02-06 ENCOUNTER — OFFICE VISIT (OUTPATIENT)
Dept: WOUND CARE | Facility: MEDICAL CENTER | Age: 75
End: 2025-02-06
Attending: STUDENT IN AN ORGANIZED HEALTH CARE EDUCATION/TRAINING PROGRAM
Payer: MEDICARE

## 2025-02-06 ENCOUNTER — HOME CARE VISIT (OUTPATIENT)
Dept: HOME HEALTH SERVICES | Facility: HOME HEALTHCARE | Age: 75
End: 2025-02-06
Payer: MEDICARE

## 2025-02-06 VITALS
SYSTOLIC BLOOD PRESSURE: 122 MMHG | OXYGEN SATURATION: 92 % | DIASTOLIC BLOOD PRESSURE: 70 MMHG | HEART RATE: 75 BPM | TEMPERATURE: 98 F | RESPIRATION RATE: 18 BRPM

## 2025-02-06 DIAGNOSIS — Z71.89 OSTOMY NURSE CONSULTATION: ICD-10-CM

## 2025-02-06 DIAGNOSIS — Z93.3 COLOSTOMY IN PLACE (HCC): ICD-10-CM

## 2025-02-06 DIAGNOSIS — Z43.3 COLOSTOMY CARE (HCC): ICD-10-CM

## 2025-02-06 PROCEDURE — 99214 OFFICE O/P EST MOD 30 MIN: CPT

## 2025-02-06 PROCEDURE — 17250 CHEM CAUT OF GRANLTJ TISSUE: CPT

## 2025-02-06 PROCEDURE — 3074F SYST BP LT 130 MM HG: CPT | Performed by: STUDENT IN AN ORGANIZED HEALTH CARE EDUCATION/TRAINING PROGRAM

## 2025-02-06 PROCEDURE — 3078F DIAST BP <80 MM HG: CPT | Performed by: STUDENT IN AN ORGANIZED HEALTH CARE EDUCATION/TRAINING PROGRAM

## 2025-02-06 PROCEDURE — 99214 OFFICE O/P EST MOD 30 MIN: CPT | Performed by: STUDENT IN AN ORGANIZED HEALTH CARE EDUCATION/TRAINING PROGRAM

## 2025-02-06 ASSESSMENT — ENCOUNTER SYMPTOMS
ROS GI COMMENTS: OSTOMY IN PLACE
VOMITING: 0
CHILLS: 0
ABDOMINAL PAIN: 0
FEVER: 0
NAUSEA: 0

## 2025-02-06 NOTE — PATIENT INSTRUCTIONS
- Change colostomies every 5-7 days. Change appliance immediately if it is leaking or peristomal skin feels irritated, has itching, or  burning. To change the appliance, remove previous appliance, cleanse peristomal skin with warm water/washcloth, pat dry, make an ostomy template or use cardboard measuring guide and trace ostomy shape onto back of barrier, cut out barrier, apply a paste ring around barrier opening and apply appliance. Empty pouches when no more than ½ full. Check contents every 2 hours or as needed. Do not leave soap residue on tissue and do not use baby wipes or skin prep wipes.     - Should you experience any significant changes in your wound(s), such as infection (redness, swelling, localized heat, increased pain, fever > 101 F, chills) or have any questions regarding your home care instructions, please contact the wound center at [phone]. If after hours, contact your primary care physician or go to the hospital emergency room.     - If you are admitted to any hospital, you will need a new referral to come back to the wound clinic and any scheduled appointments that you already have, may be cancelled.    - If you are 5 or more minutes late for an appointment, we reserve the right to cancel and reschedule that appointment. Additionally, if you are habitually late or not showing (3 late cancellations and/or no shows), we reserve the right to cancel your remaining appointments and it will be your responsibility to obtain a new referral if services are still needed.

## 2025-02-06 NOTE — PROGRESS NOTES
Provider Encounter- Ostomy Encounter        HISTORY OF PRESENT ILLNESS  Wound History:    START OF CARE IN CLINIC: 2/6/2025    REFERRING PROVIDER: Dannielle Lackey     OSTOMY TYPE: Loop transverse colostomy   LOCATION: LUQ              OSTOMY HISTORY: LBO secondary to complicated diverticulitis   SURGERY: 12/7/2025 colostomy revision Dr. Hou   OSTOMY ISSUES:  74F with PMHx of obesity, diverticulitis, colitis, DM2, HTN. Patient was admitted 11/19-11/29/2025 for large bowel obstruction.  November 24, 2024 the patient underwent laparoscopic transverse loop colostomy due to LBO secondary to complex diverticulitis.  Patient was treated with IV antibiotics and eventually was discharged.  Patient was receiving ostomy care and education from home health.  She returned to the ED and was admitted December 6 through December 10, 2024 for colostomy dysfunction appears that colostomy had retracted and had a complete separation of the mucocutaneous junction.  She was taken back to surgery on 12/7 for colostomy revision. Patient reports that ostomy function has been much improved.    Pertinent Medical History: See above     Patient's problem list, allergies, and current medications reviewed and updated in Epic    Interval History:  2/6/2025 Joint visit with ostomy RN, refer to nursing note. Wear time 3-4 days. Leaks Seldom. Weight stability gaining. Patient has been following with home health and she is becoming more involved in her ostomy care. Stoma is oblong, causes some challenges with pouching. Patient has some hypergranular / friable tissue from 3-6 o'clock which bleeds with changes. Recommend chemical cautery today.      REVIEW OF SYSTEMS:   Review of Systems   Constitutional:  Negative for chills, fever and malaise/fatigue.   Gastrointestinal:  Negative for abdominal pain, nausea and vomiting.        Ostomy in place       PHYSICAL EXAMINATION:   /70   Pulse 75   Temp 36.7 °C (98 °F) (Temporal)   Resp 18    "SpO2 92%     Physical Exam  Constitutional:       General: She is not in acute distress.     Appearance: She is obese.   Cardiovascular:      Rate and Rhythm: Normal rate.   Pulmonary:      Effort: Pulmonary effort is normal. No respiratory distress.   Abdominal:      Comments: Loop transverse colostomy LUQ: Stoma well budded, pink, patent, and productive. There is friable tissue 3 o'clock to 6 o'clock. MCJ appears intact.   Neurological:      Mental Status: She is alert.     STOMA ASSESSMENT/PROCEDURE: Peristomal skin care, pouching procedure and ostomy teaching by ostomy RN.  Refer to Ostomy RN Assessment and Photo.    Colostomy 12/07/24 Loop LUQ (Active)   Wound Image   02/06/25 1430   Stomal Appliance Assessment Intact 02/06/25 1430   Stoma Assessment Intact 02/06/25 1430   Stoma Shape Budded Less Than One Inch;Flush;Oval 02/06/25 1430   Peristomal Assessment Intact;Clean;Dry 02/06/25 1430   Mucocutaneous Junction Intact 02/06/25 1430   Treatment Appliance Changed;Removed appliance with adhesive remover;Cleansed with water/washcloth 02/06/25 1430   Peristomal Protectant Paste Ring 02/06/25 1430   Stomal Appliance Paste Ring, 2\";2 3/4\" (70mm) CTF 02/06/25 1430   Appliance (Pouch) # [id number], [id number], [id number], 12076 x 2 02/06/25 1430   Appliance Brand Sherice 02/06/25 1430   Appliance Supplier Other (Comments) 02/06/25 1430       PROCEDURE: Chemical cautery of hypergranulation tissue  - Patient with hypergranulation tissue peristomal, prone to bleeding with ostomy changes  - Used silver nitrate to chemically cauterize hypergranulation tissue  - Neutralized with saline  - Tolerated procedure with without complaint or issue.      ASSESSMENT AND PLAN:   1. Ostomy nurse consultation  2. Colostomy care ([facility])    2/6/2025  - Patient presents to clinic for assistance for ostomy care  - Has been more active with managing with assistance of home health  - Problematic hypergranulation tissue 3 o'clock to 6 o'clock, chemically " cauterized today.  - Patient to return to clinic next week  - Ostomy education and care provided by ostomy nurse    3. Colostomy in place ([facility])    2/6/2025  - Patient s/p loop transverse colostomy secondary to LBO due to complicated diverticulitis  - Patient reports that she has follow up with Dr. Cooper later this month. Ostomy likely reversible at some point.    My total time spent caring for the patient on the day of the encounter was 30 minutes, reviewing history, assessment, counseling and education, and coordination of care.  This does not include time spent on separately billable procedures/tests.    Please note that this note may have been created using voice recognition software. I have worked with technical experts from [facility] to optimize the interface.  I have made every reasonable attempt to correct obvious errors, but there may be errors of grammar and possibly content that I did not discover before finalizing the note.    N

## 2025-02-06 NOTE — Clinical Note
PHOEBE Nava was seen at [facility] [place] for silver nitrating of excess stomal mucosa.

## 2025-02-06 NOTE — CERTIFICATION
"Ostomy Evaluation  For 90 Day Certification Period: 02/06/25   - 05/07/25     Surgeon: Dr. Hou  Surgery type and date: Transverse loop colostomy due to large bowel obstruction on 11/4/24. Revision on 12/7/24 due to peristomal complications, stomal retraction and skin separation.   Temporary  Pre-Marked: No   Pertinent Hx: Diverticulitis, DM2, obesity    Start of Care: 02/06/2025    Supplier:  [facility]  at this time  Order date: TBD    OBJECTIVE: 2 3/4\" appliance intact with moderate wear to inside barrier from 3:00-6:00. There is tissue from the 3-6 area that is fragile and bleeding with light touch.     STOMA ASSESSMENT:  Colostomy 12/07/24 Loop LUQ (Active)   Wound Image   02/06/25 1430   Stomal Appliance Assessment Intact 02/06/25 1430   Stoma Assessment Intact 02/06/25 1430   Stoma Shape Budded Less Than One Inch;Flush;Oval 02/06/25 1430   Peristomal Assessment Intact;Clean;Dry 02/06/25 1430   Mucocutaneous Junction Intact 02/06/25 1430   Treatment Appliance Changed;Removed appliance with adhesive remover;Cleansed with water/washcloth 02/06/25 1430   Peristomal Protectant Paste Ring 02/06/25 1430   Stomal Appliance Paste Ring, 2\";2 3/4\" (70mm) CTF 02/06/25 1430   Appliance (Pouch) # [id number], [id number], [id number], 12076 x 2 02/06/25 1430   Appliance Brand [place] 02/06/25 1430   Appliance Supplier Other (Comments) 02/06/25 1430       Pouching Procedure Notes (if indicated): Dr. Garcia applied silver nitrate to the tissue on lateral edge of stoma and rinsed with NS. Patient prefers using the paste ring and the brava strips at this time. May attempt at a later time to trial without. Patient did not want to reinvent the wheel today.     Previous Trials and Notes: Convex 2 3/4\"    Patient/Caregiver Education:   Dr. Garcia and this RN explained the rationale for the silver nitrate. Patient had already been educated by the  nurses as to why she was coming in today.   Explained to patient that this area may take a " while to heal, and may potentially always be present.   Encouraged patient to continue practicing with the home health nurses so that she can be independent.       1 Can do independently   2 Needs some help   3 Needs a lot of help and additional review   4 No chance to review, needs additional follow-up     EMPTY THE POUCH  Empty pouch when it is 1/3 - ½ full 1   Assemble supplies before emptying: toilet paper, pouch, deodorant  1   Assume a correct position 1   Open pouch 1   Lower opening into the toilet and empty 1   Wipe opening before resealing 1   Add pouch deodorant (if used) -   Reclamp or reseal the pouch 1   , REMOVING THE OLD POUCH  Assemble supplies for pouch change: new pouch, towel, measurement guide, pen, scissors, garbage bag (skin barrier ring, powder, deodorant, and adhesive remover, if needed) 1   Remove the outer adhesive by starting at one corner/edge 1   Place one hand on skin and push down while your other hand lifts the barrier to push skin away from barrier. Use a warm wet cloth or adhesive releaser if needed 1   Place the old pouch in a garbage bag 1   If you are using a clamp, do not throw it away 1   , CLEAN AND INSPECT THE SKIN  Check the skin for color, bleeding, and irritation 1   Clean skin around the stoma with ONLY warm water 1   Pat the skin dry 1       Crusting if needed    Apply stoma powder to red/wet skin, brush off excess 4   Apply skin protectant over powder ONLY to seal in place 4   , MEASURE AND CUT OPENING  Cover the stoma with a piece of tissue or gauze while measuring 1   Measure the stoma accurately with the measurement guide 1   Trace the correct size on the back of the pouch barrier 1   Place your finger into the precut opening and push the pouch away from the barrier in using a one-piece system -   Cut the traced opening with full teeth of the scissors 4   Align opening over the stoma making sure it is close to the stoma edge. Adjust as needed.  4   Colostomy - 1/8”  "clearance between stoma and appliance (width of a nickel on its side)  Ileostomy/Urostomy - 1/16” clearance between stoma and appliance (width of a dime on its side) 2   , and APPLY NEW POUCHING SYSTEM  Remove the paper backing from the pouch barrier 2   Crust if needed 4   Remove any gauze/tissue placed over stoma 1   Center and apply the pouch skin barrier around the stoma. Starting closest to the stoma, press and rub on the barrier for 30-60 seconds \"Going around the race track\" 2   For a two-piece system, apply the pouch to the barrier flange 2   Close the bottom of the pouch 2       PLAN/GOALS:  May need to use a larger barrier until stoma decreases in size. The template is to the edges of the barrier at this time.   Trial appliance without the paste ring.   Continue to treat the lateral stomal tissue.  Patient to become more independent with ostomy care.  Ostomy orders faxed to [facility] via Right Fax.      WOUND PROCEDURE NOTE (if indicated):     "

## 2025-02-07 NOTE — CASE COMMUNICATION
noted  ----- Message -----  From: Radha Mott R.N.  Sent: 2/6/2025   6:56 PM PST  To: Asuncion Lynn R.N.; Dannielle Lackey P.A.-C.; *      PHOEBE  Pt was seen at [facility] [place] for silver nitrating of excess stomal mucosa.

## 2025-02-10 ENCOUNTER — HOME CARE VISIT (OUTPATIENT)
Dept: HOME HEALTH SERVICES | Facility: HOME HEALTHCARE | Age: 75
End: 2025-02-10
Payer: MEDICARE

## 2025-02-10 VITALS
TEMPERATURE: 98.1 F | DIASTOLIC BLOOD PRESSURE: 60 MMHG | HEART RATE: 66 BPM | OXYGEN SATURATION: 94 % | RESPIRATION RATE: 16 BRPM | SYSTOLIC BLOOD PRESSURE: 104 MMHG

## 2025-02-10 PROCEDURE — G0299 HHS/HOSPICE OF RN EA 15 MIN: HCPCS

## 2025-02-10 ASSESSMENT — ENCOUNTER SYMPTOMS
VOMITING: DENIES
COUGH CHARACTERISTICS: MOIST
HYPERTENSION: 1
FATIGUES EASILY: 1
COUGH: 1
DENIES PAIN: 1
FORGETFULNESS: 1
SHORTNESS OF BREATH: 1
MUSCLE WEAKNESS: 1
COUGH CHARACTERISTICS: PRODUCTIVE
DRY SKIN: 1
NAUSEA: DENIES
SPUTUM PRODUCTION: 1
SPUTUM COLOR: CLEAR
DYSPNEA ACTIVITY LEVEL: AFTER AMBULATING 10 - 20 FT
SPUTUM AMOUNT: SCANT
DYSPNEA ON EXERTION: 1
FATIGUE: 1

## 2025-02-10 ASSESSMENT — ACTIVITIES OF DAILY LIVING (ADL)
AMBULATION ASSISTANCE: STAND BY ASSIST
CURRENT_FUNCTION: STAND BY ASSIST

## 2025-02-10 NOTE — Clinical Note
VS WNL. Denies pain. Added supplement to medication list and paper list. Patient educated on importance of managing ostomy and to practice application in mirror without actually removing appliance so that she can get more comfortable managing her ostomy. Verbalized understanding. Patient was encouraged to reach out to friends and family to find an individual to aid her with maintaining the home. She verbalized she is going to ask for help. No further concerns at this time.

## 2025-02-11 PROCEDURE — RXMED WILLOW AMBULATORY MEDICATION CHARGE: Performed by: PHYSICIAN ASSISTANT

## 2025-02-12 ENCOUNTER — PHARMACY VISIT (OUTPATIENT)
Dept: PHARMACY | Facility: MEDICAL CENTER | Age: 75
End: 2025-02-12
Payer: COMMERCIAL

## 2025-02-12 ENCOUNTER — APPOINTMENT (OUTPATIENT)
Dept: WOUND CARE | Facility: MEDICAL CENTER | Age: 75
End: 2025-02-12
Attending: STUDENT IN AN ORGANIZED HEALTH CARE EDUCATION/TRAINING PROGRAM
Payer: MEDICARE

## 2025-02-12 NOTE — CASE COMMUNICATION
noted  ----- Message -----  From: Hortencia Redmond R.N.  Sent: 2/10/2025   4:35 PM PST  To: Asuncion Lynn R.N.; Haley Grier R.N.      VS WNL. Denies pain. Added supplement to medication list and paper list. Patient educated on importance of managing ostomy and to practice application in mirror without actually removing appliance so that she can get more comfortable managing her ostomy. Verbalized understanding. Patient was encoura ged to reach out to friends and family to find an individual to aid her with maintaining the home. She verbalized she is going to ask for help. No further concerns at this time.

## 2025-02-13 ENCOUNTER — OFFICE VISIT (OUTPATIENT)
Dept: WOUND CARE | Facility: MEDICAL CENTER | Age: 75
End: 2025-02-13
Attending: STUDENT IN AN ORGANIZED HEALTH CARE EDUCATION/TRAINING PROGRAM
Payer: MEDICARE

## 2025-02-13 ENCOUNTER — HOME CARE VISIT (OUTPATIENT)
Dept: HOME HEALTH SERVICES | Facility: HOME HEALTHCARE | Age: 75
End: 2025-02-13
Payer: MEDICARE

## 2025-02-13 DIAGNOSIS — Z71.89 OSTOMY NURSE CONSULTATION: ICD-10-CM

## 2025-02-13 DIAGNOSIS — Z43.3 COLOSTOMY CARE (HCC): ICD-10-CM

## 2025-02-13 PROCEDURE — 99213 OFFICE O/P EST LOW 20 MIN: CPT

## 2025-02-13 PROCEDURE — 99213 OFFICE O/P EST LOW 20 MIN: CPT | Performed by: NURSE PRACTITIONER

## 2025-02-13 NOTE — PROGRESS NOTES
Provider Encounter- Ostomy Encounter        HISTORY OF PRESENT ILLNESS  Wound History:    START OF CARE IN CLINIC: 2/6/2025    REFERRING PROVIDER: Dannielle Lackey     OSTOMY TYPE: Loop transverse colostomy   LOCATION: LUQ              OSTOMY HISTORY: LBO secondary to complicated diverticulitis   SURGERY: 12/7/2025 colostomy revision Dr. Hou   OSTOMY ISSUES:  74F with PMHx of obesity, diverticulitis, colitis, DM2, HTN. Patient was admitted 11/19-11/29/2025 for large bowel obstruction.  November 24, 2024 the patient underwent laparoscopic transverse loop colostomy due to LBO secondary to complex diverticulitis.  Patient was treated with IV antibiotics and eventually was discharged.  Patient was receiving ostomy care and education from home health.  She returned to the ED and was admitted December 6 through December 10, 2024 for colostomy dysfunction appears that colostomy had retracted and had a complete separation of the mucocutaneous junction.  She was taken back to surgery on 12/7 for colostomy revision. Patient reports that ostomy function has been much improved.    Pertinent Medical History: See above     Patient's problem list, allergies, and current medications reviewed and updated in Epic    Interval History:  2/6/2025 Joint visit with ostomy RN, refer to nursing note. Wear time 3-4 days. Leaks Seldom. Weight stability gaining. Patient has been following with home health and she is becoming more involved in her ostomy care. Stoma is oblong, causes some challenges with pouching. Patient has some hypergranular / friable tissue from 3-6 o'clock which bleeds with changes. Recommend chemical cautery today.    2/13/2025 : Clinic visit with RICK Hillman, FNP-BC, CWOCN, CFCN.  Visit with ostomy RN, refer to nursing note.  Wear time-4 days.  Leaks-seldom.  Patient is eating regular diet, weight stable.  She has resumed many of her normal activities.   Patient is being seen here for concerns for possible  "hypergranulation buds around her stoma.  In reviewing old photos of the stoma, I am not convinced that these are are hypergranulation buds, but rather part of the actual stoma/mucosal tissue.  I do not recommend chemical cautery of this tissue.  Continue to pouch around them, and monitor.  Will have patient return to clinic in 2 weeks for reassessment.   At some point, patient may be undergoing stoma reversal.  This is a loop colostomy.  She has a follow-up with her surgeon next month.      REVIEW OF SYSTEMS:   Unchanged from previous clinic visit on 2/6/2025, except as documented in interval history above    PHYSICAL EXAMINATION:   There were no vitals taken for this visit.    Physical Exam  Constitutional:       General: She is not in acute distress.     Appearance: She is obese.   Cardiovascular:      Rate and Rhythm: Normal rate.   Pulmonary:      Effort: Pulmonary effort is normal. No respiratory distress.   Abdominal:      Comments: Loop transverse colostomy LUQ: Stoma well budded, pink, patent, and productive.  2 lobes of mucosal tissue at 3 and 6:00.  MCJ intact.  Peristomal skin intact.   Neurological:      Mental Status: She is alert.       STOMA ASSESSMENT/PROCEDURE: Peristomal skin care, pouching procedure and ostomy teaching by ostomy RN.  Refer to Ostomy RN Assessment and Photo.    Colostomy 12/07/24 Loop LUQ (Active)   Wound Image   02/13/25 0845   Stomal Appliance Assessment Intact 02/13/25 0845   Stoma Assessment Intact 02/13/25 0845   Stoma Shape Budded Less Than One Inch;Oval 02/13/25 0845   Peristomal Assessment Intact 02/13/25 0845   Mucocutaneous Junction Intact 02/13/25 0845   Treatment Removed appliance with adhesive remover;Appliance Changed;Cleansed with water/washcloth 02/13/25 0845   Peristomal Protectant Paste Ring 02/13/25 0845   Stomal Appliance Paste Ring, 2\";2 3/4\" (70mm) CTF 02/13/25 0845   Appliance (Pouch) # [id number], 57349, 8412, 12076 x 2 02/13/25 0845   Appliance Brand [place] " 02/13/25 0845   Appliance Supplier Other (Comments) 02/13/25 0845               ASSESSMENT AND PLAN:   1. Ostomy nurse consultation  2. Colostomy care ([facility])    2/13/2025: Patient referred to the clinic for evaluation of possible hypergranulation beds.  - Patient presents to clinic for assistance for ostomy care  - Has been more active with managing with assistance of home health  -I do not believe these are actually hypergranulation beds, but rather part of the stoma itself, regular mucosal tissue.  I did not treat with AgNO3 today  - Patient to return to clinic in 2 weeks for reassessment, and continuation of ostomy education  - Ostomy education and care provided by ostomy nurse    3. Colostomy in place ([facility])    2/13/2025  - Patient s/p loop transverse colostomy secondary to LBO due to complicated diverticulitis  - Patient reports that she has follow up with Dr. Cooper next month. Ostomy likely reversible at some point.    My total time spent caring for the patient on the day of the encounter was 20 minutes, reviewing history, assessment, counseling and education, and coordination of care.  This does not include time spent on separately billable procedures/tests.    Please note that this note may have been created using voice recognition software. I have worked with technical experts from "[employer] to optimize the interface.  I have made every reasonable attempt to correct obvious errors, but there may be errors of grammar and possibly content that I did not discover before finalizing the note.    N

## 2025-02-13 NOTE — PATIENT INSTRUCTIONS
- Change colostomies every 5-7 days. Change appliance immediately if it is leaking or peristomal skin feels irritated, has itching, or  burning. To change the appliance, remove previous appliance, cleanse peristomal skin with warm water/washcloth, pat dry, make an ostomy template or use cardboard measuring guide and trace ostomy shape onto back of barrier, cut out barrier, apply a paste ring around barrier opening and apply appliance. Empty pouches when no more than ½ full. Check contents every 2 hours or as needed. Do not leave soap residue on tissue and do not use baby wipes or skin prep wipes.     - Should you experience any significant changes in your wound(s), such as infection (redness, swelling, localized heat, increased pain, fever > 101 F, chills) or have any questions regarding your home care instructions, please contact the wound center at [phone]. If after hours, contact your primary care physician or go to the hospital emergency room.     - If you are admitted to any hospital, you will need a new referral to come back to the wound clinic and any scheduled appointments that you already have, may be cancelled.    - If you are 5 or more minutes late for an appointment, we reserve the right to cancel and reschedule that appointment. Additionally, if you are habitually late or not showing (3 late cancellations and/or no shows), we reserve the right to cancel your remaining appointments and it will be your responsibility to obtain a new referral if services are still needed.

## 2025-02-13 NOTE — Clinical Note
Missed SN  visit. Pt was seen at [facility] for colostomy consultation.

## 2025-02-13 NOTE — PROGRESS NOTES
"Ostomy Evaluation  For 90 Day Certification Period: 02/06/25   - 05/14/25     Surgeon: Dr. Hou  Surgery type and date: Transverse loop colostomy due to large bowel obstruction on 11/4/24. Revision on 12/7/24 due to peristomal complications, stomal retraction and skin separation.   Temporary  Pre-Marked: No   Pertinent Hx: Diverticulitis, DM2, obesity    Start of Care: 02/06/2025    Supplier:  [facility]  at this time  Order date: TBD    OBJECTIVE: 2 3/4\" appliance intact with moderate wear to inside barrier from 3:00-6:00. There is tissue from the 3-6 area that is fragile and bleeding with light touch.     STOMA ASSESSMENT:  Colostomy 12/07/24 Loop LUQ (Active)   Wound Image   02/13/25 0845   Stomal Appliance Assessment Intact 02/13/25 0845   Stoma Assessment Intact 02/13/25 0845   Stoma Shape Budded Less Than One Inch;Oval 02/13/25 0845   Peristomal Assessment Intact 02/13/25 0845   Mucocutaneous Junction Intact 02/13/25 0845   Treatment Removed appliance with adhesive remover;Appliance Changed;Cleansed with water/washcloth 02/13/25 0845   Peristomal Protectant Paste Ring 02/13/25 0845   Stomal Appliance Paste Ring, 2\";2 3/4\" (70mm) CTF 02/13/25 0845   Appliance (Pouch) # [id number], [id number], 8815, 12076 x 2 02/13/25 0845   Appliance Brand [place] 02/13/25 0845   Appliance Supplier Other (Comments) 02/13/25 0845        Pouching Procedure Notes (if indicated): Patient prefers using the paste ring and the brava strips at this time as she feels like the appliance is more secure. May attempt at a later time to trial without.    Previous Trials and Notes: Convex 2 3/4\"    Patient/Caregiver Education:   Brianne CABRERA did not use silver nitrate today to the tissue at 3:00-6:00. Believe that it is bowel and we will continue to monitor it. If it does enlarge, we can always treat then. Patient verbalized understanding.   Encouraged patient to continue practicing with the home health nurses so that she can be independent. "       1 Can do independently   2 Needs some help   3 Needs a lot of help and additional review   4 No chance to review, needs additional follow-up     EMPTY THE POUCH  Empty pouch when it is 1/3 - ½ full 1   Assemble supplies before emptying: toilet paper, pouch, deodorant  1   Assume a correct position 1   Open pouch 1   Lower opening into the toilet and empty 1   Wipe opening before resealing 1   Add pouch deodorant (if used) -   Reclamp or reseal the pouch 1   , REMOVING THE OLD POUCH  Assemble supplies for pouch change: new pouch, towel, measurement guide, pen, scissors, garbage bag (skin barrier ring, powder, deodorant, and adhesive remover, if needed) 1   Remove the outer adhesive by starting at one corner/edge 1   Place one hand on skin and push down while your other hand lifts the barrier to push skin away from barrier. Use a warm wet cloth or adhesive releaser if needed 1   Place the old pouch in a garbage bag 1   If you are using a clamp, do not throw it away 1   , CLEAN AND INSPECT THE SKIN  Check the skin for color, bleeding, and irritation 1   Clean skin around the stoma with ONLY warm water 1   Pat the skin dry 1       Crusting if needed    Apply stoma powder to red/wet skin, brush off excess 4   Apply skin protectant over powder ONLY to seal in place 4   , MEASURE AND CUT OPENING  Cover the stoma with a piece of tissue or gauze while measuring 1   Measure the stoma accurately with the measurement guide 1   Trace the correct size on the back of the pouch barrier 1   Place your finger into the precut opening and push the pouch away from the barrier in using a one-piece system -   Cut the traced opening with full teeth of the scissors 4   Align opening over the stoma making sure it is close to the stoma edge. Adjust as needed.  4   Colostomy - 1/8” clearance between stoma and appliance (width of a nickel on its side)  Ileostomy/Urostomy - 1/16” clearance between stoma and appliance (width of a dime on  "its side) 2   , and APPLY NEW POUCHING SYSTEM  Remove the paper backing from the pouch barrier 2   Crust if needed 4   Remove any gauze/tissue placed over stoma 1   Center and apply the pouch skin barrier around the stoma. Starting closest to the stoma, press and rub on the barrier for 30-60 seconds \"Going around the race track\" 2   For a two-piece system, apply the pouch to the barrier flange 2   Close the bottom of the pouch 2       PLAN/GOALS:  The template is to the edges of the barrier at this time, but is not having any leaking issues.   Patient to become more independent with ostomy care - home health orders written for patient to start doing the changes with minimal RN assistance.   Come back in 2 weeks to assess stoma.   Ostomy orders faxed to [facility]  via Right Fax.      WOUND PROCEDURE NOTE (if indicated):     "

## 2025-02-14 DIAGNOSIS — R60.0 LOWER EXTREMITY EDEMA: ICD-10-CM

## 2025-02-14 RX ORDER — FUROSEMIDE 20 MG/1
TABLET ORAL
Qty: 270 TABLET | Refills: 1 | Status: SHIPPED | OUTPATIENT
Start: 2025-02-14

## 2025-02-14 NOTE — CASE COMMUNICATION
noted  ----- Message -----  From: Radha Mott R.N.  Sent: 2/13/2025   7:32 PM PST  To: Asuncion Lynn R.N.; Dannielle Lackey P.A.-C.; *      Missed [facility] visit. Pt was seen at [facility] for colostomy consultation.

## 2025-02-17 ENCOUNTER — HOME CARE VISIT (OUTPATIENT)
Dept: HOME HEALTH SERVICES | Facility: HOME HEALTHCARE | Age: 75
End: 2025-02-17
Payer: MEDICARE

## 2025-02-17 PROCEDURE — A4385 OST SKN BARRIER SLD EXT WEAR: HCPCS

## 2025-02-17 PROCEDURE — G0299 HHS/HOSPICE OF RN EA 15 MIN: HCPCS

## 2025-02-17 NOTE — Clinical Note
"KANCHANI  Pt was seen at [facility] last week to evaluate need for silver nitrating what were thought to be blebs on stoma. APRN felt they were part of the stoma and not silver nitrated. Margins of stoma very irregular. Widest margin 2\" - maximum skin barrier can be cut. Stoma flush. Pasty stool was ready to leak from under skin barrier. Skin still intact. New pattern made for pt to accommodate stoma margins as best as possible. Had pt do entire pouching procedure except make pattern and cut skin barrier. Had her place skin barrier, using her counter mirror, but difficult for pt. May need to have her just cut out the entire 2\" and use paste ring directly around base of stoma to accommodate margins. Other option would be to use 1-pc closed end appliance that she cuts to maximum and then removes and cleans her skin after elimination. SN will also see if [employer] still makes its moldable appliance. Needs continued appliance adjustment.  "

## 2025-02-18 VITALS
OXYGEN SATURATION: 94 % | SYSTOLIC BLOOD PRESSURE: 104 MMHG | TEMPERATURE: 99.2 F | DIASTOLIC BLOOD PRESSURE: 60 MMHG | RESPIRATION RATE: 16 BRPM | HEART RATE: 67 BPM

## 2025-02-18 ASSESSMENT — ENCOUNTER SYMPTOMS
LOWER EXTREMITY EDEMA: 1
LAST BOWEL MOVEMENT: 67253
DENIES PAIN: 1
MUSCLE WEAKNESS: 1
STOOL FREQUENCY: TWICE DAILY
NAUSEA: NO
VOMITING: NO

## 2025-02-19 ENCOUNTER — APPOINTMENT (OUTPATIENT)
Dept: WOUND CARE | Facility: MEDICAL CENTER | Age: 75
End: 2025-02-19
Attending: STUDENT IN AN ORGANIZED HEALTH CARE EDUCATION/TRAINING PROGRAM
Payer: MEDICARE

## 2025-02-20 ENCOUNTER — HOME CARE VISIT (OUTPATIENT)
Dept: HOME HEALTH SERVICES | Facility: HOME HEALTHCARE | Age: 75
End: 2025-02-20
Payer: MEDICARE

## 2025-02-20 ENCOUNTER — APPOINTMENT (OUTPATIENT)
Dept: WOUND CARE | Facility: MEDICAL CENTER | Age: 75
End: 2025-02-20
Attending: STUDENT IN AN ORGANIZED HEALTH CARE EDUCATION/TRAINING PROGRAM
Payer: MEDICARE

## 2025-02-20 VITALS
SYSTOLIC BLOOD PRESSURE: 118 MMHG | RESPIRATION RATE: 18 BRPM | OXYGEN SATURATION: 96 % | HEART RATE: 66 BPM | DIASTOLIC BLOOD PRESSURE: 60 MMHG | TEMPERATURE: 98.2 F

## 2025-02-20 PROCEDURE — G0299 HHS/HOSPICE OF RN EA 15 MIN: HCPCS

## 2025-02-20 ASSESSMENT — ENCOUNTER SYMPTOMS
FORGETFULNESS: 1
DRY SKIN: 1
HYPERTENSION: 1
DENIES PAIN: 1
DYSPNEA ACTIVITY LEVEL: AFTER AMBULATING MORE THAN 20 FT
NAUSEA: DENIES
SHORTNESS OF BREATH: 1
VOMITING: DENIES
STOOL FREQUENCY: MORE THAN TWICE DAILY
FATIGUE: 1
MUSCLE WEAKNESS: 1

## 2025-02-20 ASSESSMENT — ACTIVITIES OF DAILY LIVING (ADL)
CURRENT_FUNCTION: STAND BY ASSIST
AMBULATION ASSISTANCE: STAND BY ASSIST

## 2025-02-20 NOTE — Clinical Note
VS WNL. Denies pain and falls. Medications updated. Patient was successful in removing and applying ostomy appliance.No concerns at this time

## 2025-02-21 NOTE — CASE COMMUNICATION
noted  ----- Message -----  From: Hortencia Redmond R.N.  Sent: 2/20/2025   6:20 PM PST  To: Asuncion Lynn R.N.      [facility]. Denies pain and falls. Medications updated. Patient was successful in removing and applying ostomy appliance.No concerns at this time

## 2025-02-24 ENCOUNTER — HOME CARE VISIT (OUTPATIENT)
Dept: HOME HEALTH SERVICES | Facility: HOME HEALTHCARE | Age: 75
End: 2025-02-24
Payer: MEDICARE

## 2025-02-24 VITALS
SYSTOLIC BLOOD PRESSURE: 102 MMHG | RESPIRATION RATE: 16 BRPM | TEMPERATURE: 97.4 F | OXYGEN SATURATION: 97 % | DIASTOLIC BLOOD PRESSURE: 60 MMHG | HEART RATE: 64 BPM

## 2025-02-24 PROCEDURE — G0299 HHS/HOSPICE OF RN EA 15 MIN: HCPCS

## 2025-02-24 ASSESSMENT — ACTIVITIES OF DAILY LIVING (ADL)
AMBULATION ASSISTANCE: STAND BY ASSIST
CURRENT_FUNCTION: STAND BY ASSIST

## 2025-02-24 ASSESSMENT — ENCOUNTER SYMPTOMS
STOOL FREQUENCY: MORE THAN TWICE DAILY
VOMITING: DENIES
HYPERTENSION: 1
DRY SKIN: 1
SHORTNESS OF BREATH: 1
NAUSEA: DENIES
DENIES PAIN: 1
DYSPNEA ACTIVITY LEVEL: AFTER AMBULATING MORE THAN 20 FT
MUSCLE WEAKNESS: 1
LOWER EXTREMITY EDEMA: 1

## 2025-02-24 NOTE — Clinical Note
VS WNl. Denies falls, pain, or changes in her medications. Patient received moldable ostomy appliance in the mail. Patient did well applying ostomy appliance but the underlying skin has some redness. Radha and wound care doc made aware. Patient to go to wound clinic on Thursday and will be seeing Radha on Monday. Patient verbalized she will not be having the ostomy reversed for a few more months due to MD requiring several tests to be completed prior. Patient has made appts for those tests. No other concerns at this time.

## 2025-02-26 ENCOUNTER — APPOINTMENT (OUTPATIENT)
Dept: WOUND CARE | Facility: MEDICAL CENTER | Age: 75
End: 2025-02-26
Attending: STUDENT IN AN ORGANIZED HEALTH CARE EDUCATION/TRAINING PROGRAM
Payer: MEDICARE

## 2025-02-27 ENCOUNTER — OFFICE VISIT (OUTPATIENT)
Dept: WOUND CARE | Facility: MEDICAL CENTER | Age: 75
End: 2025-02-27
Attending: STUDENT IN AN ORGANIZED HEALTH CARE EDUCATION/TRAINING PROGRAM
Payer: MEDICARE

## 2025-02-27 ENCOUNTER — HOME CARE VISIT (OUTPATIENT)
Dept: HOME HEALTH SERVICES | Facility: HOME HEALTHCARE | Age: 75
End: 2025-02-27
Payer: MEDICARE

## 2025-02-27 DIAGNOSIS — Z93.3 COLOSTOMY IN PLACE (HCC): ICD-10-CM

## 2025-02-27 DIAGNOSIS — Z43.3 COLOSTOMY CARE (HCC): ICD-10-CM

## 2025-02-27 DIAGNOSIS — Z71.89 OSTOMY NURSE CONSULTATION: ICD-10-CM

## 2025-02-27 PROCEDURE — 11401 EXC TR-EXT B9+MARG 0.6-1 CM: CPT

## 2025-02-27 NOTE — PROGRESS NOTES
"Ostomy Evaluation  For 90 Day Certification Period: 02/06/25   - 05/28/25     Surgeon: Dr. Hou  Surgery type and date: Transverse loop colostomy due to large bowel obstruction on 11/4/24. Revision on 12/7/24 due to peristomal complications, stomal retraction and skin separation.   Temporary  Pre-Marked: No   Pertinent Hx: Diverticulitis, DM2, obesity    Start of Care: 02/06/2025    Supplier:  [facility]  at this time  Order date: TBD    OBJECTIVE: 2 3/4\" appliance intact with minimal wear to inside barrier from 3:00-6:00. There are granulomas from 3 - 6.     STOMA ASSESSMENT:  Colostomy 12/07/24 Loop LUQ (Active)   Wound Image    02/27/25 1000   Stomal Appliance Assessment Intact 02/27/25 1000   Stoma Assessment Intact 02/27/25 1000   Stoma Shape Budded Less Than One Inch;Oval 02/27/25 1000   Stoma Size (in) 3 02/27/25 1000   Peristomal Assessment Intact 02/27/25 1000   Mucocutaneous Junction Intact 02/27/25 1000   Treatment Removed appliance with adhesive remover;Appliance Changed;Crusted with stoma powder 02/27/25 1000   Peristomal Protectant Paste Ring 02/27/25 1000   Stomal Appliance Paste Ring, 2\";2 3/4\" (70mm) CTF 02/27/25 1000   Appliance (Pouch) # [id number], [id number], 8815, 12076 x 2 02/27/25 1000   Appliance Brand Sherice 02/27/25 1000   Appliance Supplier Other (Comments) 02/27/25 1000             Pouching Procedure Notes (if indicated): Patient prefers using the paste ring and the brava strips at this time as she feels like the appliance is more secure. May attempt at a later time to trial without.    Previous Trials and Notes: Convex 2 3/4\"    Patient/Caregiver Education:   Brianne CABRERA excised granulomas with scalpel and forceps. Silver nitrate and manual pressure to stop bleeding. Neutralized with saline.   Encouraged patient to continue practicing with the home health nurses so that she can be independent.       1 Can do independently   2 Needs some help   3 Needs a lot of help and additional review "   4 No chance to review, needs additional follow-up     EMPTY THE POUCH  Empty pouch when it is 1/3 - ½ full 1   Assemble supplies before emptying: toilet paper, pouch, deodorant  1   Assume a correct position 1   Open pouch 1   Lower opening into the toilet and empty 1   Wipe opening before resealing 1   Add pouch deodorant (if used) -   Reclamp or reseal the pouch 1   , REMOVING THE OLD POUCH  Assemble supplies for pouch change: new pouch, towel, measurement guide, pen, scissors, garbage bag (skin barrier ring, powder, deodorant, and adhesive remover, if needed) 1   Remove the outer adhesive by starting at one corner/edge 1   Place one hand on skin and push down while your other hand lifts the barrier to push skin away from barrier. Use a warm wet cloth or adhesive releaser if needed 1   Place the old pouch in a garbage bag 1   If you are using a clamp, do not throw it away 1   , CLEAN AND INSPECT THE SKIN  Check the skin for color, bleeding, and irritation 1   Clean skin around the stoma with ONLY warm water 1   Pat the skin dry 1       Crusting if needed    Apply stoma powder to red/wet skin, brush off excess 4   Apply skin protectant over powder ONLY to seal in place 4   , MEASURE AND CUT OPENING  Cover the stoma with a piece of tissue or gauze while measuring 1   Measure the stoma accurately with the measurement guide 1   Trace the correct size on the back of the pouch barrier 1   Place your finger into the precut opening and push the pouch away from the barrier in using a one-piece system -   Cut the traced opening with full teeth of the scissors 2   Align opening over the stoma making sure it is close to the stoma edge. Adjust as needed.  2   Colostomy - 1/8” clearance between stoma and appliance (width of a nickel on its side)  Ileostomy/Urostomy - 1/16” clearance between stoma and appliance (width of a dime on its side) 2   , and APPLY NEW POUCHING SYSTEM  Remove the paper backing from the pouch barrier 2  "  Crust if needed 4   Remove any gauze/tissue placed over stoma 1   Center and apply the pouch skin barrier around the stoma. Starting closest to the stoma, press and rub on the barrier for 30-60 seconds \"Going around the race track\" 2   For a two-piece system, apply the pouch to the barrier flange 2   Close the bottom of the pouch 2       PLAN/GOALS:  Patient to become more independent with ostomy care - home health orders written for patient to start doing the changes with minimal RN assistance.   Come back in 2 weeks to assess stoma and if granulomas have not grown back.   Ostomy orders faxed to [facility]  via [facility] Fax.      WOUND PROCEDURE NOTE (if indicated):     "

## 2025-02-27 NOTE — PROGRESS NOTES
Provider Encounter- Ostomy Encounter        HISTORY OF PRESENT ILLNESS  Wound History:    START OF CARE IN CLINIC: 2/6/2025    REFERRING PROVIDER: Dannielle Lackey     OSTOMY TYPE: Loop transverse colostomy   LOCATION: LUQ              OSTOMY HISTORY: LBO secondary to complicated diverticulitis   SURGERY: 12/7/2025 colostomy revision Dr. Hou   OSTOMY ISSUES:  74F with PMHx of obesity, diverticulitis, colitis, DM2, HTN. Patient was admitted 11/19-11/29/2025 for large bowel obstruction.  November 24, 2024 the patient underwent laparoscopic transverse loop colostomy due to LBO secondary to complex diverticulitis.  Patient was treated with IV antibiotics and eventually was discharged.  Patient was receiving ostomy care and education from home health.  She returned to the ED and was admitted December 6 through December 10, 2024 for colostomy dysfunction appears that colostomy had retracted and had a complete separation of the mucocutaneous junction.  She was taken back to surgery on 12/7 for colostomy revision. Patient reports that ostomy function has been much improved.    Pertinent Medical History: See above     Patient's problem list, allergies, and current medications reviewed and updated in Epic    Interval History:  2/6/2025 Joint visit with ostomy RN, refer to nursing note. Wear time 3-4 days. Leaks Seldom. Weight stability gaining. Patient has been following with home health and she is becoming more involved in her ostomy care. Stoma is oblong, causes some challenges with pouching. Patient has some hypergranular / friable tissue from 3-6 o'clock which bleeds with changes. Recommend chemical cautery today.    2/13/2025 : Clinic visit with RICK Hillman, FNP-BC, CWOCN, CFCN.  Visit with ostomy RN, refer to nursing note.  Wear time-4 days.  Leaks-seldom.  Patient is eating regular diet, weight stable.  She has resumed many of her normal activities.   Patient is being seen here for concerns for possible  hypergranulation buds around her stoma.  In reviewing old photos of the stoma, I am not convinced that these are are hypergranulation buds, but rather part of the actual stoma/mucosal tissue.  I do not recommend chemical cautery of this tissue.  Continue to pouch around them, and monitor.  Will have patient return to clinic in 2 weeks for reassessment.   At some point, patient may be undergoing stoma reversal.  This is a loop colostomy.  She has a follow-up with her surgeon next month.    2/27/2025 : Clinic visit with Brianne Carrasco, APRN, FNCORDELL-BC, CWLISAN, CFNILO.   Patient presents today with distinct granulomas to lateral edge of her stoma.  Previously thought to be possibly an extension of her stoma, however today they present as distinct separate lesions.  4 lesions were excised in clinic today, then treated with silver nitrate.   Patient has been getting 3 to 4 days wear time from her ostomy appliance, reports occasional leaks.  She is eating well, weight stable.      REVIEW OF SYSTEMS:   Unchanged from previous clinic visit on 2/13/2025, except as documented in interval history above    PHYSICAL EXAMINATION:   There were no vitals taken for this visit.    Physical Exam  Constitutional:       General: She is not in acute distress.     Appearance: She is obese.   Cardiovascular:      Rate and Rhythm: Normal rate.   Pulmonary:      Effort: Pulmonary effort is normal. No respiratory distress.   Abdominal:      Comments: Loop transverse colostomy LUQ: Stoma well budded, pink, patent, and productive.  MCJ intact.  Peristomal skin intact.  4 distinct granulomas noted today along the lateral edge of stoma.   Neurological:      Mental Status: She is alert.     PROCEDURE:   - scalpel and forceps used to lift and excise granulomas x 4, each measuring approximately 0.5 cm²  -Bleeding controlled with manual pressure and AgNO3  -Wound care and ostomy pouching completed by ostomy RN, refer to flowsheet  -Patient tolerated the  "procedure well, without c/o pain or discomfort.      STOMA ASSESSMENT/PROCEDURE: Peristomal skin care, pouching procedure and ostomy teaching by ostomy RN.  Refer to Ostomy RN Assessment and Photo.    Colostomy 12/07/24 Loop LUQ (Active)   Wound Image    02/27/25 1000   Stomal Appliance Assessment Intact 02/27/25 1000   Stoma Assessment Intact 02/27/25 1000   Stoma Shape Budded Less Than One Inch;Oval 02/27/25 1000   Stoma Size (in) 3 02/27/25 1000   Peristomal Assessment Intact 02/27/25 1000   Mucocutaneous Junction Intact 02/27/25 1000   Treatment Removed appliance with adhesive remover;Appliance Changed;Crusted with stoma powder 02/27/25 1000   Peristomal Protectant Paste Ring 02/27/25 1000   Stomal Appliance Paste Ring, 2\";2 3/4\" (70mm) CTF 02/27/25 1000   Appliance (Pouch) # 24418, 22225, 8815, 12076 x 2 02/27/25 1000   Appliance Brand Sherice 02/27/25 1000   Appliance Supplier Other (Comments) 02/27/25 1000                         ASSESSMENT AND PLAN:   1. Ostomy nurse consultation  2. Colostomy care ([facility])    2/27/2025: Patient presents today with 4 distinct granulomas near her stoma, obviously not a continuation of her stoma as previously suspected.  Granulomas have potential for interfering with pouching adhesion.  -Granulomas x 4 excised and cauterized in clinic, in order to enable better pouching of stoma  -Patient has been trying to become more independent with ostomy management, changing appliance with RN assist  - Patient to return to clinic in 2 weeks for reassessment, and continuation of ostomy education  -Home health is to continue seeing patient in between clinic for ostomy care and education.  -Current pouching system appears to be working well.  Will modify as needed    3. Colostomy in place ([facility])    2/27/2025  - Patient s/p loop transverse colostomy secondary to LBO due to complicated diverticulitis  - Patient reports that she has follow up with Dr. Cooper next month. Ostomy likely reversible " at some point.      Please note that this note may have been created using voice recognition software. I have worked with technical experts from [facility] to optimize the interface.  I have made every reasonable attempt to correct obvious errors, but there may be errors of grammar and possibly content that I did not discover before finalizing the note.    N

## 2025-02-27 NOTE — Clinical Note
Missed SN home health visit today. Pt was seen at [facility] for colostomy. Four granulomas removed. Pt to return in 2 weeks.

## 2025-02-27 NOTE — PATIENT INSTRUCTIONS
- Change colostomies every 5-7 days. Change appliance immediately if it is leaking or peristomal skin feels irritated, has itching, or  burning. To change the appliance, remove previous appliance, cleanse peristomal skin with warm water/washcloth, pat dry, make an ostomy template or use cardboard measuring guide and trace ostomy shape onto back of barrier, cut out barrier, apply a paste ring around barrier opening and apply appliance. Empty pouches when no more than ½ full. Check contents every 2 hours or as needed. Do not leave soap residue on tissue and do not use baby wipes or skin prep wipes.     - Should you experience any significant changes in your wound(s), such as infection (redness, swelling, localized heat, increased pain, fever > 101 F, chills) or have any questions regarding your home care instructions, please contact the wound center at [phone]. If after hours, contact your primary care physician or go to the hospital emergency room.     - If you are admitted to any hospital, you will need a new referral to come back to the wound clinic and any scheduled appointments that you already have, may be cancelled.    - If you are 5 or more minutes late for an appointment, we reserve the right to cancel and reschedule that appointment. Additionally, if you are habitually late or not showing (3 late cancellations and/or no shows), we reserve the right to cancel your remaining appointments and it will be your responsibility to obtain a new referral if services are still needed.

## 2025-03-01 NOTE — CASE COMMUNICATION
noted  ----- Message -----  From: Radha Mott R.N.  Sent: 2/27/2025   5:45 PM PST  To: Asuncion Lynn R.N.; Dannielle Lackey P.A.-C.; *      Missed SN home health visit today. Pt was seen at [facility] for colostomy. Four granulomas removed. Pt to return in 2 weeks.

## 2025-03-03 ENCOUNTER — HOME CARE VISIT (OUTPATIENT)
Dept: HOME HEALTH SERVICES | Facility: HOME HEALTHCARE | Age: 75
End: 2025-03-03
Payer: MEDICARE

## 2025-03-03 VITALS
TEMPERATURE: 99 F | RESPIRATION RATE: 16 BRPM | OXYGEN SATURATION: 97 % | SYSTOLIC BLOOD PRESSURE: 110 MMHG | HEART RATE: 67 BPM | DIASTOLIC BLOOD PRESSURE: 60 MMHG

## 2025-03-03 PROCEDURE — G0299 HHS/HOSPICE OF RN EA 15 MIN: HCPCS

## 2025-03-03 ASSESSMENT — ENCOUNTER SYMPTOMS
DENIES PAIN: 1
LIMITED RANGE OF MOTION: 1
VOMITING: NO
LAST BOWEL MOVEMENT: 67267
LOWER EXTREMITY EDEMA: 1
STOOL FREQUENCY: TWICE DAILY
NAUSEA: NO
MUSCLE WEAKNESS: 1

## 2025-03-03 NOTE — Clinical Note
"KANCHANMICHAEL  Pt was seen at [facility] 2/27 for colostomy FU. Four granulomas were removed. When appliance first removed today, there was some bleeding, but quickly stopped. Nicely healed. Peristomal skin intact. Stoma much easier to work with. Stoma 1 1/4 x 1 3/4\". No longer 2\". ConvaTec flat moldable appliance sample came; convex not available to 2\". Oval sample fit nicely around stoma without any molding. Flange is not accordion, so was a little difficult for pt to place. Now that stoma is smaller, will see what other options we have with ConvaTec. Will see if convex available. Pt did well removing and applying new appliance.   Thank you, Radha Mott, RN, MSN, WOCN  "

## 2025-03-04 NOTE — CASE COMMUNICATION
"noted  ----- Message -----  From: Radha Mott R.N.  Sent: 3/3/2025   7:03 PM PST  To: Asuncion Lynn R.N.; Dannielle Lackey P.A.-C.; *      PHOEBE  Pt was seen at [facility] 2/27 for colostomy FU. Four granulomas were removed. When appliance first removed today, there was some bleeding, but quickly stopped. Nicely healed. Peristomal skin intact. Stoma much easier to work with. Stoma 1 1/4 x 1 3/4\". No longer 2\". ConvaT ec flat moldable appliance sample came; convex not available to 2\". Oval sample fit nicely around stoma without any molding. Flange is not accordion, so was a little difficult for pt to place. Now that stoma is smaller, will see what other options we have with ConvaTec. Will see if convex available. Pt did well removing and applying new appliance.   Thank you, Radha Mott, RN, MSN, WOCN  "

## 2025-03-05 ENCOUNTER — TELEPHONE (OUTPATIENT)
Dept: CARDIOLOGY | Facility: MEDICAL CENTER | Age: 75
End: 2025-03-05
Payer: MEDICARE

## 2025-03-05 NOTE — TELEPHONE ENCOUNTER
Last OV: 12/23/2024  Proposed Surgery: Colonoscopy through ostomy and rectum   Surgery Date: 04/02/2025  Requesting Office Name: [facility]   Fax Number: [phone]  Preference of Location (default is surgery center unless specified by Cardiologist or LOUIE)  Prior Clearance Addressed: No    Is this a general clearance? YES   Anticoags/Antiplatelets: Apixaban   Anticoags/Antiplatelet managed by Cardiology? YES    Outstanding Cardiac Imaging : No  Ablation, Cardioversion, Stent, Cardiac Devices, Catheterization, Watchman: No  TAVR/Valve, Mitral Clip, Watchman (including open heart),: N/A   Recent Cardiac Hospitalization: Yes  Date:  01/19/2025            When: Hospitalized in the last 3 months. Forward to provider to review.   History (cardiac history):   Past Medical History:   Diagnosis Date    Allergy     Anemia 1997    Bleeding hemorrhoids    Anxiety     Arthritis     Asthma     Bronchitis     Cataract     bilateral    Chest pain 02/01/2016    Cholesterol serum elevated     diet controlled    Dental disorder 10/2020    Filled chipped front upper tooth    Depression     anxiety    Detached retina, left     Diverticulitis     Diverticulitis 03/28/2016    Gynecological disorder 1982    bleeding, cervical pecancer    Headache     Occasionally, can become severe if she does not take Advil. ICD-10 transition    Headache(784.0)     Occasionally, can become severe if she does not take Advil.    Heart murmur     Comes and goes    Hemorrhoids 1997    had surgery    High cholesterol     Hyperlipidemia     Hypertension     non-medicated    Hyponatremia 03/29/2016    Indigestion     Infectious disease     uti    Macular hole of right eye     Pain     Pneumonia     Shortness of breath 02/28/2014    SIRS (systemic inflammatory response syndrome) (HCC) 03/29/2016    Urinary tract infection, site not specified            Is this a dental clearance? NO  Ablation, Cardioversion, Watchman, Stents, Cath, Devices within the last 3 months? No    If yes- Send dental wait letter, do not forward to provider for review.     TAVR / Valve, Mitral clip within the last 6 months? No  If yes- Send dental wait letter, do not forward to provider for review.     If completing a general clearance, continue per protocol.           Surgical Clearance Letter Sent: No Provider to advise.   **Scan clearance request letter into [facility].**

## 2025-03-05 NOTE — LETTER
PROCEDURE/SURGERY CLEARANCE FORM      Encounter Date: 3/5/2025    Patient: Guerda Lunsford  YOB: 1950    CARDIOLOGIST:  Grayson Nolasco M.D.   REFERRING DOCTOR:  No ref. provider found    The following procedure/surgery: Colonoscopy through ostomy and rectum                                            PROCEDURE/SURGERY CLEARANCE FORM    Date: 3/6/2025   Patient Name: Guerda Lunsford    Dear Surgeon or Proceduralist,      Thank you for your request for cardiac stratification of our mutual patient Guerda Lunsford 1950. We have reviewed their [facility] records; and to the best of our understanding this patient has not had stenting, ablation, watchman, cardiothoracic surgery or hospitalization for cardiovascular reasons in the past 6 months.  Guedra Lunsford has been seen within the past 15 months and is considered to have non-modifiable cardiac risk for this low-risk procedure/surgery. They may proceed from a cardiovascular standpoint and may hold their antiplatelet/anticoagulation as briefly as possible. Please have patient resume this medication when hemodynamically stable to do so.     Aspirin or Prasugrel   - hold 7 days prior to procedure/surgery, resume when hemodynamically stable      Clopidrogrel or Ticagrelor  - hold 7 days for all neurological procedures, hold 5 days prior to all other procedure/surgery,  resume when hemodynamically stable     Warfarin - hold 7 days for all neurological procedures, hold 5 days prior to all other procedure/surgery and coordinate with [facility] Anticoagulation Clinic ([phone]) INR testing and dose management.      Pradaxa/Xarelto/Eliquis/Savesya - hold 1 day prior to procedure for low bleeding risk procedure, 2 days for high bleeding risk procedure, or consider holding 3 days or longer for patients with reduced kidney function (CrCl <30mL/min) or spinal/cranial surgeries/procedures.      If they have a mechanical heart valve,  please coordinate with [facility] Anticoagulation Service ([phone]) the proper management of their anticoagulant in the periprocedural or perioperative period.      Some patients have higher risk for cardiovascular complications or holding medication. If our patient has had prior complications of holding antiplatelet or anticoagulants in the past and we have seen them after these events, we have addressed these concerns with the patient. They are at an unknown degree of increased risk for recurrent complication.  You may hold anticoagulation/antiplatelets for the procedure or surgery if the benefits of the procedure or surgery outweigh this nonmodifiable risk.      If Guerda Lunsford 1950 has new symptoms of heart failure decompensation, unstable arrythmia, or angina please reach out and we will assess the patient.      If you have other patient-specific concerns, please feel free to reach out to the patient's cardiologist directly at [phone].     Thank you,       [facility]       Electronically signed       MD Vargas Nolasco M.D.

## 2025-03-05 NOTE — LETTER
PROCEDURE/SURGERY CLEARANCE FORM      Encounter Date: 3/5/2025    Patient: Guerda Lunsford  YOB: 1950    CARDIOLOGIST: Grayson Nolasco M.D.   REFERRING DOCTOR:  No ref. provider found    Th following procedure/surgery: Colonoscopy through ostomy and rectum                                            PROCEDURE/SURGERY CLEARANCE FORM    Date: 3/6/2025   Patient Name: Guerda Lunsford    Dear Surgeon or Proceduralist,      Thank you for your request for cardiac stratification of our mutual patient Guerda Lunsford 1950. We have reviewed their [facility] records; and to the best of our understanding this patient has not had stenting, ablation, watchman, cardiothoracic surgery or hospitalization for cardiovascular reasons in the past 6 months.  Guerda Lunsford has been seen within the past 15 months and is considered to have non-modifiable cardiac risk for this low-risk procedure/surgery. They may proceed from a cardiovascular standpoint and may hold their antiplatelet/anticoagulation as briefly as possible. Please have patient resume this medication when hemodynamically stable to do so.     Aspirin or Prasugrel   - hold 7 days prior to procedure/surgery, resume when hemodynamically stable      Clopidrogrel or Ticagrelor  - hold 7 days for all neurological procedures, hold 5 days prior to all other procedure/surgery,  resume when hemodynamically stable     Warfarin - hold 7 days for all neurological procedures, hold 5 days prior to all other procedure/surgery and coordinate with [facility] Anticoagulation Clinic ([phone]) INR testing and dose management.      Pradaxa/Xarelto/Eliquis/Savesya - hold 1 day prior to procedure for low bleeding risk procedure, 2 days for high bleeding risk procedure, or consider holding 3 days or longer for patients with reduced kidney function (CrCl <30mL/min) or spinal/cranial surgeries/procedures.      If they have a mechanical heart valve,  please coordinate with [facility] Anticoagulation Service ([phone]) the proper management of their anticoagulant in the periprocedural or perioperative period.      Some patients have higher risk for cardiovascular complications or holding medication. If our patient has had prior complications of holding antiplatelet or anticoagulants in the past and we have seen them after these events, we have addressed these concerns with the patient. They are at an unknown degree of increased risk for recurrent complication.  You may hold anticoagulation/antiplatelets for the procedure or surgery if the benefits of the procedure or surgery outweigh this nonmodifiable risk.      If Guerda Lunsford 1950 has new symptoms of heart failure decompensation, unstable arrythmia, or angina please reach out and we will assess the patient.      If you have other patient-specific concerns, please feel free to reach out to the patient's cardiologist directly at [phone].     Thank you,       [facility]       Electronically signed        MD Vargas Nolasco M.D.

## 2025-03-06 ENCOUNTER — APPOINTMENT (OUTPATIENT)
Dept: WOUND CARE | Facility: MEDICAL CENTER | Age: 75
End: 2025-03-06
Attending: STUDENT IN AN ORGANIZED HEALTH CARE EDUCATION/TRAINING PROGRAM
Payer: MEDICARE

## 2025-03-06 ENCOUNTER — HOME CARE VISIT (OUTPATIENT)
Dept: HOME HEALTH SERVICES | Facility: HOME HEALTHCARE | Age: 75
End: 2025-03-06
Payer: MEDICARE

## 2025-03-06 VITALS
DIASTOLIC BLOOD PRESSURE: 60 MMHG | HEART RATE: 75 BPM | RESPIRATION RATE: 16 BRPM | OXYGEN SATURATION: 94 % | SYSTOLIC BLOOD PRESSURE: 100 MMHG | TEMPERATURE: 98 F

## 2025-03-06 PROCEDURE — G0299 HHS/HOSPICE OF RN EA 15 MIN: HCPCS

## 2025-03-06 ASSESSMENT — ENCOUNTER SYMPTOMS
LOWER EXTREMITY EDEMA: 1
DENIES PAIN: 1
MUSCLE WEAKNESS: 1
VOMITING: NO
LAST BOWEL MOVEMENT: 67270
STOOL FREQUENCY: MORE THAN TWICE DAILY
NAUSEA: NO

## 2025-03-07 ENCOUNTER — HOSPITAL ENCOUNTER (OUTPATIENT)
Dept: RADIOLOGY | Facility: MEDICAL CENTER | Age: 75
End: 2025-03-07
Attending: SURGERY
Payer: MEDICARE

## 2025-03-07 DIAGNOSIS — Z93.3 COLOSTOMY STATUS (HCC): ICD-10-CM

## 2025-03-07 PROCEDURE — 74270 X-RAY XM COLON 1CNTRST STD: CPT

## 2025-03-07 PROCEDURE — 700117 HCHG RX CONTRAST REV CODE 255: Performed by: SURGERY

## 2025-03-07 RX ADMIN — IOHEXOL 700 ML: 300 INJECTION, SOLUTION INTRAVENOUS at 14:54

## 2025-03-10 ENCOUNTER — HOME CARE VISIT (OUTPATIENT)
Dept: HOME HEALTH SERVICES | Facility: HOME HEALTHCARE | Age: 75
End: 2025-03-10
Payer: MEDICARE

## 2025-03-10 VITALS
TEMPERATURE: 98.5 F | HEART RATE: 66 BPM | RESPIRATION RATE: 18 BRPM | SYSTOLIC BLOOD PRESSURE: 104 MMHG | OXYGEN SATURATION: 96 % | DIASTOLIC BLOOD PRESSURE: 60 MMHG

## 2025-03-10 PROCEDURE — G0299 HHS/HOSPICE OF RN EA 15 MIN: HCPCS

## 2025-03-10 ASSESSMENT — ACTIVITIES OF DAILY LIVING (ADL)
AMBULATION ASSISTANCE: STAND BY ASSIST
CURRENT_FUNCTION: STAND BY ASSIST

## 2025-03-10 ASSESSMENT — ENCOUNTER SYMPTOMS
STOOL FREQUENCY: MORE THAN TWICE DAILY
HYPERTENSION: 1
MUSCLE WEAKNESS: 1
INDIGESTION: 1
NAUSEA: DENIES
DRY SKIN: 1
VOMITING: DENIES
DENIES PAIN: 1
FATIGUE: 1
FORGETFULNESS: 1
LOWER EXTREMITY EDEMA: 1

## 2025-03-10 NOTE — Clinical Note
VS WNL. Denies pain, falls or changes in medications. Ostomy care provided per plan and photo taken/submitted. Patient did 90% of the ostomy appliance change - patient needed assistance with areas that were challenging to see. Patient is hoping to be approved by Dr. Cooper in June or July for ostomy reversal and hernia surgey but needs to meet MD requirements including weightloss. Patient encouraged to fillow up with Kenneth for the desired amount of weight she would like patient to lose. No further concerns at this time.

## 2025-03-11 NOTE — CASE COMMUNICATION
noted  ----- Message -----  From: Hortencia Redmond R.N.  Sent: 3/10/2025   1:46 PM PDT  To: Asuncion Lynn R.N.      VS WNL. Denies pain, falls or changes in medications. Ostomy care provided per plan and photo taken/submitted. Patient did 90% of the ostomy appliance change - patient needed assistance with areas that were challenging to see. Patient is hoping to be approved by Dr. Cooper in June or July for ostomy reversal and hernia brown rgey but needs to meet MD requirements including weightloss. Patient encouraged to fillow up with Kenneth for the desired amount of weight she would like patient to lose. No further concerns at this time.

## 2025-03-12 ENCOUNTER — APPOINTMENT (OUTPATIENT)
Dept: WOUND CARE | Facility: MEDICAL CENTER | Age: 75
End: 2025-03-12
Attending: STUDENT IN AN ORGANIZED HEALTH CARE EDUCATION/TRAINING PROGRAM
Payer: MEDICARE

## 2025-03-13 ENCOUNTER — OFFICE VISIT (OUTPATIENT)
Dept: WOUND CARE | Facility: MEDICAL CENTER | Age: 75
End: 2025-03-13
Attending: STUDENT IN AN ORGANIZED HEALTH CARE EDUCATION/TRAINING PROGRAM
Payer: MEDICARE

## 2025-03-13 ENCOUNTER — HOME CARE VISIT (OUTPATIENT)
Dept: HOME HEALTH SERVICES | Facility: HOME HEALTHCARE | Age: 75
End: 2025-03-13
Payer: MEDICARE

## 2025-03-13 DIAGNOSIS — Z93.3 COLOSTOMY IN PLACE (HCC): ICD-10-CM

## 2025-03-13 DIAGNOSIS — Z71.89 OSTOMY NURSE CONSULTATION: ICD-10-CM

## 2025-03-13 DIAGNOSIS — Z43.3 COLOSTOMY CARE (HCC): ICD-10-CM

## 2025-03-13 PROCEDURE — 17250 CHEM CAUT OF GRANLTJ TISSUE: CPT | Performed by: NURSE PRACTITIONER

## 2025-03-13 PROCEDURE — 17250 CHEM CAUT OF GRANLTJ TISSUE: CPT

## 2025-03-13 PROCEDURE — 99213 OFFICE O/P EST LOW 20 MIN: CPT | Mod: 25 | Performed by: NURSE PRACTITIONER

## 2025-03-13 PROCEDURE — 99213 OFFICE O/P EST LOW 20 MIN: CPT

## 2025-03-13 NOTE — PROGRESS NOTES
Provider Encounter- Ostomy Encounter        HISTORY OF PRESENT ILLNESS  Wound History:    START OF CARE IN CLINIC: 2/6/2025    REFERRING PROVIDER: Dannielle Lackey     OSTOMY TYPE: Loop transverse colostomy   LOCATION: LUQ              OSTOMY HISTORY: LBO secondary to complicated diverticulitis   SURGERY: 12/7/2025 colostomy revision Dr. Hou   OSTOMY ISSUES:  74F with PMHx of obesity, diverticulitis, colitis, DM2, HTN. Patient was admitted 11/19-11/29/2025 for large bowel obstruction.  November 24, 2024 the patient underwent laparoscopic transverse loop colostomy due to LBO secondary to complex diverticulitis.  Patient was treated with IV antibiotics and eventually was discharged.  Patient was receiving ostomy care and education from home health.  She returned to the ED and was admitted December 6 through December 10, 2024 for colostomy dysfunction appears that colostomy had retracted and had a complete separation of the mucocutaneous junction.  She was taken back to surgery on 12/7 for colostomy revision. Patient reports that ostomy function has been much improved.    Pertinent Medical History: See above     Patient's problem list, allergies, and current medications reviewed and updated in Epic    Interval History:  2/6/2025 Joint visit with ostomy RN, refer to nursing note. Wear time 3-4 days. Leaks Seldom. Weight stability gaining. Patient has been following with home health and she is becoming more involved in her ostomy care. Stoma is oblong, causes some challenges with pouching. Patient has some hypergranular / friable tissue from 3-6 o'clock which bleeds with changes. Recommend chemical cautery today.    2/13/2025 : Clinic visit with RICK Hillman, FNP-BC, CWOCN, CFCN.  Visit with ostomy RN, refer to nursing note.  Wear time-4 days.  Leaks-seldom.  Patient is eating regular diet, weight stable.  She has resumed many of her normal activities.   Patient is being seen here for concerns for possible  hypergranulation buds around her stoma.  In reviewing old photos of the stoma, I am not convinced that these are are hypergranulation buds, but rather part of the actual stoma/mucosal tissue.  I do not recommend chemical cautery of this tissue.  Continue to pouch around them, and monitor.  Will have patient return to clinic in 2 weeks for reassessment.   At some point, patient may be undergoing stoma reversal.  This is a loop colostomy.  She has a follow-up with her surgeon next month.    2/27/2025 : Clinic visit with RICK Hillman, SUZIE, HYACINTH, ALYSIA.   Patient presents today with distinct granulomas to lateral edge of her stoma.  Previously thought to be possibly an extension of her stoma, however today they present as distinct separate lesions.  4 lesions were excised in clinic today, then treated with silver nitrate.   Patient has been getting 3 to 4 days wear time from her ostomy appliance, reports occasional leaks.  She is eating well, weight stable.    3/13/2025 : Clinic visit with RICK Hillman, SUZIE, HYACINTH, ALYSIA.   Patient states she is feeling well, denies any problems with leaks over the past week.  She is trying to become more independent with her ostomy.  Site of previous granuloma excision assessed, 2 new blebs appear to be starting, treated with AgNO3 today.      REVIEW OF SYSTEMS:   Unchanged from previous clinic visit on 2/27/2025, except as documented in interval history above    PHYSICAL EXAMINATION:   There were no vitals taken for this visit.    Physical Exam  Constitutional:       General: She is not in acute distress.     Appearance: She is obese.   Cardiovascular:      Rate and Rhythm: Normal rate.   Pulmonary:      Effort: Pulmonary effort is normal. No respiratory distress.   Abdominal:      Comments: Loop transverse colostomy LUQ: Stoma is not well budded, pink, productive.  MCJ intact.  Peristomal skin intact.  4 distinct granulomas noted today along the lateral edge of  "stoma.   Neurological:      Mental Status: She is alert.       PROCEDURE:   -AgNO3 applied to 2 small granulomas at edge stoma, at approximately 4:00  -Wound care and ostomy pouching completed by ostomy RN, refer to flowsheet  -Patient tolerated the procedure well, without c/o pain or discomfort.      STOMA ASSESSMENT/PROCEDURE: Peristomal skin care, pouching procedure and ostomy teaching by ostomy RN.  Refer to Ostomy RN Assessment and Photo.    Colostomy 12/07/24 Loop LUQ (Active)   Wound Image    03/13/25 1500   Stomal Appliance Assessment Intact 03/13/25 1500   Stoma Assessment Red 03/13/25 1500   Stoma Shape Oval;Flush 03/13/25 1500   Stoma Size (in) 3 02/27/25 1000   Peristomal Assessment Intact;Other (Comment) 03/13/25 1500   Mucocutaneous Junction Intact 03/13/25 1500   Treatment Removed appliance with adhesive remover;Cleansed with water/washcloth;Site care;Crusted with stoma powder;Appliance Changed 03/13/25 1500   Peristomal Protectant Paste Ring 03/13/25 1500   Stomal Appliance Paste Ring, 2\";2 3/4\" (70mm) CTF 03/13/25 1500   Output Color Brown 03/13/25 1500   WOUND RN ONLY - Stomal Appliance  2 Piece;2 3/4\" (70mm) CTF;Soft Convex;Paste Ring, Ceraplus, 2\" slim;Brava Sheet;Belt, Large 03/13/25 1500   Appliance (Pouch) # [id number], 8815, 32346, 7299, 12076 x 2, [id number] 03/13/25 1500   Appliance Brand [place] 03/13/25 1500   Appliance Supplier Other (Comments) 03/13/25 1500                                   ASSESSMENT AND PLAN:   1. Ostomy nurse consultation  2. Colostomy care ([facility])    3/13/2025: Patient presents today 2 small granulomas at site of previous excision.  -AgNO3 to granulomas x 2  -Ostomy care and education in clinic today.  Goal is for patient to become independent  - Patient to return to clinic in 2 weeks for reassessment, and continuation of ostomy education  -Home health is to continue seeing patient in between clinic for ostomy care and education.  -Current pouching system appears to be " working well.      3. Colostomy in place ([facility])    3/13/2025  - Patient s/p loop transverse colostomy secondary to LBO due to complicated diverticulitis  - Patient reports that she underwent recent barium enema, needs sigmoidoscopy.  Will likely be undergoing reversal at some point    My total time spent caring for the patient on the day of the encounter was 20 minutes.   This does not include time spent on separately billable procedures/tests.       Please note that this note may have been created using voice recognition software. I have worked with technical experts from [facility] to optimize the interface.  I have made every reasonable attempt to correct obvious errors, but there may be errors of grammar and possibly content that I did not discover before finalizing the note.    N

## 2025-03-13 NOTE — PATIENT INSTRUCTIONS
-Change colostomies every 5-7 days. Change appliance immediately if it is leaking or peristomal skin feels irritated, has itching, or  burning.   To change the appliance, remove previous appliance, cleanse peristomal skin with warm water/washcloth, pat dry, make an ostomy template or use cardboard measuring guide and trace ostomy shape onto back of barrier, cut out barrier, apply a paste ring around barrier opening and apply appliance. Empty pouches when no more than ½ full. Check contents every 2 hours or as needed. Do not leave soap residue on tissue and do not use baby wipes or skin prep wipes.     -Should you experience any significant changes in your wound(s), such as infection (redness, swelling, localized heat, increased pain, fever > 101 F, chills) or have any questions regarding your home care instructions, please contact the wound center at [phone]. If after hours, contact your primary care physician or go to the hospital emergency room.

## 2025-03-13 NOTE — PROGRESS NOTES
"Ostomy Evaluation  For 90 Day Certification Period: 02/06/25   - 06/11/25     Surgeon: Dr. Hou  Surgery type and date: Transverse loop colostomy due to large bowel obstruction on 11/4/24. Revision on 12/7/24 due to peristomal complications, stomal retraction and skin separation.   Temporary  Pre-Marked: No   Pertinent Hx: Diverticulitis, DM2, obesity    Start of Care: 02/06/2025    Supplier:  [facility]  at this time  Order date: TBD    OBJECTIVE: 2 3/4\" appliance intact with large belt, moderate wear and caked on stool on inside of barrier when removed.  Minimal granulomas at 4:00.     STOMA ASSESSMENT:  Colostomy 12/07/24 Loop LUQ (Active)   Wound Image    03/13/25 1500   Stomal Appliance Assessment Intact 03/13/25 1500   Stoma Assessment Red 03/13/25 1500   Stoma Shape Oval;Flush 03/13/25 1500   Stoma Size (in) 3 02/27/25 1000   Peristomal Assessment Intact;Other (Comment) 03/13/25 1500   Mucocutaneous Junction Intact 03/13/25 1500   Treatment Removed appliance with adhesive remover;Cleansed with water/washcloth;Site care;Crusted with stoma powder;Appliance Changed 03/13/25 1500   Peristomal Protectant Paste Ring 03/13/25 1500   Stomal Appliance Paste Ring, 2\";2 3/4\" (70mm) CTF 03/13/25 1500   Output Color Brown 03/13/25 1500   WOUND RN ONLY - Stomal Appliance  2 Piece;2 3/4\" (70mm) CTF;Soft Convex;Paste Ring, Ceraplus, 2\" slim;Brava Sheet;Belt, Large 03/13/25 1500   Appliance (Pouch) # [id number], 8815, 29945, 7299, 12076 x 2, [id number] 03/13/25 1500   Appliance Brand [place] 03/13/25 1500   Appliance Supplier Other (Comments) 03/13/25 1500                     Pouching Procedure Notes (if indicated): Instructed patient to rinse pouch after emptying and to apply lubricating deodorant into pouch after rinsing and with any new pouch change, to help prevent pancaking of stool on stoma and barrier. Instructed patient to apply paste ring to back of barrier after cutting, but to allow some room between edge of where barrier " "is cut and application of paste ring. Patient had been pushing paste ring up to edge and it appears to have been melting into pouch.    Previous Trials and Notes: Convex 2 3/4\"    Patient/Caregiver Education:   Brianneron CABRERA applied slver nitrate to granulomas x 2 at 4:00.  Neutralized with saline.   Encouraged patient to continue practicing with the home health nurses so that she can be independent.       1 Can do independently   2 Needs some help   3 Needs a lot of help and additional review   4 No chance to review, needs additional follow-up     EMPTY THE POUCH  Empty pouch when it is 1/3 - ½ full 1   Assemble supplies before emptying: toilet paper, pouch, deodorant  1   Assume a correct position 1   Open pouch 1   Lower opening into the toilet and empty 1   Wipe opening before resealing 1   Add pouch deodorant (if used) -   Reclamp or reseal the pouch 1   , REMOVING THE OLD POUCH  Assemble supplies for pouch change: new pouch, towel, measurement guide, pen, scissors, garbage bag (skin barrier ring, powder, deodorant, and adhesive remover, if needed) 1   Remove the outer adhesive by starting at one corner/edge 1   Place one hand on skin and push down while your other hand lifts the barrier to push skin away from barrier. Use a warm wet cloth or adhesive releaser if needed 1   Place the old pouch in a garbage bag 1   If you are using a clamp, do not throw it away 1   , CLEAN AND INSPECT THE SKIN  Check the skin for color, bleeding, and irritation 1   Clean skin around the stoma with ONLY warm water 1   Pat the skin dry 1       Crusting if needed    Apply stoma powder to red/wet skin, brush off excess 4   Apply skin protectant over powder ONLY to seal in place 4   , MEASURE AND CUT OPENING  Cover the stoma with a piece of tissue or gauze while measuring 1   Measure the stoma accurately with the measurement guide 1   Trace the correct size on the back of the pouch barrier 1   Place your finger into the precut " "opening and push the pouch away from the barrier in using a one-piece system -   Cut the traced opening with full teeth of the scissors 2   Align opening over the stoma making sure it is close to the stoma edge. Adjust as needed.  2   Colostomy - 1/8” clearance between stoma and appliance (width of a nickel on its side)  Ileostomy/Urostomy - 1/16” clearance between stoma and appliance (width of a dime on its side) 2   , and APPLY NEW POUCHING SYSTEM  Remove the paper backing from the pouch barrier 2   Crust if needed 4   Remove any gauze/tissue placed over stoma 1   Center and apply the pouch skin barrier around the stoma. Starting closest to the stoma, press and rub on the barrier for 30-60 seconds \"Going around the race track\" 2   For a two-piece system, apply the pouch to the barrier flange 2   Close the bottom of the pouch 2       PLAN/GOALS:  Patient to become more independent with ostomy care - patient is assisting more at home. She has a sigmoidoscopy scheduled for 4/2/25 and will be discussing reversal surgery after all pre op procedures are completed.   Come back in 2 weeks to assess stoma and granulomas   [facility].      WOUND PROCEDURE NOTE (if indicated): n/a    "

## 2025-03-14 DIAGNOSIS — E11.9 TYPE 2 DIABETES MELLITUS WITHOUT COMPLICATION, WITHOUT LONG-TERM CURRENT USE OF INSULIN (HCC): ICD-10-CM

## 2025-03-14 RX ORDER — ATORVASTATIN CALCIUM 20 MG/1
20 TABLET, FILM COATED ORAL DAILY
Qty: 100 TABLET | Refills: 3 | Status: SHIPPED | OUTPATIENT
Start: 2025-03-14

## 2025-03-14 NOTE — CASE COMMUNICATION
Has appt with wound clinic today Pt laying in bed, NAD, respirations even and unlabored, NSR on the monitor. Blood glucose <70, Mynor GUALLPA made aware, pt provided apple juice and dextrose 50% administered. Pt mentating well, pt denies diaphoresis, headache, blurry vision, chills.

## 2025-03-17 ENCOUNTER — HOME CARE VISIT (OUTPATIENT)
Dept: HOME HEALTH SERVICES | Facility: HOME HEALTHCARE | Age: 75
End: 2025-03-17
Payer: MEDICARE

## 2025-03-17 VITALS
SYSTOLIC BLOOD PRESSURE: 110 MMHG | TEMPERATURE: 99.6 F | OXYGEN SATURATION: 94 % | DIASTOLIC BLOOD PRESSURE: 60 MMHG | HEART RATE: 72 BPM | RESPIRATION RATE: 16 BRPM

## 2025-03-17 PROCEDURE — G0299 HHS/HOSPICE OF RN EA 15 MIN: HCPCS

## 2025-03-17 ASSESSMENT — ENCOUNTER SYMPTOMS
MUSCLE WEAKNESS: 1
LAST BOWEL MOVEMENT: 67281
STOOL FREQUENCY: TWICE DAILY
VOMITING: NO
LIMITED RANGE OF MOTION: 1
DENIES PAIN: 1
LOWER EXTREMITY EDEMA: 1
NAUSEA: NO

## 2025-03-18 ENCOUNTER — PHARMACY VISIT (OUTPATIENT)
Dept: PHARMACY | Facility: MEDICAL CENTER | Age: 75
End: 2025-03-18
Payer: COMMERCIAL

## 2025-03-18 PROCEDURE — RXMED WILLOW AMBULATORY MEDICATION CHARGE: Performed by: PHYSICIAN ASSISTANT

## 2025-03-19 ENCOUNTER — APPOINTMENT (OUTPATIENT)
Dept: WOUND CARE | Facility: MEDICAL CENTER | Age: 75
End: 2025-03-19
Attending: STUDENT IN AN ORGANIZED HEALTH CARE EDUCATION/TRAINING PROGRAM
Payer: MEDICARE

## 2025-03-20 ENCOUNTER — APPOINTMENT (OUTPATIENT)
Dept: WOUND CARE | Facility: MEDICAL CENTER | Age: 75
End: 2025-03-20
Attending: STUDENT IN AN ORGANIZED HEALTH CARE EDUCATION/TRAINING PROGRAM
Payer: MEDICARE

## 2025-03-20 ENCOUNTER — HOME CARE VISIT (OUTPATIENT)
Dept: HOME HEALTH SERVICES | Facility: HOME HEALTHCARE | Age: 75
End: 2025-03-20
Payer: MEDICARE

## 2025-03-20 VITALS
HEART RATE: 73 BPM | OXYGEN SATURATION: 93 % | SYSTOLIC BLOOD PRESSURE: 110 MMHG | TEMPERATURE: 99.6 F | RESPIRATION RATE: 16 BRPM | DIASTOLIC BLOOD PRESSURE: 60 MMHG

## 2025-03-20 PROCEDURE — G0299 HHS/HOSPICE OF RN EA 15 MIN: HCPCS

## 2025-03-20 ASSESSMENT — ENCOUNTER SYMPTOMS
MUSCLE WEAKNESS: 1
NAUSEA: NO
LAST BOWEL MOVEMENT: 67284
DENIES PAIN: 1
STOOL FREQUENCY: TWICE DAILY
LOWER EXTREMITY EDEMA: 1
LIMITED RANGE OF MOTION: 1
VOMITING: NO

## 2025-03-20 NOTE — Clinical Note
PHOEBE  Some leakage under ostomy skin barrier 3-6 o'clock. Peristomal skin intact. ConvaTec 2-pc moldable convex sample came. Does not have accordion flange. Flat flange is very hard for pt to put together and to open to vent. She did use moldable and get it on. Did not use paste ring. Also had pt use old pattern and trace to new Sherice skin barrier and cut so she has another skin barrier over the weekend should she need it. Still has some problem placing around stoma. Had her look clearly at where lower margins to be sure clear and and then move up. Needs continued practice. She can only stand briefly when looking in mirror to place skin barrier, then has to sit. Best option may be to stay with [place] convex she has been using and have them precut by Janee [employer]. Was seen at [facility] 3/14 and more granulomas silver nitrated. Is scheduled to return in 2 weeks.

## 2025-03-23 NOTE — CASE COMMUNICATION
noted  ----- Message -----  From: Radha Mott R.N.  Sent: 3/20/2025  10:11 PM PDT  To: Asuncion Lynn R.N.; Dannielle Lackey P.A.-C.; *      PHOEBE  Some leakage under ostomy skin barrier 3-6 o'clock. Peristomal skin intact. ConvaTec 2-pc moldable convex sample came. Does not have accordion flange. Flat flange is very hard for pt to put together and to open to vent. She did use moldable and get it on. Did not use paste ring. Also had pt use  old pattern and trace to new Sherice skin barrier and cut so she has another skin barrier over the weekend should she need it. Still has some problem placing around stoma. Had her look clearly at where lower margins to be sure clear and and then move up. Needs continued practice. She can only stand briefly when looking in mirror to place skin barrier, then has to sit. Best option may be to stay with Sherice convex she has been using  and have them precut by [employer]. Was seen at [facility] 3/14 and more granulomas silver nitrated. Is scheduled to return in 2 weeks.

## 2025-03-24 ENCOUNTER — HOME CARE VISIT (OUTPATIENT)
Dept: HOME HEALTH SERVICES | Facility: HOME HEALTHCARE | Age: 75
End: 2025-03-24
Payer: MEDICARE

## 2025-03-24 VITALS
HEART RATE: 74 BPM | RESPIRATION RATE: 18 BRPM | TEMPERATURE: 97.7 F | DIASTOLIC BLOOD PRESSURE: 80 MMHG | OXYGEN SATURATION: 94 % | SYSTOLIC BLOOD PRESSURE: 120 MMHG

## 2025-03-24 PROCEDURE — G0495 RN CARE TRAIN/EDU IN HH: HCPCS

## 2025-03-24 ASSESSMENT — ENCOUNTER SYMPTOMS
PAIN LOCATION - PAIN SEVERITY: 3/10
PAIN LOCATION - EXACERBATING FACTORS: WALK
PAIN LOCATION - RELIEVING FACTORS: REST
LOWEST PAIN SEVERITY IN PAST 24 HOURS: 0/10
PAIN: 1
HIGHEST PAIN SEVERITY IN PAST 24 HOURS: 5/10
SUBJECTIVE PAIN PROGRESSION: GRADUALLY IMPROVING
PAIN LOCATION - PAIN DURATION: 1 WEEK
PAIN LOCATION - PAIN FREQUENCY: FREQUENT
PAIN LOCATION - PAIN QUALITY: ACHE
PAIN LOCATION: BACK
DEBILITATING PAIN: 1
PAIN SEVERITY GOAL: 1/10

## 2025-03-25 ENCOUNTER — APPOINTMENT (OUTPATIENT)
Dept: CARDIOLOGY | Facility: MEDICAL CENTER | Age: 75
End: 2025-03-25
Attending: INTERNAL MEDICINE
Payer: MEDICARE

## 2025-03-27 ENCOUNTER — OFFICE VISIT (OUTPATIENT)
Dept: MEDICAL GROUP | Age: 75
End: 2025-03-27
Payer: MEDICARE

## 2025-03-27 ENCOUNTER — OFFICE VISIT (OUTPATIENT)
Dept: WOUND CARE | Facility: MEDICAL CENTER | Age: 75
End: 2025-03-27
Attending: STUDENT IN AN ORGANIZED HEALTH CARE EDUCATION/TRAINING PROGRAM
Payer: MEDICARE

## 2025-03-27 VITALS
BODY MASS INDEX: 48.32 KG/M2 | SYSTOLIC BLOOD PRESSURE: 112 MMHG | HEART RATE: 73 BPM | DIASTOLIC BLOOD PRESSURE: 60 MMHG | WEIGHT: 290 LBS | HEIGHT: 65 IN | TEMPERATURE: 97.3 F | OXYGEN SATURATION: 91 %

## 2025-03-27 DIAGNOSIS — N18.31 STAGE 3A CHRONIC KIDNEY DISEASE: ICD-10-CM

## 2025-03-27 DIAGNOSIS — Z93.3 COLOSTOMY IN PLACE (HCC): ICD-10-CM

## 2025-03-27 DIAGNOSIS — Z43.3 COLOSTOMY CARE (HCC): ICD-10-CM

## 2025-03-27 DIAGNOSIS — D64.9 ANEMIA, UNSPECIFIED TYPE: ICD-10-CM

## 2025-03-27 DIAGNOSIS — E78.5 DYSLIPIDEMIA: ICD-10-CM

## 2025-03-27 DIAGNOSIS — M54.50 LUMBAR BACK PAIN: ICD-10-CM

## 2025-03-27 DIAGNOSIS — E11.9 TYPE 2 DIABETES MELLITUS WITHOUT COMPLICATION, WITHOUT LONG-TERM CURRENT USE OF INSULIN (HCC): ICD-10-CM

## 2025-03-27 DIAGNOSIS — E66.01 MORBID OBESITY WITH BMI OF 45.0-49.9, ADULT (HCC): ICD-10-CM

## 2025-03-27 DIAGNOSIS — Z71.89 OSTOMY NURSE CONSULTATION: ICD-10-CM

## 2025-03-27 LAB
HBA1C MFR BLD: 6.5 % (ref ?–5.8)
POCT INT CON NEG: NEGATIVE
POCT INT CON POS: POSITIVE

## 2025-03-27 PROCEDURE — 99213 OFFICE O/P EST LOW 20 MIN: CPT | Performed by: NURSE PRACTITIONER

## 2025-03-27 PROCEDURE — 83036 HEMOGLOBIN GLYCOSYLATED A1C: CPT | Performed by: PHYSICIAN ASSISTANT

## 2025-03-27 PROCEDURE — 3078F DIAST BP <80 MM HG: CPT | Performed by: PHYSICIAN ASSISTANT

## 2025-03-27 PROCEDURE — 99213 OFFICE O/P EST LOW 20 MIN: CPT

## 2025-03-27 PROCEDURE — 99214 OFFICE O/P EST MOD 30 MIN: CPT | Performed by: PHYSICIAN ASSISTANT

## 2025-03-27 PROCEDURE — 3074F SYST BP LT 130 MM HG: CPT | Performed by: PHYSICIAN ASSISTANT

## 2025-03-27 ASSESSMENT — ENCOUNTER SYMPTOMS
LAST BOWEL MOVEMENT: 67288
MUSCLE WEAKNESS: 1
ARTHRALGIAS: 1

## 2025-03-27 ASSESSMENT — FIBROSIS 4 INDEX: FIB4 SCORE: 0.64

## 2025-03-27 NOTE — PROGRESS NOTES
"Ostomy Evaluation  For 90 Day Certification Period: 02/06/25   - 06/25/25     Surgeon: Dr. Hou  Surgery type and date: Transverse loop colostomy due to large bowel obstruction on 11/4/24. Revision on 12/7/24 due to peristomal complications, stomal retraction and skin separation.   Temporary  Pre-Marked: No   Pertinent Hx: Diverticulitis, DM2, obesity    Start of Care: 02/06/2025    [facility] , likely [facility] after discharge  Order date: TBD    OBJECTIVE: Convatec, 2 piece 2 3/4\" appliance intact with large belt, mild wear and caked on stool on inside of barrier when removed.  No granulomas noted at today's visit.     STOMA ASSESSMENT:  Colostomy 12/07/24 Loop LUQ (Active)   Wound Image   03/27/25 1600   Stomal Appliance Assessment Intact 03/27/25 1600   Stoma Assessment Red 03/27/25 1600   Stoma Shape Oval;Retracted 03/27/25 1600   Stoma Size (in) 2 03/27/25 1600   Peristomal Assessment Intact 03/27/25 1600   Mucocutaneous Junction Intact 03/27/25 1600   Treatment Removed appliance with adhesive remover;Site care;Cleansed with water/washcloth;Appliance Changed 03/27/25 1600   Peristomal Protectant Paste Ring 03/27/25 1600   Stomal Appliance Paste Ring, 2\";2 3/4\" (70mm) CTF 03/27/25 1600   Output Color Brown 03/27/25 1600   WOUND RN ONLY - Stomal Appliance  2 Piece;2 3/4\" (70mm) CTF;Soft Convex;Paste Ring, Ceraplus, 2\" slim;Belt, Large 03/27/25 1600   Appliance (Pouch) # [id number], 80934, 8194,12076 x 2, 18283, 7240 03/27/25 1600   Appliance Brand [place] 03/27/25 1600   Appliance Supplier [employer] 03/27/25 1600                  Pouching Procedure Notes (if indicated):     -Patient able to apply appliance independently.     Previous Trials and Notes:   3/27/25 Trialing Convatec two pice convex moldable appliance through [facility]  -Convex 2 3/4\"    Patient/Caregiver Education:   -Reviewed cutting and sizing appliance  -Reviewed skin hygiene  -Educated about Janee, who can cut " ostomy barrier, or  stamps to assist with cutting appliances      PLAN/GOALS:  Patient will continue to work with [facility] to finalized pouching system.   Return PRN for peristomal skin or pouching issues.   Ostomy orders faxed to [facility] via [facility] Fax.      WOUND PROCEDURE NOTE (if indicated): n/a

## 2025-03-27 NOTE — PROGRESS NOTES
cc: Diabetic lab review and back pain    Subjective:     HPI    History of Present Illness  The patient is a 74-year-old female presenting for a diabetic review. She did not have labs completed prior to today's visit.    She has been experiencing lower back pain for approximately one week, which was severe enough to impede her mobility last Thursday. The pain has since  subsided, but she continues to experience discomfort, particularly when rising from a seated position or walking. She rates her current pain level as 2 to 3 on a scale of 10. She has been managing the pain with Tylenol, as she is unable to take Advil due to her Eliquis prescription.     She is currently on a daily regimen of glipizide 10 mg for her diabetes.  A1c-6.5 done in clinic today.  She reports no numbness or tingling in her feet. Her last eye examination was conducted approximately one year ago.  She plans to follow-up with her optometrist.    She has recently started taking oral iron supplements.  She was mildly anemic after having colostomy.  She does have CBC ordered.          Review of systems:  See above.       Current Outpatient Medications:     atorvastatin (LIPITOR) 20 MG Tab, TAKE 1 TABLET BY MOUTH DAILY, Disp: 100 Tablet, Rfl: 3    furosemide (LASIX) 20 MG Tab, TAKE 2 TABLETS BY MOUTH EVERY MORNING AND 1 TABLET EVERY EVENING (Patient taking differently: TAKE 2 TABLETS BY MOUTH EVERY MORNING AND EVERY EVENING), Disp: 270 Tablet, Rfl: 1    ferrous sulfate 325 (65 Fe) MG tablet, Take 1 Tablet by mouth every day., Disp: 30 Tablet, Rfl: 1    apixaban (ELIQUIS) 5mg Tab, Take 1 Tablet by mouth 2 times a day for 90 days. Indications: Thromboembolism secondary to Atrial Fibrillation, Disp: 180 Tablet, Rfl: 0    furosemide (LASIX) 40 MG Tab, Take 1 Tablet by mouth 2 times a day. Indications: Edema, Disp: 180 Tablet, Rfl: 1    potassium Chloride ER (K-TAB) 20 MEQ Tab CR tablet, Take 1 Tablet by mouth 2 times a day. Indications: Low Amount of  "Potassium in the Blood (Patient taking differently: Take 20 mEq by mouth every day. Indications: Low Amount of Potassium in the Blood), Disp: 60 Tablet, Rfl: 1    glipiZIDE (GLUCOTROL) 10 MG Tab, Take 1 Tablet by mouth every day. Pt reports that she takes this medication QDAY, not BID  Indications: Type 2 Diabetes, Disp: 100 Tablet, Rfl: 3    metoprolol SR (TOPROL XL) 50 MG TABLET SR 24 HR, Take 1 Tablet by mouth every evening. Indications: High Blood Pressure, Disp: 90 Tablet, Rfl: 3    acetaminophen (TYLENOL) 500 MG Tab, Take 1,000 mg by mouth every 6 hours as needed for Mild Pain. Indications: Pain, Disp: , Rfl:     pantoprazole (PROTONIX) 40 MG Tablet Delayed Response, Take 40 mg by mouth every evening. Indications: Heartburn, Disp: , Rfl:     sertraline (ZOLOFT) 50 MG Tab, Take 50 mg by mouth every day. Indications: Major Depressive Disorder, Disp: , Rfl:     buPROPion (WELLBUTRIN XL) 300 MG XL tablet, Take 300 mg by mouth every morning. Indications: Depression, Disp: , Rfl:     albuterol 108 (90 Base) MCG/ACT Aero Soln inhalation aerosol, Inhale 2 Puffs every 6 hours as needed for Shortness of Breath. (Patient not taking: Reported on 1/19/2025), Disp: 8.5 g, Rfl: 0    Allergies, past medical history, past surgical history, family history, social history reviewed and updated    Objective:     Vitals: /60 (BP Location: Left arm, Patient Position: Sitting, BP Cuff Size: Adult long)   Pulse 73   Temp 36.3 °C (97.3 °F) (Temporal)   Ht 1.651 m (5' 5\")   Wt (!) 132 kg (290 lb)   SpO2 91%   BMI 48.26 kg/m²   General: Alert, pleasant, NAD  HEENT: Normocephalic. Neck supple.  No thyromegaly or masses palpated. No cervical or supraclavicular lymphadenopathy. No carotid bruits   Heart: Regular rate and rhythm.  S1 and S2 normal.  No murmurs appreciated.  Respiratory: Normal respiratory effort.  Clear to auscultation bilaterally.  Skin: Warm, dry, no rashes.  Psych:  Affect/mood is normal, judgement is good, " memory is intact, grooming is appropriate.    Monofilament testing with a 10 gram force: sensation intact: intact bilaterally  Visual Inspection: Feet without maceration, ulcers, fissures.  Pedal pulses: intact bilaterally      Assessment/Plan:     Guerda was seen today for diabetes follow-up.    Diagnoses and all orders for this visit:    Type 2 diabetes mellitus without complication, without long-term current use of insulin ([facility])  Diabetes is well-controlled.  Will continue 10 mg of glipizide daily.  Repeat A1c again in 6 months.  Did advise to have the rest of her fasting labs completed  -     POCT Hemoglobin A1C  -     MICROALBUMIN CREAT RATIO URINE; Future  -     HEMOGLOBIN A1C; Future  -     Lipid Profile; Future  -     Comp Metabolic Panel; Future  -     Comp Metabolic Panel; Future  -     Diabetic Monofilament Lower Extremity Exam    Dyslipidemia  -controlled.  Continue 20 mg of Lipitor    Anemia, unspecified type  -Was recently started on oral iron.  Repeat CBC.  Tolerating well  -     CBC WITH DIFFERENTIAL; Future    Lumbar back pain  Has been improving over the past week, advised to continue with Tylenol over-the-counter.  If symptoms do not completely resolve follow-up for reevaluation    Stage 3a chronic kidney disease  Monitoring.  Has CMP ordered.  Advised to have this completed    Morbid obesity with BMI of 45.0-49.9, adult ([facility])  -Reviewed lifestyle modifications  -     Patient identified as having weight management issue.  Appropriate orders and counseling given.        Return in about 3 months (around 6/27/2025) for AWV.

## 2025-03-27 NOTE — PATIENT INSTRUCTIONS
My Ostomy Supplier: [employer] phone [phone] fax [phone]      Prescription/Order is good for 12 months. We scan your order into your medical record that you can view in [employer].   Your primary care provider can sign and submit orders to the Supplier/DME company of your choice, you do not need to come back to the clinic for an updated supply order.       When to Return to [facility]:   New skin problems or wounds around your stoma that do not resolve with common troubleshooting tips and tricks.  Pouching issues resulting from significant body contour changes, such as weight loss or gain (10lbs), or surgical modifications or new scars.   Constant leaking  If it has been more than 90 days/3 months since your last clinic appointment, you will need to obtain a new referral from your primary care provider.       Ostomy Nurse Support Hotlines:   [facility], “Secure Start”: 1-302.611.1154  [employer], “[facility]”:  [phone]  [employer], “Me+”: [phone] opt.1      TIPS and TRICKS to Try at Home:   Crusting is a treatment tool you can use if the skin around your stoma is open and wet. ONLY CRUST WHEN NEEDED. Closed/dry skin does not need to crusting, even if it is red.   Crust by lightly dusting wet skin with stoma powder, flick away excess with dry cloth, then seal by dabbing over powder with no-sting skin prep. You will see the powder turn from white to clear. Allow to dry fully. You can repeat this process up to three (3) times, if needed, for deeper or more wet skin. Use a moist washcloth to wipe off any excess powder that not sealed to the skin.     If creases or skin divots are present around the stoma, you can try using a paste ring to make a “pie wedge” or “worm” and apply them into divot or crease to fill the space, proceed with the rest of your normal appliance change.     Stoma size will change for the first 6-8 weeks after surgery, usually it becomes smaller, so check  your size weekly, using a cardboard template or the plastic from the back of the barrier.       If you are leaking or having skin breakdown around your stoma, remeasure your stoma to ensure you are cutting to the right size.   Colostomies, there should be 1/8” clearance (or the width of a nickel on its side) between the appliance and the stoma.   Ileostomies and Urostomies, there should be 1/16” clearance (or the width of a dime on its side) between the appliance and stoma.    Ostomy products adhere best to clean, dry skin! Do not use any disposable wipes or other products as they will leave a residue on the skin and may cause the barrier not to stick. A washcloth or gauze moistened with water is appropriate. You can shower with or without your appliance in place. If you wish to clean the skin around the stoma with soap and water or other cleansers, be sure to rinse skin thoroughly around the stoma and dry.

## 2025-03-28 ENCOUNTER — HOME CARE VISIT (OUTPATIENT)
Dept: HOME HEALTH SERVICES | Facility: HOME HEALTHCARE | Age: 75
End: 2025-03-28
Payer: MEDICARE

## 2025-03-28 NOTE — PROGRESS NOTES
Provider Encounter- Ostomy Encounter        HISTORY OF PRESENT ILLNESS  Wound History:    START OF CARE IN CLINIC: 2/6/2025    REFERRING PROVIDER: Dannielle Lackey     OSTOMY TYPE: Loop transverse colostomy   LOCATION: LUQ              OSTOMY HISTORY: LBO secondary to complicated diverticulitis   SURGERY: 12/7/2025 colostomy revision Dr. Hou   OSTOMY ISSUES:  74F with PMHx of obesity, diverticulitis, colitis, DM2, HTN. Patient was admitted 11/19-11/29/2025 for large bowel obstruction.  November 24, 2024 the patient underwent laparoscopic transverse loop colostomy due to LBO secondary to complex diverticulitis.  Patient was treated with IV antibiotics and eventually was discharged.  Patient was receiving ostomy care and education from home health.  She returned to the ED and was admitted December 6 through December 10, 2024 for colostomy dysfunction appears that colostomy had retracted and had a complete separation of the mucocutaneous junction.  She was taken back to surgery on 12/7 for colostomy revision. Patient reports that ostomy function has been much improved.    Pertinent Medical History: See above     Patient's problem list, allergies, and current medications reviewed and updated in Epic    Interval History:  2/6/2025 Joint visit with ostomy RN, refer to nursing note. Wear time 3-4 days. Leaks Seldom. Weight stability gaining. Patient has been following with home health and she is becoming more involved in her ostomy care. Stoma is oblong, causes some challenges with pouching. Patient has some hypergranular / friable tissue from 3-6 o'clock which bleeds with changes. Recommend chemical cautery today.    2/13/2025 : Clinic visit with RICK Hillman, FNP-BC, CWOCN, CFCN.  Visit with ostomy RN, refer to nursing note.  Wear time-4 days.  Leaks-seldom.  Patient is eating regular diet, weight stable.  She has resumed many of her normal activities.   Patient is being seen here for concerns for possible  "hypergranulation buds around her stoma.  In reviewing old photos of the stoma, I am not convinced that these are are hypergranulation buds, but rather part of the actual stoma/mucosal tissue.  I do not recommend chemical cautery of this tissue.  Continue to pouch around them, and monitor.  Will have patient return to clinic in 2 weeks for reassessment.   At some point, patient may be undergoing stoma reversal.  This is a loop colostomy.  She has a follow-up with her surgeon next month.    2/27/2025 : Clinic visit with RICK Hillman, SUZIE, HYACINTH, ALYSIA.   Patient presents today with distinct granulomas to lateral edge of her stoma.  Previously thought to be possibly an extension of her stoma, however today they present as distinct separate lesions.  4 lesions were excised in clinic today, then treated with silver nitrate.   Patient has been getting 3 to 4 days wear time from her ostomy appliance, reports occasional leaks.  She is eating well, weight stable.    3/13/2025 : Clinic visit with RICK Hillman, SUZIE, HYACINTH, ALYSIA.   Patient states she is feeling well, denies any problems with leaks over the past week.  She is trying to become more independent with her ostomy.  Site of previous granuloma excision assessed, 2 new blebs appear to be starting, treated with AgNO3 today.    3/27/2025 : Clinic visit with RICK Hillman, SUZIE, HYACINTH, ALYSIA.   Patient is doing well today.  Current pouching system appears to be working well, no leaks, wear time 4 to 5 days.  No new granulomas noted.  Peristomal skin is intact.  She states she is becoming more independent with her ostomy, states she changed her last appliance, \"99% by myself\".  Home health continues to see her, though may discharge her soon.   She is discharged to [facility].  She understands she may return to the clinic if she develops any problems, does not require a new referral if within 90-day period.      REVIEW OF SYSTEMS:   Unchanged from previous " "clinic visit on 3/13/2025, except as documented in interval history above    PHYSICAL EXAMINATION:   There were no vitals taken for this visit.    Physical Exam  Constitutional:       General: She is not in acute distress.     Appearance: She is obese.   Cardiovascular:      Rate and Rhythm: Normal rate.   Pulmonary:      Effort: Pulmonary effort is normal. No respiratory distress.   Abdominal:      Tenderness: There is no abdominal tenderness.      Comments: Loop transverse colostomy LUQ: Stoma flush with abdomen, pink and moist..  Functioning well..  MCJ intact.  Peristomal skin intact.      Obese abdomen, nontender   Neurological:      Mental Status: She is alert.             STOMA ASSESSMENT/PROCEDURE: Peristomal skin care, pouching procedure and ostomy teaching by ostomy RN.  Refer to Ostomy RN Assessment and Photo.    Colostomy 12/07/24 Loop LUQ (Active)   Wound Image   03/27/25 1600   Stomal Appliance Assessment Intact 03/27/25 1600   Stoma Assessment Red 03/27/25 1600   Stoma Shape Oval;Retracted 03/27/25 1600   Stoma Size (in) 2 03/27/25 1600   Peristomal Assessment Intact 03/27/25 1600   Mucocutaneous Junction Intact 03/27/25 1600   Treatment Removed appliance with adhesive remover;Site care;Cleansed with water/washcloth;Appliance Changed 03/27/25 1600   Peristomal Protectant Paste Ring 03/27/25 1600   Stomal Appliance Paste Ring, 2\";2 3/4\" (70mm) CTF 03/27/25 1600   Output Color Brown 03/27/25 1600   WOUND RN ONLY - Stomal Appliance  2 Piece;2 3/4\" (70mm) CTF;Soft Convex;Paste Ring, Ceraplus, 2\" slim;Belt, Large 03/27/25 1600   Appliance (Pouch) # [id number], [id number], [id number],12076 x 2, 56451, 7236 03/27/25 1600   Appliance Brand [place] 03/27/25 1600   Appliance Supplier [employer] 03/27/25 1600                                             ASSESSMENT AND PLAN:   1. Ostomy nurse consultation  2. Colostomy care ([facility])    3/27/2025: No new granulomas.  Peristomal skin intact.  -Ostomy care and education in clinic " today.  Patient is nearly independent with her ostomy  -Discharge from [facility].  She understands she may return to the clinic if she develops any problems with her ostomy  -Home health is to continue seeing patient until she is able and ready to care for her ostomy on her own.  They will be responsible for ordering supplies, and establishing patient with [employer] company.  -Current pouching system appears to be working well.      3. Colostomy in place ([facility])    3/27/2025  - Patient s/p loop transverse colostomy secondary to LBO due to complicated diverticulitis  - Patient reports that she underwent recent barium enema, needs sigmoidoscopy.  Will likely be undergoing reversal at some point    My total time spent caring for the patient on the day of the encounter was 20 minutes.   This does not include time spent on separately billable procedures/tests.       Please note that this note may have been created using voice recognition software. I have worked with technical experts from [facility] to optimize the interface.  I have made every reasonable attempt to correct obvious errors, but there may be errors of grammar and possibly content that I did not discover before finalizing the note.    N

## 2025-03-31 ENCOUNTER — HOME CARE VISIT (OUTPATIENT)
Dept: HOME HEALTH SERVICES | Facility: HOME HEALTHCARE | Age: 75
End: 2025-03-31
Payer: MEDICARE

## 2025-03-31 DIAGNOSIS — R60.0 LOWER EXTREMITY EDEMA: ICD-10-CM

## 2025-03-31 PROCEDURE — G0493 RN CARE EA 15 MIN HH/HOSPICE: HCPCS

## 2025-03-31 RX ORDER — POTASSIUM CHLORIDE 1500 MG/1
TABLET, EXTENDED RELEASE ORAL
Qty: 60 TABLET | Refills: 1 | Status: SHIPPED | OUTPATIENT
Start: 2025-03-31

## 2025-03-31 SDOH — ECONOMIC STABILITY: FOOD INSECURITY: MEALS PER DAY: 3

## 2025-03-31 SDOH — ECONOMIC STABILITY: FOOD INSECURITY: SNACKS PER DAY: 1

## 2025-03-31 ASSESSMENT — ACTIVITIES OF DAILY LIVING (ADL): OASIS_M1830: 00

## 2025-03-31 ASSESSMENT — ENCOUNTER SYMPTOMS
VOMITING: NO
NAUSEA: NO
LOWER EXTREMITY EDEMA: 1
STOOL FREQUENCY: TWICE DAILY
DENIES PAIN: 1
APPETITE LEVEL: GOOD
LAST BOWEL MOVEMENT: 67295

## 2025-03-31 NOTE — Clinical Note
"ACTION REQUIRED  Order to discharge from [facility] services submitted to Dannielle Lackey for review and signature.  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 No treatment needed. Area has healed well. Pt aware that she may call for meseret't if she needs help with colostomy in the future. Pt independent in colostomy care. Template left for her to use. Had her practice, cut, and position over stoma. Needs to be careful she is around, not over, margins of stoma. Using mirror on bathroom counter to see when placing skin barrier. Account set up with [facility] and supplies ordered. PRESCRIPTION WILL BE SENT TO DR ELISA PINON, surgeon, for review and signature. When returned to [place], supplies will be sent. [place] will be pre-cutting her skin barriers for her to 1 1/4 x 1 3/4\" opening so pt can just peel and stick.   Thank you, Radha Mott, RN, MSN, WOCN"

## 2025-04-01 VITALS
TEMPERATURE: 97.9 F | SYSTOLIC BLOOD PRESSURE: 110 MMHG | HEART RATE: 63 BPM | OXYGEN SATURATION: 96 % | DIASTOLIC BLOOD PRESSURE: 74 MMHG | RESPIRATION RATE: 16 BRPM

## 2025-04-01 SDOH — ECONOMIC STABILITY: HOUSING INSECURITY
HOME SAFETY: LIVES WITH HUSBAND IN VERY NICE HOUSE. OUTSIDE VERY NICELY LANDSCAPED BY HUSBAND. INSIDE IS VERY CLUTTERED AND DIRTY. PT WITH LOW ENERGY LEVELS.

## 2025-04-01 ASSESSMENT — ACTIVITIES OF DAILY LIVING (ADL): HOME_HEALTH_OASIS: 00

## 2025-04-01 NOTE — CASE COMMUNICATION
"noted  ----- Message -----  From: Radha Mott R.N.  Sent: 4/1/2025  10:30 AM PDT  To: Asnucion Lynn R.N.; Dannielle Lackey P.A.-C.; *      ACTION REQUIRED  Order to discharge from [facility] services submitted to Dannielle Lackey for review and signature.  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 No treatment needed. Area has healed well. Pt aware that she may call for meseret't if she needs  help with colostomy in the future. Pt independent in colostomy care. Template left for her to use. Had her practice, cut, and position over stoma. Needs to be careful she is around, not over, margins of stoma. Using mirror on bathroom counter to see when placing skin barrier. [facility] and supplies ordered. PRESCRIPTION WILL BE SENT TO DR ELISA PINON, surgeon, for review and signature. When returned to [place], supplies will be sent. [place] will be pre-cutting her skin barriers for her to 1 1/4 x 1 3/4\" opening so pt can just peel and stick.   Thank you, Radha Mott, RN, MSN, WOCN"

## 2025-04-02 DIAGNOSIS — I10 PRIMARY HYPERTENSION: ICD-10-CM

## 2025-04-02 DIAGNOSIS — E11.9 TYPE 2 DIABETES MELLITUS WITHOUT COMPLICATION, WITHOUT LONG-TERM CURRENT USE OF INSULIN (HCC): ICD-10-CM

## 2025-04-02 DIAGNOSIS — I48.91 ATRIAL FIBRILLATION, UNSPECIFIED TYPE (HCC): ICD-10-CM

## 2025-04-08 ENCOUNTER — HOSPITAL ENCOUNTER (OUTPATIENT)
Dept: LAB | Facility: MEDICAL CENTER | Age: 75
End: 2025-04-08
Attending: PHYSICIAN ASSISTANT
Payer: MEDICARE

## 2025-04-08 DIAGNOSIS — D64.9 ANEMIA, UNSPECIFIED TYPE: ICD-10-CM

## 2025-04-08 DIAGNOSIS — E11.9 TYPE 2 DIABETES MELLITUS WITHOUT COMPLICATION, WITHOUT LONG-TERM CURRENT USE OF INSULIN (HCC): ICD-10-CM

## 2025-04-08 LAB
ALBUMIN SERPL BCP-MCNC: 4 G/DL (ref 3.2–4.9)
ALBUMIN/GLOB SERPL: 1.3 G/DL
ALP SERPL-CCNC: 135 U/L (ref 30–99)
ALT SERPL-CCNC: 19 U/L (ref 2–50)
ANION GAP SERPL CALC-SCNC: 12 MMOL/L (ref 7–16)
AST SERPL-CCNC: 19 U/L (ref 12–45)
BASOPHILS # BLD AUTO: 0.5 % (ref 0–1.8)
BASOPHILS # BLD: 0.04 K/UL (ref 0–0.12)
BILIRUB SERPL-MCNC: 0.3 MG/DL (ref 0.1–1.5)
BUN SERPL-MCNC: 27 MG/DL (ref 8–22)
CALCIUM ALBUM COR SERPL-MCNC: 9.2 MG/DL (ref 8.5–10.5)
CALCIUM SERPL-MCNC: 9.2 MG/DL (ref 8.5–10.5)
CHLORIDE SERPL-SCNC: 101 MMOL/L (ref 96–112)
CO2 SERPL-SCNC: 31 MMOL/L (ref 20–33)
CREAT SERPL-MCNC: 1.42 MG/DL (ref 0.5–1.4)
EOSINOPHIL # BLD AUTO: 0.49 K/UL (ref 0–0.51)
EOSINOPHIL NFR BLD: 5.7 % (ref 0–6.9)
ERYTHROCYTE [DISTWIDTH] IN BLOOD BY AUTOMATED COUNT: 41.2 FL (ref 35.9–50)
GFR SERPLBLD CREATININE-BSD FMLA CKD-EPI: 39 ML/MIN/1.73 M 2
GLOBULIN SER CALC-MCNC: 3.1 G/DL (ref 1.9–3.5)
GLUCOSE SERPL-MCNC: 99 MG/DL (ref 65–99)
HCT VFR BLD AUTO: 40 % (ref 37–47)
HGB BLD-MCNC: 12.6 G/DL (ref 12–16)
IMM GRANULOCYTES # BLD AUTO: 0.05 K/UL (ref 0–0.11)
IMM GRANULOCYTES NFR BLD AUTO: 0.6 % (ref 0–0.9)
LYMPHOCYTES # BLD AUTO: 1.29 K/UL (ref 1–4.8)
LYMPHOCYTES NFR BLD: 14.9 % (ref 22–41)
MCH RBC QN AUTO: 26.7 PG (ref 27–33)
MCHC RBC AUTO-ENTMCNC: 31.5 G/DL (ref 32.2–35.5)
MCV RBC AUTO: 84.7 FL (ref 81.4–97.8)
MONOCYTES # BLD AUTO: 0.66 K/UL (ref 0–0.85)
MONOCYTES NFR BLD AUTO: 7.6 % (ref 0–13.4)
NEUTROPHILS # BLD AUTO: 6.12 K/UL (ref 1.82–7.42)
NEUTROPHILS NFR BLD: 70.7 % (ref 44–72)
NRBC # BLD AUTO: 0 K/UL
NRBC BLD-RTO: 0 /100 WBC (ref 0–0.2)
PLATELET # BLD AUTO: 290 K/UL (ref 164–446)
PMV BLD AUTO: 9.7 FL (ref 9–12.9)
POTASSIUM SERPL-SCNC: 3.5 MMOL/L (ref 3.6–5.5)
PROT SERPL-MCNC: 7.1 G/DL (ref 6–8.2)
RBC # BLD AUTO: 4.72 M/UL (ref 4.2–5.4)
SODIUM SERPL-SCNC: 144 MMOL/L (ref 135–145)
WBC # BLD AUTO: 8.7 K/UL (ref 4.8–10.8)

## 2025-04-08 PROCEDURE — 36415 COLL VENOUS BLD VENIPUNCTURE: CPT

## 2025-04-08 PROCEDURE — 80053 COMPREHEN METABOLIC PANEL: CPT

## 2025-04-08 PROCEDURE — 85025 COMPLETE CBC W/AUTO DIFF WBC: CPT

## 2025-04-10 ENCOUNTER — APPOINTMENT (OUTPATIENT)
Dept: WOUND CARE | Facility: MEDICAL CENTER | Age: 75
End: 2025-04-10
Attending: STUDENT IN AN ORGANIZED HEALTH CARE EDUCATION/TRAINING PROGRAM
Payer: MEDICARE

## 2025-04-10 ENCOUNTER — RESULTS FOLLOW-UP (OUTPATIENT)
Dept: MEDICAL GROUP | Age: 75
End: 2025-04-10
Payer: MEDICARE

## 2025-04-10 NOTE — TELEPHONE ENCOUNTER
Phone Number Called: [phone]     Call outcome: Did not leave a detailed message. Requested patient to call back.    Message: left voicemail stating to give us a call back for lab results.

## 2025-04-10 NOTE — TELEPHONE ENCOUNTER
----- Message from Physician Ashanti Suazo M.D. sent at 4/10/2025  2:06 PM PDT -----  Please let the patient know, that her potassium level is low.  She should incorporate more potassium rich foods in there diet: Avocado, nuts, banana.  Her red blood cell count levels have improved.  Her kidney function test remains low at 39.  Please have her schedule appointment with Dannielle to discuss. Thank you

## 2025-04-18 DIAGNOSIS — I48.91 ATRIAL FIBRILLATION, UNSPECIFIED TYPE (HCC): ICD-10-CM

## 2025-04-18 NOTE — TELEPHONE ENCOUNTER
Please call patient let her know that I will fill this for her.  However future request need to come from her cardiologist

## 2025-04-24 ENCOUNTER — APPOINTMENT (OUTPATIENT)
Dept: WOUND CARE | Facility: MEDICAL CENTER | Age: 75
End: 2025-04-24
Attending: STUDENT IN AN ORGANIZED HEALTH CARE EDUCATION/TRAINING PROGRAM
Payer: MEDICARE

## 2025-05-01 ENCOUNTER — HOSPITAL ENCOUNTER (OUTPATIENT)
Dept: LAB | Facility: MEDICAL CENTER | Age: 75
End: 2025-05-01
Attending: INTERNAL MEDICINE
Payer: MEDICARE

## 2025-05-01 DIAGNOSIS — R06.09 DYSPNEA ON EXERTION: ICD-10-CM

## 2025-05-01 DIAGNOSIS — E11.65 TYPE 2 DIABETES MELLITUS WITH HYPERGLYCEMIA, WITHOUT LONG-TERM CURRENT USE OF INSULIN (HCC): ICD-10-CM

## 2025-05-01 LAB
EST. AVERAGE GLUCOSE BLD GHB EST-MCNC: 143 MG/DL
HBA1C MFR BLD: 6.6 % (ref 4–5.6)

## 2025-05-01 PROCEDURE — 80048 BASIC METABOLIC PNL TOTAL CA: CPT

## 2025-05-01 PROCEDURE — 83036 HEMOGLOBIN GLYCOSYLATED A1C: CPT | Mod: GA

## 2025-05-01 PROCEDURE — 83880 ASSAY OF NATRIURETIC PEPTIDE: CPT

## 2025-05-01 PROCEDURE — 80061 LIPID PANEL: CPT

## 2025-05-01 PROCEDURE — 36415 COLL VENOUS BLD VENIPUNCTURE: CPT

## 2025-05-02 ENCOUNTER — RESULTS FOLLOW-UP (OUTPATIENT)
Dept: CARDIOLOGY | Facility: MEDICAL CENTER | Age: 75
End: 2025-05-02

## 2025-05-02 LAB
ANION GAP SERPL CALC-SCNC: 13 MMOL/L (ref 7–16)
BUN SERPL-MCNC: 29 MG/DL (ref 8–22)
CALCIUM SERPL-MCNC: 9.5 MG/DL (ref 8.5–10.5)
CHLORIDE SERPL-SCNC: 99 MMOL/L (ref 96–112)
CHOLEST SERPL-MCNC: 138 MG/DL (ref 100–199)
CO2 SERPL-SCNC: 28 MMOL/L (ref 20–33)
CREAT SERPL-MCNC: 1.49 MG/DL (ref 0.5–1.4)
FASTING STATUS PATIENT QL REPORTED: NORMAL
GFR SERPLBLD CREATININE-BSD FMLA CKD-EPI: 37 ML/MIN/1.73 M 2
GLUCOSE SERPL-MCNC: 126 MG/DL (ref 65–99)
HDLC SERPL-MCNC: 43 MG/DL
LDLC SERPL CALC-MCNC: 62 MG/DL
NT-PROBNP SERPL IA-MCNC: 177 PG/ML (ref 0–125)
POTASSIUM SERPL-SCNC: 4 MMOL/L (ref 3.6–5.5)
SODIUM SERPL-SCNC: 140 MMOL/L (ref 135–145)
TRIGL SERPL-MCNC: 167 MG/DL (ref 0–149)

## 2025-05-05 ENCOUNTER — OFFICE VISIT (OUTPATIENT)
Dept: CARDIOLOGY | Facility: MEDICAL CENTER | Age: 75
End: 2025-05-05
Attending: INTERNAL MEDICINE
Payer: MEDICARE

## 2025-05-05 VITALS
DIASTOLIC BLOOD PRESSURE: 62 MMHG | BODY MASS INDEX: 48.82 KG/M2 | HEART RATE: 83 BPM | WEIGHT: 293 LBS | OXYGEN SATURATION: 94 % | HEIGHT: 65 IN | SYSTOLIC BLOOD PRESSURE: 104 MMHG | RESPIRATION RATE: 16 BRPM

## 2025-05-05 DIAGNOSIS — I48.91 ATRIAL FIBRILLATION, UNSPECIFIED TYPE (HCC): ICD-10-CM

## 2025-05-05 DIAGNOSIS — I48.0 PAROXYSMAL ATRIAL FIBRILLATION (HCC): ICD-10-CM

## 2025-05-05 DIAGNOSIS — I10 HTN (HYPERTENSION), MALIGNANT: ICD-10-CM

## 2025-05-05 DIAGNOSIS — E78.2 MIXED HYPERLIPIDEMIA: ICD-10-CM

## 2025-05-05 DIAGNOSIS — R06.09 DYSPNEA ON EXERTION: ICD-10-CM

## 2025-05-05 DIAGNOSIS — E11.65 TYPE 2 DIABETES MELLITUS WITH HYPERGLYCEMIA, WITHOUT LONG-TERM CURRENT USE OF INSULIN (HCC): ICD-10-CM

## 2025-05-05 DIAGNOSIS — R60.0 BILATERAL LOWER EXTREMITY EDEMA: ICD-10-CM

## 2025-05-05 DIAGNOSIS — D68.69 HYPERCOAGULABLE STATE DUE TO PAROXYSMAL ATRIAL FIBRILLATION (HCC): ICD-10-CM

## 2025-05-05 DIAGNOSIS — I48.0 HYPERCOAGULABLE STATE DUE TO PAROXYSMAL ATRIAL FIBRILLATION (HCC): ICD-10-CM

## 2025-05-05 LAB — EKG IMPRESSION: NORMAL

## 2025-05-05 PROCEDURE — 3074F SYST BP LT 130 MM HG: CPT | Performed by: INTERNAL MEDICINE

## 2025-05-05 PROCEDURE — 3078F DIAST BP <80 MM HG: CPT | Performed by: INTERNAL MEDICINE

## 2025-05-05 PROCEDURE — 93005 ELECTROCARDIOGRAM TRACING: CPT | Mod: TC | Performed by: INTERNAL MEDICINE

## 2025-05-05 PROCEDURE — 93010 ELECTROCARDIOGRAM REPORT: CPT | Performed by: INTERNAL MEDICINE

## 2025-05-05 PROCEDURE — 99214 OFFICE O/P EST MOD 30 MIN: CPT | Performed by: INTERNAL MEDICINE

## 2025-05-05 PROCEDURE — 99212 OFFICE O/P EST SF 10 MIN: CPT | Performed by: INTERNAL MEDICINE

## 2025-05-05 PROCEDURE — G2211 COMPLEX E/M VISIT ADD ON: HCPCS | Performed by: INTERNAL MEDICINE

## 2025-05-05 RX ORDER — FUROSEMIDE 40 MG/1
40 TABLET ORAL 2 TIMES DAILY
Qty: 180 TABLET | Refills: 1 | Status: SHIPPED | OUTPATIENT
Start: 2025-05-05

## 2025-05-05 RX ORDER — METOPROLOL SUCCINATE 50 MG/1
50 TABLET, EXTENDED RELEASE ORAL EVERY EVENING
Qty: 90 TABLET | Refills: 3 | Status: SHIPPED | OUTPATIENT
Start: 2025-05-05

## 2025-05-05 ASSESSMENT — ENCOUNTER SYMPTOMS
WEIGHT LOSS: 0
EYE PAIN: 0
MYALGIAS: 0
VOMITING: 0
FALLS: 0
HALLUCINATIONS: 0
BRUISES/BLEEDS EASILY: 0
COUGH: 0
CHILLS: 0
PALPITATIONS: 0
BLOOD IN STOOL: 0
SENSORY CHANGE: 0
NAUSEA: 0
DIZZINESS: 0
CLAUDICATION: 0
FEVER: 0
SHORTNESS OF BREATH: 0
PND: 0
LOSS OF CONSCIOUSNESS: 0
ABDOMINAL PAIN: 0
DEPRESSION: 0
DOUBLE VISION: 0
BLURRED VISION: 0
ORTHOPNEA: 0
EYE DISCHARGE: 0
HEADACHES: 0
SPEECH CHANGE: 0

## 2025-05-05 ASSESSMENT — FIBROSIS 4 INDEX: FIB4 SCORE: 1.11

## 2025-05-05 NOTE — PROGRESS NOTES
Chief Complaint   Patient presents with    Atrial Fibrillation    Tachycardia    Hypertension       Subjective     Guerda Lunsford is a 74 y.o. female who presents today for cardiac care and management of atrial fibrillation of paroxysmal type, in setting of bowel obstruction status post colostomy.    I have independently interpreted and reviewed echocardiogram's actual images with patient which showed normal left ventricular systolic function. No wall motion abnormality. No evidence of pulmonary hypertension. No significant valvular disease.    I have personally interpreted EKG today with patient, there is no evidence of acute coronary syndrome, no evidence of prior infarct, normal PA and QT interval, no significant conduction disease. Sinus rhythm.    Patient is feeling better these days. Does get winded upon walking up inclines or for distance. No symptoms at rest or with daily living activities.    I have independently interpreted and reviewed blood tests results with patient in clinic today which showed LDL level of 62, triglycerides level of 167, GFR of 37, K of 4, Hgba1c of 6.6, NT pro BNP of 177.    Past Medical History:   Diagnosis Date    Allergy     Anemia 1997    Bleeding hemorrhoids    Anxiety     Arthritis     Asthma     Back pain     Bronchitis     Cataract     bilateral    Chest pain 02/01/2016    Chickenpox     Cholesterol serum elevated     diet controlled    Daytime sleepiness     Dental disorder 10/2020    Filled chipped front upper tooth    Depression     anxiety    Detached retina, left     Diverticulitis     Diverticulitis 03/28/2016    Gynecological disorder 1982    bleeding, cervical pecancer    Headache     Occasionally, can become severe if she does not take Advil. ICD-10 transition    Headache(784.0)     Occasionally, can become severe if she does not take Advil.    Hearing difficulty     Heart murmur     Comes and goes    Hemorrhoids 1997    had surgery    High cholesterol      Hyperlipidemia     Hypertension     non-medicated    Hyponatremia 03/29/2016    Indigestion     Infectious disease     uti    Influenza     Macular hole of right eye     Pain     Pneumonia     Rhinitis     Ringing in ears     Shortness of breath 02/28/2014    SIRS (systemic inflammatory response syndrome) (HCC) 03/29/2016    Urinary tract infection, site not specified     Wears glasses      Past Surgical History:   Procedure Laterality Date    TX EXPLORATORY OF ABDOMEN  12/07/2024    Procedure: COLOSTOMY REVISION;  Surgeon: George Hou D.O.;  Location: [facility] [place];  Service: General    TX LAP,DIAGNOSTIC ABDOMEN N/A 11/24/2024    Procedure: laparoscopy;  Surgeon: George Hou D.O.;  Location: [facility] [place];  Service: General    TX COLOSTOMY Left 11/24/2024    Procedure: CREATION, COLOSTOMY, -transverse loop colostomy;  Surgeon: George Hou D.O.;  Location: [facility] [place];  Service: General    TX SIGMOIDOSCOPY,DIAGNOSTIC  11/21/2024    Procedure: SIGMOIDOSCOPY, FLEXIBLE;  Surgeon: Cecelia Adam M.D.;  Location: [facility] [place];  Service: Gastroenterology    PB TOTAL KNEE ARTHROPLASTY Right 11/16/2021    Procedure: ARTHROPLASTY, KNEE, TOTAL Baxter cementless;  Surgeon: Sonido Amado M.D.;  Location: SUR[facility]ERY [facility];  Service: Orthopedics    PB TOTAL KNEE ARTHROPLASTY Left 05/18/2021    Procedure: ARTHROPLASTY, KNEE, TOTAL.;  Surgeon: Sonido Amado M.D.;  Location: [facility] [place];  Service: Orthopedics    BREAST BIOPSY  2009    LAMINOTOMY  1982    2 Lumbar Discs    ABDOMINAL HYSTERECTOMY TOTAL  1982    Cervical precancer and bleeding.  No oophorectomy.    APPENDECTOMY      ARTHROPLASTY      ARTHROSCOPY, KNEE      CERVICAL LAMINECTOMY POSTERIOR  1981    COLON RESECTION      GYN SURGERY      hysterectomy - partial    HEMORRHOIDECTOMY      HYSTERECTOMY LAPAROSCOPY      LUMPECTOMY      ORIF, KNEE  2021    OTHER      hemorrhoidectomy    OTHER Right     cataract  extraction    OTHER Right     Macular Hole Surgery    OTHER ABDOMINAL SURGERY      appy    OTHER ORTHOPEDIC SURGERY      lami     Family History   Problem Relation Age of Onset    Diabetes Mother     Heart Disease Mother 60        MI then CABG    Heart Disease Father         CHF    No Known Problems Sister     Blood Disease Brother     Abdominal aortic aneurysm Brother     Alcohol/Drug Maternal Aunt     Heart Disease Maternal Grandmother 65        MI,  with it    Alcohol/Drug Maternal Grandmother     Alcohol/Drug Maternal Grandfather     Alcohol/Drug Paternal Grandmother      Social History     Socioeconomic History    Marital status:      Spouse name: Not on file    Number of children: Not on file    Years of education: Not on file    Highest education level: Some college, no degree   Occupational History    Not on file   Tobacco Use    Smoking status: Never    Smokeless tobacco: Never   Vaping Use    Vaping status: Never Used   Substance and Sexual Activity    Alcohol use: Never    Drug use: Never    Sexual activity: Not Currently     Partners: Male     Birth control/protection: Surgical   Other Topics Concern    Not on file   Social History Narrative    Not on file     Social Drivers of Health     Financial Resource Strain: Low Risk  (2024)    Overall Financial Resource Strain (CARDIA)     Difficulty of Paying Living Expenses: Not very hard   Food Insecurity: No Food Insecurity (2025)    Hunger Vital Sign     Worried About Running Out of Food in the Last Year: Never true     Ran Out of Food in the Last Year: Never true   Transportation Needs: No Transportation Needs (2025)    PRAPARE - Transportation     Lack of Transportation (Medical): No     Lack of Transportation (Non-Medical): No   Physical Activity: Not on file   Stress: No Stress Concern Present (2024)    [employer] - Occupational Stress Questionnaire     Feeling of Stress : Only a little  "  Social Connections: Feeling Socially Integrated (3/31/2025)    OASIS : Social Isolation     Frequency of experiencing loneliness or isolation: Never   Intimate Partner Violence: Not At Risk (1/19/2025)    Humiliation, Afraid, Rape, and Kick questionnaire     Fear of Current or Ex-Partner: No     Emotionally Abused: No     Physically Abused: No     Sexually Abused: No   Housing Stability: Low Risk  (1/19/2025)    Housing Stability Vital Sign     Unable to Pay for Housing in the Last Year: No     Number of Times Moved in the Last Year: 0     Homeless in the Last Year: No     Allergies   Allergen Reactions    Azithromycin Unspecified     Pt states she feels a burning and hot sensation \"I can feel it in my veins, all the way through my body down to the bottom of my feet.\"    azithromycin    Cymbalta [Duloxetine Hcl] Unspecified     Make blood pressure go up    Lisinopril Cough     Cough      Metformin Diarrhea     Stomach problems    Demerol Rash     Rash at injection site, N/V.    Ertugliflozin-Metformin Hcl Diarrhea          Montelukast Anxiety and Unspecified     Gets nightmares - will never take it again    montelukast     Outpatient Encounter Medications as of 5/5/2025   Medication Sig Dispense Refill    furosemide (LASIX) 40 MG Tab Take 1 Tablet by mouth 2 times a day. Indications: Edema 180 Tablet 1    metoprolol SR (TOPROL XL) 50 MG TABLET SR 24 HR Take 1 Tablet by mouth every evening. Indications: High Blood Pressure 90 Tablet 3    potassium Chloride ER (K-TAB) 20 MEQ Tab CR tablet TAKE 1 TABLET BY MOUTH TWICE DAILY FOR LOW POTASSIUM 60 Tablet 1    atorvastatin (LIPITOR) 20 MG Tab TAKE 1 TABLET BY MOUTH DAILY 100 Tablet 3    furosemide (LASIX) 20 MG Tab TAKE 2 TABLETS BY MOUTH EVERY MORNING AND 1 TABLET EVERY EVENING (Patient taking differently: TAKE 2 TABLETS BY MOUTH EVERY MORNING AND EVERY EVENING) 270 Tablet 1    ferrous sulfate 325 (65 Fe) MG tablet Take 1 Tablet by mouth every day. 30 Tablet 1    " glipiZIDE (GLUCOTROL) 10 MG Tab Take 1 Tablet by mouth every day. Pt reports that she takes this medication QDAY, not BID  Indications: Type 2 Diabetes 100 Tablet 3    acetaminophen (TYLENOL) 500 MG Tab Take 1,000 mg by mouth every 6 hours as needed for Mild Pain. Indications: Pain      pantoprazole (PROTONIX) 40 MG Tablet Delayed Response Take 40 mg by mouth every evening. Indications: Heartburn      sertraline (ZOLOFT) 50 MG Tab Take 50 mg by mouth every day. Indications: Major Depressive Disorder      buPROPion (WELLBUTRIN XL) 300 MG XL tablet Take 300 mg by mouth every morning. Indications: Depression      albuterol 108 (90 Base) MCG/ACT Aero Soln inhalation aerosol Inhale 2 Puffs every 6 hours as needed for Shortness of Breath. 8.5 g 0    [DISCONTINUED] apixaban (ELIQUIS) 5mg Tab Take 1 Tablet by mouth 2 times a day for 90 days. Indications: Thromboembolism secondary to Atrial Fibrillation (Patient not taking: Reported on 5/5/2025) 180 Tablet 0    [DISCONTINUED] furosemide (LASIX) 40 MG Tab Take 1 Tablet by mouth 2 times a day. Indications: Edema 180 Tablet 1    [DISCONTINUED] metoprolol SR (TOPROL XL) 50 MG TABLET SR 24 HR Take 1 Tablet by mouth every evening. Indications: High Blood Pressure 90 Tablet 3     No facility-administered encounter medications on file as of 5/5/2025.     Review of Systems   Constitutional:  Negative for chills, fever, malaise/fatigue and weight loss.   HENT:  Negative for ear discharge, ear pain, hearing loss and nosebleeds.    Eyes:  Negative for blurred vision, double vision, pain and discharge.   Respiratory:  Negative for cough and shortness of breath.    Cardiovascular:  Negative for chest pain, palpitations, orthopnea, claudication, leg swelling and PND.   Gastrointestinal:  Negative for abdominal pain, blood in stool, melena, nausea and vomiting.   Genitourinary:  Negative for dysuria and hematuria.   Musculoskeletal:  Negative for falls, joint pain and myalgias.   Skin:   "Negative for itching and rash.   Neurological:  Negative for dizziness, sensory change, speech change, loss of consciousness and headaches.   Endo/Heme/Allergies:  Negative for environmental allergies. Does not bruise/bleed easily.   Psychiatric/Behavioral:  Negative for depression, hallucinations and suicidal ideas.               Objective     /62 (BP Location: Left arm, Patient Position: Sitting, BP Cuff Size: Adult)   Pulse 83   Resp 16   Ht 1.651 m (5' 5\")   Wt (!) 136 kg (299 lb)   SpO2 94%   BMI 49.76 kg/m²     Physical Exam  Vitals and nursing note reviewed.   Constitutional:       General: She is not in acute distress.     Appearance: She is not diaphoretic.   HENT:      Head: Normocephalic and atraumatic.      Right Ear: External ear normal.      Left Ear: External ear normal.      Nose: No congestion or rhinorrhea.   Eyes:      General:         Right eye: No discharge.         Left eye: No discharge.   Neck:      Thyroid: No thyromegaly.      Vascular: No JVD.   Cardiovascular:      Rate and Rhythm: Normal rate and regular rhythm.      Pulses: Normal pulses.   Pulmonary:      Effort: No respiratory distress.   Abdominal:      General: There is no distension.      Tenderness: There is no abdominal tenderness.   Musculoskeletal:         General: No swelling or tenderness.      Right lower leg: No edema.      Left lower leg: No edema.   Skin:     General: Skin is warm and dry.   Neurological:      Mental Status: She is alert and oriented to person, place, and time.      Cranial Nerves: No cranial nerve deficit.   Psychiatric:         Behavior: Behavior normal.                Assessment & Plan     1. Paroxysmal atrial fibrillation (HCC)  EKG      2. HTN (hypertension), malignant        3. Type 2 diabetes mellitus with hyperglycemia, without long-term current use of insulin (HCC)        4. Mixed hyperlipidemia        5. Hypercoagulable state due to paroxysmal atrial fibrillation (HCC)        6. " Dyspnea on exertion        7. Bilateral lower extremity edema  furosemide (LASIX) 40 MG Tab      8. Atrial fibrillation, unspecified type (HCC)  metoprolol SR (TOPROL XL) 50 MG TABLET SR 24 HR          Medical Decision Making: Today's Assessment/Status/Plan:   Today, based on physical examination findings, patient is euvolemic. No JVD, lungs are clear to auscultation, no pitting edema in bilateral lower extremities, no ascites.    Dry weight is 299 lbs. Gained 30 lbs of adipose tissue. Continue furosemide 40 mg BID.    Blood pressure is well controlled.    Currently in sinus rhythm. Continue Toprol 50 mg daily.    Been 6 months and she has not had any recurrent AF, thus ok to stop anticoagulation with Eliquis.    Continue Atorvastatin 20 mg daily for LDL control.    This visit encounter signifies the visit complexity inherent to evaluation and management associated with medical care services that serve as the continuing focal point for all needed health care services and/or with medical care services that are part of ongoing care related to this patient's single, serious condition, complex cardiac condition.    Grayson Nolasco M.D.

## 2025-06-02 DIAGNOSIS — R60.0 LOWER EXTREMITY EDEMA: ICD-10-CM

## 2025-06-02 RX ORDER — POTASSIUM CHLORIDE 1500 MG/1
TABLET, EXTENDED RELEASE ORAL
Qty: 60 TABLET | Refills: 1 | Status: SHIPPED | OUTPATIENT
Start: 2025-06-02

## 2025-07-05 DIAGNOSIS — R60.0 BILATERAL LOWER EXTREMITY EDEMA: ICD-10-CM

## 2025-07-07 RX ORDER — FUROSEMIDE 40 MG/1
TABLET ORAL
Qty: 180 TABLET | Refills: 1 | Status: SHIPPED | OUTPATIENT
Start: 2025-07-07

## 2025-08-11 DIAGNOSIS — R60.0 LOWER EXTREMITY EDEMA: ICD-10-CM

## 2025-08-11 RX ORDER — POTASSIUM CHLORIDE 1500 MG/1
TABLET, EXTENDED RELEASE ORAL
Qty: 180 TABLET | Refills: 1 | Status: SHIPPED | OUTPATIENT
Start: 2025-08-11

## 2025-08-15 ENCOUNTER — OFFICE VISIT (OUTPATIENT)
Dept: MEDICAL GROUP | Age: 75
End: 2025-08-15
Payer: MEDICARE

## 2025-08-15 VITALS
HEART RATE: 85 BPM | SYSTOLIC BLOOD PRESSURE: 122 MMHG | WEIGHT: 293 LBS | OXYGEN SATURATION: 92 % | DIASTOLIC BLOOD PRESSURE: 70 MMHG | HEIGHT: 65 IN | BODY MASS INDEX: 48.82 KG/M2 | TEMPERATURE: 97.6 F

## 2025-08-15 DIAGNOSIS — R06.2 WHEEZING: ICD-10-CM

## 2025-08-15 DIAGNOSIS — I48.0 PAROXYSMAL ATRIAL FIBRILLATION (HCC): ICD-10-CM

## 2025-08-15 DIAGNOSIS — Z93.3 COLOSTOMY IN PLACE (HCC): ICD-10-CM

## 2025-08-15 DIAGNOSIS — R09.82 POSTNASAL DRIP: ICD-10-CM

## 2025-08-15 DIAGNOSIS — N18.31 STAGE 3A CHRONIC KIDNEY DISEASE: ICD-10-CM

## 2025-08-15 DIAGNOSIS — E78.5 DYSLIPIDEMIA: ICD-10-CM

## 2025-08-15 DIAGNOSIS — E11.9 TYPE 2 DIABETES MELLITUS WITHOUT COMPLICATION, WITHOUT LONG-TERM CURRENT USE OF INSULIN (HCC): ICD-10-CM

## 2025-08-15 DIAGNOSIS — Z00.00 MEDICARE ANNUAL WELLNESS VISIT, INITIAL: Primary | ICD-10-CM

## 2025-08-15 DIAGNOSIS — F33.0 MILD EPISODE OF RECURRENT MAJOR DEPRESSIVE DISORDER (HCC): ICD-10-CM

## 2025-08-15 DIAGNOSIS — I10 PRIMARY HYPERTENSION: ICD-10-CM

## 2025-08-15 DIAGNOSIS — E55.9 VITAMIN D DEFICIENCY: ICD-10-CM

## 2025-08-15 DIAGNOSIS — E66.01 MORBID OBESITY WITH BMI OF 50.0-59.9, ADULT (HCC): ICD-10-CM

## 2025-08-15 DIAGNOSIS — H61.23 BILATERAL IMPACTED CERUMEN: ICD-10-CM

## 2025-08-15 PROBLEM — I48.91 NEW ONSET ATRIAL FIBRILLATION (HCC): Status: RESOLVED | Noted: 2024-11-22 | Resolved: 2025-08-15

## 2025-08-15 PROBLEM — Z71.89 ACP (ADVANCE CARE PLANNING): Status: RESOLVED | Noted: 2024-11-19 | Resolved: 2025-08-15

## 2025-08-15 PROBLEM — Z71.89 ADVANCE CARE PLANNING: Status: RESOLVED | Noted: 2024-12-07 | Resolved: 2025-08-15

## 2025-08-15 LAB
HBA1C MFR BLD: 6.5 % (ref ?–5.8)
POCT INT CON NEG: NEGATIVE
POCT INT CON POS: POSITIVE

## 2025-08-15 PROCEDURE — 3074F SYST BP LT 130 MM HG: CPT | Performed by: PHYSICIAN ASSISTANT

## 2025-08-15 PROCEDURE — G0438 PPPS, INITIAL VISIT: HCPCS | Performed by: PHYSICIAN ASSISTANT

## 2025-08-15 PROCEDURE — 99214 OFFICE O/P EST MOD 30 MIN: CPT | Mod: 25 | Performed by: PHYSICIAN ASSISTANT

## 2025-08-15 PROCEDURE — 3078F DIAST BP <80 MM HG: CPT | Performed by: PHYSICIAN ASSISTANT

## 2025-08-15 PROCEDURE — 69210 REMOVE IMPACTED EAR WAX UNI: CPT | Performed by: PHYSICIAN ASSISTANT

## 2025-08-15 PROCEDURE — 83036 HEMOGLOBIN GLYCOSYLATED A1C: CPT | Performed by: PHYSICIAN ASSISTANT

## 2025-08-15 RX ORDER — ALBUTEROL SULFATE 90 UG/1
2 INHALANT RESPIRATORY (INHALATION) EVERY 4 HOURS PRN
Qty: 1 EACH | Refills: 1 | Status: SHIPPED | OUTPATIENT
Start: 2025-08-15

## 2025-08-15 ASSESSMENT — ENCOUNTER SYMPTOMS: GENERAL WELL-BEING: FAIR

## 2025-08-15 ASSESSMENT — PATIENT HEALTH QUESTIONNAIRE - PHQ9: CLINICAL INTERPRETATION OF PHQ2 SCORE: 0

## 2025-08-15 ASSESSMENT — FIBROSIS 4 INDEX: FIB4 SCORE: 1.11

## 2025-08-15 ASSESSMENT — ACTIVITIES OF DAILY LIVING (ADL): BATHING_REQUIRES_ASSISTANCE: 0

## 2025-09-05 ENCOUNTER — APPOINTMENT (OUTPATIENT)
Dept: MEDICAL GROUP | Age: 75
End: 2025-09-05
Payer: MEDICARE

## (undated) DEVICE — SUTURE 1 VICRYL PLUS CTX - 8 X 18 INCH (12/BX)

## (undated) DEVICE — DRESSING TRANSPARENT FILM TEGADERM 2.375 X 2.75" (100EA/BX)"

## (undated) DEVICE — TUBING INSUFFLATION - (10/BX)

## (undated) DEVICE — PAD PREP 24 X 48 CUFFED - (100/CA)

## (undated) DEVICE — SUTURE 3-0 VICRYL PLUS SH - 8X 18 INCH (12/BX)

## (undated) DEVICE — SODIUM CHL IRRIGATION 0.9% 1000ML (12EA/CA)

## (undated) DEVICE — SLEEVE VASO DVT COMPRESSION CALF MED - (10PR/CA)

## (undated) DEVICE — STOCKINETTE IMPERVIOUS 12X48 - STERILELF (10/CA)"

## (undated) DEVICE — SYRINGE SAFETY 3 ML 18 GA X 1 1/2 BLUNT LL (100/BX 8BX/CA)

## (undated) DEVICE — ELECTRODE 5MM LHK LAPSCP STERILE DISP- MEGADYNE (5/CA)

## (undated) DEVICE — TOWEL STOP TIMEOUT SAFETY FLAG (40EA/CA)

## (undated) DEVICE — TOURNIQUET CUFF 34 X 4 ONE PORT DISP - STERILE (10/BX)

## (undated) DEVICE — GLOVE BIOGEL SZ 7.5 SURGICAL PF LTX - (50PR/BX 4BX/CA)

## (undated) DEVICE — SET EXTENSION WITH 2 PORTS (48EA/CA) ***PART #2C8610 IS A SUBSTITUTE*****

## (undated) DEVICE — PACK MAJOR BASIN - (2EA/CA)

## (undated) DEVICE — SENSOR OXIMETER ADULT SPO2 RD SET (20EA/BX)

## (undated) DEVICE — SUTURE 0 PDS-2 CTX 36 INCH - (24/BX)

## (undated) DEVICE — MAT PATIENT POSITIONING PREVALON (10EA/CA)

## (undated) DEVICE — GOWN WARMING STANDARD FLEX - (30/CA)

## (undated) DEVICE — CATHETER FOLEY ROBINSON 14FR 16IN STRL (12EA/CA)

## (undated) DEVICE — LENS/HOOD FOR SPACESUIT - (32/PK) PEEL AWAY FACE

## (undated) DEVICE — SUTURE 3-0 MONOCRYL PLUS PS-1 - 27 INCH (36/BX)

## (undated) DEVICE — SUCTION INSTRUMENT YANKAUER BULBOUS TIP W/O VENT (50EA/CA)

## (undated) DEVICE — SYRINGE SAFETY 10 ML 18 GA X 1 1/2 BLUNT LL (100/BX 4BX/CA)

## (undated) DEVICE — Device

## (undated) DEVICE — TUBE E-T HI-LO CUFF 6.5MM (10EA/BX)

## (undated) DEVICE — LACTATED RINGERS INJ 1000 ML - (14EA/CA 60CA/PF)

## (undated) DEVICE — NEPTUNE 4 PORT MANIFOLD - (20/PK)

## (undated) DEVICE — BLADE 90X18X1.27MM SAW SAGITTAL

## (undated) DEVICE — STAPLER 75MM LINEAR OPEN (3EA/BX)

## (undated) DEVICE — GLOVE BIOGEL SZ 8 SURGICAL PF LTX - (50PR/BX 4BX/CA)

## (undated) DEVICE — BLADE SAGITTAL 34MM

## (undated) DEVICE — TOWELS CLOTH SURGICAL - (4/PK 20PK/CA)

## (undated) DEVICE — TIP INTPLS HFLO ML ORFC BTRY - (12/CS)  FOR SURGILAV

## (undated) DEVICE — SPONGE GAUZESTER 4 X 4 4PLY - (128PK/CA)

## (undated) DEVICE — SYRINGE 30 ML LL (56/BX)

## (undated) DEVICE — GLOVE BIOGEL PI INDICATOR SZ 8.5 SURGICAL PF LF - (50PR/BX 4BX/CA)

## (undated) DEVICE — SUTURE 3-0 VICRYL PLUS SH - 27 INCH (36/BX)

## (undated) DEVICE — STAPLE 60MM BLUE 3.5MM - ECHELON (12/BX)

## (undated) DEVICE — PACK TOTAL KNEE  (1/CA)

## (undated) DEVICE — BAG RETRIEVAL 10ML (10EA/BX)

## (undated) DEVICE — NEEDLE W/FACET TIP DULL VERSION W/STIMULATION CABLE SONOPLEX 21G X 4 (10/EA)"

## (undated) DEVICE — GLOVE BIOGEL PI ORTHO SZ 7.5 PF LF (40PR/BX)

## (undated) DEVICE — GLOVE BIOGEL PI INDICATOR SZ 7.5 SURGICAL PF LF -(50/BX 4BX/CA)

## (undated) DEVICE — ELECTRODE 850 FOAM ADHESIVE - HYDROGEL RADIOTRNSPRNT (50/PK)

## (undated) DEVICE — DRAPE LARGE 3 QUARTER - (20/CA)

## (undated) DEVICE — COVER LIGHT HANDLE FLEXIBLE - SOFT (2EA/PK 80PK/CA)

## (undated) DEVICE — ELECTRODE DUAL RETURN W/ CORD - (50/PK)

## (undated) DEVICE — BLADE SAGITTAL SAW DUAL CUT 25.0 X 90.0 X 1.27MM (1/EA)

## (undated) DEVICE — HANDPIECE 10FT INTPLS SCT PLS IRRIGATION HAND CONTROL SET (6/PK)

## (undated) DEVICE — CHLORAPREP 26 ML APPLICATOR - ORANGE TINT(25/CA)

## (undated) DEVICE — KIT TAK BIO-SUTURE DISP

## (undated) DEVICE — WATER IRRIGATION STERILE 1000ML (12EA/CA)

## (undated) DEVICE — TUBE E-T HI-LO CUFF 7.0MM (10EA/PK)

## (undated) DEVICE — SPONGE GAUZESTER. 2X2 4-PL - (2/PK 50PK/BX 30BX/CS)

## (undated) DEVICE — SPONGE GAUZE NON-STERILE 4X4 - (2000/CA 10PK/CA)

## (undated) DEVICE — SUTURE 0 VICRYL PLUS UR-6 - 27 INCH (36/BX)

## (undated) DEVICE — SUTURE 2-0 MONOCRYL PLUS UNDYED CT-1 1 X 36 (36EA/BX)"

## (undated) DEVICE — SUTURE 2-0 VICRYL PLUS SH - 27 INCH (36/BX)

## (undated) DEVICE — TUBE CONNECTING SUCTION - CLEAR PLASTIC STERILE 72 IN (50EA/CA)

## (undated) DEVICE — KIT ANESTHESIA W/CIRCUIT & 3/LT BAG W/FILTER (20EA/CA)

## (undated) DEVICE — SUTURE 4-0 MONOCRYL PLUS PS-2 - 27 INCH (36/BX)

## (undated) DEVICE — CANISTER SUCTION 3000ML MECHANICAL FILTER AUTO SHUTOFF MEDI-VAC NONSTERILE LF DISP  (40EA/CA)

## (undated) DEVICE — HEAD HOLDER JUNIOR/ADULT

## (undated) DEVICE — CANNULA O2 COMFORT SOFT EAR ADULT 7 FT TUBING (50/CA)

## (undated) DEVICE — SET SUCTION/IRRIGATION WITH DISPOSABLE TIP (6/CA )PART #0250-070-520 IS A SUB

## (undated) DEVICE — SCISSORS 5MM CVD (6EA/BX)

## (undated) DEVICE — COVER LIGHT HANDLE ALC PLUS DISP (18EA/BX)

## (undated) DEVICE — MIXER BONE CEMENT REVOLUTION - W/FEMORAL PRESSURIZER (6/CA)

## (undated) DEVICE — SYRINGE SAFETY 5 ML 18 GA X 1-1/2 BLUNT LL (100/BX 4BX/CA)

## (undated) DEVICE — TUBING CLEARLINK DUO-VENT - C-FLO (48EA/CA)

## (undated) DEVICE — GLOVE BIOGEL PI ULTRATOUCH SZ 7.5 SURGICAL PF LF -(50/BX 4BX/CA)

## (undated) DEVICE — DRESSING AQUACEL AG ADVANTAGE 3.5 X 10" (10EA/BX)"

## (undated) DEVICE — CATHERTER CLEAR SINGLE USE INJECTION THERAPY NEEDLE 25GA X 4MM 2.3MM X 240CM (5EA/BX)

## (undated) DEVICE — FORCEP RADIAL JAW 4 STANDARD CAPACITY W/NEEDLE 240CM (40EA/BX)

## (undated) DEVICE — BLADE SURGICAL CLIPPER - (50EA/CA)

## (undated) DEVICE — GOWN SURGEONS LARGE - (32/CA)

## (undated) DEVICE — STAPLE 75MM LINEAR (12EA/BX)

## (undated) DEVICE — SUTURE 0 PDS CT-2 (36PK/BX)

## (undated) DEVICE — CANISTER SUCTION 3000ML MECHANICAL FILTER AUTO SHUTOFF MEDI-VAC NONSTERILE LF DISP (40EA/CA)

## (undated) DEVICE — SET LEADWIRE 5 LEAD BEDSIDE DISPOSABLE ECG (1SET OF 5/EA)

## (undated) DEVICE — KIT  I.V. START (100EA/CA)

## (undated) DEVICE — PINS, HEADLESS

## (undated) DEVICE — GLOVE BIOGEL PI ORTHO SZ 8 PF LF (40PR/BX)

## (undated) DEVICE — TROCAR 5X100 NON BLADED Z-TH - READ KII (6/BX)

## (undated) DEVICE — TROCAR Z THREAD12MM OPTICAL - NON BLADED (6/BX)

## (undated) DEVICE — GLOVE BIOGEL PI INDICATOR SZ 8.0 SURGICAL PF LF -(50/BX 4BX/CA)

## (undated) DEVICE — STAPLER SKIN DISP - (6/BX 10BX/CA) VISISTAT

## (undated) DEVICE — MASK ANESTHESIA ADULT  - (100/CA)

## (undated) DEVICE — KIT 2.25IN COL ILSTM 2 PC DRN - 57MM 2 1/4 INCH THIS IS FOR THE OR ONLY (5/BX)

## (undated) DEVICE — SUTURE 2-0 COATED VICRYL PLUS - 12 X 18 INCH (12/BX)

## (undated) DEVICE — TROCAR LAPSCP 100MM 12MM NTHRD - (6/BX)

## (undated) DEVICE — CANNULA W/SEAL 5X100 Z-THRE - ADED KII (12/BX)

## (undated) DEVICE — SUTURE GENERAL

## (undated) DEVICE — CANISTER SUCTION RIGID RED 1500CC (40EA/CA)

## (undated) DEVICE — DERMABOND ADVANCED - (12EA/BX)